# Patient Record
Sex: FEMALE | Race: WHITE | Employment: UNEMPLOYED | URBAN - METROPOLITAN AREA
[De-identification: names, ages, dates, MRNs, and addresses within clinical notes are randomized per-mention and may not be internally consistent; named-entity substitution may affect disease eponyms.]

---

## 2017-01-10 ENCOUNTER — TELEPHONE (OUTPATIENT)
Dept: NEUROLOGY | Facility: CLINIC | Age: 57
End: 2017-01-10

## 2017-01-10 DIAGNOSIS — M54.16 LUMBAR RADICULOPATHY: ICD-10-CM

## 2017-01-10 DIAGNOSIS — M48.061 SPINAL STENOSIS, LUMBAR REGION, WITHOUT NEUROGENIC CLAUDICATION: ICD-10-CM

## 2017-01-10 DIAGNOSIS — M51.9 LUMBAR DISC DISEASE: ICD-10-CM

## 2017-01-10 DIAGNOSIS — M43.10 ACQUIRED SPONDYLOLISTHESIS: Primary | ICD-10-CM

## 2017-01-10 RX ORDER — HYDROCODONE BITARTRATE AND ACETAMINOPHEN 5; 325 MG/1; MG/1
1 TABLET ORAL EVERY 4 HOURS PRN
Qty: 180 TABLET | Refills: 0 | Status: SHIPPED | OUTPATIENT
Start: 2017-01-10 | End: 2017-03-07

## 2017-01-10 NOTE — TELEPHONE ENCOUNTER
Drug Norco 5-325mg #180-30 refill 0    Last refill 11/16/2016    Last office visit 09/22/16    Next appointment 03/23/17    [place]

## 2017-01-10 NOTE — TELEPHONE ENCOUNTER
Patient has been scheduled for a bilateral L5 TFESI  on 01/27/17 at the [facility]. Medications and allergies reviewed. Patient informed to hold aspirins, nsaids, blood thinners, vitamins and fish oils 5-7 days prior to procedure.  Patient informed we will need v

## 2017-01-10 NOTE — TELEPHONE ENCOUNTER
Drug Norco 5-325mg    Last refill 11/16/26    Last office visit    Next appointment    [place]

## 2017-01-16 ENCOUNTER — TELEPHONE (OUTPATIENT)
Dept: NEUROLOGY | Facility: CLINIC | Age: 57
End: 2017-01-16

## 2017-01-16 NOTE — TELEPHONE ENCOUNTER
Called [facility] [phone] for Authorization of Approval for Bilateral L-5 CPT codes 18448-50 / 69227-23 if needed, t/t Moises Rosenthal, stated No Auth needed with   Ref #[id number] Patient is scheduled for her injection procedure 01/27/2017

## 2017-01-19 ENCOUNTER — TELEPHONE (OUTPATIENT)
Dept: NEUROLOGY | Facility: CLINIC | Age: 57
End: 2017-01-19

## 2017-01-24 ENCOUNTER — TELEPHONE (OUTPATIENT)
Dept: NEUROLOGY | Facility: CLINIC | Age: 57
End: 2017-01-24

## 2017-01-24 RX ORDER — GABAPENTIN 400 MG/1
400 CAPSULE ORAL 3 TIMES DAILY
Qty: 270 CAPSULE | Refills: 0 | Status: SHIPPED | OUTPATIENT
Start: 2017-01-24 | End: 2017-04-10

## 2017-01-24 NOTE — TELEPHONE ENCOUNTER
Medication request: Gabapentin 400mg tid (requesting 3 month supply)    LOV 9/22/16  NOV 3/23/17 (pt is scheduled for bilateral L5 TFESIs on 1/27/17)    Last refill: 10/4/16 #180  Patient increased Gabapentin 400mg bid to tid in November 2016.     Per Dr. Foreman Och

## 2017-01-27 ENCOUNTER — OFFICE VISIT (OUTPATIENT)
Dept: SURGERY | Facility: CLINIC | Age: 57
End: 2017-01-27

## 2017-01-27 DIAGNOSIS — M51.9 LUMBAR DISC DISEASE: ICD-10-CM

## 2017-01-27 DIAGNOSIS — M54.16 LUMBAR RADICULOPATHY: Primary | ICD-10-CM

## 2017-01-27 DIAGNOSIS — M96.1 POSTLAMINECTOMY SYNDROME, LUMBAR REGION: ICD-10-CM

## 2017-01-27 PROCEDURE — 64483 NJX AA&/STRD TFRM EPI L/S 1: CPT | Performed by: PHYSICAL MEDICINE & REHABILITATION

## 2017-01-27 NOTE — PROCEDURES
Francisco Jamil [address].    BILATERAL LUMBAR TRANSFORAMINAL   NAME:  Liseth Castaneda    MR #:    [id number] :  1960     PHYSICIAN:  Minor Saunders        Operative Report    DATE OF PROCEDURE: 2017   PREOPERATIVE DIAGNOSES: 1. right L Then, the patient was injected with a 3 cc solution of 1.5 cc of 40 mg/cc of Kenalog and 1.5 cc of 1% PF lidocaine without epinephrine. After this, the needle was removed.   Then  fluoroscopy was right anterior obliqued opening up the left L5-S1 interverte

## 2017-03-07 RX ORDER — HYDROCODONE BITARTRATE AND ACETAMINOPHEN 5; 325 MG/1; MG/1
1 TABLET ORAL EVERY 4 HOURS PRN
Qty: 180 TABLET | Refills: 0 | Status: SHIPPED | OUTPATIENT
Start: 2017-03-07 | End: 2017-05-02

## 2017-03-07 NOTE — TELEPHONE ENCOUNTER
Medication request: Norco 5-325mg q4h prn pain    LOV 9/22/16 (pt had bilateral L5 TFESI 1/27/17)  Krystal Brow 3/23/17    [place] 1/13/17 #180 (pt refills every 2 months)

## 2017-03-23 ENCOUNTER — OFFICE VISIT (OUTPATIENT)
Dept: NEUROLOGY | Facility: CLINIC | Age: 57
End: 2017-03-23

## 2017-03-23 VITALS
WEIGHT: 225 LBS | RESPIRATION RATE: 16 BRPM | DIASTOLIC BLOOD PRESSURE: 84 MMHG | HEIGHT: 67 IN | HEART RATE: 65 BPM | OXYGEN SATURATION: 95 % | BODY MASS INDEX: 35.31 KG/M2 | SYSTOLIC BLOOD PRESSURE: 138 MMHG

## 2017-03-23 DIAGNOSIS — M76.61 TENDONITIS, ACHILLES, RIGHT: ICD-10-CM

## 2017-03-23 DIAGNOSIS — M54.16 LUMBAR RADICULOPATHY: ICD-10-CM

## 2017-03-23 DIAGNOSIS — M48.061 SPINAL STENOSIS, LUMBAR REGION, WITHOUT NEUROGENIC CLAUDICATION: ICD-10-CM

## 2017-03-23 DIAGNOSIS — M96.1 POSTLAMINECTOMY SYNDROME, LUMBAR REGION: ICD-10-CM

## 2017-03-23 DIAGNOSIS — M51.9 LUMBAR DISC DISEASE: ICD-10-CM

## 2017-03-23 DIAGNOSIS — M43.10 ACQUIRED SPONDYLOLISTHESIS: Primary | ICD-10-CM

## 2017-03-23 PROCEDURE — 99214 OFFICE O/P EST MOD 30 MIN: CPT | Performed by: PHYSICAL MEDICINE & REHABILITATION

## 2017-03-23 NOTE — PATIENT INSTRUCTIONS
Plan  The patient does not need any injections at this time. The patient will continue with their current pain medications.     The patient will continue with her home exercise program.    She will follow up in 1 year unless there are any changes in he

## 2017-03-23 NOTE — PROGRESS NOTES
Low Back Pain H & P    Chief Complaint: Patient presents with: Follow - Up: LOV 9/22/16. Lumbar injection 1/2017. Pt stated that she got 75% relief of symptoms. Hep being done,. Pt taking Norco and neurontin. Pt continues to have right lower back pain. • Cancer Father    • Diabetes Mother    • Other[other] [OTHER] Mother      Parkinson       Social History     Social History   Marital Status:   Spouse Name: N/A    Years of Education: N/A  Number of Children: N/A     Occupational History  None o posterior pulse-RIGHT 2+   Tibialis posterior pulse-LEFT 2+     Neurological Lower Extremity:    Light Touch Sensation: Intact in bilateral Lower Extremities   LE Muscle Strength:  All LE strength measurements 5/5  Hamstring RIGHT:   4/5   RIGHT plantar ref

## 2017-04-10 ENCOUNTER — TELEPHONE (OUTPATIENT)
Dept: NEUROLOGY | Facility: CLINIC | Age: 57
End: 2017-04-10

## 2017-04-10 NOTE — TELEPHONE ENCOUNTER
Medication request: Gabapentin 400mg tid    LOV 3/23/17 (f/u in one year)  No follow up scheduled    Last refill: 1/24/17 #561

## 2017-04-11 RX ORDER — GABAPENTIN 400 MG/1
400 CAPSULE ORAL 3 TIMES DAILY
Qty: 270 CAPSULE | Refills: 1 | Status: SHIPPED | OUTPATIENT
Start: 2017-04-24 | End: 2017-04-12

## 2017-04-12 RX ORDER — GABAPENTIN 400 MG/1
400 CAPSULE ORAL 3 TIMES DAILY
Qty: 270 CAPSULE | Refills: 1 | Status: SHIPPED | OUTPATIENT
Start: 2017-04-24 | End: 2018-05-01

## 2017-04-12 NOTE — TELEPHONE ENCOUNTER
Nick Gearing at [facility] in [facility] and cancelled Gabapentin 400mg tid #270 r-1. Prescription Gabapentin 400mg tid #270 r-1 e-scribed to preferred pharmacy ([facility] in [facility]) as requested.

## 2017-04-12 NOTE — TELEPHONE ENCOUNTER
Doc Daija would like her gabapentin resent to a new pharmacy just added to her chart so she can get a 90 day supply.   Please send to [facility] in [place], [place]

## 2017-05-02 ENCOUNTER — TELEPHONE (OUTPATIENT)
Dept: NEUROLOGY | Facility: CLINIC | Age: 57
End: 2017-05-02

## 2017-05-02 RX ORDER — HYDROCODONE BITARTRATE AND ACETAMINOPHEN 5; 325 MG/1; MG/1
1 TABLET ORAL EVERY 4 HOURS PRN
Qty: 180 TABLET | Refills: 0 | Status: SHIPPED | OUTPATIENT
Start: 2017-05-02 | End: 2017-06-28

## 2017-05-02 RX ORDER — METHYLPREDNISOLONE 4 MG/1
TABLET ORAL
Qty: 1 PACKAGE | Refills: 0 | Status: SHIPPED | OUTPATIENT
Start: 2017-05-02 | End: 2018-02-26

## 2017-05-02 RX ORDER — METHYLPREDNISOLONE 4 MG/1
TABLET ORAL
Qty: 1 PACKAGE | Refills: 0 | Status: SHIPPED | OUTPATIENT
Start: 2017-05-02 | End: 2018-01-11

## 2017-05-02 NOTE — TELEPHONE ENCOUNTER
Pt called Rx sent to [facility] per pt request.Drug Norco 5-325mg q 4hr prn #180-30. 0 refill    [place] Roldan Morales rx #[id number] filled 1/10/17 no record of fill [facility] [place].     Last office visit 03/23/17    Next appointment none    Pt h

## 2017-05-02 NOTE — TELEPHONE ENCOUNTER
Recalled pt. Pt c/o of low back pain across both buttock going into thigh bilateral no further down leg. Pt stated that in pass Dr Judy Velasquez gave medrol pack and pt had good relief of symptoms with steroid. Last medrol 4mg domingo given in Dec 2016.  Pt also asking

## 2017-05-17 ENCOUNTER — TELEPHONE (OUTPATIENT)
Dept: NEUROLOGY | Facility: CLINIC | Age: 57
End: 2017-05-17

## 2017-05-17 DIAGNOSIS — M48.061 SPINAL STENOSIS, LUMBAR REGION, WITHOUT NEUROGENIC CLAUDICATION: ICD-10-CM

## 2017-05-17 DIAGNOSIS — M96.1 POSTLAMINECTOMY SYNDROME, LUMBAR REGION: ICD-10-CM

## 2017-05-17 DIAGNOSIS — M43.10 ACQUIRED SPONDYLOLISTHESIS: ICD-10-CM

## 2017-05-17 DIAGNOSIS — M54.16 LUMBAR RADICULOPATHY: Primary | ICD-10-CM

## 2017-05-17 NOTE — TELEPHONE ENCOUNTER
Patient states she is experiencing lower back pain bilaterally radiating in both buttocks. Patient has been off work 2 days due to pain. Patient will occasionally have some superficial numbness in the left thigh.  Pain score of 6-8/10 at worst. Taking Norco

## 2017-05-18 NOTE — TELEPHONE ENCOUNTER
Patient has been scheduled for a bilateral L5 TFESIs  on 6/2/17 at the [facility]. Medications and allergies reviewed. Patient informed to hold aspirins, nsaids, blood thinners, vitamins and fish oils 5-7 days prior to procedure.  Patient informed we will need ve

## 2017-05-18 NOTE — TELEPHONE ENCOUNTER
Called [facility] BS for authorization of approval of bilateral L5 TFESIs cpt codes 21626-61,19635. Talked to [address].  who states no authorization is required. Reference # [id number]. Will inform Nursing.

## 2017-06-02 ENCOUNTER — OFFICE VISIT (OUTPATIENT)
Dept: SURGERY | Facility: CLINIC | Age: 57
End: 2017-06-02

## 2017-06-02 DIAGNOSIS — M96.1 POSTLAMINECTOMY SYNDROME, LUMBAR REGION: ICD-10-CM

## 2017-06-02 DIAGNOSIS — M51.9 LUMBAR DISC DISEASE: ICD-10-CM

## 2017-06-02 DIAGNOSIS — M54.16 LUMBAR RADICULOPATHY: Primary | ICD-10-CM

## 2017-06-02 PROCEDURE — 64483 NJX AA&/STRD TFRM EPI L/S 1: CPT | Performed by: PHYSICAL MEDICINE & REHABILITATION

## 2017-06-02 NOTE — PROCEDURES
Francisco CHANEY 7.    BILATERAL LUMBAR TRANSFORAMINAL   NAME:  Celi Guzman    MR #:    [id number] :  1960     PHYSICIAN:  Leslie Saunders        Operative Report    DATE OF PROCEDURE: 2017   PREOPERATIVE DIAGNOSES: 1. right L5 No blood, fluid, or air was aspirated. Then, the patient was injected with a 3 cc solution of 1.5 cc of 40 mg/cc of Kenalog and 1.5 cc of 1% PF lidocaine without epinephrine. After this, the needle was removed.   Then  fluoroscopy was right anterior obliq

## 2017-06-28 NOTE — TELEPHONE ENCOUNTER
TANIKA refill policy is 12-81 business hours for controlled medications    Medication request: Norco 5-325mg q4h prn pain    LOV 3/23/17 -had bilateral L5 TFESIs on 6/2/17  NOV 9/14/17    [place] 5/5/17 #180

## 2017-06-29 RX ORDER — HYDROCODONE BITARTRATE AND ACETAMINOPHEN 5; 325 MG/1; MG/1
1 TABLET ORAL EVERY 4 HOURS PRN
Qty: 180 TABLET | Refills: 0 | Status: SHIPPED | OUTPATIENT
Start: 2017-06-29 | End: 2017-08-28

## 2017-08-28 ENCOUNTER — TELEPHONE (OUTPATIENT)
Dept: NEUROLOGY | Facility: CLINIC | Age: 57
End: 2017-08-28

## 2017-08-28 RX ORDER — HYDROCODONE BITARTRATE AND ACETAMINOPHEN 5; 325 MG/1; MG/1
1 TABLET ORAL EVERY 4 HOURS PRN
Qty: 180 TABLET | Refills: 0 | Status: SHIPPED | OUTPATIENT
Start: 2017-08-28 | End: 2017-10-20

## 2017-08-28 NOTE — TELEPHONE ENCOUNTER
Patient requesting refill on Norco 5-325 mg  Checked [facility] [place] last filled was 630/2017  LOV 6/2/2017  NOV 9/14/2017  Rx printed and signed and left at desk for   Last office note was to continue same current medications

## 2017-08-29 ENCOUNTER — TELEPHONE (OUTPATIENT)
Dept: NEUROLOGY | Facility: CLINIC | Age: 57
End: 2017-08-29

## 2017-09-08 NOTE — TELEPHONE ENCOUNTER
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 Aneta Hale.

## 2017-09-13 ENCOUNTER — CHARTING TRANS (OUTPATIENT)
Dept: OTHER | Age: 57
End: 2017-09-13

## 2017-09-14 ENCOUNTER — OFFICE VISIT (OUTPATIENT)
Dept: NEUROLOGY | Facility: CLINIC | Age: 57
End: 2017-09-14

## 2017-09-14 ENCOUNTER — TELEPHONE (OUTPATIENT)
Dept: NEUROLOGY | Facility: CLINIC | Age: 57
End: 2017-09-14

## 2017-09-14 VITALS
HEIGHT: 67 IN | SYSTOLIC BLOOD PRESSURE: 134 MMHG | HEART RATE: 78 BPM | WEIGHT: 225 LBS | BODY MASS INDEX: 35.31 KG/M2 | DIASTOLIC BLOOD PRESSURE: 82 MMHG

## 2017-09-14 DIAGNOSIS — M54.16 LUMBAR RADICULOPATHY: Primary | ICD-10-CM

## 2017-09-14 DIAGNOSIS — M43.10 ACQUIRED SPONDYLOLISTHESIS: ICD-10-CM

## 2017-09-14 DIAGNOSIS — M48.061 SPINAL STENOSIS, LUMBAR REGION, WITHOUT NEUROGENIC CLAUDICATION: ICD-10-CM

## 2017-09-14 DIAGNOSIS — M51.9 LUMBAR DISC DISEASE: ICD-10-CM

## 2017-09-14 DIAGNOSIS — M96.1 POSTLAMINECTOMY SYNDROME, LUMBAR REGION: ICD-10-CM

## 2017-09-14 PROCEDURE — 99214 OFFICE O/P EST MOD 30 MIN: CPT | Performed by: PHYSICAL MEDICINE & REHABILITATION

## 2017-09-14 RX ORDER — VALSARTAN 160 MG/1
160 TABLET ORAL DAILY
COMMUNITY
End: 2018-05-01

## 2017-09-14 NOTE — TELEPHONE ENCOUNTER
Left detailed message informing patient of approval of bilateral L5 TFESIs and to call back when ready to schedule.  (HIPAA verified)

## 2017-09-14 NOTE — TELEPHONE ENCOUNTER
Called [facility] BS for authorization of approval of bilateral L5 TFESIs cpt codes I4273813, [id number]. Talked to [facility].      who states no authorization is required. Reference #      [id number] inform Nursing.

## 2017-09-14 NOTE — PATIENT INSTRUCTIONS
Refill policies:    • Allow 2 business days for refills; controlled substances may take longer.   • Contact your pharmacy at least 5 days prior to running out of medication and have them send an electronic request or submit request through the “request re your physician has recommended that you have a procedure or additional testing performed. [facility] will contact your insurance carrier to obtain pre-certification or prior authorization.     Unfortunately, TANIKA has seen an increas

## 2017-09-14 NOTE — TELEPHONE ENCOUNTER
Called [facility] BS for authorization of approval of bilateral L5 TFESIs cpt codes U6285692, Z2155632. Talked to [facility]. Who states no authorization is required. Reference # [id number]. Will inform Nursing.

## 2017-09-14 NOTE — PROGRESS NOTES
Low Back Pain H & P    Chief Complaint: Patient presents with:  Low Back Pain: LOV 03/23/17. Pt had bilateral L5 TFESI 06/02/17 with 80% relief lasting appro 3 months symptoms are now slowly reoccuring.   Pain low back bilaterally worse on left travels down tobacco: Never Used    Alcohol use Yes  0.0 oz/week     Comment: 1 drink every other month on average.     Drug use: No    Sexual activity: Not on file     Other Topics Concern    Caffeine Concern Yes    Comment: 2 cups coffee daily    Exercise Yes    Comme 2. s/p left L4-5 laminectomy    3. L4-5 bilateral mild, L3-4 right mild foraminal stenosis    4. L4-5 grade 1-2 unstable spondylolisthesis    5.  L4-5 right mild/left mod diffuse, L5-S1 right mild, L3-4 right mild-mod/left mild far lateral, L2-3 right mil

## 2017-09-22 ENCOUNTER — MED REC SCAN ONLY (OUTPATIENT)
Dept: NEUROLOGY | Facility: CLINIC | Age: 57
End: 2017-09-22

## 2017-09-22 NOTE — TELEPHONE ENCOUNTER
Forms completed and emailed to patient at her request at [email or web address]    Copy sent to scanning

## 2017-09-29 ENCOUNTER — TELEPHONE (OUTPATIENT)
Dept: NEUROLOGY | Facility: CLINIC | Age: 57
End: 2017-09-29

## 2017-09-29 NOTE — TELEPHONE ENCOUNTER
Patient has been scheduled for a bilateral L5 TFESI  on 10/20/17 at the [place]. Medications and allergies reviewed. Patient informed to hold aspirins, nsaids, blood thinners, vitamins and fish oils 5-7 days prior to procedure.  Patient informed we will need v

## 2017-10-20 ENCOUNTER — APPOINTMENT (OUTPATIENT)
Dept: SURGERY | Facility: CLINIC | Age: 57
End: 2017-10-20

## 2017-10-20 DIAGNOSIS — M51.9 LUMBAR DISC DISEASE: ICD-10-CM

## 2017-10-20 DIAGNOSIS — M96.1 POSTLAMINECTOMY SYNDROME, LUMBAR REGION: ICD-10-CM

## 2017-10-20 DIAGNOSIS — M54.16 LUMBAR RADICULOPATHY: Primary | ICD-10-CM

## 2017-10-20 PROCEDURE — 64483 NJX AA&/STRD TFRM EPI L/S 1: CPT | Performed by: PHYSICAL MEDICINE & REHABILITATION

## 2017-10-20 NOTE — PROCEDURES
Francisco CHANEY 7.    BILATERAL LUMBAR TRANSFORAMINAL   NAME:  Amado Bragg    MR #:    [id number] :  1960     PHYSICIAN:  Renan Saunders        Operative Report    DATE OF PROCEDURE: 10/20/2017   PREOPERATIVE DIAGNOSES: 1. Bilate injected with a 3 cc solution of 1.5 cc of 40 mg/cc of Kenalog and 1.5 cc of 1% PF lidocaine without epinephrine. After this, the needle was removed. Then  fluoroscopy was right anterior obliqued opening up the left L5-S1 intervertebral foramen.   At this

## 2017-10-20 NOTE — TELEPHONE ENCOUNTER
Drug Norco 5-325mg q 4hr prn pain #180-30 0 refill    Last refill 08/28/17    Last office visit 09/14/17    Next appointment    [place]

## 2017-10-22 RX ORDER — HYDROCODONE BITARTRATE AND ACETAMINOPHEN 5; 325 MG/1; MG/1
1 TABLET ORAL EVERY 4 HOURS PRN
Qty: 180 TABLET | Refills: 0 | Status: SHIPPED | OUTPATIENT
Start: 2017-10-22 | End: 2017-12-27

## 2017-10-24 ENCOUNTER — DIAGNOSTIC TRANS (OUTPATIENT)
Dept: OTHER | Age: 57
End: 2017-10-24

## 2017-10-24 ENCOUNTER — HOSPITAL (OUTPATIENT)
Dept: OTHER | Age: 57
End: 2017-10-24
Attending: SURGERY

## 2017-10-30 ENCOUNTER — MED REC SCAN ONLY (OUTPATIENT)
Dept: NEUROLOGY | Facility: CLINIC | Age: 57
End: 2017-10-30

## 2017-10-30 ENCOUNTER — TELEPHONE (OUTPATIENT)
Dept: NEUROLOGY | Facility: CLINIC | Age: 57
End: 2017-10-30

## 2017-10-30 NOTE — TELEPHONE ENCOUNTER
Left message information added to [facility] paperwork signed by Dr Teodora Rapp 9/20/17. Copy to scanning and copy mailed to patient home address. Unable to e-mail that patient requested.

## 2017-11-14 ENCOUNTER — IMAGING SERVICES (OUTPATIENT)
Dept: OTHER | Age: 57
End: 2017-11-14

## 2017-11-14 ENCOUNTER — CHARTING TRANS (OUTPATIENT)
Dept: OTHER | Age: 57
End: 2017-11-14

## 2017-11-14 ASSESSMENT — PAIN SCALES - GENERAL: PAINLEVEL_OUTOF10: 0

## 2017-11-20 ENCOUNTER — HOSPITAL (OUTPATIENT)
Dept: OTHER | Age: 57
End: 2017-11-20
Attending: SURGERY

## 2017-11-20 ENCOUNTER — CHARTING TRANS (OUTPATIENT)
Dept: OTHER | Age: 57
End: 2017-11-20

## 2017-11-20 LAB
ALBUMIN SERPL-MCNC: 3 GM/DL (ref 3.6–5.1)
ALBUMIN/GLOB SERPL: 0.9 {RATIO} (ref 1–2.4)
ALP SERPL-CCNC: 58 UNIT/L (ref 45–117)
ALT SERPL-CCNC: 71 UNIT/L
ANALYZER ANC (IANC): ABNORMAL
ANION GAP SERPL CALC-SCNC: 10 MMOL/L (ref 10–20)
APTT PPP: 22 SECONDS (ref 22–30)
APTT PPP: NORMAL S
AST SERPL-CCNC: 66 UNIT/L
BASE DEFICIT BLDA-SCNC: ABNORMAL MMOL/L
BASE EXCESS BLDA CALC-SCNC: 2 MMOL/L (ref 0–3)
BASOPHILS # BLD: 0 THOUSAND/MCL (ref 0–0.3)
BASOPHILS NFR BLD: 0 %
BDY SITE: ABNORMAL
BILIRUB SERPL-MCNC: 0.8 MG/DL (ref 0.2–1)
BODY TEMPERATURE: 36.2 DEGREES
BUN SERPL-MCNC: 13 MG/DL (ref 6–20)
BUN/CREAT SERPL: 17 (ref 7–25)
CA-I BLDA-SCNC: 16 % (ref 15–23)
CALCIUM SERPL-MCNC: 8.8 MG/DL (ref 8.4–10.2)
CHLORIDE: 106 MMOL/L (ref 98–107)
CO2 SERPL-SCNC: 30 MMOL/L (ref 21–32)
COHGB MFR BLD: 2.5 %
CONDITION: ABNORMAL
CONDITION: ABNORMAL
CREAT SERPL-MCNC: 0.78 MG/DL (ref 0.51–0.95)
DIFFERENTIAL METHOD BLD: ABNORMAL
EOSINOPHIL # BLD: 0 THOUSAND/MCL (ref 0.1–0.5)
EOSINOPHIL NFR BLD: 0 %
ERYTHROCYTE [DISTWIDTH] IN BLOOD: 14.3 % (ref 11–15)
GLOBULIN SER-MCNC: 3.3 GM/DL (ref 2–4)
GLUCOSE BLDC GLUCOMTR-MCNC: 153 MG/DL (ref 65–99)
GLUCOSE BLDC GLUCOMTR-MCNC: 84 MG/DL (ref 65–99)
GLUCOSE SERPL-MCNC: 150 MG/DL (ref 65–99)
HCO3 BLDA-SCNC: 28 MMOL/L (ref 22–28)
HCT VFR BLD CALC: 41 % (ref 36–46.5)
HEMATOCRIT: 39.9 % (ref 36–46.5)
HGB BLD-MCNC: 13.6 GM/DL (ref 12–15.5)
HGB BLD-MCNC: 13.8 GM/DL (ref 12–15.5)
HOROWITZ INDEX BLD+IHG-RTO: ABNORMAL MM[HG]
INR PPP: 1
LYMPHOCYTES # BLD: 1.1 THOUSAND/MCL (ref 1–4)
LYMPHOCYTES NFR BLD: 5 %
MAGNESIUM SERPL-MCNC: 2 MG/DL (ref 1.7–2.4)
MCH RBC QN AUTO: 31.5 PG (ref 26–34)
MCHC RBC AUTO-ENTMCNC: 34.6 GM/DL (ref 32–36.5)
MCV RBC AUTO: 91.1 FL (ref 78–100)
METHGB MFR BLD: 1.5 %
MONOCYTES # BLD: 1 THOUSAND/MCL (ref 0.3–0.9)
MONOCYTES NFR BLD: 5 %
NEUTROPHILS # BLD: 17.9 THOUSAND/MCL (ref 1.8–7.7)
NEUTROPHILS NFR BLD: 90 %
NEUTS SEG NFR BLD: ABNORMAL %
OXYHGB MFR BLD: 86.2 % (ref 94–98)
PCO2 BLDA: 45 MM HG (ref 32–45)
PERCENT NRBC: ABNORMAL
PH BLDA: 7.39 UNIT (ref 7.35–7.45)
PHOSPHATE SERPL-MCNC: 4.8 MG/DL (ref 2.4–4.7)
PLATELET # BLD: 299 THOUSAND/MCL (ref 140–450)
PO2 BLDA: 47 MM HG (ref 83–108)
POTASSIUM BLD-SCNC: 2.9 MMOL/L (ref 3.4–5.1)
POTASSIUM SERPL-SCNC: 3.3 MMOL/L (ref 3.4–5.1)
PROT SERPL-MCNC: 6.3 GM/DL (ref 6.4–8.2)
PROTHROMBIN TIME: 11 SECONDS (ref 9.7–11.8)
PROTHROMBIN TIME: NORMAL
RBC # BLD: 4.38 MILLION/MCL (ref 4–5.2)
SAO2 % BLDA: 90 % (ref 95–99)
SODIUM BLD-SCNC: 136 MMOL/L (ref 135–145)
SODIUM SERPL-SCNC: 143 MMOL/L (ref 135–145)
WBC # BLD: 19.9 THOUSAND/MCL (ref 4.2–11)

## 2017-11-21 LAB
ALBUMIN SERPL-MCNC: 2.6 GM/DL (ref 3.6–5.1)
ALP SERPL-CCNC: 55 UNIT/L (ref 45–117)
ALT SERPL-CCNC: 71 UNIT/L
ANALYZER ANC (IANC): ABNORMAL
ANION GAP SERPL CALC-SCNC: 9 MMOL/L (ref 10–20)
AST SERPL-CCNC: 58 UNIT/L
BILIRUB CONJ SERPL-MCNC: 0.2 MG/DL (ref 0–0.2)
BILIRUB SERPL-MCNC: 0.8 MG/DL (ref 0.2–1)
BUN SERPL-MCNC: 11 MG/DL (ref 6–20)
BUN/CREAT SERPL: 16 (ref 7–25)
CALCIUM SERPL-MCNC: 8.5 MG/DL (ref 8.4–10.2)
CHLORIDE: 107 MMOL/L (ref 98–107)
CO2 SERPL-SCNC: 30 MMOL/L (ref 21–32)
CREAT SERPL-MCNC: 0.68 MG/DL (ref 0.51–0.95)
ERYTHROCYTE [DISTWIDTH] IN BLOOD: 14.8 % (ref 11–15)
GLUCOSE BLDC GLUCOMTR-MCNC: 108 MG/DL (ref 65–99)
GLUCOSE BLDC GLUCOMTR-MCNC: 126 MG/DL (ref 65–99)
GLUCOSE BLDC GLUCOMTR-MCNC: 85 MG/DL (ref 65–99)
GLUCOSE BLDC GLUCOMTR-MCNC: 95 MG/DL (ref 65–99)
GLUCOSE SERPL-MCNC: 124 MG/DL (ref 65–99)
HEMATOCRIT: 38.1 % (ref 36–46.5)
HGB BLD-MCNC: 13 GM/DL (ref 12–15.5)
MAGNESIUM SERPL-MCNC: 1.8 MG/DL (ref 1.7–2.4)
MCH RBC QN AUTO: 31.8 PG (ref 26–34)
MCHC RBC AUTO-ENTMCNC: 34.1 GM/DL (ref 32–36.5)
MCV RBC AUTO: 93.2 FL (ref 78–100)
PHOSPHATE SERPL-MCNC: 2.7 MG/DL (ref 2.4–4.7)
PLATELET # BLD: 328 THOUSAND/MCL (ref 140–450)
POTASSIUM SERPL-SCNC: 4.3 MMOL/L (ref 3.4–5.1)
PROT SERPL-MCNC: 6.1 GM/DL (ref 6.4–8.2)
RBC # BLD: 4.09 MILLION/MCL (ref 4–5.2)
SODIUM SERPL-SCNC: 142 MMOL/L (ref 135–145)
WBC # BLD: 16.3 THOUSAND/MCL (ref 4.2–11)

## 2017-11-22 LAB
ALBUMIN SERPL-MCNC: 2.7 GM/DL (ref 3.6–5.1)
ALBUMIN/GLOB SERPL: 0.7 {RATIO} (ref 1–2.4)
ALP SERPL-CCNC: 61 UNIT/L (ref 45–117)
ALT SERPL-CCNC: 66 UNIT/L
ANALYZER ANC (IANC): ABNORMAL
ANION GAP SERPL CALC-SCNC: 10 MMOL/L (ref 10–20)
AST SERPL-CCNC: 42 UNIT/L
BASOPHILS # BLD: 0 THOUSAND/MCL (ref 0–0.3)
BASOPHILS NFR BLD: 0 %
BILIRUB SERPL-MCNC: 1.1 MG/DL (ref 0.2–1)
BUN SERPL-MCNC: 7 MG/DL (ref 6–20)
BUN/CREAT SERPL: 12 (ref 7–25)
CALCIUM SERPL-MCNC: 8.6 MG/DL (ref 8.4–10.2)
CHLORIDE: 104 MMOL/L (ref 98–107)
CO2 SERPL-SCNC: 31 MMOL/L (ref 21–32)
CREAT SERPL-MCNC: 0.59 MG/DL (ref 0.51–0.95)
DIFFERENTIAL METHOD BLD: ABNORMAL
EOSINOPHIL # BLD: 0.1 THOUSAND/MCL (ref 0.1–0.5)
EOSINOPHIL NFR BLD: 1 %
ERYTHROCYTE [DISTWIDTH] IN BLOOD: 14.4 % (ref 11–15)
GLOBULIN SER-MCNC: 3.8 GM/DL (ref 2–4)
GLUCOSE BLDC GLUCOMTR-MCNC: 102 MG/DL (ref 65–99)
GLUCOSE BLDC GLUCOMTR-MCNC: 93 MG/DL (ref 65–99)
GLUCOSE SERPL-MCNC: 95 MG/DL (ref 65–99)
HEMATOCRIT: 39.1 % (ref 36–46.5)
HGB BLD-MCNC: 12.9 GM/DL (ref 12–15.5)
LYMPHOCYTES # BLD: 1.6 THOUSAND/MCL (ref 1–4)
LYMPHOCYTES NFR BLD: 14 %
MAGNESIUM SERPL-MCNC: 2 MG/DL (ref 1.7–2.4)
MCH RBC QN AUTO: 30.9 PG (ref 26–34)
MCHC RBC AUTO-ENTMCNC: 33 GM/DL (ref 32–36.5)
MCV RBC AUTO: 93.5 FL (ref 78–100)
MONOCYTES # BLD: 1 THOUSAND/MCL (ref 0.3–0.9)
MONOCYTES NFR BLD: 9 %
NEUTROPHILS # BLD: 8.2 THOUSAND/MCL (ref 1.8–7.7)
NEUTROPHILS NFR BLD: 76 %
NEUTS SEG NFR BLD: ABNORMAL %
PERCENT NRBC: ABNORMAL
PHOSPHATE SERPL-MCNC: 2.6 MG/DL (ref 2.4–4.7)
PLATELET # BLD: 297 THOUSAND/MCL (ref 140–450)
POTASSIUM SERPL-SCNC: 3.7 MMOL/L (ref 3.4–5.1)
PROT SERPL-MCNC: 6.5 GM/DL (ref 6.4–8.2)
RBC # BLD: 4.18 MILLION/MCL (ref 4–5.2)
SODIUM SERPL-SCNC: 141 MMOL/L (ref 135–145)
WBC # BLD: 10.9 THOUSAND/MCL (ref 4.2–11)

## 2017-12-27 ENCOUNTER — TELEPHONE (OUTPATIENT)
Dept: NEUROLOGY | Facility: CLINIC | Age: 57
End: 2017-12-27

## 2017-12-27 NOTE — TELEPHONE ENCOUNTER
Drug Norco 5-325mg q6hr prn pain #180-0 0-refill    Last refill 11/15/17    Last office visit 09/14/17    Next appointment 02/26/18    [place]  Pt had  Gastric bypass surgery by Dr Vinod Stewart in November. Brandon Pena was order by Dr Vinod Stewart.

## 2017-12-29 RX ORDER — HYDROCODONE BITARTRATE AND ACETAMINOPHEN 5; 325 MG/1; MG/1
1 TABLET ORAL EVERY 4 HOURS PRN
Qty: 180 TABLET | Refills: 0 | Status: SHIPPED | OUTPATIENT
Start: 2017-12-29 | End: 2018-02-26

## 2018-01-11 ENCOUNTER — TELEPHONE (OUTPATIENT)
Dept: NEUROLOGY | Facility: CLINIC | Age: 58
End: 2018-01-11

## 2018-01-11 RX ORDER — METHYLPREDNISOLONE 4 MG/1
TABLET ORAL
Qty: 1 PACKAGE | Refills: 0 | Status: SHIPPED | OUTPATIENT
Start: 2018-01-11 | End: 2018-02-26

## 2018-01-11 NOTE — TELEPHONE ENCOUNTER
Pt recalled stated that she was helping dtg move bookcase and pulled something in left lower back, pt feels it may be muscle. Pt is asking for medirol dose domingo for what she feels is muscle related. Norco is not relieving pain. Appro 2 tabs per day.  Per

## 2018-01-19 ENCOUNTER — TELEPHONE (OUTPATIENT)
Dept: NEUROLOGY | Facility: CLINIC | Age: 58
End: 2018-01-19

## 2018-01-23 ENCOUNTER — MED REC SCAN ONLY (OUTPATIENT)
Dept: NEUROLOGY | Facility: CLINIC | Age: 58
End: 2018-01-23

## 2018-02-26 ENCOUNTER — OFFICE VISIT (OUTPATIENT)
Dept: NEUROLOGY | Facility: CLINIC | Age: 58
End: 2018-02-26

## 2018-02-26 ENCOUNTER — TELEPHONE (OUTPATIENT)
Dept: NEUROLOGY | Facility: CLINIC | Age: 58
End: 2018-02-26

## 2018-02-26 VITALS
RESPIRATION RATE: 18 BRPM | SYSTOLIC BLOOD PRESSURE: 118 MMHG | DIASTOLIC BLOOD PRESSURE: 70 MMHG | WEIGHT: 210 LBS | HEIGHT: 67 IN | HEART RATE: 82 BPM | BODY MASS INDEX: 32.96 KG/M2

## 2018-02-26 DIAGNOSIS — M43.10 ACQUIRED SPONDYLOLISTHESIS: ICD-10-CM

## 2018-02-26 DIAGNOSIS — M51.9 LUMBAR DISC DISEASE: Primary | ICD-10-CM

## 2018-02-26 DIAGNOSIS — M48.061 SPINAL STENOSIS, LUMBAR REGION, WITHOUT NEUROGENIC CLAUDICATION: ICD-10-CM

## 2018-02-26 DIAGNOSIS — M96.1 POSTLAMINECTOMY SYNDROME, LUMBAR REGION: ICD-10-CM

## 2018-02-26 DIAGNOSIS — M76.61 TENDONITIS, ACHILLES, RIGHT: ICD-10-CM

## 2018-02-26 DIAGNOSIS — M54.16 LUMBAR RADICULOPATHY: ICD-10-CM

## 2018-02-26 PROCEDURE — 99214 OFFICE O/P EST MOD 30 MIN: CPT | Performed by: PHYSICAL MEDICINE & REHABILITATION

## 2018-02-26 RX ORDER — HYDROCODONE BITARTRATE AND ACETAMINOPHEN 5; 325 MG/1; MG/1
1 TABLET ORAL EVERY 4 HOURS PRN
Qty: 180 TABLET | Refills: 0 | Status: SHIPPED | OUTPATIENT
Start: 2018-02-26 | End: 2018-04-23

## 2018-02-26 RX ORDER — LORAZEPAM 1 MG/1
1 TABLET ORAL AS NEEDED
Qty: 2 TABLET | Refills: 0 | Status: SHIPPED | OUTPATIENT
Start: 2018-02-26 | End: 2018-08-09 | Stop reason: ALTCHOICE

## 2018-02-26 NOTE — TELEPHONE ENCOUNTER
Left detailed message informing patient of the denial for right achilles tendon platelet rich plasma injection. Patient notified to contact office if she would like to pay out of pocket $933.00 + proceed with scheduling PRP.-HIPAA verified.     Patient was

## 2018-02-26 NOTE — TELEPHONE ENCOUNTER
Called [facility] BS for authorization of approval of Right Achilles tendon platelet rich plasma injection cpt code 0232T. Talked to [address] who states it is NOT a covered benefit. Reference # [id number]. Will  inform Nursing.

## 2018-02-26 NOTE — PROGRESS NOTES
Low Back Pain H & P    Chief Complaint: Patient presents with:  Low Back Pain: Patient is here for f/u on lower back pain and right heels pain. sharp pain in lower back. Right heel is warm to touch constantly painful and difficult to bear weight.       Fanny Drug use: No    Sexual activity: Not on file     Other Topics Concern    Caffeine Concern Yes    Comment: 2 cups coffee daily    Exercise Yes    Comment: walk 1-3 miles per day. Social History Narrative    The patient does not use an assistive device. Roderick Baltazar the calcaneus    3. Bilateral L5 radiculopathies     4. L4-5 grade 1-2 unstable spondylolisthesis    5.  L4-5 bilateral mild, L3-4 right mild foraminal stenosis    6. s/p left L4-5 laminectomy      Plan  I will do a right Achilles tendon platelet rich plasm

## 2018-02-26 NOTE — PATIENT INSTRUCTIONS
As of October 6th 2014, the Drug Enforcement Agency [facility]) is reclassifying all hydrocodone combination medications from Schedule III to Schedule II. This includes medications such as Norco, Vicodin, Lortab, Zohydro, and Vicoprofen.     What this means for y will do a right Achilles tendon platelet rich plasma injection in the future. The patient will continue with her home exercise program.    She will trail Voltaren gel for the Achilles tendon pain. She does not need any lumbar ORLIN's at this time.     Bhavin Merino

## 2018-02-27 ENCOUNTER — MED REC SCAN ONLY (OUTPATIENT)
Dept: NEUROLOGY | Facility: CLINIC | Age: 58
End: 2018-02-27

## 2018-03-24 ENCOUNTER — CHARTING TRANS (OUTPATIENT)
Dept: OTHER | Age: 58
End: 2018-03-24

## 2018-03-24 ENCOUNTER — MYAURORA ACCOUNT LINK (OUTPATIENT)
Dept: OTHER | Age: 58
End: 2018-03-24

## 2018-03-24 ASSESSMENT — PAIN SCALES - GENERAL: PAINLEVEL_OUTOF10: 0

## 2018-03-30 ENCOUNTER — TELEPHONE (OUTPATIENT)
Dept: NEUROLOGY | Facility: CLINIC | Age: 58
End: 2018-03-30

## 2018-03-30 NOTE — TELEPHONE ENCOUNTER
Spoke to patient and informed her as soon as Dr. Margie Colon provides office his availability we will contact her to schedule appt for Right Achilles tendon platelet rich plasma injection under ultrasound guidance. Patient verbalized understanding.

## 2018-04-09 ENCOUNTER — TELEPHONE (OUTPATIENT)
Dept: NEUROLOGY | Facility: CLINIC | Age: 58
End: 2018-04-09

## 2018-04-12 ENCOUNTER — TELEPHONE (OUTPATIENT)
Dept: NEUROLOGY | Facility: CLINIC | Age: 58
End: 2018-04-12

## 2018-04-12 DIAGNOSIS — M51.9 LUMBAR DISC DISEASE: ICD-10-CM

## 2018-04-12 DIAGNOSIS — M96.1 POSTLAMINECTOMY SYNDROME, LUMBAR REGION: ICD-10-CM

## 2018-04-12 DIAGNOSIS — M54.16 LUMBAR RADICULOPATHY: Primary | ICD-10-CM

## 2018-04-12 NOTE — TELEPHONE ENCOUNTER
Spoke to patient, states she would like to have back injection prior to PRP for achilles tendon. Advise would forward to Dr Judy Velasquez and recontact her after order and authorization obtained. Expressed understanding.

## 2018-04-13 NOTE — TELEPHONE ENCOUNTER
Patient has been scheduled for a bilateral L5 TFESIs  on 4/24/18 at the [facility]. Medications and allergies reviewed. Patient informed to hold aspirins, nsaids, blood thinners, vitamins and fish oils 3-7 days prior to procedure.  Patient informed we will need v

## 2018-04-13 NOTE — TELEPHONE ENCOUNTER
Called [facility] BS for authorization of approval of bilateral L5 TFESIs cpt codes [id number], [id number]. Talked to Jeni Greer. who states no authorization is required. Reference # [id number] Will inform Nursing.

## 2018-04-23 RX ORDER — HYDROCODONE BITARTRATE AND ACETAMINOPHEN 5; 325 MG/1; MG/1
1 TABLET ORAL EVERY 4 HOURS PRN
Qty: 180 TABLET | Refills: 0 | Status: SHIPPED | OUTPATIENT
Start: 2018-04-23 | End: 2018-06-18

## 2018-04-23 NOTE — TELEPHONE ENCOUNTER
Refill request for norco 5/325 mg, take 1 tab every 4 hrs as needed, #180, no refills    LOV: 2/26/18  NOV: 5/1/18  Last [place] 3[place]1/18 [place]

## 2018-04-24 ENCOUNTER — APPOINTMENT (OUTPATIENT)
Dept: SURGERY | Facility: CLINIC | Age: 58
End: 2018-04-24

## 2018-04-24 DIAGNOSIS — M96.1 POSTLAMINECTOMY SYNDROME, LUMBAR REGION: ICD-10-CM

## 2018-04-24 DIAGNOSIS — M51.9 LUMBAR DISC DISEASE: ICD-10-CM

## 2018-04-24 DIAGNOSIS — M54.16 LUMBAR RADICULOPATHY: Primary | ICD-10-CM

## 2018-04-24 PROCEDURE — 64483 NJX AA&/STRD TFRM EPI L/S 1: CPT | Performed by: PHYSICAL MEDICINE & REHABILITATION

## 2018-04-24 NOTE — PROGRESS NOTES
Francisco CHANEY 7.    BILATERAL LUMBAR TRANSFORAMINAL   NAME:  Donnell Guerrero    MR #:    [id number] :  1960     PHYSICIAN:  Tyshawn Saunders        Operative Report    DATE OF PROCEDURE: 2018   PREOPERATIVE DIAGNOSES: 1. Bilater injected with a 3 cc solution of 1.5 cc of 40 mg/cc of Kenalog and 1.5 cc of 1% PF lidocaine without epinephrine. After this, the needle was removed. Then  fluoroscopy was right anterior obliqued opening up the left L5-S1 intervertebral foramen.   At this

## 2018-05-01 ENCOUNTER — OFFICE VISIT (OUTPATIENT)
Dept: NEUROLOGY | Facility: CLINIC | Age: 58
End: 2018-05-01

## 2018-05-01 VITALS
RESPIRATION RATE: 20 BRPM | SYSTOLIC BLOOD PRESSURE: 120 MMHG | WEIGHT: 190 LBS | DIASTOLIC BLOOD PRESSURE: 80 MMHG | HEART RATE: 66 BPM | HEIGHT: 67 IN | BODY MASS INDEX: 29.82 KG/M2

## 2018-05-01 DIAGNOSIS — M76.61 TENDONITIS, ACHILLES, RIGHT: ICD-10-CM

## 2018-05-01 DIAGNOSIS — M43.10 ACQUIRED SPONDYLOLISTHESIS: Primary | ICD-10-CM

## 2018-05-01 DIAGNOSIS — M48.061 SPINAL STENOSIS, LUMBAR REGION, WITHOUT NEUROGENIC CLAUDICATION: ICD-10-CM

## 2018-05-01 DIAGNOSIS — M54.16 LUMBAR RADICULOPATHY: ICD-10-CM

## 2018-05-01 DIAGNOSIS — M51.9 LUMBAR DISC DISEASE: ICD-10-CM

## 2018-05-01 DIAGNOSIS — M96.1 POSTLAMINECTOMY SYNDROME, LUMBAR REGION: ICD-10-CM

## 2018-05-01 PROCEDURE — 99214 OFFICE O/P EST MOD 30 MIN: CPT | Performed by: PHYSICAL MEDICINE & REHABILITATION

## 2018-05-01 RX ORDER — GABAPENTIN 400 MG/1
400 CAPSULE ORAL 3 TIMES DAILY
Qty: 90 CAPSULE | Refills: 11 | Status: SHIPPED | OUTPATIENT
Start: 2018-05-01 | End: 2019-03-13

## 2018-05-01 NOTE — PATIENT INSTRUCTIONS
As of October 6th 2014, the Drug Enforcement Agency [facility]) is reclassifying all hydrocodone combination medications from Schedule III to Schedule II. This includes medications such as Norco, Vicodin, Lortab, Zohydro, and Vicoprofen.     What this means for y

## 2018-05-01 NOTE — PROGRESS NOTES
Low Back Pain H & P    Chief Complaint: Patient presents with:  Low Back Pain: Patient presents for follow up on low back pain, had bilateral lumbar TSFEI 4/24/18. States her pain is better since the injection, has 50% relief from the injection.  Rated pain device. . Using boot right foot     The patient does live in a home with stairs. Review of Systems      PE: The patient does appear in her stated age in no distress. The patient is well groomed.     Psychiatric:  The patient is alert and oriented x 3 this time. She will do a few more sessions of the PT. She will continue with the Voltaren gel as needed. The patient will continue with their current pain medications. She will follow up in 3 months.     The patient understands and agrees with t

## 2018-05-03 RX ORDER — GABAPENTIN 400 MG/1
400 CAPSULE ORAL 3 TIMES DAILY
Qty: 270 CAPSULE | Refills: 1 | Status: SHIPPED | OUTPATIENT
Start: 2018-05-03 | End: 2018-08-01

## 2018-05-11 ENCOUNTER — MED REC SCAN ONLY (OUTPATIENT)
Dept: NEUROLOGY | Facility: CLINIC | Age: 58
End: 2018-05-11

## 2018-05-29 ENCOUNTER — TELEPHONE (OUTPATIENT)
Dept: NEUROLOGY | Facility: CLINIC | Age: 58
End: 2018-05-29

## 2018-05-29 NOTE — TELEPHONE ENCOUNTER
Healthcare Strategy paperwork for possible drug-drug interaction placed on Dr Saunders`s desk for review and completion.

## 2018-05-30 NOTE — TELEPHONE ENCOUNTER
Healthcare Strategy form for pharmacy data physician feedback  completed by Dr Sean Partida and faxed to .

## 2018-06-18 RX ORDER — HYDROCODONE BITARTRATE AND ACETAMINOPHEN 5; 325 MG/1; MG/1
1 TABLET ORAL EVERY 4 HOURS PRN
Qty: 180 TABLET | Refills: 0 | Status: SHIPPED | OUTPATIENT
Start: 2018-06-18 | End: 2018-07-18

## 2018-06-18 NOTE — TELEPHONE ENCOUNTER
Medication request: Norco 5-325mg q4h prn pain    LOV 5/1/18  NOV 8/9/18    [place] 4/29/18 #180

## 2018-07-18 ENCOUNTER — MED REC SCAN ONLY (OUTPATIENT)
Dept: NEUROLOGY | Facility: CLINIC | Age: 58
End: 2018-07-18

## 2018-07-30 ENCOUNTER — TELEPHONE (OUTPATIENT)
Dept: NEUROLOGY | Facility: CLINIC | Age: 58
End: 2018-07-30

## 2018-07-30 DIAGNOSIS — M43.10 ACQUIRED SPONDYLOLISTHESIS: Primary | ICD-10-CM

## 2018-07-30 NOTE — TELEPHONE ENCOUNTER
Patient has been waking up since Friday 8/27/18 with bilateral hip pain - unusual that the pain is waking her up. Pain is radiating into buttocks and neuropathy in feet is getting worse. Patient rates pain 8/10. Patient is requesting Medrol Dose pack.     L

## 2018-07-31 RX ORDER — METHYLPREDNISOLONE 4 MG/1
TABLET ORAL
Qty: 1 PACKAGE | Refills: 0 | Status: SHIPPED | OUTPATIENT
Start: 2018-07-31 | End: 2018-08-09 | Stop reason: ALTCHOICE

## 2018-08-09 ENCOUNTER — OFFICE VISIT (OUTPATIENT)
Dept: NEUROLOGY | Facility: CLINIC | Age: 58
End: 2018-08-09
Payer: COMMERCIAL

## 2018-08-09 VITALS
BODY MASS INDEX: 29.03 KG/M2 | HEART RATE: 78 BPM | HEIGHT: 67 IN | SYSTOLIC BLOOD PRESSURE: 116 MMHG | WEIGHT: 185 LBS | DIASTOLIC BLOOD PRESSURE: 82 MMHG

## 2018-08-09 DIAGNOSIS — M76.61 TENDONITIS, ACHILLES, RIGHT: ICD-10-CM

## 2018-08-09 DIAGNOSIS — M51.9 LUMBAR DISC DISEASE: ICD-10-CM

## 2018-08-09 DIAGNOSIS — M96.1 POSTLAMINECTOMY SYNDROME, LUMBAR REGION: ICD-10-CM

## 2018-08-09 DIAGNOSIS — M54.16 LUMBAR RADICULOPATHY: Primary | ICD-10-CM

## 2018-08-09 DIAGNOSIS — M48.061 SPINAL STENOSIS, LUMBAR REGION, WITHOUT NEUROGENIC CLAUDICATION: ICD-10-CM

## 2018-08-09 DIAGNOSIS — M43.10 ACQUIRED SPONDYLOLISTHESIS: ICD-10-CM

## 2018-08-09 PROCEDURE — 99214 OFFICE O/P EST MOD 30 MIN: CPT | Performed by: PHYSICAL MEDICINE & REHABILITATION

## 2018-08-09 RX ORDER — HYDROCODONE BITARTRATE AND ACETAMINOPHEN 10; 325 MG/1; MG/1
1 TABLET ORAL EVERY 8 HOURS PRN
Qty: 90 TABLET | Refills: 0 | Status: SHIPPED | OUTPATIENT
Start: 2018-08-09 | End: 2018-08-09

## 2018-08-09 RX ORDER — HYDROCODONE BITARTRATE AND ACETAMINOPHEN 10; 325 MG/1; MG/1
1 TABLET ORAL EVERY 8 HOURS PRN
Qty: 90 TABLET | Refills: 0 | Status: SHIPPED | OUTPATIENT
Start: 2018-09-08 | End: 2018-10-01

## 2018-08-09 RX ORDER — HYDROCODONE BITARTRATE AND ACETAMINOPHEN 5; 325 MG/1; MG/1
1 TABLET ORAL EVERY 6 HOURS PRN
Refills: 0 | COMMUNITY
Start: 2018-06-21 | End: 2018-08-09

## 2018-08-09 NOTE — PATIENT INSTRUCTIONS
Plan  I will perform bilateral L5 TFESI(s). The patient will continue with her home exercise program.    I have increased her Norco to 10/325 TID.     The patient will follow up in 3 months, but the patient will call me 2 weeks after having the injecti

## 2018-08-09 NOTE — PROGRESS NOTES
Low Back Pain H & P    Chief Complaint: Patient presents with:  Low Back Pain: LOV 5-1-18 pt is here to f/u on lower back pain 8 out 10. Per pt lower back pain is constant worse since last office visit, takes Norco and Gabapentin for pain.  completed physic History  None on file     Social History Main Topics   Smoking status: Former Smoker     Quit date: 11/20/2000    Smokeless tobacco: Never Used    Alcohol use No    Drug use: No    Sexual activity: Not on file     Other Topics Concern    Caffeine Concern Y bilateral lower extremities     Assessment  1. Bilateral L5 radiculopathies     2. L4-5 grade 1-2 unstable spondylolisthesis    3. L4-5 bilateral mild, L3-4 right mild foraminal stenosis    4. s/p left L4-5 laminectomy    5.  L4-5 right mild/left mod diffus

## 2018-08-10 ENCOUNTER — TELEPHONE (OUTPATIENT)
Dept: NEUROLOGY | Facility: CLINIC | Age: 58
End: 2018-08-10

## 2018-08-10 NOTE — TELEPHONE ENCOUNTER
Shiva Guadalupe called [phone] for Authorization of Approval t/t May B Authorization was given with Ref #[id number], another # for this patients Plan [id number]

## 2018-08-10 NOTE — TELEPHONE ENCOUNTER
Patient has been scheduled for a bilateral L5 TFESIs  on 9/7/18 at the [facility]. Medications and allergies reviewed. Patient informed to hold aspirins, nsaids, blood thinners, vitamins and fish oils 3-7 days prior to procedure.  Patient informed we will need ve

## 2018-08-10 NOTE — TELEPHONE ENCOUNTER
Called [facility] [phone] for Authorization of Approval for Bilateral L5 TFESI with CPT codes 77440-59 / 67655, t/t Alberto Claros, stated that patients insurance termed on 05/01/2018,   then called [facility] [phone] Hawk Maynard is stating that patients insurance is no

## 2018-09-06 ENCOUNTER — TELEPHONE (OUTPATIENT)
Dept: NEUROLOGY | Facility: CLINIC | Age: 58
End: 2018-09-06

## 2018-09-07 ENCOUNTER — OFFICE VISIT (OUTPATIENT)
Dept: SURGERY | Facility: CLINIC | Age: 58
End: 2018-09-07
Payer: COMMERCIAL

## 2018-09-07 DIAGNOSIS — M54.16 LUMBAR RADICULOPATHY: Primary | ICD-10-CM

## 2018-09-07 DIAGNOSIS — M96.1 POSTLAMINECTOMY SYNDROME, LUMBAR REGION: ICD-10-CM

## 2018-09-07 DIAGNOSIS — M51.9 LUMBAR DISC DISEASE: ICD-10-CM

## 2018-09-07 PROCEDURE — 64483 NJX AA&/STRD TFRM EPI L/S 1: CPT | Performed by: PHYSICAL MEDICINE & REHABILITATION

## 2018-09-07 NOTE — PROCEDURES
Francisco Jamil [address] 7.    BILATERAL LUMBAR TRANSFORAMINAL   NAME:  Suzzanna Severin    MR #:    [id number] :  1960     PHYSICIAN:  Angela Saunders        Operative Report    DATE OF PROCEDURE: 2018   PREOPERATIVE DIAGNOSES: 1. Bilatera injected with a 3 cc solution of 1.5 cc of 40 mg/cc of Kenalog and 1.5 cc of 1% PF lidocaine without epinephrine. After this, the needle was removed. Then  fluoroscopy was right anterior obliqued opening up the left L5-S1 intervertebral foramen.   At this

## 2018-10-01 NOTE — TELEPHONE ENCOUNTER
Patient called with update s/p Bilateral L5 TFESI on 9/7/2018. Patient states she has obtained 50% relief from injection. She states she has a slight low back pain but mostly experiencing radicular pain to bilateral calves/feet.  Describes pain as burni

## 2018-10-02 RX ORDER — HYDROCODONE BITARTRATE AND ACETAMINOPHEN 10; 325 MG/1; MG/1
1 TABLET ORAL EVERY 8 HOURS PRN
Qty: 90 TABLET | Refills: 0 | Status: SHIPPED | OUTPATIENT
Start: 2018-10-05 | End: 2018-10-30

## 2018-10-02 NOTE — TELEPHONE ENCOUNTER
Called patient to notify her that Sujeya Client stated it was ok to increase Gabapentin to 4 times daily. Also, left detailed message(hipaa verified) that she should call the office to schedule NOV.

## 2018-10-06 ENCOUNTER — HOSPITAL (OUTPATIENT)
Dept: OTHER | Age: 58
End: 2018-10-06
Attending: INTERNAL MEDICINE

## 2018-10-06 ENCOUNTER — IMAGING SERVICES (OUTPATIENT)
Dept: OTHER | Age: 58
End: 2018-10-06

## 2018-10-11 ENCOUNTER — CHARTING TRANS (OUTPATIENT)
Dept: OTHER | Age: 58
End: 2018-10-11

## 2018-10-30 RX ORDER — HYDROCODONE BITARTRATE AND ACETAMINOPHEN 10; 325 MG/1; MG/1
1 TABLET ORAL EVERY 8 HOURS PRN
Qty: 90 TABLET | Refills: 0 | Status: SHIPPED | OUTPATIENT
Start: 2018-11-03 | End: 2018-11-26

## 2018-10-30 NOTE — TELEPHONE ENCOUNTER
Medication request: hydrocodone-acetaminophen 10/325mg. Take 1 tablet every 8 hours prn pain. #90. No refills.     LOV-8/9/2018 NOV-1/8/2019    [place]10/4/2018

## 2018-11-01 VITALS
SYSTOLIC BLOOD PRESSURE: 98 MMHG | HEIGHT: 68 IN | HEART RATE: 62 BPM | TEMPERATURE: 98.6 F | RESPIRATION RATE: 14 BRPM | BODY MASS INDEX: 31.98 KG/M2 | WEIGHT: 211 LBS | DIASTOLIC BLOOD PRESSURE: 64 MMHG

## 2018-11-02 VITALS
HEART RATE: 90 BPM | DIASTOLIC BLOOD PRESSURE: 68 MMHG | WEIGHT: 256.04 LBS | BODY MASS INDEX: 38.81 KG/M2 | RESPIRATION RATE: 16 BRPM | SYSTOLIC BLOOD PRESSURE: 124 MMHG | HEIGHT: 68 IN | TEMPERATURE: 98 F

## 2018-11-03 VITALS
SYSTOLIC BLOOD PRESSURE: 160 MMHG | WEIGHT: 263.89 LBS | RESPIRATION RATE: 18 BRPM | HEART RATE: 76 BPM | TEMPERATURE: 97.9 F | DIASTOLIC BLOOD PRESSURE: 100 MMHG | BODY MASS INDEX: 39.99 KG/M2 | OXYGEN SATURATION: 96 % | HEIGHT: 68 IN

## 2018-11-26 ENCOUNTER — TELEPHONE (OUTPATIENT)
Dept: NEUROLOGY | Facility: CLINIC | Age: 58
End: 2018-11-26

## 2018-11-26 NOTE — TELEPHONE ENCOUNTER
Medication request: HYDROcodone-acetaminophen  MG Oral Tab, #90, no refill  Take 1 tablet by mouth every 8 (eight) hours as needed for pain.      [place]: 10/31/18    LOV: 8/9/18  NOV: 1/8/19    Medication pended - routed to Dr. Michael Hollingsworth

## 2018-11-27 RX ORDER — HYDROCODONE BITARTRATE AND ACETAMINOPHEN 10; 325 MG/1; MG/1
1 TABLET ORAL EVERY 8 HOURS PRN
Qty: 90 TABLET | Refills: 0 | Status: SHIPPED | OUTPATIENT
Start: 2018-11-30 | End: 2018-12-27

## 2018-12-27 NOTE — TELEPHONE ENCOUNTER
Medication request: HYDROcodone-acetaminophen  MG Oral Tab, #90, no refill  Take 1 tablet by mouth every 8 (eight) hours as needed for pain.      [place]: 11/28/18    LOV: 8/9/18  NOV: 1/8/19    Medication refill pended - routed to Dr. Latasha Conteh

## 2018-12-28 RX ORDER — HYDROCODONE BITARTRATE AND ACETAMINOPHEN 10; 325 MG/1; MG/1
1 TABLET ORAL EVERY 8 HOURS PRN
Qty: 90 TABLET | Refills: 0 | Status: SHIPPED | OUTPATIENT
Start: 2018-12-28 | End: 2019-01-21

## 2019-01-01 ENCOUNTER — EXTERNAL RECORD (OUTPATIENT)
Dept: HEALTH INFORMATION MANAGEMENT | Facility: OTHER | Age: 59
End: 2019-01-01

## 2019-01-21 NOTE — TELEPHONE ENCOUNTER
Medication request: HYDROcodone-acetaminophen  MG Oral Tab, #90, no refill  Take 1 tablet by mouth every 8 (eight) hours as needed for pain.     [place]: 12/28/19    LOV: 8/9/18  NOV: Not yet scheduled - routed to  to schedule appt

## 2019-01-22 RX ORDER — HYDROCODONE BITARTRATE AND ACETAMINOPHEN 10; 325 MG/1; MG/1
1 TABLET ORAL EVERY 8 HOURS PRN
Qty: 90 TABLET | Refills: 0 | Status: SHIPPED | OUTPATIENT
Start: 2019-01-27 | End: 2019-02-25

## 2019-01-23 DIAGNOSIS — K63.5 BENIGN COLONIC POLYP: ICD-10-CM

## 2019-01-23 DIAGNOSIS — R19.4 ENCOUNTER FOR DIAGNOSTIC COLONOSCOPY DUE TO CHANGE IN BOWEL HABITS: Primary | ICD-10-CM

## 2019-01-24 NOTE — TELEPHONE ENCOUNTER
Pt picked up Rx, Id verified. Pt states she is out of insurance and will schedule when she get insurance again.

## 2019-02-11 RX ORDER — DULOXETIN HYDROCHLORIDE 60 MG/1
CAPSULE, DELAYED RELEASE ORAL
Qty: 90 CAPSULE | Refills: 0 | Status: SHIPPED | OUTPATIENT
Start: 2019-02-11 | End: 2019-05-07 | Stop reason: SDUPTHER

## 2019-02-25 NOTE — TELEPHONE ENCOUNTER
Medication request: Norco 10-325mg 1 tab q 8hr prn pain #90-30 0 refill    LOV 08/09/18  NOV 03/13/19    [place]01/27/19

## 2019-02-26 RX ORDER — HYDROCODONE BITARTRATE AND ACETAMINOPHEN 10; 325 MG/1; MG/1
1 TABLET ORAL EVERY 8 HOURS PRN
Qty: 90 TABLET | Refills: 0 | Status: SHIPPED | OUTPATIENT
Start: 2019-02-26 | End: 2019-03-13

## 2019-03-13 ENCOUNTER — OFFICE VISIT (OUTPATIENT)
Dept: NEUROLOGY | Facility: CLINIC | Age: 59
End: 2019-03-13

## 2019-03-13 VITALS
HEIGHT: 68 IN | HEART RATE: 80 BPM | BODY MASS INDEX: 27.28 KG/M2 | DIASTOLIC BLOOD PRESSURE: 90 MMHG | RESPIRATION RATE: 16 BRPM | SYSTOLIC BLOOD PRESSURE: 140 MMHG | WEIGHT: 180 LBS

## 2019-03-13 DIAGNOSIS — M54.16 LUMBAR RADICULOPATHY: Primary | ICD-10-CM

## 2019-03-13 DIAGNOSIS — M48.061 SPINAL STENOSIS, LUMBAR REGION, WITHOUT NEUROGENIC CLAUDICATION: ICD-10-CM

## 2019-03-13 DIAGNOSIS — M76.61 TENDONITIS, ACHILLES, RIGHT: ICD-10-CM

## 2019-03-13 DIAGNOSIS — M51.9 LUMBAR DISC DISEASE: ICD-10-CM

## 2019-03-13 DIAGNOSIS — M43.10 ACQUIRED SPONDYLOLISTHESIS: ICD-10-CM

## 2019-03-13 DIAGNOSIS — M96.1 POSTLAMINECTOMY SYNDROME, LUMBAR REGION: ICD-10-CM

## 2019-03-13 PROCEDURE — 99214 OFFICE O/P EST MOD 30 MIN: CPT | Performed by: PHYSICAL MEDICINE & REHABILITATION

## 2019-03-13 RX ORDER — ASPIRIN 325 MG
325 TABLET ORAL DAILY
COMMUNITY
End: 2020-02-24

## 2019-03-13 RX ORDER — HYDROCODONE BITARTRATE AND ACETAMINOPHEN 10; 325 MG/1; MG/1
1 TABLET ORAL EVERY 6 HOURS PRN
Qty: 120 TABLET | Refills: 0 | Status: SHIPPED | OUTPATIENT
Start: 2019-03-21 | End: 2019-04-20

## 2019-03-13 RX ORDER — GABAPENTIN 600 MG/1
600 TABLET ORAL 4 TIMES DAILY
Qty: 120 TABLET | Refills: 5 | Status: SHIPPED | OUTPATIENT
Start: 2019-03-13 | End: 2019-08-26

## 2019-03-13 NOTE — PATIENT INSTRUCTIONS
Plan  She does not have insurance and therefore I will put the bilateral L5 TFESI's on hold for now. I will increase the Neurontin to 600 mg 4 times a day. She will increase the Norco to 4 times a day as well.     The patient will continue with her

## 2019-03-13 NOTE — PROGRESS NOTES
Low Back Pain H & P    Chief Complaint: Patient presents with:  Low Back Pain: Patient presents for follow up on low back pain, LOV: 8/9/18.  Patient states her pain has worsened, had Bilateral L5 transforaminal epidural steroid injections, had relief for 5 History      Marital status:       Spouse name: Not on file      Number of children: Not on file      Years of education: Not on file      Highest education level: Not on file    Occupational History      Not on file    Social Needs      Financial The patient does not use an assistive device. The patient does live in a home with stairs. Review of Systems      PE: The patient does appear in her stated age in no distress. The patient is well groomed.     Psychiatric:  The patient is alert the calcaneus         Plan  She does not have insurance and therefore I will put the bilateral L5 TFESI's on hold for now. I will increase the Neurontin to 600 mg 4 times a day. She will increase the Norco to 4 times a day as well.     The patient rachel

## 2019-04-16 ENCOUNTER — TELEPHONE (OUTPATIENT)
Dept: NEUROLOGY | Facility: CLINIC | Age: 59
End: 2019-04-16

## 2019-04-16 NOTE — TELEPHONE ENCOUNTER
Norco 10/325mg. Take 1 tablet every 6 hours prn pain. #120. No refills    [place]19[place]    LOV-3/13/2019  NOV-none scheduled    Left detailed message(hipaa verified) to have patient call to schedule NOV.

## 2019-04-18 RX ORDER — HYDROCODONE BITARTRATE AND ACETAMINOPHEN 10; 325 MG/1; MG/1
1 TABLET ORAL EVERY 8 HOURS PRN
Qty: 120 TABLET | Refills: 0 | Status: SHIPPED | OUTPATIENT
Start: 2019-04-18 | End: 2019-05-07

## 2019-04-22 NOTE — TELEPHONE ENCOUNTER
Pt states that the dosage changed from her last visit to 4 tabs a day but she was given a script last week for 3 times a day instead, please advise

## 2019-04-22 NOTE — TELEPHONE ENCOUNTER
S/w pt who states quantity of last Norco prescription was correct, #120, but directions stated pt is to take 3 tabs per day, when per LOV note 3/13/19, pt is to increase to 4 tabs per day.  Pharmacy was only able to dispense #90 on 4/19/19 based on directio

## 2019-05-07 RX ORDER — DULOXETIN HYDROCHLORIDE 60 MG/1
CAPSULE, DELAYED RELEASE ORAL
Qty: 90 CAPSULE | Refills: 0 | Status: SHIPPED | OUTPATIENT
Start: 2019-05-07 | End: 2019-08-02 | Stop reason: SDUPTHER

## 2019-05-07 NOTE — TELEPHONE ENCOUNTER
Medication request: Norco 10-325mg q8h prn pain    LOV 3/13/19  NOV 9/9/19    [place]: 4/19/19 #90    Per Dr. Josephine Davis at [address] [place] [address] will increase the Norco to 4 times a day    For April refill patient was given 90 tabs of Norco to take q8h, May pr

## 2019-05-08 ENCOUNTER — TELEPHONE (OUTPATIENT)
Dept: SCHEDULING | Age: 59
End: 2019-05-08

## 2019-05-08 RX ORDER — DULOXETIN HYDROCHLORIDE 60 MG/1
CAPSULE, DELAYED RELEASE ORAL
Qty: 90 CAPSULE | Refills: 0 | OUTPATIENT
Start: 2019-05-08

## 2019-05-09 RX ORDER — HYDROCODONE BITARTRATE AND ACETAMINOPHEN 10; 325 MG/1; MG/1
1 TABLET ORAL EVERY 8 HOURS PRN
Qty: 90 TABLET | Refills: 0 | Status: SHIPPED | OUTPATIENT
Start: 2019-05-19 | End: 2019-05-10

## 2019-05-10 RX ORDER — HYDROCODONE BITARTRATE AND ACETAMINOPHEN 10; 325 MG/1; MG/1
1 TABLET ORAL EVERY 6 HOURS PRN
Qty: 120 TABLET | Refills: 0 | Status: SHIPPED | OUTPATIENT
Start: 2019-05-10 | End: 2019-06-04

## 2019-05-10 NOTE — TELEPHONE ENCOUNTER
Patient returned prescription for Norco to start 5/19/19 for 90 tabs, rx shredded appropriately    Patient picked up new rx for Norco 10-325mg #120

## 2019-05-10 NOTE — TELEPHONE ENCOUNTER
Spoke to patient who states she was suppose to get 120 tabs of Norco last month but only received 90 tabs. Because of this patient was out sooner.  Patient is currently out of medication and would like new rx to start today for 120 tabs    Patient informed

## 2019-05-13 ENCOUNTER — APPOINTMENT (OUTPATIENT)
Dept: INTERNAL MEDICINE | Age: 59
End: 2019-05-13

## 2019-05-15 ENCOUNTER — OFFICE VISIT (OUTPATIENT)
Dept: INTERNAL MEDICINE | Age: 59
End: 2019-05-15

## 2019-05-15 DIAGNOSIS — I10 ESSENTIAL HYPERTENSION: Primary | ICD-10-CM

## 2019-05-15 PROCEDURE — 99213 OFFICE O/P EST LOW 20 MIN: CPT | Performed by: INTERNAL MEDICINE

## 2019-05-15 PROCEDURE — 3079F DIAST BP 80-89 MM HG: CPT | Performed by: INTERNAL MEDICINE

## 2019-05-15 PROCEDURE — 3075F SYST BP GE 130 - 139MM HG: CPT | Performed by: INTERNAL MEDICINE

## 2019-05-15 RX ORDER — HYDROCODONE BITARTRATE AND ACETAMINOPHEN 10; 325 MG/1; MG/1
1 TABLET ORAL EVERY 6 HOURS PRN
COMMUNITY

## 2019-05-15 RX ORDER — TRIAMTERENE AND HYDROCHLOROTHIAZIDE 37.5; 25 MG/1; MG/1
1 TABLET ORAL DAILY
Qty: 90 TABLET | Refills: 3 | Status: SHIPPED | OUTPATIENT
Start: 2019-05-15

## 2019-05-15 RX ORDER — ASPIRIN 325 MG
325 TABLET ORAL DAILY
COMMUNITY

## 2019-05-15 RX ORDER — GABAPENTIN 600 MG/1
600 TABLET ORAL 4 TIMES DAILY
COMMUNITY

## 2019-05-15 SDOH — HEALTH STABILITY: MENTAL HEALTH
STRESS IS WHEN SOMEONE FEELS TENSE, NERVOUS, ANXIOUS, OR CAN'T SLEEP AT NIGHT BECAUSE THEIR MIND IS TROUBLED. HOW STRESSED ARE YOU?: RATHER MUCH

## 2019-05-15 SDOH — HEALTH STABILITY: PHYSICAL HEALTH: ON AVERAGE, HOW MANY DAYS PER WEEK DO YOU ENGAGE IN MODERATE TO STRENUOUS EXERCISE (LIKE A BRISK WALK)?: 5 DAYS

## 2019-05-15 SDOH — HEALTH STABILITY: MENTAL HEALTH: HOW OFTEN DO YOU HAVE A DRINK CONTAINING ALCOHOL?: NEVER

## 2019-05-15 SDOH — HEALTH STABILITY: PHYSICAL HEALTH: ON AVERAGE, HOW MANY MINUTES DO YOU ENGAGE IN EXERCISE AT THIS LEVEL?: 30 MIN

## 2019-05-15 ASSESSMENT — PAIN SCALES - GENERAL: PAINLEVEL: 0

## 2019-05-16 ASSESSMENT — ENCOUNTER SYMPTOMS: ROS SKIN COMMENTS: PER HPI.

## 2019-06-04 RX ORDER — HYDROCODONE BITARTRATE AND ACETAMINOPHEN 10; 325 MG/1; MG/1
1 TABLET ORAL EVERY 6 HOURS PRN
Qty: 120 TABLET | Refills: 0 | Status: SHIPPED | OUTPATIENT
Start: 2019-06-09 | End: 2019-07-01

## 2019-06-04 NOTE — TELEPHONE ENCOUNTER
Medication request: Norco  mg q 6hr prn pain #120 30-0 refill    LOV 03/13/19  NOV 09/09/19    [place]: 05/10/19

## 2019-07-01 RX ORDER — HYDROCODONE BITARTRATE AND ACETAMINOPHEN 10; 325 MG/1; MG/1
1 TABLET ORAL EVERY 6 HOURS PRN
Qty: 120 TABLET | Refills: 0 | Status: SHIPPED | OUTPATIENT
Start: 2019-07-09 | End: 2019-08-05

## 2019-08-02 RX ORDER — DULOXETIN HYDROCHLORIDE 60 MG/1
CAPSULE, DELAYED RELEASE ORAL
Qty: 90 CAPSULE | Refills: 0 | Status: SHIPPED | OUTPATIENT
Start: 2019-08-02

## 2019-08-05 NOTE — TELEPHONE ENCOUNTER
Called pt regarding request for medrol dosepak. Pt states it has been a year since her last injection, back pain is a 7-8/10, but currently she is experiencing more radicular symptoms in feet, burning/tingling in feet has increased.  Pt notes medrol dosepak

## 2019-08-06 ENCOUNTER — TELEPHONE (OUTPATIENT)
Dept: SCHEDULING | Age: 59
End: 2019-08-06

## 2019-08-06 RX ORDER — HYDROCODONE BITARTRATE AND ACETAMINOPHEN 10; 325 MG/1; MG/1
1 TABLET ORAL EVERY 6 HOURS PRN
Qty: 120 TABLET | Refills: 0 | Status: SHIPPED | OUTPATIENT
Start: 2019-08-08 | End: 2019-08-26

## 2019-08-06 RX ORDER — METHYLPREDNISOLONE 4 MG/1
TABLET ORAL
Qty: 1 PACKAGE | Refills: 0 | Status: SHIPPED | OUTPATIENT
Start: 2019-08-06 | End: 2019-08-26 | Stop reason: ALTCHOICE

## 2019-08-09 ENCOUNTER — OFFICE VISIT (OUTPATIENT)
Dept: INTERNAL MEDICINE | Age: 59
End: 2019-08-09

## 2019-08-09 VITALS
WEIGHT: 187.2 LBS | RESPIRATION RATE: 16 BRPM | DIASTOLIC BLOOD PRESSURE: 70 MMHG | BODY MASS INDEX: 29.32 KG/M2 | OXYGEN SATURATION: 99 % | HEART RATE: 77 BPM | TEMPERATURE: 98.3 F | SYSTOLIC BLOOD PRESSURE: 108 MMHG

## 2019-08-09 DIAGNOSIS — Z02.1 PRE-EMPLOYMENT EXAMINATION: Primary | ICD-10-CM

## 2019-08-09 PROCEDURE — 99396 PREV VISIT EST AGE 40-64: CPT | Performed by: INTERNAL MEDICINE

## 2019-08-09 SDOH — HEALTH STABILITY: MENTAL HEALTH: HOW OFTEN DO YOU HAVE A DRINK CONTAINING ALCOHOL?: NEVER

## 2019-08-09 ASSESSMENT — PATIENT HEALTH QUESTIONNAIRE - PHQ9
2. FEELING DOWN, DEPRESSED OR HOPELESS: NOT AT ALL
1. LITTLE INTEREST OR PLEASURE IN DOING THINGS: NOT AT ALL
SUM OF ALL RESPONSES TO PHQ9 QUESTIONS 1 AND 2: 0
SUM OF ALL RESPONSES TO PHQ9 QUESTIONS 1 AND 2: 0

## 2019-08-20 ENCOUNTER — TELEPHONE (OUTPATIENT)
Dept: NEUROLOGY | Facility: CLINIC | Age: 59
End: 2019-08-20

## 2019-08-20 NOTE — TELEPHONE ENCOUNTER
Spoke to patient who is asking for a 2 month supply of Norco because she is moving to [place] next week. Patient notified per [facility] policy our office do not give 3 month supply of narcotics unless otherwise specified by the physician.    Patient also infor

## 2019-08-26 ENCOUNTER — OFFICE VISIT (OUTPATIENT)
Dept: NEUROLOGY | Facility: CLINIC | Age: 59
End: 2019-08-26

## 2019-08-26 VITALS
WEIGHT: 180 LBS | HEART RATE: 80 BPM | SYSTOLIC BLOOD PRESSURE: 131 MMHG | HEIGHT: 68 IN | RESPIRATION RATE: 17 BRPM | DIASTOLIC BLOOD PRESSURE: 79 MMHG | BODY MASS INDEX: 27.28 KG/M2

## 2019-08-26 DIAGNOSIS — M54.16 LUMBAR RADICULOPATHY: Primary | ICD-10-CM

## 2019-08-26 DIAGNOSIS — M96.1 POSTLAMINECTOMY SYNDROME, LUMBAR REGION: ICD-10-CM

## 2019-08-26 DIAGNOSIS — M48.061 SPINAL STENOSIS, LUMBAR REGION, WITHOUT NEUROGENIC CLAUDICATION: ICD-10-CM

## 2019-08-26 DIAGNOSIS — M51.9 LUMBAR DISC DISEASE: ICD-10-CM

## 2019-08-26 DIAGNOSIS — M43.10 ACQUIRED SPONDYLOLISTHESIS: ICD-10-CM

## 2019-08-26 DIAGNOSIS — M76.61 TENDONITIS, ACHILLES, RIGHT: ICD-10-CM

## 2019-08-26 PROCEDURE — 99214 OFFICE O/P EST MOD 30 MIN: CPT | Performed by: PHYSICAL MEDICINE & REHABILITATION

## 2019-08-26 RX ORDER — HYDROCODONE BITARTRATE AND ACETAMINOPHEN 10; 325 MG/1; MG/1
1 TABLET ORAL EVERY 6 HOURS PRN
Qty: 120 TABLET | Refills: 0 | Status: SHIPPED | OUTPATIENT
Start: 2019-10-07 | End: 2019-11-06

## 2019-08-26 RX ORDER — HYDROCODONE BITARTRATE AND ACETAMINOPHEN 10; 325 MG/1; MG/1
1 TABLET ORAL EVERY 6 HOURS PRN
Qty: 120 TABLET | Refills: 0 | Status: SHIPPED | OUTPATIENT
Start: 2019-09-07 | End: 2019-10-07

## 2019-08-26 RX ORDER — NALOXONE HYDROCHLORIDE 4 MG/.1ML
4 SPRAY, METERED NASAL AS NEEDED
Qty: 1 KIT | Refills: 0 | Status: SHIPPED | OUTPATIENT
Start: 2019-08-26

## 2019-08-26 RX ORDER — GABAPENTIN 600 MG/1
TABLET ORAL
Qty: 120 TABLET | Refills: 4 | OUTPATIENT
Start: 2019-08-26

## 2019-08-26 RX ORDER — HYDROCODONE BITARTRATE AND ACETAMINOPHEN 10; 325 MG/1; MG/1
1 TABLET ORAL EVERY 6 HOURS PRN
Qty: 120 TABLET | Refills: 0 | Status: SHIPPED | OUTPATIENT
Start: 2019-09-07 | End: 2019-08-26

## 2019-08-26 RX ORDER — HYDROCODONE BITARTRATE AND ACETAMINOPHEN 10; 325 MG/1; MG/1
1 TABLET ORAL EVERY 6 HOURS PRN
Qty: 120 TABLET | Refills: 0 | Status: SHIPPED | OUTPATIENT
Start: 2019-11-06 | End: 2019-12-06

## 2019-08-26 RX ORDER — TRIAMTERENE AND HYDROCHLOROTHIAZIDE 37.5; 25 MG/1; MG/1
1 TABLET ORAL AS NEEDED
COMMUNITY
Start: 2019-05-15

## 2019-08-26 RX ORDER — CELECOXIB 200 MG/1
200 CAPSULE ORAL DAILY
Qty: 30 CAPSULE | Refills: 2 | Status: SHIPPED | OUTPATIENT
Start: 2019-08-26 | End: 2020-02-24

## 2019-08-26 RX ORDER — GABAPENTIN 600 MG/1
600 TABLET ORAL 4 TIMES DAILY
Qty: 120 TABLET | Refills: 5 | Status: SHIPPED | OUTPATIENT
Start: 2019-08-26 | End: 2020-01-07

## 2019-08-26 RX ORDER — HYDROCODONE BITARTRATE AND ACETAMINOPHEN 10; 325 MG/1; MG/1
1 TABLET ORAL EVERY 6 HOURS PRN
Qty: 120 TABLET | Refills: 0 | Status: SHIPPED | OUTPATIENT
Start: 2019-10-07 | End: 2019-08-26

## 2019-08-26 RX ORDER — HYDROCODONE BITARTRATE AND ACETAMINOPHEN 10; 325 MG/1; MG/1
1 TABLET ORAL EVERY 6 HOURS PRN
Qty: 120 TABLET | Refills: 0 | Status: SHIPPED | OUTPATIENT
Start: 2019-11-06 | End: 2019-08-26

## 2019-08-26 NOTE — PROGRESS NOTES
Low Back Pain H & P    Chief Complaint: Patient presents with:  Low Back Pain: LOV: 3/13/19 - Pt presents for LBP, symptoms are consistent w/ LOV, moving to [place] on Friday. Pt states she would like to discuss medication refills once she moves.  Would l status:       Spouse name: Not on file      Number of children: Not on file      Years of education: Not on file      Highest education level: Not on file    Occupational History      Not on file    Social Needs      Financial resource strain: Not History Narrative      The patient does not use an assistive device. The patient does live in a home with stairs. Review of Systems      PE: The patient does appear in her stated age in no distress. The patient is well groomed.     Psychiatric: tendonitis at the insertion on the calcaneus         Plan  I will hold on doing any repeat bilateral L5 TFESI's for now. She will need a new MRI of the lumbar spine before I do any further injections since it has been 9 years since her last MRI scan.

## 2019-08-26 NOTE — PATIENT INSTRUCTIONS
Plan  I will hold on doing any repeat bilateral L5 TFESI's for now. She will need a new MRI of the lumbar spine before I do any further injections since it has been 9 years since her last MRI scan. She will continue with the [place] for the pain.

## 2019-09-04 ENCOUNTER — MED REC SCAN ONLY (OUTPATIENT)
Dept: NEUROLOGY | Facility: CLINIC | Age: 59
End: 2019-09-04

## 2019-10-11 ENCOUNTER — TELEPHONE (OUTPATIENT)
Dept: NEUROLOGY | Facility: CLINIC | Age: 59
End: 2019-10-11

## 2019-10-11 DIAGNOSIS — M96.1 POSTLAMINECTOMY SYNDROME, LUMBAR REGION: ICD-10-CM

## 2019-10-11 DIAGNOSIS — M51.9 LUMBAR DISC DISEASE: ICD-10-CM

## 2019-10-11 DIAGNOSIS — M48.061 SPINAL STENOSIS, LUMBAR REGION, WITHOUT NEUROGENIC CLAUDICATION: ICD-10-CM

## 2019-10-11 DIAGNOSIS — M54.16 LUMBAR RADICULOPATHY: ICD-10-CM

## 2019-10-11 DIAGNOSIS — M43.10 ACQUIRED SPONDYLOLISTHESIS: Primary | ICD-10-CM

## 2019-10-11 NOTE — TELEPHONE ENCOUNTER
Spoke to patient who is requesting order for Lumbar Spine MRI be placed as discussed with Dr. Lora Quarles at [address] [place] [address] and mailed to her. - (L-Spine MRI w+wo contrast placed-completed and mailed at the direction of Dr. Lora Quarles    Patient states she resides in [facility]

## 2019-10-11 NOTE — TELEPHONE ENCOUNTER
Called Laurie[employer]Ashlie for authorization of approval of MRI L-spine w/wo cpt code 41677. Talked to Joie Mcginnis who states  authorization is required. Pt. has not (zero)met individual nor family deductible; would be responsible for cost . Reference # [id number].

## 2019-11-12 ENCOUNTER — TELEPHONE (OUTPATIENT)
Dept: NEUROLOGY | Facility: CLINIC | Age: 59
End: 2019-11-12

## 2019-11-14 ENCOUNTER — TELEPHONE (OUTPATIENT)
Dept: NEUROLOGY | Facility: CLINIC | Age: 59
End: 2019-11-14

## 2019-11-14 DIAGNOSIS — M54.16 LUMBAR RADICULOPATHY: Primary | ICD-10-CM

## 2019-11-14 DIAGNOSIS — M51.9 LUMBAR DISC DISEASE: ICD-10-CM

## 2019-11-14 DIAGNOSIS — M43.10 ACQUIRED SPONDYLOLISTHESIS: ICD-10-CM

## 2019-11-14 DIAGNOSIS — M48.061 SPINAL STENOSIS, LUMBAR REGION, WITHOUT NEUROGENIC CLAUDICATION: ICD-10-CM

## 2019-11-14 RX ORDER — HYDROCODONE BITARTRATE AND ACETAMINOPHEN 10; 325 MG/1; MG/1
1 TABLET ORAL EVERY 6 HOURS PRN
Qty: 120 TABLET | Refills: 0 | Status: SHIPPED | OUTPATIENT
Start: 2020-02-04 | End: 2020-02-24

## 2019-11-14 RX ORDER — HYDROCODONE BITARTRATE AND ACETAMINOPHEN 10; 325 MG/1; MG/1
1 TABLET ORAL EVERY 6 HOURS PRN
Qty: 120 TABLET | Refills: 0 | Status: SHIPPED | OUTPATIENT
Start: 2020-01-05 | End: 2020-02-24

## 2019-11-14 RX ORDER — HYDROCODONE BITARTRATE AND ACETAMINOPHEN 10; 325 MG/1; MG/1
1 TABLET ORAL EVERY 6 HOURS PRN
Qty: 120 TABLET | Refills: 0 | Status: SHIPPED | OUTPATIENT
Start: 2019-12-06 | End: 2020-02-24

## 2019-11-14 NOTE — TELEPHONE ENCOUNTER
[employer] for authorization of approval of Bilateral L5 TFESIs cpt codes D9266845, A3070030. Talked to Antwon Foster. who states  authorization is required. Reference #   [id number]  Will initiate request,. Called [facility].  T/t Xena NEWSOME  who states

## 2019-11-14 NOTE — TELEPHONE ENCOUNTER
Called [facility] to request authorization of Bilateral L5 TFESIs cpt codes D5915530, [id number]. T/t Yamileth SERRATO States no authorization is required. Advised I received call from [facility] stating authorization is required after conference call.  States

## 2019-11-14 NOTE — TELEPHONE ENCOUNTER
Spoke to patient who had new MRI done and will bring disc prior to injection on 11/15/19. Patient would like to proceed with repeat bilateral L5 TFESIs. Patient also requesting 3 mos supply of Norco 10-325mg.   Patient states pain is unchanged in the lowe

## 2019-11-14 NOTE — TELEPHONE ENCOUNTER
Spoke to [place] from [place]. States she spoke to [address] [place] [address] from [place] and was told injection does require authorization. Patient  is scheduled for 11/15/19.

## 2019-11-15 ENCOUNTER — TELEPHONE (OUTPATIENT)
Dept: NEUROLOGY | Facility: CLINIC | Age: 59
End: 2019-11-15

## 2019-11-15 ENCOUNTER — OFFICE VISIT (OUTPATIENT)
Dept: SURGERY | Facility: CLINIC | Age: 59
End: 2019-11-15

## 2019-11-15 DIAGNOSIS — M51.9 LUMBAR DISC DISEASE: ICD-10-CM

## 2019-11-15 DIAGNOSIS — M96.1 POSTLAMINECTOMY SYNDROME, LUMBAR REGION: ICD-10-CM

## 2019-11-15 DIAGNOSIS — M54.16 LUMBAR RADICULOPATHY: Primary | ICD-10-CM

## 2019-11-15 PROCEDURE — 64483 NJX AA&/STRD TFRM EPI L/S 1: CPT | Performed by: PHYSICAL MEDICINE & REHABILITATION

## 2019-11-15 NOTE — TELEPHONE ENCOUNTER
Received disc of Lumbar Spine MRI done 11/6/19 at [facility] -with report  Patients daughter also brought in disc of Lumbar Spine MRI done 10/30/2008 under Tevin Just report-loaded to [address] PACs  Discs loaded to [address] PACs and given back to

## 2019-11-15 NOTE — PROCEDURES
Francisco Jamil UDanae [address].    BILATERAL LUMBAR TRANSFORAMINAL   NAME:  Suzzanna Severin    MR #:    [id number] :  1960     PHYSICIAN:  Angela Saunders        Operative Report    DATE OF PROCEDURE: 11/15/2019   PREOPERATIVE DIAGNOSES: 1. Bilate injected with a 3 cc solution of 1.5 cc of 40 mg/cc of Kenalog and 1.5 cc of 1% PF lidocaine without epinephrine. After this, the needle was removed. Then  fluoroscopy was right anterior obliqued opening up the left L5-S1 intervertebral foramen.   At this

## 2019-11-19 ENCOUNTER — MED REC SCAN ONLY (OUTPATIENT)
Dept: NEUROLOGY | Facility: CLINIC | Age: 59
End: 2019-11-19

## 2019-11-22 PROBLEM — M51.26 LUMBAR HERNIATED DISC: Status: ACTIVE | Noted: 2019-11-22

## 2019-11-22 PROBLEM — M48.061 SPINAL STENOSIS OF LUMBAR REGION WITHOUT NEUROGENIC CLAUDICATION: Status: ACTIVE | Noted: 2019-11-22

## 2019-12-03 ENCOUNTER — MED REC SCAN ONLY (OUTPATIENT)
Dept: NEUROLOGY | Facility: CLINIC | Age: 59
End: 2019-12-03

## 2020-01-01 ENCOUNTER — EXTERNAL RECORD (OUTPATIENT)
Dept: HEALTH INFORMATION MANAGEMENT | Facility: OTHER | Age: 60
End: 2020-01-01

## 2020-01-07 NOTE — TELEPHONE ENCOUNTER
Medication request: gabapentin 600MG Oral Tab, #120, 5 refills  Take 1 tablet by mouth QID    [place]: 10/22/19    LOV: 8/26/19  NOV: 2/24/20

## 2020-01-08 ENCOUNTER — DOCUMENTATION ONLY (OUTPATIENT)
Dept: CARE COORDINATION | Facility: CLINIC | Age: 60
End: 2020-01-08

## 2020-01-09 RX ORDER — GABAPENTIN 600 MG/1
TABLET ORAL
Qty: 120 TABLET | Refills: 5 | Status: SHIPPED | OUTPATIENT
Start: 2020-01-09 | End: 2020-07-29

## 2020-02-24 ENCOUNTER — OFFICE VISIT (OUTPATIENT)
Dept: NEUROLOGY | Facility: CLINIC | Age: 60
End: 2020-02-24
Payer: COMMERCIAL

## 2020-02-24 VITALS — RESPIRATION RATE: 16 BRPM | SYSTOLIC BLOOD PRESSURE: 138 MMHG | DIASTOLIC BLOOD PRESSURE: 72 MMHG | HEART RATE: 78 BPM

## 2020-02-24 DIAGNOSIS — M48.061 SPINAL STENOSIS, LUMBAR REGION, WITHOUT NEUROGENIC CLAUDICATION: ICD-10-CM

## 2020-02-24 DIAGNOSIS — M54.16 LUMBAR RADICULOPATHY: Primary | ICD-10-CM

## 2020-02-24 DIAGNOSIS — M43.10 ACQUIRED SPONDYLOLISTHESIS: ICD-10-CM

## 2020-02-24 DIAGNOSIS — M51.9 LUMBAR DISC DISEASE: ICD-10-CM

## 2020-02-24 DIAGNOSIS — M48.061 SPINAL STENOSIS OF LUMBAR REGION WITHOUT NEUROGENIC CLAUDICATION: ICD-10-CM

## 2020-02-24 DIAGNOSIS — M96.1 POSTLAMINECTOMY SYNDROME, LUMBAR REGION: ICD-10-CM

## 2020-02-24 DIAGNOSIS — M51.26 LUMBAR HERNIATED DISC: ICD-10-CM

## 2020-02-24 PROCEDURE — 99214 OFFICE O/P EST MOD 30 MIN: CPT | Performed by: PHYSICAL MEDICINE & REHABILITATION

## 2020-02-24 RX ORDER — HYDROCODONE BITARTRATE AND ACETAMINOPHEN 10; 325 MG/1; MG/1
1 TABLET ORAL EVERY 6 HOURS PRN
Qty: 120 TABLET | Refills: 0 | Status: SHIPPED | OUTPATIENT
Start: 2020-05-04 | End: 2020-04-28

## 2020-02-24 RX ORDER — HYDROCODONE BITARTRATE AND ACETAMINOPHEN 10; 325 MG/1; MG/1
1 TABLET ORAL EVERY 6 HOURS PRN
Qty: 120 TABLET | Refills: 0 | Status: SHIPPED | OUTPATIENT
Start: 2020-03-05 | End: 2020-04-04

## 2020-02-24 RX ORDER — MULTIVITAMIN WITH IRON
500 TABLET ORAL AS NEEDED
COMMUNITY
Start: 2019-10-21

## 2020-02-24 RX ORDER — ACETAMINOPHEN 500 MG
500 TABLET ORAL AS NEEDED
COMMUNITY

## 2020-02-24 RX ORDER — HYDROCODONE BITARTRATE AND ACETAMINOPHEN 10; 325 MG/1; MG/1
1 TABLET ORAL EVERY 6 HOURS PRN
Qty: 120 TABLET | Refills: 0 | Status: CANCELLED | OUTPATIENT
Start: 2020-02-24 | End: 2020-03-25

## 2020-02-24 RX ORDER — ELETRIPTAN HYDROBROMIDE 40 MG/1
40 TABLET, FILM COATED ORAL AS NEEDED
COMMUNITY
Start: 2020-02-12

## 2020-02-24 RX ORDER — HYDROCODONE BITARTRATE AND ACETAMINOPHEN 10; 325 MG/1; MG/1
1 TABLET ORAL EVERY 6 HOURS PRN
Qty: 120 TABLET | Refills: 0 | Status: SHIPPED | OUTPATIENT
Start: 2020-04-04 | End: 2020-05-04

## 2020-02-24 NOTE — PROGRESS NOTES
Low Back Pain H & P    Chief Complaint: Patient presents with:  Low Back Pain: pt s/p Bilateral L5 transforaminal epidural steroid injections 11/15/19 with 80-85% relief until one month ago.  pt c/o lower back pain radiating in the lower extremitites with n Mother         Parkinson       Social History   Social History    Socioeconomic History      Marital status:       Spouse name: Not on file      Number of children: Not on file      Years of education: Not on file      Highest education level: Not day.        Bike Helmet: Not Asked        Seat Belt: Not Asked        Self-Exams: Not Asked    Social History Narrative      The patient does not use an assistive device. The patient does live in a home with stairs. Review of Systems      PE:   Elias Henao stenosis    5. L5-S1 left mod foraminal, L4-5 mod diffuse, L3-4 mod diffuse, L2-3 left mod foraminal & mild-mod diffuse bulging discs    6.  L5-S1 mild central herniated disc    7. s/p left L4-5 laminectomy       Plan  I will perform bilateral L5 TFESI(s) i

## 2020-02-24 NOTE — PATIENT INSTRUCTIONS
As of October 6th 2014, the Drug Enforcement Agency [facility]) is reclassifying all hydrocodone combination medications from Schedule III to Schedule II. This includes medications such as Norco, Vicodin, Lortab, Zohydro, and Vicoprofen.     What this means for y will perform bilateral L5 TFESI(s) in May. The patient will continue with her home exercise program.    The patient will continue with their current pain medications.   She will let me know which medication her podiatrist will place her on after her surg

## 2020-02-27 ENCOUNTER — TELEPHONE (OUTPATIENT)
Dept: NEUROLOGY | Facility: CLINIC | Age: 60
End: 2020-02-27

## 2020-02-27 NOTE — TELEPHONE ENCOUNTER
Bilateral L5 (L5-S1) LUMBAR TRANSFORAMINAL EPIDURAL INJECTION- APPROVED    Called GUERA Dailey for authorization of approval for Bilateral L5 (L5-S1) LUMBAR TRANSFORAMINAL EPIDURAL INJECTION   CPT [id number], [id number]  Spoke to Tom RAMIREZ who states au

## 2020-03-03 ENCOUNTER — PRE VISIT (OUTPATIENT)
Dept: INTERNAL MEDICINE | Facility: CLINIC | Age: 60
End: 2020-03-03

## 2020-03-03 NOTE — TELEPHONE ENCOUNTER
Patient establishing care, no records of labs, no labs ordered,  need to sign care everywhere  Alma Gutierrez CMA at 10:00 AM on 3/3/2020.

## 2020-03-12 NOTE — TELEPHONE ENCOUNTER
Patient has been scheduled for a bilateral L5 TFESIs  on 5/15/2020 at the [facility]. Medications and allergies reviewed. Patient informed to hold aspirins, nsaids, blood thinners, multivitamins, vitamin E and fish oils 3-7 days prior to procedure.  Patient infor

## 2020-03-13 NOTE — TELEPHONE ENCOUNTER
Patient left voicemail calling to update Dr. Nisha Candelaria that the Podiatrist will prescribe Oxycodone post surgery

## 2020-04-21 ENCOUNTER — TELEPHONE (OUTPATIENT)
Dept: NEUROLOGY | Facility: CLINIC | Age: 60
End: 2020-04-21

## 2020-04-21 ENCOUNTER — TELEMEDICINE (OUTPATIENT)
Dept: NEUROLOGY | Facility: CLINIC | Age: 60
End: 2020-04-21
Payer: COMMERCIAL

## 2020-04-21 DIAGNOSIS — M96.1 POSTLAMINECTOMY SYNDROME, LUMBAR REGION: ICD-10-CM

## 2020-04-21 DIAGNOSIS — M51.26 LUMBAR HERNIATED DISC: ICD-10-CM

## 2020-04-21 DIAGNOSIS — M54.16 LUMBAR RADICULOPATHY: Primary | ICD-10-CM

## 2020-04-21 DIAGNOSIS — M48.061 SPINAL STENOSIS OF LUMBAR REGION WITHOUT NEUROGENIC CLAUDICATION: ICD-10-CM

## 2020-04-21 DIAGNOSIS — M48.061 SPINAL STENOSIS, LUMBAR REGION, WITHOUT NEUROGENIC CLAUDICATION: ICD-10-CM

## 2020-04-21 DIAGNOSIS — M43.10 ACQUIRED SPONDYLOLISTHESIS: ICD-10-CM

## 2020-04-21 DIAGNOSIS — M51.9 LUMBAR DISC DISEASE: ICD-10-CM

## 2020-04-21 PROCEDURE — 99214 OFFICE O/P EST MOD 30 MIN: CPT | Performed by: PHYSICAL MEDICINE & REHABILITATION

## 2020-04-21 NOTE — TELEPHONE ENCOUNTER
Called  [facility] for authorization of approval of Bilateral L5 TFESIs cpt codes G2020100, [id number]. Talked to [address] [place] [address] [place] who states authorization is not required. Reference #   [id number].  Procedure may be scheduled after 06/15/20(secondary to

## 2020-04-21 NOTE — PATIENT INSTRUCTIONS
PLAN:   I will do bilateral L5 TFESI's once we are doing elective procedures again.     The patient will continue with her home exercise program as best as she is able.     The patient will continue with their current pain medications.   She does not want a

## 2020-04-21 NOTE — PROGRESS NOTES
[facility] [address] [facility]    Telemedicine Visit - New Evaluation    Celi Guzamn verbally consents to a Telemedicine Visit on 04/21/20. This visit is conducted using Telemedicine with live, interactive audio and video. weakness in both legs but she is not sure if this is due to wearing the boot or her neuropathy. She has issues with her balance.       PAST MEDICAL HISTORY:     Past Medical History:   Diagnosis Date   • Depression    • PE (pulmonary embolism)          PAS Coughing  Ace Inhibitors          Coughing, OTHER (SEE COMMENTS)      FAMILY HISTORY:     Family History   Problem Relation Age of Onset   • Cancer Father    • Diabetes Mother    • Other (Other) Mother         Parkinson          SOCIAL HISTORY:   Social Hi unstable spondylolisthesis    4. L5-S1 left mild-mod, L4-5 bilateral mild, L3-4 left mild, L2-3 left mod foraminal stenosis    5. L5-S1 left mod foraminal, L4-5 mod diffuse, L3-4 mod diffuse, L2-3 left mod foraminal & mild-mod diffuse bulging discs    6.  L over half of the time spent discussing his care and treatment as outlined above. Leslie Bartlett M.D.   Physical Medicine and Rehabilitation   4/21/2020

## 2020-04-28 RX ORDER — HYDROCODONE BITARTRATE AND ACETAMINOPHEN 10; 325 MG/1; MG/1
1 TABLET ORAL EVERY 6 HOURS PRN
Qty: 120 TABLET | Refills: 0 | Status: SHIPPED | OUTPATIENT
Start: 2020-05-04 | End: 2020-05-26

## 2020-04-28 NOTE — TELEPHONE ENCOUNTER
Pt called stating she is unable to locate her May norco script. As we are able to e-scribe, can we e-scribe this medication with a note to pharmacy stating \"if paper prescription is turned in with pt's name/start date of 5/4/20, it is to be shredded\"?  Pl

## 2020-05-27 RX ORDER — HYDROCODONE BITARTRATE AND ACETAMINOPHEN 10; 325 MG/1; MG/1
1 TABLET ORAL EVERY 6 HOURS PRN
Qty: 120 TABLET | Refills: 0 | Status: SHIPPED | OUTPATIENT
Start: 2020-06-03 | End: 2020-06-28

## 2020-05-27 NOTE — TELEPHONE ENCOUNTER
Refill request for norco 10/325 mg, take 1 tab every 6 hrs as needed, #120, no refills    LOV: 4/21/20  NOV: none  Last [place]

## 2020-06-03 ENCOUNTER — TELEPHONE (OUTPATIENT)
Dept: NEUROLOGY | Facility: CLINIC | Age: 60
End: 2020-06-03

## 2020-06-03 NOTE — TELEPHONE ENCOUNTER
Patient has been scheduled for a Bilateral L5 TFESI under local on 6/12/20 at the [facility]. Medications and allergies reviewed. Patient informed to hold aspirins, nsaids, blood thinners, vitamins and fish oils 3-7 days prior to procedure.  Patient informed he/s

## 2020-06-10 NOTE — TELEPHONE ENCOUNTER
Called  [facility] for authorization of approval of Bilateral L5 TFESIs cpt codes [id number], [id number]. Talked to Alejandrina who states authorization is not required. Authorization would be required if medication for procedure would cost more than $2000.

## 2020-06-11 ENCOUNTER — LAB REQUISITION (OUTPATIENT)
Dept: LAB | Facility: HOSPITAL | Age: 60
End: 2020-06-11
Payer: COMMERCIAL

## 2020-06-11 DIAGNOSIS — Z20.828 CONTACT WITH AND (SUSPECTED) EXPOSURE TO OTHER VIRAL COMMUNICABLE DISEASES: ICD-10-CM

## 2020-06-12 ENCOUNTER — OFFICE VISIT (OUTPATIENT)
Dept: SURGERY | Facility: CLINIC | Age: 60
End: 2020-06-12

## 2020-06-12 DIAGNOSIS — M51.9 LUMBAR DISC DISEASE: ICD-10-CM

## 2020-06-12 DIAGNOSIS — M96.1 POSTLAMINECTOMY SYNDROME, LUMBAR REGION: ICD-10-CM

## 2020-06-12 DIAGNOSIS — M51.26 LUMBAR HERNIATED DISC: ICD-10-CM

## 2020-06-12 DIAGNOSIS — M54.16 LUMBAR RADICULOPATHY: Primary | ICD-10-CM

## 2020-06-12 PROCEDURE — 64483 NJX AA&/STRD TFRM EPI L/S 1: CPT | Performed by: PHYSICAL MEDICINE & REHABILITATION

## 2020-06-12 NOTE — PROCEDURES
Francisco CHANEY 7.    BILATERAL LUMBAR TRANSFORAMINAL   NAME:  Amado Bragg    MR #:    [id number] :  1960     PHYSICIAN:  Renan Saunders        Operative Report    DATE OF PROCEDURE: 2020   PREOPERATIVE DIAGNOSES: 1. Bilater air was aspirated. Then, the patient was injected with a 3 cc solution of 1.5 cc of 40 mg/cc of Kenalog and 1.5 cc of 1% PF lidocaine without epinephrine. After this, the needle was removed.   Then  fluoroscopy was right anterior obliqued opening up the l

## 2020-06-29 NOTE — TELEPHONE ENCOUNTER
norco 10/325 mg, QID PRN, #120, no refills    LOV: 4/21/20  NOV: None  Last refilled on 6/3/20 per pharmacy

## 2020-06-30 RX ORDER — HYDROCODONE BITARTRATE AND ACETAMINOPHEN 10; 325 MG/1; MG/1
1 TABLET ORAL EVERY 6 HOURS PRN
Qty: 120 TABLET | Refills: 0 | Status: SHIPPED | OUTPATIENT
Start: 2020-07-03 | End: 2020-07-28

## 2020-07-02 ENCOUNTER — VIRTUAL VISIT (OUTPATIENT)
Dept: INTERNAL MEDICINE | Facility: CLINIC | Age: 60
End: 2020-07-02
Payer: COMMERCIAL

## 2020-07-02 ENCOUNTER — PATIENT MESSAGE (OUTPATIENT)
Dept: NEUROLOGY | Facility: CLINIC | Age: 60
End: 2020-07-02

## 2020-07-02 DIAGNOSIS — Z98.84 HISTORY OF WEIGHT LOSS SURGERY: ICD-10-CM

## 2020-07-02 DIAGNOSIS — M54.50 CHRONIC LOW BACK PAIN, UNSPECIFIED BACK PAIN LATERALITY, UNSPECIFIED WHETHER SCIATICA PRESENT: ICD-10-CM

## 2020-07-02 DIAGNOSIS — I10 BENIGN ESSENTIAL HYPERTENSION: ICD-10-CM

## 2020-07-02 DIAGNOSIS — Z86.718 PERSONAL HISTORY OF DVT (DEEP VEIN THROMBOSIS): ICD-10-CM

## 2020-07-02 DIAGNOSIS — G89.29 CHRONIC LOW BACK PAIN, UNSPECIFIED BACK PAIN LATERALITY, UNSPECIFIED WHETHER SCIATICA PRESENT: ICD-10-CM

## 2020-07-02 DIAGNOSIS — Z79.01 LONG TERM CURRENT USE OF ANTICOAGULANT THERAPY: ICD-10-CM

## 2020-07-02 DIAGNOSIS — Z76.89 ENCOUNTER TO ESTABLISH CARE: Primary | ICD-10-CM

## 2020-07-02 DIAGNOSIS — F32.A DEPRESSION, UNSPECIFIED DEPRESSION TYPE: ICD-10-CM

## 2020-07-02 RX ORDER — UBIDECARENONE 75 MG
500 CAPSULE ORAL EVERY EVENING
COMMUNITY
Start: 2019-10-21 | End: 2022-06-01

## 2020-07-02 RX ORDER — HYDROCODONE BITARTRATE AND ACETAMINOPHEN 5; 325 MG/1; MG/1
1 TABLET ORAL EVERY 6 HOURS PRN
Status: ON HOLD | COMMUNITY
End: 2022-04-12

## 2020-07-02 RX ORDER — METHOCARBAMOL 500 MG/1
500 TABLET, FILM COATED ORAL 2 TIMES DAILY PRN
Status: ON HOLD | COMMUNITY
Start: 2020-04-20 | End: 2022-04-10

## 2020-07-02 RX ORDER — GABAPENTIN 600 MG/1
600 TABLET ORAL 4 TIMES DAILY
Status: ON HOLD | COMMUNITY
End: 2022-04-10

## 2020-07-02 RX ORDER — ACETAMINOPHEN 500 MG
325-650 TABLET ORAL EVERY 6 HOURS PRN
Status: ON HOLD | COMMUNITY
End: 2022-04-12

## 2020-07-02 RX ORDER — HYDROCHLOROTHIAZIDE 25 MG/1
25 TABLET ORAL DAILY PRN
Status: ON HOLD | COMMUNITY
Start: 2019-10-21 | End: 2022-04-10

## 2020-07-02 RX ORDER — MULTIVITAMIN
1 TABLET ORAL
Status: ON HOLD | COMMUNITY
End: 2022-04-10

## 2020-07-02 RX ORDER — DULOXETIN HYDROCHLORIDE 60 MG/1
60 CAPSULE, DELAYED RELEASE ORAL
COMMUNITY
Start: 2019-08-02 | End: 2020-07-27

## 2020-07-02 RX ORDER — ELETRIPTAN HYDROBROMIDE 40 MG/1
TABLET, FILM COATED ORAL
COMMUNITY
Start: 2020-02-12 | End: 2020-07-21
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
## 2020-07-02 NOTE — TELEPHONE ENCOUNTER
From: Rohit Fuentes  To: Denys Partida MD  Sent: 7/2/2020 8:16 AM CDT  Subject: Non-Urgent Medical Question    Good Morning. .. I hope all is well with you and your family. Wanted to give you a follow up note after my injection last month.  Unfortunately,

## 2020-07-02 NOTE — NURSING NOTE
Chief Complaint   Patient presents with     Establish Care     pt would like to establish care       Abner Colvin CMA, EMT at 9:35 AM on 7/2/2020.

## 2020-07-03 ENCOUNTER — TELEPHONE (OUTPATIENT)
Dept: INTERNAL MEDICINE | Facility: CLINIC | Age: 60
End: 2020-07-03

## 2020-07-03 ENCOUNTER — PATIENT MESSAGE (OUTPATIENT)
Dept: NEUROLOGY | Facility: CLINIC | Age: 60
End: 2020-07-03

## 2020-07-03 DIAGNOSIS — M54.16 LUMBAR RADICULOPATHY: ICD-10-CM

## 2020-07-03 DIAGNOSIS — M51.9 LUMBAR DISC DISEASE: ICD-10-CM

## 2020-07-03 DIAGNOSIS — M96.1 POSTLAMINECTOMY SYNDROME, LUMBAR REGION: ICD-10-CM

## 2020-07-03 DIAGNOSIS — M48.061 SPINAL STENOSIS, LUMBAR REGION, WITHOUT NEUROGENIC CLAUDICATION: ICD-10-CM

## 2020-07-03 DIAGNOSIS — M51.26 LUMBAR HERNIATED DISC: ICD-10-CM

## 2020-07-03 DIAGNOSIS — M43.10 ACQUIRED SPONDYLOLISTHESIS: Primary | ICD-10-CM

## 2020-07-03 DIAGNOSIS — M48.061 SPINAL STENOSIS OF LUMBAR REGION WITHOUT NEUROGENIC CLAUDICATION: ICD-10-CM

## 2020-07-06 NOTE — TELEPHONE ENCOUNTER
From: Awilda Merida  To: Jigna Black MD  Sent: 7/3/2020 11:11 AM CDT  Subject: Non-Urgent Medical Question    Would both be an option?     Lucas Alert        RE: Non-Urgent Medical Question  I could repeat the injections, but it might be worth doing a few sess

## 2020-07-13 ENCOUNTER — TELEPHONE (OUTPATIENT)
Dept: NEUROLOGY | Facility: CLINIC | Age: 60
End: 2020-07-13

## 2020-07-13 NOTE — TELEPHONE ENCOUNTER
[employer] [facility]   for authorization of approval of Bilateral L5 TFESI cpt codes [id number], [id number]. Talked to [facility] who states authorization is not required. Reference #  [id number]             Will inform Nursing.

## 2020-07-17 ENCOUNTER — PATIENT MESSAGE (OUTPATIENT)
Dept: NEUROLOGY | Facility: CLINIC | Age: 60
End: 2020-07-17

## 2020-07-20 ENCOUNTER — MYC MEDICAL ADVICE (OUTPATIENT)
Dept: INTERNAL MEDICINE | Facility: CLINIC | Age: 60
End: 2020-07-20

## 2020-07-20 DIAGNOSIS — G43.909 MIGRAINE: ICD-10-CM

## 2020-07-20 DIAGNOSIS — I82.409 DVT (DEEP VENOUS THROMBOSIS) (H): Primary | ICD-10-CM

## 2020-07-20 NOTE — TELEPHONE ENCOUNTER
From: Suzzanna Severin  To: Angela Saunders MD  Sent: 7/17/2020 1:03 PM CDT  Subject: Other    Last week William Bazan left a message for me on my VM. I did return her call, but uncertain if she received it.  Wanted to let het know that I hoped that Aug 14th is

## 2020-07-20 NOTE — TELEPHONE ENCOUNTER
PT order faxed to [facility] at [phone]. Injection scheduling continued on 7/13/20 encounter.

## 2020-07-21 RX ORDER — ELETRIPTAN HYDROBROMIDE 40 MG/1
TABLET, FILM COATED ORAL
Qty: 20 TABLET | Refills: 1 | Status: SHIPPED | OUTPATIENT
Start: 2020-07-21 | End: 2021-01-05

## 2020-07-21 NOTE — TELEPHONE ENCOUNTER
Patient has been scheduled for a Bilateral L5 TFESI under local on 8/14/20 at the [facility]. Medications and allergies reviewed. Patient informed to hold aspirins, nsaids, blood thinners, vitamins and fish oils 3-7 days prior to procedure.  Patient informed he/s

## 2020-07-25 ENCOUNTER — MYC MEDICAL ADVICE (OUTPATIENT)
Dept: INTERNAL MEDICINE | Facility: CLINIC | Age: 60
End: 2020-07-25

## 2020-07-25 ENCOUNTER — MYC REFILL (OUTPATIENT)
Dept: INTERNAL MEDICINE | Facility: CLINIC | Age: 60
End: 2020-07-25

## 2020-07-25 DIAGNOSIS — G43.909 MIGRAINE: ICD-10-CM

## 2020-07-25 DIAGNOSIS — F32.A DEPRESSION, UNSPECIFIED DEPRESSION TYPE: Primary | ICD-10-CM

## 2020-07-25 DIAGNOSIS — I82.409 DVT (DEEP VENOUS THROMBOSIS) (H): ICD-10-CM

## 2020-07-25 RX ORDER — ELETRIPTAN HYDROBROMIDE 40 MG/1
TABLET, FILM COATED ORAL
Qty: 20 TABLET | Refills: 1 | Status: CANCELLED | OUTPATIENT
Start: 2020-07-25

## 2020-07-27 NOTE — TELEPHONE ENCOUNTER
"    eletriptan (RELPAX) 40 MG tablet    Last Written Prescription Date:  7/21/20  Last Fill Quantity: 20,   # refills: 1         apixaban ANTICOAGULANT (ELIQUIS) 5 MG tablet      Last Written Prescription Date:  7/21/20  Last Fill Quantity: 180,   # refills: 1    Requested to:\"The Rx Should go to [facility] on [address] in [place].The telephone number is [phone]. \"  Prescriptions sent on 7/21/20 to:  [facility] [place] [facility] - [address] PRICE  [phone]     Last Office Visit : 7/2/20  Future Office visit:  None     Message sent to patient in this regard per Otis                 "

## 2020-07-28 RX ORDER — HYDROCODONE BITARTRATE AND ACETAMINOPHEN 10; 325 MG/1; MG/1
1 TABLET ORAL EVERY 6 HOURS PRN
Qty: 120 TABLET | Refills: 0 | Status: SHIPPED | OUTPATIENT
Start: 2020-08-02 | End: 2020-08-27

## 2020-07-28 RX ORDER — DULOXETIN HYDROCHLORIDE 60 MG/1
60 CAPSULE, DELAYED RELEASE ORAL DAILY
Qty: 90 CAPSULE | Refills: 3 | Status: SHIPPED | OUTPATIENT
Start: 2020-07-28 | End: 2021-06-28

## 2020-07-28 NOTE — TELEPHONE ENCOUNTER
Refill request for norco 10/325 mg, take 1 tab every 6 hrs as needed, #120, no refills    LOV: 4/21/20  NOV: none, injection at [facility] on 8/14  Last refilled on 7/3 per pharmacy

## 2020-07-29 NOTE — TELEPHONE ENCOUNTER
Refill request for gabapentin 600 mg, QID, #120, 5 refills    LOV: 4/21/20  NOV: none  Last refilled on 1/9/20 with 5 refills

## 2020-07-30 ENCOUNTER — PATIENT MESSAGE (OUTPATIENT)
Dept: NEUROLOGY | Facility: CLINIC | Age: 60
End: 2020-07-30

## 2020-07-30 RX ORDER — GABAPENTIN 600 MG/1
TABLET ORAL
Qty: 120 TABLET | Refills: 5 | Status: SHIPPED | OUTPATIENT
Start: 2020-07-30 | End: 2020-11-03

## 2020-07-30 NOTE — TELEPHONE ENCOUNTER
From: Alicia Franco  To: Katie Rapp MD  Sent: 7/30/2020 3:34 PM CDT  Subject: Other    I received a call from [facility] today. She left a message on my VM and said that my injection date needed to be changed to August 14th.  It was already scheduled on

## 2020-07-30 NOTE — TELEPHONE ENCOUNTER
Dr Reno Burns unavailable 8/14/20 for injection EOSC. Left message on voice mail to call office to reschedule.

## 2020-08-17 NOTE — TELEPHONE ENCOUNTER
Patient asked for a supervisor to call back regarding multiple injection cancellations .  LM informing patient I had received her message and asked her to call back to discuss

## 2020-08-17 NOTE — TELEPHONE ENCOUNTER
Spoke to patient regarding rescheduling injection from 8/21 to 8/19. She states that this is the 3rd time she has been cancelled. 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  She would like to know why she is being rescheduled

## 2020-08-18 NOTE — TELEPHONE ENCOUNTER
Spoke to Dr Esther Prajapati and Dr Deepak Carrion. OK for Dr Esther Prajapati to do injection on 8/21/20. Spoke to patient and informed her Dr Esther Prajapati will do injection. Patient will contact internist to hold eliquis for 48 hours. Will bring documentation with her to injection.    Instr

## 2020-08-18 NOTE — TELEPHONE ENCOUNTER
Spoke to patient, apologized for needing to change date of injection. Patient aware Dr Hudson Bhardwaj needs to change his schedule for 8/21/20. Patient offered injection date 8/19/20, is unable to have injection on this date. Is APN in clinic in [place].  Plan

## 2020-08-21 ENCOUNTER — OFFICE VISIT (OUTPATIENT)
Dept: SURGERY | Facility: CLINIC | Age: 60
End: 2020-08-21

## 2020-08-21 DIAGNOSIS — M54.16 LUMBAR RADICULOPATHY: Primary | ICD-10-CM

## 2020-08-21 PROCEDURE — 64483 NJX AA&/STRD TFRM EPI L/S 1: CPT | Performed by: PHYSICAL MEDICINE & REHABILITATION

## 2020-08-21 NOTE — PROCEDURES
Preoperative Diagnosis:  (M54.16) Bilateral L5 radiculopathies   (primary encounter diagnosis)       Postoperative Diagnosis:  (M54.16) Bilateral L5 radiculopathies   (primary encounter diagnosis)       Procedures: Bilateral L5 Transforaminal epidural ster

## 2020-08-28 RX ORDER — HYDROCODONE BITARTRATE AND ACETAMINOPHEN 10; 325 MG/1; MG/1
1 TABLET ORAL EVERY 6 HOURS PRN
Qty: 120 TABLET | Refills: 0 | Status: SHIPPED | OUTPATIENT
Start: 2020-09-01 | End: 2020-09-30

## 2020-08-28 NOTE — TELEPHONE ENCOUNTER
Medication request: Norco 10-325mg 1 TAB Q6H PRN PAIN    LOV: 08/21/20 bilateral L5 TFESI, OV 04/21/20  NOV: none    [place] 08/02/20 #120 r-0

## 2020-09-26 DIAGNOSIS — M76.61 TENDONITIS, ACHILLES, RIGHT: ICD-10-CM

## 2020-09-28 NOTE — TELEPHONE ENCOUNTER
Medication request: Norco 10-325mg q6h prn pain and Voltaren gel 1% apply 4g to prn affected area    LOV 4/21/20  No follow up    [place]: 9/1/20 #120  Voltaren Gel !% last filled 3/13/2019 #1 r-3    Left detailed message informing patient follow

## 2020-09-30 ENCOUNTER — TELEMEDICINE (OUTPATIENT)
Dept: NEUROLOGY | Facility: CLINIC | Age: 60
End: 2020-09-30
Payer: COMMERCIAL

## 2020-09-30 DIAGNOSIS — M96.1 POSTLAMINECTOMY SYNDROME, LUMBAR REGION: ICD-10-CM

## 2020-09-30 DIAGNOSIS — M43.10 ACQUIRED SPONDYLOLISTHESIS: ICD-10-CM

## 2020-09-30 DIAGNOSIS — M51.26 LUMBAR HERNIATED DISC: ICD-10-CM

## 2020-09-30 DIAGNOSIS — M48.061 SPINAL STENOSIS, LUMBAR REGION, WITHOUT NEUROGENIC CLAUDICATION: ICD-10-CM

## 2020-09-30 DIAGNOSIS — M51.9 LUMBAR DISC DISEASE: ICD-10-CM

## 2020-09-30 DIAGNOSIS — M47.816 LUMBAR FACET ARTHROPATHY: ICD-10-CM

## 2020-09-30 DIAGNOSIS — M54.16 LUMBAR RADICULOPATHY: Primary | ICD-10-CM

## 2020-09-30 DIAGNOSIS — M48.061 SPINAL STENOSIS OF LUMBAR REGION WITHOUT NEUROGENIC CLAUDICATION: ICD-10-CM

## 2020-09-30 DIAGNOSIS — G60.9 IDIOPATHIC PERIPHERAL NEUROPATHY: ICD-10-CM

## 2020-09-30 PROCEDURE — 99214 OFFICE O/P EST MOD 30 MIN: CPT | Performed by: PHYSICAL MEDICINE & REHABILITATION

## 2020-09-30 RX ORDER — HYDROCODONE BITARTRATE AND ACETAMINOPHEN 10; 325 MG/1; MG/1
1 TABLET ORAL EVERY 6 HOURS PRN
Qty: 120 TABLET | Refills: 0 | Status: SHIPPED | OUTPATIENT
Start: 2020-10-01 | End: 2020-10-31

## 2020-09-30 NOTE — PROGRESS NOTES
[facility]  [address]    Telemedicine Visit - Evaluation    Angel Rodriguez verbally consents to a Telemedicine Visit on 09/30/20. This visit is conducted using Telemedicine with live, interactive audio and video.     Pa mouth every 6 (six) hours as needed for Pain. 120 tablet 0   • GABAPENTIN 600 MG Oral Tab TAKE ONE TABLET BY MOUTH 4 TIMES DAILY  120 tablet 5   • apixaban 5 MG Oral Tab Take 5 mg by mouth 2 (two) times daily.      • acetaminophen 500 MG Oral Tab Take 500 m deformity  Cardiovascular: No cyanosis, clubbing or edema  Respiratory: Non-labored respirations  Skin: No lesions noted   Neurological: alert & oriented x 3, attentive, able to follow commands, comprehention intact, spontaneous speech intact  Psychiatric: neuropathy to see if it is coming from any source other than her spine. The patient has stated that she does not want to proceed with an EMG nerve conduction study of her bilateral lower extremities to assess for her neuropathy at this time.     The parag consent forms and documents. which are located on the [facility] website. The patient verbally agreed to telehealth consent form, related consents and the risks discussed. Lastly, the patient confirmed [place].    Included in this visit, florecita

## 2020-10-01 ENCOUNTER — TELEPHONE (OUTPATIENT)
Dept: NEUROLOGY | Facility: CLINIC | Age: 60
End: 2020-10-01

## 2020-10-01 DIAGNOSIS — M47.816 LUMBAR FACET ARTHROPATHY: Primary | ICD-10-CM

## 2020-10-01 RX ORDER — HYDROCODONE BITARTRATE AND ACETAMINOPHEN 10; 325 MG/1; MG/1
1 TABLET ORAL EVERY 6 HOURS PRN
Qty: 120 TABLET | Refills: 0 | Status: SHIPPED | OUTPATIENT
Start: 2020-10-01 | End: 2020-10-28

## 2020-10-01 NOTE — TELEPHONE ENCOUNTER
Pt concerned that order was not written for lumbar  RFA or TFESI that will be preformed on Friday after MBB. Pt was told that Dr Noam Ghosh could not present order til results of MBB. We are unable to schedule until order written and no order written.  Told her c

## 2020-10-01 NOTE — TELEPHONE ENCOUNTER
Patient has been scheduled for a literal L3-L4 and L5 MBB on 12/15/20 at the [address] [place] [address]. Medications and allergies reviewed. Patient informed to hold aspirins, nsaids, blood thinners, vitamins and fish oils 5-7 days prior to procedure.  Patient informed we will n

## 2020-10-01 NOTE — TELEPHONE ENCOUNTER
Called Laurie/Ashlie for authorization of approval of Bilateral L3 and L4 and L5 medial branch blocks cpt codes 15736-36, 89748-FG, 63514-SV, 64353-BT, 81802-OY. Talked to [address] [place] [address]. who states authorization is not required. Reference #   [id number].    Tala Mcfarlane

## 2020-10-02 ENCOUNTER — TELEPHONE (OUTPATIENT)
Dept: NEUROLOGY | Facility: CLINIC | Age: 60
End: 2020-10-02

## 2020-10-02 NOTE — TELEPHONE ENCOUNTER
Order placed for bilateral L3, L4, and L5 MBRFN's if she has a positive response to the MBB's earlier in the week.

## 2020-10-02 NOTE — TELEPHONE ENCOUNTER
Bilateral L3, L4, and L5 medial branch radiofrequency neurotomies   CPT Code: Z4679918, [id number] RT, 12657 LT -[address] [place] [address] for authorization of approval for above. Spoke to [address] [place] [address]  who states no authorization is required.    Reference call# [email or web address]

## 2020-10-25 ENCOUNTER — MYC REFILL (OUTPATIENT)
Dept: INTERNAL MEDICINE | Facility: CLINIC | Age: 60
End: 2020-10-25

## 2020-10-25 DIAGNOSIS — G43.909 MIGRAINE: ICD-10-CM

## 2020-10-25 DIAGNOSIS — I82.409 DVT (DEEP VENOUS THROMBOSIS) (H): ICD-10-CM

## 2020-10-25 RX ORDER — ELETRIPTAN HYDROBROMIDE 40 MG/1
TABLET, FILM COATED ORAL
Qty: 20 TABLET | Refills: 1 | Status: CANCELLED | OUTPATIENT
Start: 2020-10-25

## 2020-10-28 NOTE — TELEPHONE ENCOUNTER
Norco 10/325mg Take 1 tablet every 6 hours prn pain. #120.      [place]    LOV-9/30/20 televisit  NOV-none    12/15/20 Lateral L3-4 and 5 MBB scheduled    Patient also stut messaged today asking to increase dose of norco and gabapentin by 1 tabl pe

## 2020-10-29 RX ORDER — HYDROCODONE BITARTRATE AND ACETAMINOPHEN 10; 325 MG/1; MG/1
1 TABLET ORAL EVERY 6 HOURS PRN
Qty: 120 TABLET | Refills: 0 | Status: SHIPPED | OUTPATIENT
Start: 2020-10-31 | End: 2020-11-24

## 2020-10-30 ENCOUNTER — PATIENT MESSAGE (OUTPATIENT)
Dept: NEUROLOGY | Facility: CLINIC | Age: 60
End: 2020-10-30

## 2020-10-30 NOTE — TELEPHONE ENCOUNTER
From: Farideh Perez  To: Priya Anderson MD  Sent: 10/30/2020 9:30 AM CDT  Subject: Prescription Question    Would it be possible to increase my dose of Norco by one dose, and my Neurontin by one dose per day?  Thanks, Fay Jon

## 2020-10-31 NOTE — TELEPHONE ENCOUNTER
[address] Consuelo Persaud for Neurontin to increase by one more tablet per day. She should do this for one week and then let me know how she is doing before increasing the Norco by another pill per day.   If she ends up needing 5 of each per day, then she should get a surgical o

## 2020-11-03 NOTE — TELEPHONE ENCOUNTER
Medication request: Gabapentin 600mg 5 times daily    LOV 9/30/20  No follow up-scheduled 12/15/20 for MBBs.     Last refill: 7/30/20 #120 r-5    Dose was increased to 5 times per day per

## 2020-11-06 RX ORDER — GABAPENTIN 600 MG/1
600 TABLET ORAL
Qty: 150 TABLET | Refills: 3 | Status: SHIPPED | OUTPATIENT
Start: 2020-11-06 | End: 2021-01-25

## 2020-11-24 RX ORDER — HYDROCODONE BITARTRATE AND ACETAMINOPHEN 10; 325 MG/1; MG/1
1 TABLET ORAL EVERY 6 HOURS PRN
Qty: 120 TABLET | Refills: 0 | Status: SHIPPED | OUTPATIENT
Start: 2020-11-30 | End: 2020-12-22

## 2020-11-24 NOTE — TELEPHONE ENCOUNTER
Medication request: Norco 10-325mg q6h prn pain    LOV tele visit 9/30/20  No follow up scheduled-scheduled for lumbar MBBs 12/15/20    [place]: 10/31/20 #120    Patient is requesting refill to be e-scribed across state lines to [facility] [place]

## 2020-12-10 ENCOUNTER — TELEPHONE (OUTPATIENT)
Dept: NEUROLOGY | Facility: CLINIC | Age: 60
End: 2020-12-10

## 2020-12-10 NOTE — TELEPHONE ENCOUNTER
Patient calling to cancel TFESI on 12/15/20. Scheduling form faxed to [facility] and spoke with Yahaira    Also, informed patient that she will need to have a follow up with Ольга Haider as instructed at [address] [place] [address].     Appointment scheduled

## 2020-12-24 RX ORDER — HYDROCODONE BITARTRATE AND ACETAMINOPHEN 10; 325 MG/1; MG/1
1 TABLET ORAL EVERY 6 HOURS PRN
Qty: 120 TABLET | Refills: 0 | Status: SHIPPED | OUTPATIENT
Start: 2020-12-30 | End: 2021-01-25

## 2020-12-24 NOTE — TELEPHONE ENCOUNTER
Medication request: Hydrocodone-acetaminophen 10-325mg. Take 1 tab PO q6H PRN for pain.      LOV: 09/30/2020   NOV: 02/01/2021    [place]: 11/30/2020 # 120 Refill 0

## 2020-12-29 DIAGNOSIS — G43.909 MIGRAINE: ICD-10-CM

## 2021-01-01 ENCOUNTER — EXTERNAL RECORD (OUTPATIENT)
Dept: HEALTH INFORMATION MANAGEMENT | Facility: OTHER | Age: 61
End: 2021-01-01

## 2021-01-04 ENCOUNTER — HEALTH MAINTENANCE LETTER (OUTPATIENT)
Age: 61
End: 2021-01-04

## 2021-01-05 RX ORDER — ELETRIPTAN HYDROBROMIDE 40 MG/1
40 TABLET, FILM COATED ORAL
Qty: 20 TABLET | Refills: 2 | Status: SHIPPED | OUTPATIENT
Start: 2021-01-05 | End: 2021-08-25

## 2021-01-05 NOTE — TELEPHONE ENCOUNTER
ELETRIPTAN HBR 40 MG TABLET   Last Written Prescription Date:  7/21/2020  Last Fill Quantity: 20,   # refills: 1  Last Office Visit : 7/2/2020  Future Office visit:  None  20 Tabs, 2 Refills sent to pharm 1/5/2021    Yoon Gaona RN  Central Triage Red Flags/Med Refills

## 2021-01-25 RX ORDER — GABAPENTIN 600 MG/1
600 TABLET ORAL
Qty: 150 TABLET | Refills: 0 | Status: SHIPPED | OUTPATIENT
Start: 2021-01-25 | End: 2021-04-21

## 2021-01-25 NOTE — TELEPHONE ENCOUNTER
Gabapentin 600mg Take 1 tablet 5 times daily.  #150     Last filled-11/6/20 for 3 months    LOV-9/30/20  NOV-2/1/21

## 2021-01-26 NOTE — TELEPHONE ENCOUNTER
Medication request: Norco 10-325mg Take 1 tablet by mouth every 6 (six) hours as needed for Pain.     LOV: 09/30/20  NOV: 02/01/21    [place] ref[place]: 12/31/20 #120 r-0

## 2021-01-27 RX ORDER — HYDROCODONE BITARTRATE AND ACETAMINOPHEN 10; 325 MG/1; MG/1
1 TABLET ORAL EVERY 6 HOURS PRN
Qty: 120 TABLET | Refills: 0 | Status: SHIPPED | OUTPATIENT
Start: 2021-01-30 | End: 2021-02-24

## 2021-02-01 ENCOUNTER — OFFICE VISIT (OUTPATIENT)
Dept: NEUROLOGY | Facility: CLINIC | Age: 61
End: 2021-02-01

## 2021-02-01 ENCOUNTER — TELEPHONE (OUTPATIENT)
Dept: NEUROLOGY | Facility: CLINIC | Age: 61
End: 2021-02-01

## 2021-02-01 DIAGNOSIS — M47.816 LUMBAR FACET ARTHROPATHY: ICD-10-CM

## 2021-02-01 DIAGNOSIS — M96.1 POSTLAMINECTOMY SYNDROME, LUMBAR REGION: ICD-10-CM

## 2021-02-01 DIAGNOSIS — M48.061 SPINAL STENOSIS, LUMBAR REGION, WITHOUT NEUROGENIC CLAUDICATION: ICD-10-CM

## 2021-02-01 DIAGNOSIS — M48.061 SPINAL STENOSIS OF LUMBAR REGION WITHOUT NEUROGENIC CLAUDICATION: ICD-10-CM

## 2021-02-01 DIAGNOSIS — M51.26 LUMBAR HERNIATED DISC: ICD-10-CM

## 2021-02-01 DIAGNOSIS — M76.61 TENDONITIS, ACHILLES, RIGHT: ICD-10-CM

## 2021-02-01 DIAGNOSIS — M54.16 LUMBAR RADICULOPATHY: Primary | ICD-10-CM

## 2021-02-01 DIAGNOSIS — M51.9 LUMBAR DISC DISEASE: ICD-10-CM

## 2021-02-01 DIAGNOSIS — G60.9 IDIOPATHIC PERIPHERAL NEUROPATHY: ICD-10-CM

## 2021-02-01 DIAGNOSIS — M43.10 ACQUIRED SPONDYLOLISTHESIS: ICD-10-CM

## 2021-02-01 PROCEDURE — 99214 OFFICE O/P EST MOD 30 MIN: CPT | Performed by: PHYSICAL MEDICINE & REHABILITATION

## 2021-02-01 NOTE — PROGRESS NOTES
[facility]  [facility]    Telemedicine Visit - Evaluation    Hanny Vega verbally consents to a Telemedicine Visit on 02/01/21. This visit is conducted using Telemedicine with live, interactive audio and video.     Pa Refill   • HYDROcodone-acetaminophen  MG Oral Tab Take 1 tablet by mouth every 6 (six) hours as needed for Pain. 120 tablet 0   • gabapentin 600 MG Oral Tab Take 1 tablet (600 mg total) by mouth 5 (five) times daily.  150 tablet 0   • Diclofenac Sodiu intact  Ears/Nose/Throat:  External appearance identifies normal appearance without obvious deformity  Cardiovascular: No cyanosis, clubbing or edema  Respiratory: Non-labored respirations  Skin: No lesions noted   Neurological: alert & oriented x 3, atten however, this limits the ability to perform a thorough physical examination which may affect objective findings related to a specific condition and can affect treatment. The patient verbalized understanding with this plan and was in agreement.   There time spent for the visit.

## 2021-02-02 NOTE — TELEPHONE ENCOUNTER
Left detailed message on confidential voicemail of Aydenain to initiate authorization for Bilateral L5 TFESIs CPT 11152-34 dx:M54.16 to be done at [facility].     Status: pending follow up

## 2021-02-02 NOTE — TELEPHONE ENCOUNTER
Called patient, no answer and no voicemail to find out who prescribes Eliquis. Will try calling again later.

## 2021-02-02 NOTE — TELEPHONE ENCOUNTER
Azael VILLASEÑOR at [facility] to initiate authorization for Bilateral L5 TFESIs. (no reference # only name of rep and today's date)    Status:Approved-pre-certification is not required per health plan    Routing to clinical staff to schedule

## 2021-02-24 NOTE — TELEPHONE ENCOUNTER
Medication request: .  Hydrocodone-acetaminophen  mg     LOV 2/1/2021  NOV 5/3/2021     [place] 12/31/2020 #120 R-0

## 2021-02-26 RX ORDER — HYDROCODONE BITARTRATE AND ACETAMINOPHEN 10; 325 MG/1; MG/1
1 TABLET ORAL EVERY 6 HOURS PRN
Qty: 120 TABLET | Refills: 0 | Status: SHIPPED | OUTPATIENT
Start: 2021-02-26 | End: 2021-03-25

## 2021-03-07 ENCOUNTER — PATIENT MESSAGE (OUTPATIENT)
Dept: NEUROLOGY | Facility: CLINIC | Age: 61
End: 2021-03-07

## 2021-03-08 NOTE — TELEPHONE ENCOUNTER
From: Donnell Guerrero  To: Tyshawn Goetz MD  Sent: 3/7/2021 8:48 AM CST  Subject: Non-Urgent Medical Question    Would Friday April 9th work for you to do an injection?

## 2021-03-09 ENCOUNTER — TELEPHONE (OUTPATIENT)
Dept: NEUROLOGY | Facility: CLINIC | Age: 61
End: 2021-03-09

## 2021-03-09 ENCOUNTER — PATIENT MESSAGE (OUTPATIENT)
Dept: NEUROLOGY | Facility: CLINIC | Age: 61
End: 2021-03-09

## 2021-03-09 NOTE — TELEPHONE ENCOUNTER
Staff called and faxed over form to: 03/09/2021    Lasha Greer MD      Specialty: [facility] [address]  [address], [address] [place] [address]: [phone]  Fax: [phone]

## 2021-03-09 NOTE — TELEPHONE ENCOUNTER
S/W patient and she stated she would mychart message with information on who prescribes the Eliquis.

## 2021-03-09 NOTE — TELEPHONE ENCOUNTER
From: Branden Ritter  To: Benja Payne MD  Sent: 3/9/2021 2:40 PM CST  Subject: Non-Urgent Medical Question    Contact info for provider authorizing Annabellequis:     Virgen Gomez MD     Specialty: [facility]     Practice Information   [place]

## 2021-03-17 NOTE — TELEPHONE ENCOUNTER
Received approval from Dr. Dede Lay for patient to hold Eliquis for 2 days prior to TFESI. Placed in scan bin.

## 2021-03-17 NOTE — TELEPHONE ENCOUNTER
Patient has been scheduled for a Bilateral L5 transforaminal epidural steroid injections on 04/16/21 at the [facility]. Medications and allergies reviewed.  Patient informed we will need verbal or written authorization from patients Primary Care Physician/Cardiol

## 2021-03-25 NOTE — TELEPHONE ENCOUNTER
Medication request: HYDROcodone-acetaminophen  MG Take 1 tablet by mouth every 6 (six) hours as needed for Pain    LOV: 2/1/21  NOV: 4/16/21-injection, 5/3/21-OV     [place]: 2/27/21 #120

## 2021-03-26 RX ORDER — HYDROCODONE BITARTRATE AND ACETAMINOPHEN 10; 325 MG/1; MG/1
1 TABLET ORAL EVERY 6 HOURS PRN
Qty: 120 TABLET | Refills: 0 | Status: SHIPPED | OUTPATIENT
Start: 2021-03-28 | End: 2021-04-20

## 2021-04-03 DIAGNOSIS — I82.409 DVT (DEEP VENOUS THROMBOSIS) (H): ICD-10-CM

## 2021-04-16 ENCOUNTER — OFFICE VISIT (OUTPATIENT)
Dept: SURGERY | Facility: CLINIC | Age: 61
End: 2021-04-16

## 2021-04-16 DIAGNOSIS — M54.16 LUMBAR RADICULOPATHY: Primary | ICD-10-CM

## 2021-04-16 DIAGNOSIS — M51.9 LUMBAR DISC DISEASE: ICD-10-CM

## 2021-04-16 DIAGNOSIS — M96.1 POSTLAMINECTOMY SYNDROME, LUMBAR REGION: ICD-10-CM

## 2021-04-16 PROCEDURE — 64483 NJX AA&/STRD TFRM EPI L/S 1: CPT | Performed by: PHYSICAL MEDICINE & REHABILITATION

## 2021-04-16 NOTE — PROCEDURES
[facility] [address]    BILATERAL LUMBAR TRANSFORAMINAL   NAME:  Alicia Franco    MR #:    [id number] :  1960     PHYSICIAN:  Katie Saunders        Operative Report    DATE OF PROCEDURE: 2021   PREOPERATIVE DIAGNOSES: 1. Bilater of 40 mg/cc of Kenalog and 2 cc of 1% PF lidocaine without epinephrine. After this, the needle was removed. Then  fluoroscopy was right anterior obliqued opening up the left L5-S1 intervertebral foramen.   At this point in time, the patient's skin was ane

## 2021-04-20 NOTE — TELEPHONE ENCOUNTER
Medication request: HYDROcodone-acetaminophen  MG Oral Tab. Take 1 tablet by mouth every 6 (six) hours as needed for Pain.     LOV 02/01/2021  NOV 04/2/2021    [place] ref[place]: 03/29/2021 #120 Refill-0

## 2021-04-21 NOTE — TELEPHONE ENCOUNTER
Medication request: gabapentin 600 MG  Take 1 tablet (600 mg total) by mouth 4 times daily.     LOV: 2/1/21-televisit, 4/16/21-injection  NOV: 4/28/21     [place] ref[place]: 3/26/21 #120

## 2021-04-22 RX ORDER — GABAPENTIN 600 MG/1
TABLET ORAL
Qty: 120 TABLET | Refills: 4 | Status: SHIPPED | OUTPATIENT
Start: 2021-04-22 | End: 2021-09-20

## 2021-04-23 RX ORDER — HYDROCODONE BITARTRATE AND ACETAMINOPHEN 10; 325 MG/1; MG/1
1 TABLET ORAL EVERY 6 HOURS PRN
Qty: 120 TABLET | Refills: 0 | Status: SHIPPED | OUTPATIENT
Start: 2021-04-28 | End: 2021-05-24

## 2021-04-28 ENCOUNTER — TELEPHONE (OUTPATIENT)
Dept: NEUROLOGY | Facility: CLINIC | Age: 61
End: 2021-04-28

## 2021-04-28 ENCOUNTER — TELEMEDICINE (OUTPATIENT)
Dept: NEUROLOGY | Facility: CLINIC | Age: 61
End: 2021-04-28

## 2021-04-28 VITALS — WEIGHT: 175 LBS | BODY MASS INDEX: 27.47 KG/M2 | HEIGHT: 67 IN

## 2021-04-28 DIAGNOSIS — M48.061 SPINAL STENOSIS, LUMBAR REGION, WITHOUT NEUROGENIC CLAUDICATION: ICD-10-CM

## 2021-04-28 DIAGNOSIS — M43.10 ACQUIRED SPONDYLOLISTHESIS: ICD-10-CM

## 2021-04-28 DIAGNOSIS — M54.16 LUMBAR RADICULOPATHY: Primary | ICD-10-CM

## 2021-04-28 DIAGNOSIS — M96.1 POSTLAMINECTOMY SYNDROME, LUMBAR REGION: ICD-10-CM

## 2021-04-28 DIAGNOSIS — M51.9 LUMBAR DISC DISEASE: ICD-10-CM

## 2021-04-28 DIAGNOSIS — M48.061 SPINAL STENOSIS OF LUMBAR REGION WITHOUT NEUROGENIC CLAUDICATION: ICD-10-CM

## 2021-04-28 DIAGNOSIS — M51.26 LUMBAR HERNIATED DISC: ICD-10-CM

## 2021-04-28 PROCEDURE — 3008F BODY MASS INDEX DOCD: CPT | Performed by: PHYSICAL MEDICINE & REHABILITATION

## 2021-04-28 PROCEDURE — 99442 PHONE E/M BY PHYS 11-20 MIN: CPT | Performed by: PHYSICAL MEDICINE & REHABILITATION

## 2021-04-28 NOTE — PROGRESS NOTES
[facility]    Telemedicine Visit - Evaluation    Zheng Michel verbally consents to a Telemedicine Visit on 04/28/21. This visit is conducted using Telemedicine with live, interactive audio.     Patient unde tablet by mouth every 6 (six) hours as needed for Pain. 120 tablet 0   • GABAPENTIN 600 MG Oral Tab TAKE 1 TABLET BY MOUTH FOUR TIMES DAILY 120 tablet 4   • Diclofenac Sodium 1 % Transdermal Gel Apply 4 g topically 4 (four) times daily.  1 Tube 3   • apixab sound depressed or anxious      ASSESSMENT:     1. Bilateral L5 radiculopathies     2. L4-5 grade 1-2 unstable spondylolisthesis    3.  L5-S1 left mild-mod, L4-5 bilateral mild, L3-4 left mild, L2-3 left mod foraminal stenosis    4. s/p left L4-5 laminectom

## 2021-05-20 ENCOUNTER — OFFICE VISIT (OUTPATIENT)
Dept: NEUROLOGY | Facility: CLINIC | Age: 61
End: 2021-05-20
Payer: COMMERCIAL

## 2021-05-20 VITALS — BODY MASS INDEX: 29.03 KG/M2 | WEIGHT: 185 LBS | HEIGHT: 67 IN

## 2021-05-20 DIAGNOSIS — M96.1 POSTLAMINECTOMY SYNDROME, LUMBAR REGION: ICD-10-CM

## 2021-05-20 DIAGNOSIS — M48.061 SPINAL STENOSIS, LUMBAR REGION, WITHOUT NEUROGENIC CLAUDICATION: ICD-10-CM

## 2021-05-20 DIAGNOSIS — M54.16 LUMBAR RADICULOPATHY: ICD-10-CM

## 2021-05-20 DIAGNOSIS — S93.492A SPRAIN OF ANTERIOR TALOFIBULAR LIGAMENT OF LEFT ANKLE, INITIAL ENCOUNTER: Primary | ICD-10-CM

## 2021-05-20 DIAGNOSIS — M51.9 LUMBAR DISC DISEASE: ICD-10-CM

## 2021-05-20 DIAGNOSIS — G60.9 IDIOPATHIC PERIPHERAL NEUROPATHY: ICD-10-CM

## 2021-05-20 DIAGNOSIS — M43.10 ACQUIRED SPONDYLOLISTHESIS: ICD-10-CM

## 2021-05-20 DIAGNOSIS — M51.26 LUMBAR HERNIATED DISC: ICD-10-CM

## 2021-05-20 DIAGNOSIS — M76.61 TENDONITIS, ACHILLES, RIGHT: ICD-10-CM

## 2021-05-20 DIAGNOSIS — M48.061 SPINAL STENOSIS OF LUMBAR REGION WITHOUT NEUROGENIC CLAUDICATION: ICD-10-CM

## 2021-05-20 PROCEDURE — 99214 OFFICE O/P EST MOD 30 MIN: CPT | Performed by: PHYSICAL MEDICINE & REHABILITATION

## 2021-05-20 PROCEDURE — 3008F BODY MASS INDEX DOCD: CPT | Performed by: PHYSICAL MEDICINE & REHABILITATION

## 2021-05-20 NOTE — PROGRESS NOTES
Low Back Pain H & P    Chief Complaint: Patient presents with:  Back Pain: 04/16/2021 Bilateral L5 transforaminal epidural. LOV: 02/01/2021. Patient had 60% improvment from injections.  Patient states she is doing home exercises and would like to do more PT use: No      Sexual activity: Not on file    Other Topics      Concerns:         Service: Not Asked        Blood Transfusions: Not Asked        Caffeine Concern: Yes          2 cups coffee daily        Occupational Exposure: Not Asked        Clance Opitz admits   Neurological  Loss of Strength Since last Visit: denies  Tingling/Numbness: denies          PE: The patient does appear in her stated age in no distress. The patient is well groomed. Psychiatric:  The patient is alert and oriented x 3.   The p left mild, L2-3 left mod foraminal stenosis    7. s/p left L4-5 laminectomy    8. L5-S1 left mod foraminal, L4-5 mod diffuse, L3-4 mod diffuse, L2-3 left mod foraminal & mild-mod diffuse bulging discs    9. L3-4 mod central stenosis    10.  L5-S1 mild centr

## 2021-05-20 NOTE — PATIENT INSTRUCTIONS
Plan  I will perform bilateral L5 TFESI(s) once the pain returns in the future with 160 mg of Kenalog. She will continue with the walking boot on the right side and she may trial this on the left side.     The patient will continue with her home exerci

## 2021-05-24 ENCOUNTER — PATIENT MESSAGE (OUTPATIENT)
Dept: NEUROLOGY | Facility: CLINIC | Age: 61
End: 2021-05-24

## 2021-05-25 VITALS
HEART RATE: 64 BPM | WEIGHT: 193 LBS | SYSTOLIC BLOOD PRESSURE: 136 MMHG | DIASTOLIC BLOOD PRESSURE: 83 MMHG | RESPIRATION RATE: 16 BRPM | HEIGHT: 67 IN | BODY MASS INDEX: 30.29 KG/M2 | TEMPERATURE: 98.2 F

## 2021-05-25 NOTE — TELEPHONE ENCOUNTER
From: Jaylin Lemon  To: Anabell Saunders MD  Sent: 5/24/2021 6:23 PM CDT  Subject: Other    My new pharmacy is [facility], [address] [place], [address] [place] [address] . Phone number is : [phone]; fax: [phone].

## 2021-05-25 NOTE — TELEPHONE ENCOUNTER
Medication request: HYDROcodone-acetaminophen  MG Oral Tab. Take 1 tablet by mouth every 6 (six) hours as needed for Pain.     LOV: 05/20/2021  NOV: None     [place] ref[place]: 04/23/2021 #120  Refill-0

## 2021-05-26 ENCOUNTER — PATIENT MESSAGE (OUTPATIENT)
Dept: NEUROLOGY | Facility: CLINIC | Age: 61
End: 2021-05-26

## 2021-05-28 RX ORDER — HYDROCODONE BITARTRATE AND ACETAMINOPHEN 10; 325 MG/1; MG/1
1 TABLET ORAL EVERY 6 HOURS PRN
Qty: 120 TABLET | Refills: 0 | Status: CANCELLED | OUTPATIENT
Start: 2021-05-28 | End: 2021-06-27

## 2021-05-28 RX ORDER — HYDROCODONE BITARTRATE AND ACETAMINOPHEN 10; 325 MG/1; MG/1
1 TABLET ORAL EVERY 6 HOURS PRN
Qty: 120 TABLET | Refills: 0 | Status: SHIPPED | OUTPATIENT
Start: 2021-05-28 | End: 2021-06-22

## 2021-05-31 NOTE — TELEPHONE ENCOUNTER
From: Celi Guzman  To: Leslie Bartlett MD  Sent: 5/26/2021 6:48 PM CDT  Subject: Other    Dr Corbin Bartlett. ..wanted to drop a quick note to let you know that after a week of (fairly much) staying off my feet and wrapping them both, I feel so much better.  They (

## 2021-06-23 NOTE — TELEPHONE ENCOUNTER
Narcotic Refill Request    Medication request: HYDROcodone-acetaminophen  MG  Take 1 tablet by mouth every 6 (six) hours as needed for Pain.     LOV: 5/20/21  NOV: none scheduled (Per LOV f/u in 3-6 months)     [place] refill: 5/28/21 #120    Natanael Mccray

## 2021-06-24 RX ORDER — HYDROCODONE BITARTRATE AND ACETAMINOPHEN 10; 325 MG/1; MG/1
1 TABLET ORAL EVERY 6 HOURS PRN
Qty: 120 TABLET | Refills: 0 | Status: SHIPPED | OUTPATIENT
Start: 2021-06-27 | End: 2021-07-24

## 2021-06-25 DIAGNOSIS — F32.A DEPRESSION, UNSPECIFIED DEPRESSION TYPE: ICD-10-CM

## 2021-06-28 RX ORDER — DULOXETIN HYDROCHLORIDE 60 MG/1
60 CAPSULE, DELAYED RELEASE ORAL DAILY
Qty: 90 CAPSULE | Refills: 0 | Status: SHIPPED | OUTPATIENT
Start: 2021-06-28 | End: 2021-09-20

## 2021-06-28 NOTE — TELEPHONE ENCOUNTER
Last Clinic Visit: 7/2/2020 [facility] _ Established care  Future Visit: 8/9/2021    Test(s)- PHQ-9 past due.  Renetta refill of duloxitine was sent for a 90 day supply and FYI to [facility] clinic.   *future visit 8/9/2021

## 2021-07-22 DIAGNOSIS — Z11.59 ENCOUNTER FOR SCREENING FOR OTHER VIRAL DISEASES: ICD-10-CM

## 2021-07-26 ENCOUNTER — TELEPHONE (OUTPATIENT)
Dept: NEUROLOGY | Facility: CLINIC | Age: 61
End: 2021-07-26

## 2021-07-26 DIAGNOSIS — M54.16 LUMBAR RADICULOPATHY: Primary | ICD-10-CM

## 2021-07-26 NOTE — TELEPHONE ENCOUNTER
S/W patient and she would like to have lumbar injections done on Oct 15th.  Spot held and orders placed

## 2021-07-26 NOTE — TELEPHONE ENCOUNTER
Called Laurie/Ashlie for authorization of approval of Bilateral L5 TFESI cpt codes [id number], [id number]. Talked to [employer]. Insurance termed on 05/01/21. Reference # [id number]. Pt. has new insurance. Updated in [address]      Called [facility] for authorization

## 2021-07-26 NOTE — TELEPHONE ENCOUNTER
Narcotic Refill Request    Medication request: HYDROcodone-acetaminophen  MG Oral Tab  Take 1 tablet by mouth every 6 (six) hours as needed for Pain. LOV: 5/20/21  NOV: none scheduled (Per LOV: She will follow up in 3-6 months or sooner if needed.

## 2021-07-26 NOTE — TELEPHONE ENCOUNTER
Patient has been scheduled for a  Bilateral L5 TFESI   on 10/15/2021 at the [facility]. Medications and allergies reviewed. Patient informed she will need a .  Patient informed not to eat or drink anything after midnight the night prior to the procedure if

## 2021-07-28 RX ORDER — HYDROCODONE BITARTRATE AND ACETAMINOPHEN 10; 325 MG/1; MG/1
1 TABLET ORAL EVERY 6 HOURS PRN
Qty: 120 TABLET | Refills: 0 | Status: SHIPPED | OUTPATIENT
Start: 2021-07-28 | End: 2021-08-20

## 2021-08-03 ENCOUNTER — TELEPHONE (OUTPATIENT)
Dept: NEUROLOGY | Facility: CLINIC | Age: 61
End: 2021-08-03

## 2021-08-20 RX ORDER — HYDROCODONE BITARTRATE AND ACETAMINOPHEN 10; 325 MG/1; MG/1
1 TABLET ORAL EVERY 6 HOURS PRN
Qty: 120 TABLET | Refills: 0 | Status: SHIPPED | OUTPATIENT
Start: 2021-08-28 | End: 2021-09-23

## 2021-08-20 NOTE — TELEPHONE ENCOUNTER
Medication request: Norco 10-325mg Take 1 tablet by mouth every 6 (six) hours as needed for Pain.     LOV: 05/20/21  NOV: 10/28/21    [place]: 07/29/21 #120 r-0

## 2021-08-21 DIAGNOSIS — I82.409 DVT (DEEP VENOUS THROMBOSIS) (H): ICD-10-CM

## 2021-08-24 ENCOUNTER — LAB (OUTPATIENT)
Dept: LAB | Facility: CLINIC | Age: 61
End: 2021-08-24
Attending: SURGERY
Payer: COMMERCIAL

## 2021-08-24 DIAGNOSIS — Z11.59 ENCOUNTER FOR SCREENING FOR OTHER VIRAL DISEASES: ICD-10-CM

## 2021-08-24 PROCEDURE — U0003 INFECTIOUS AGENT DETECTION BY NUCLEIC ACID (DNA OR RNA); SEVERE ACUTE RESPIRATORY SYNDROME CORONAVIRUS 2 (SARS-COV-2) (CORONAVIRUS DISEASE [COVID-19]), AMPLIFIED PROBE TECHNIQUE, MAKING USE OF HIGH THROUGHPUT TECHNOLOGIES AS DESCRIBED BY CMS-2020-01-R: HCPCS

## 2021-08-24 PROCEDURE — U0005 INFEC AGEN DETEC AMPLI PROBE: HCPCS

## 2021-08-25 LAB — SARS-COV-2 RNA RESP QL NAA+PROBE: NEGATIVE

## 2021-08-25 NOTE — TELEPHONE ENCOUNTER
ELIQUIS 5 MG TABLET  Last Written Prescription Date:  4/7/2021  Last Fill Quantity: 180,   # refills: 0  Last Office Visit : 7/2/2020  Future Office visit:  9/20/2021  30 days sent to pharm to cover Pt until office visit on 9/20/2021      Yoon Gaona RN  Central Triage Red Flags/Med Refills      Warnings Override History for apixaban ANTICOAGULANT (ELIQUIS ANTICOAGULANT) 5 MG tablet [[id number]]    Overridden by Eloisa Talbot RN on Apr 7, 2021 7:01 AM   Drug-Drug   1. NSAIDS / DIRECT FACTOR XA INHIBITORS [Level: Major]   Other Orders: diclofenac (VOLTAREN) 1 % topical gel

## 2021-08-27 ENCOUNTER — ANESTHESIA (OUTPATIENT)
Dept: SURGERY | Facility: CLINIC | Age: 61
End: 2021-08-27
Payer: COMMERCIAL

## 2021-08-27 ENCOUNTER — HOSPITAL ENCOUNTER (OUTPATIENT)
Facility: CLINIC | Age: 61
Discharge: HOME | End: 2021-08-27
Attending: SURGERY | Admitting: SURGERY
Payer: COMMERCIAL

## 2021-08-27 ENCOUNTER — ANESTHESIA EVENT (OUTPATIENT)
Dept: SURGERY | Facility: CLINIC | Age: 61
End: 2021-08-27
Payer: COMMERCIAL

## 2021-08-27 VITALS
HEIGHT: 67 IN | OXYGEN SATURATION: 99 % | WEIGHT: 181.2 LBS | HEART RATE: 66 BPM | TEMPERATURE: 98 F | BODY MASS INDEX: 28.44 KG/M2 | RESPIRATION RATE: 16 BRPM | SYSTOLIC BLOOD PRESSURE: 123 MMHG | DIASTOLIC BLOOD PRESSURE: 65 MMHG

## 2021-08-27 PROCEDURE — 250N000009 HC RX 250: Performed by: SURGERY

## 2021-08-27 PROCEDURE — 258N000003 HC RX IP 258 OP 636: Performed by: ANESTHESIOLOGY

## 2021-08-27 PROCEDURE — 710N000012 HC RECOVERY PHASE 2, PER MINUTE: Performed by: SURGERY

## 2021-08-27 PROCEDURE — 360N000075 HC SURGERY LEVEL 2, PER MIN: Performed by: SURGERY

## 2021-08-27 PROCEDURE — 88304 TISSUE EXAM BY PATHOLOGIST: CPT | Mod: TC | Performed by: SURGERY

## 2021-08-27 PROCEDURE — 272N000001 HC OR GENERAL SUPPLY STERILE: Performed by: SURGERY

## 2021-08-27 PROCEDURE — 250N000011 HC RX IP 250 OP 636: Performed by: NURSE ANESTHETIST, CERTIFIED REGISTERED

## 2021-08-27 PROCEDURE — 250N000009 HC RX 250: Performed by: NURSE ANESTHETIST, CERTIFIED REGISTERED

## 2021-08-27 PROCEDURE — 370N000017 HC ANESTHESIA TECHNICAL FEE, PER MIN: Performed by: SURGERY

## 2021-08-27 PROCEDURE — 999N000141 HC STATISTIC PRE-PROCEDURE NURSING ASSESSMENT: Performed by: SURGERY

## 2021-08-27 PROCEDURE — 250N000011 HC RX IP 250 OP 636: Performed by: SURGERY

## 2021-08-27 RX ORDER — FENTANYL CITRATE 50 UG/ML
INJECTION, SOLUTION INTRAMUSCULAR; INTRAVENOUS PRN
Status: DISCONTINUED | OUTPATIENT
Start: 2021-08-27 | End: 2021-08-27

## 2021-08-27 RX ORDER — ONDANSETRON 4 MG/1
4 TABLET, ORALLY DISINTEGRATING ORAL EVERY 30 MIN PRN
Status: DISCONTINUED | OUTPATIENT
Start: 2021-08-27 | End: 2021-08-27 | Stop reason: HOSPADM

## 2021-08-27 RX ORDER — MELATONIN 10 MG
1 CAPSULE ORAL
COMMUNITY
End: 2023-11-13

## 2021-08-27 RX ORDER — PROPOFOL 10 MG/ML
INJECTION, EMULSION INTRAVENOUS CONTINUOUS PRN
Status: DISCONTINUED | OUTPATIENT
Start: 2021-08-27 | End: 2021-08-27

## 2021-08-27 RX ORDER — OXYCODONE HYDROCHLORIDE 5 MG/1
5 TABLET ORAL EVERY 4 HOURS PRN
Status: DISCONTINUED | OUTPATIENT
Start: 2021-08-27 | End: 2021-08-27 | Stop reason: HOSPADM

## 2021-08-27 RX ORDER — CEFAZOLIN SODIUM 2 G/100ML
2 INJECTION, SOLUTION INTRAVENOUS SEE ADMIN INSTRUCTIONS
Status: DISCONTINUED | OUTPATIENT
Start: 2021-08-27 | End: 2021-08-27 | Stop reason: HOSPADM

## 2021-08-27 RX ORDER — MEPERIDINE HYDROCHLORIDE 25 MG/ML
12.5 INJECTION INTRAMUSCULAR; INTRAVENOUS; SUBCUTANEOUS
Status: DISCONTINUED | OUTPATIENT
Start: 2021-08-27 | End: 2021-08-27 | Stop reason: HOSPADM

## 2021-08-27 RX ORDER — LIDOCAINE 40 MG/G
CREAM TOPICAL
Status: DISCONTINUED | OUTPATIENT
Start: 2021-08-27 | End: 2021-08-27 | Stop reason: HOSPADM

## 2021-08-27 RX ORDER — CEFAZOLIN SODIUM 2 G/100ML
2 INJECTION, SOLUTION INTRAVENOUS
Status: COMPLETED | OUTPATIENT
Start: 2021-08-27 | End: 2021-08-27

## 2021-08-27 RX ORDER — SODIUM CHLORIDE, SODIUM LACTATE, POTASSIUM CHLORIDE, CALCIUM CHLORIDE 600; 310; 30; 20 MG/100ML; MG/100ML; MG/100ML; MG/100ML
INJECTION, SOLUTION INTRAVENOUS CONTINUOUS
Status: DISCONTINUED | OUTPATIENT
Start: 2021-08-27 | End: 2021-08-27 | Stop reason: HOSPADM

## 2021-08-27 RX ORDER — ONDANSETRON 2 MG/ML
INJECTION INTRAMUSCULAR; INTRAVENOUS PRN
Status: DISCONTINUED | OUTPATIENT
Start: 2021-08-27 | End: 2021-08-27

## 2021-08-27 RX ORDER — DEXAMETHASONE SODIUM PHOSPHATE 4 MG/ML
INJECTION, SOLUTION INTRA-ARTICULAR; INTRALESIONAL; INTRAMUSCULAR; INTRAVENOUS; SOFT TISSUE PRN
Status: DISCONTINUED | OUTPATIENT
Start: 2021-08-27 | End: 2021-08-27

## 2021-08-27 RX ORDER — ONDANSETRON 2 MG/ML
4 INJECTION INTRAMUSCULAR; INTRAVENOUS EVERY 30 MIN PRN
Status: DISCONTINUED | OUTPATIENT
Start: 2021-08-27 | End: 2021-08-27 | Stop reason: HOSPADM

## 2021-08-27 RX ORDER — PROPOFOL 10 MG/ML
INJECTION, EMULSION INTRAVENOUS PRN
Status: DISCONTINUED | OUTPATIENT
Start: 2021-08-27 | End: 2021-08-27

## 2021-08-27 RX ORDER — FENTANYL CITRATE 50 UG/ML
25 INJECTION, SOLUTION INTRAMUSCULAR; INTRAVENOUS EVERY 5 MIN PRN
Status: DISCONTINUED | OUTPATIENT
Start: 2021-08-27 | End: 2021-08-27 | Stop reason: HOSPADM

## 2021-08-27 RX ORDER — BUPIVACAINE HYDROCHLORIDE AND EPINEPHRINE 5; 5 MG/ML; UG/ML
INJECTION, SOLUTION EPIDURAL; INTRACAUDAL; PERINEURAL PRN
Status: DISCONTINUED | OUTPATIENT
Start: 2021-08-27 | End: 2021-08-27 | Stop reason: HOSPADM

## 2021-08-27 RX ADMIN — FENTANYL CITRATE 50 MCG: 50 INJECTION, SOLUTION INTRAMUSCULAR; INTRAVENOUS at 10:00

## 2021-08-27 RX ADMIN — FENTANYL CITRATE 50 MCG: 50 INJECTION, SOLUTION INTRAMUSCULAR; INTRAVENOUS at 10:02

## 2021-08-27 RX ADMIN — MIDAZOLAM HYDROCHLORIDE 2 MG: 1 INJECTION, SOLUTION INTRAMUSCULAR; INTRAVENOUS at 09:58

## 2021-08-27 RX ADMIN — CEFAZOLIN SODIUM 2 G: 2 INJECTION, SOLUTION INTRAVENOUS at 10:05

## 2021-08-27 RX ADMIN — PROPOFOL 50 MG: 10 INJECTION, EMULSION INTRAVENOUS at 10:02

## 2021-08-27 RX ADMIN — SODIUM CHLORIDE, POTASSIUM CHLORIDE, SODIUM LACTATE AND CALCIUM CHLORIDE: 600; 310; 30; 20 INJECTION, SOLUTION INTRAVENOUS at 08:35

## 2021-08-27 RX ADMIN — ONDANSETRON 4 MG: 2 INJECTION INTRAMUSCULAR; INTRAVENOUS at 10:05

## 2021-08-27 RX ADMIN — DEXAMETHASONE SODIUM PHOSPHATE 4 MG: 4 INJECTION, SOLUTION INTRA-ARTICULAR; INTRALESIONAL; INTRAMUSCULAR; INTRAVENOUS; SOFT TISSUE at 10:05

## 2021-08-27 RX ADMIN — PROPOFOL 150 MCG/KG/MIN: 10 INJECTION, EMULSION INTRAVENOUS at 10:02

## 2021-08-27 ASSESSMENT — MIFFLIN-ST. JEOR: SCORE: 1419.55

## 2021-08-27 NOTE — OR NURSING
LM on vm for patient to call Dr Velasco's office to see when/if he wants to see her for follow up.

## 2021-08-27 NOTE — ANESTHESIA CARE TRANSFER NOTE
Patient: Angeli Galindo    Procedure(s):  EXCISE CYST ABDOMINAL WALL    Diagnosis: Abscess [L02.91]  Diagnosis Additional Information: No value filed.    Anesthesia Type:   MAC     Note:    Oropharynx: oropharynx clear of all foreign objects  Level of Consciousness: awake  Oxygen Supplementation: face mask  Level of Supplemental Oxygen (L/min / FiO2): 6  Independent Airway: airway patency satisfactory and stable  Dentition: dentition unchanged  Vital Signs Stable: post-procedure vital signs reviewed and stable  Report to RN Given: handoff report given  Patient transferred to: Phase II    Handoff Report: Identifed the Patient, Identified the Reponsible Provider, Reviewed the pertinent medical history, Discussed the surgical course, Reviewed Intra-OP anesthesia mangement and issues during anesthesia, Set expectations for post-procedure period and Allowed opportunity for questions and acknowledgement of understanding      Vitals:  Vitals Value Taken Time   BP     Temp     Pulse     Resp     SpO2         Electronically Signed By: YAHIR Burch CRNA  August 27, 2021  10:28 AM

## 2021-08-27 NOTE — ANESTHESIA POSTPROCEDURE EVALUATION
Patient: Angeli Galindo    Procedure(s):  EXCISE CYST ABDOMINAL WALL    Diagnosis:Abscess [L02.91]  Diagnosis Additional Information: No value filed.    Anesthesia Type:  MAC    Note:  Disposition: Outpatient   Postop Pain Control: Uneventful            Sign Out: Well controlled pain   PONV: No   Neuro/Psych: Uneventful            Sign Out: Acceptable/Baseline neuro status   Airway/Respiratory: Uneventful            Sign Out: Acceptable/Baseline resp. status   CV/Hemodynamics: Uneventful            Sign Out: Acceptable CV status; No obvious hypovolemia; No obvious fluid overload   Other NRE: NONE   DID A NON-ROUTINE EVENT OCCUR? No           Last vitals:  Vitals Value Taken Time   /65 08/27/21 1100   Temp 36.7  C (98  F) 08/27/21 1026   Pulse 66 08/27/21 1026   Resp     SpO2 99 % 08/27/21 1100       Electronically Signed By: Chaim Vargas MD  August 27, 2021  12:41 PM

## 2021-08-27 NOTE — ANESTHESIA PREPROCEDURE EVALUATION
Anesthesia Pre-Procedure Evaluation    Patient: Angeli Galindo   MRN: [id number] : 1960        Preoperative Diagnosis: Abscess [L02.91]   Procedure : Procedure(s):  EXCISE CYST ABDOMINAL WALL     History reviewed. No pertinent past medical history.   Past Surgical History:   Procedure Laterality Date      SECTION       GASTRIC BYPASS  2016      Allergies   Allergen Reactions     Shellfish-Derived Products Angioedema     Lisinopril Cough      Social History     Tobacco Use     Smoking status: Former Smoker     Years: 5.00     Quit date: 2000     Years since quittin.6     Smokeless tobacco: Never Used   Substance Use Topics     Alcohol use: Yes     Comment: Rare      Wt Readings from Last 1 Encounters:   21 82.2 kg (181 lb 3.2 oz)        Anesthesia Evaluation   Pt has had prior anesthetic.     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 ), pt is anticoagulated,     Musculoskeletal:       GI/Hepatic:       Renal/Genitourinary:       Endo:     (+) Obesity,     Psychiatric/Substance Use:       Infectious Disease:       Malignancy:       Other:            Physical Exam    Airway        Mallampati: II   TM distance: > 3 FB   Neck ROM: full   Mouth opening: > 3 cm    Respiratory Devices and Support         Dental       (+) caps      Cardiovascular          Rhythm and rate: regular and normal     Pulmonary           breath sounds clear to auscultation           OUTSIDE LABS:  CBC: No results found for: WBC, HGB, HCT, PLT  BMP: No results found for: NA, POTASSIUM, CHLORIDE, CO2, BUN, CR, GLC  COAGS: No results found for: PTT, INR, FIBR  POC: No results found for: BGM, HCG, HCGS  HEPATIC: No results found  for: ALBUMIN, PROTTOTAL, ALT, AST, GGT, ALKPHOS, BILITOTAL, BILIDIRECT, KWAME  OTHER: No results found for: PH, LACT, A1C, ALYSSIA, PHOS, MAG, LIPASE, AMYLASE, TSH, T4, T3, CRP, SED    Anesthesia Plan    ASA Status:  2      Anesthesia Type: MAC.     - Reason for MAC: straight local not clinically adequate              Consents    Anesthesia Plan(s) and associated risks, benefits, and realistic alternatives discussed. Questions answered and patient/representative(s) expressed understanding.     - Discussed with:  Patient      - Patient is DNR/DNI Status: No         Postoperative Care    Pain management: Multi-modal analgesia.   PONV prophylaxis: Ondansetron (or other 5HT-3), Dexamethasone or Solumedrol     Comments:    GA LMA as needed             Chaim Vargas MD

## 2021-08-27 NOTE — PHARMACY-ADMISSION MEDICATION HISTORY
Pharmacy Note - Admission Medication History    Pertinent Provider Information: none.    ______________________________________________________________________    Prior To Admission (PTA) med list completed and updated in EMR.       PTA Med List   Medication Sig Note Last Dose     acetaminophen (TYLENOL) 500 MG tablet Take 1,000 mg by mouth   8/27/2021 at 0500     apixaban ANTICOAGULANT (ELIQUIS ANTICOAGULANT) 5 MG tablet Take 1 tablet (5 mg) by mouth 2 times daily (Please have updated labs on 9/20/2021 office visit for further refills) Thank you 8/25/2021: LD 8/22 8/22/2021 at 2000     Calcium Carbonate-Vitamin D (CALCIUM-VITAMIN D) 600-125 MG-UNIT TABS Take 1 tablet by mouth  8/22/2021 at 0800     cyanocobalamin (VITAMIN B-12) 100 MCG tablet Take 500 mcg by mouth daily   8/22/2021 at 0800     DULoxetine (CYMBALTA) 60 MG capsule Take 1 capsule (60 mg) by mouth daily  8/26/2021 at 2000     gabapentin (NEURONTIN) 600 MG tablet Take 600 mg by mouth 4 times daily Take 2 tabs  8/26/2021 at Unknown time     hydrochlorothiazide (HYDRODIURIL) 25 MG tablet Take 25 mg by mouth daily as needed   Past Month at Unknown time     HYDROcodone-acetaminophen (NORCO) 5-325 MG tablet Take 1 tablet by mouth every 6 hours as needed   8/20/2021 at Unknown time     Melatonin 10 MG CAPS Take 1 capsule by mouth At Bedtime  8/26/2021 at 2200     methocarbamol (ROBAXIN) 500 MG tablet Take 500 mg by mouth 2 times daily as needed   8/26/2021 at 2000     multivitamin (ONE-DAILY) tablet Take 1 tablet by mouth  8/22/2021 at 0800       Information source(s): Patient and CareEverywhere/SureScripts    Method of interview communication: in-person    Patient was asked about OTC/herbal products specifically.  PTA med list reflects this.    Based on the pharmacist's assessment, the PTA med list information appears reliable    Allergies were reviewed, assessed, and updated with the patient.      Patient does not use any multi-dose medications prior to  admission.     Thank you for the opportunity to participate in the care of this patient.      Venus Carlson [facility]     8/27/2021     9:04 AM

## 2021-08-27 NOTE — OP NOTE
OPERATIVE REPORT    Angeli Galindo   [facility]  Medical Record #:  [id number]  YOB: 1960  Age:  61 year old    PROCEDURE DATE:  8/27/2021    PREOPERATIVE DIAGNOSIS: Large sebaceous cyst upper abdomen    POSTOPERATIVE DIAGNOSIS: Same    PROCEDURE: Excision of large sebaceous cyst incision 5 cm  2.5 cm by primary layer closure skin subcutaneous tissue separately    OPERATING SURGEON:  Junior Gonzalez MD    ASSISTANT: Technician    ANESTHESIA: MAC    DESCRIPTION OF PROCEDURE: With the patient in supine position under intravenous sedative anesthesia abdomen is prepped draped usual sterile fashion.  1/2% Marcaine with epinephrine used for field block in the elliptical incision above no dimensions are made and this is carried through subcutaneous tissue to the fascial level.  Lesion is removed in its entirety skin flaps elevated and closed with interrupted 2-0 Vicryl running 3-0 Vicryl subcu tissues and Dermabond.  Estimated blood loss was minimal there were no complications and the patient tolerated procedure well.  Sponge and counts are correct x2.    EBL:  [unfilled]    SPECIMENS:    ID Type Source Tests Collected by Time Destination   1 : abdominal cyst wall Tissue Abdomen SURGICAL PATHOLOGY EXAM Junior Gonzalez MD 8/27/2021 10:09 AM        Junior gonzalez md  [facility]

## 2021-08-30 LAB
PATH REPORT.COMMENTS IMP SPEC: NORMAL
PATH REPORT.COMMENTS IMP SPEC: NORMAL
PATH REPORT.FINAL DX SPEC: NORMAL
PATH REPORT.GROSS SPEC: NORMAL
PATH REPORT.MICROSCOPIC SPEC OTHER STN: NORMAL
PATH REPORT.RELEVANT HX SPEC: NORMAL
PHOTO IMAGE: NORMAL

## 2021-08-30 PROCEDURE — 88304 TISSUE EXAM BY PATHOLOGIST: CPT | Mod: 26 | Performed by: PATHOLOGY

## 2021-09-01 ENCOUNTER — TRANSFERRED RECORDS (OUTPATIENT)
Dept: MULTI SPECIALTY CLINIC | Facility: CLINIC | Age: 61
End: 2021-09-01

## 2021-09-17 DIAGNOSIS — F32.A DEPRESSION, UNSPECIFIED DEPRESSION TYPE: ICD-10-CM

## 2021-09-17 DIAGNOSIS — I82.409 DVT (DEEP VENOUS THROMBOSIS) (H): Primary | ICD-10-CM

## 2021-09-20 ENCOUNTER — OFFICE VISIT (OUTPATIENT)
Dept: INTERNAL MEDICINE | Facility: CLINIC | Age: 61
End: 2021-09-20
Payer: COMMERCIAL

## 2021-09-20 VITALS
WEIGHT: 181.5 LBS | BODY MASS INDEX: 28.49 KG/M2 | HEART RATE: 68 BPM | HEIGHT: 67 IN | DIASTOLIC BLOOD PRESSURE: 84 MMHG | OXYGEN SATURATION: 98 % | SYSTOLIC BLOOD PRESSURE: 128 MMHG

## 2021-09-20 DIAGNOSIS — Z90.81 H/O SPLENECTOMY: ICD-10-CM

## 2021-09-20 DIAGNOSIS — Z23 NEED FOR INFLUENZA VACCINATION: ICD-10-CM

## 2021-09-20 DIAGNOSIS — Z12.11 SPECIAL SCREENING FOR MALIGNANT NEOPLASMS, COLON: ICD-10-CM

## 2021-09-20 DIAGNOSIS — T07.XXXA MULTIPLE FRACTURES: Primary | ICD-10-CM

## 2021-09-20 DIAGNOSIS — Z12.31 ENCOUNTER FOR SCREENING MAMMOGRAM FOR BREAST CANCER: ICD-10-CM

## 2021-09-20 PROCEDURE — 90471 IMMUNIZATION ADMIN: CPT | Performed by: INTERNAL MEDICINE

## 2021-09-20 PROCEDURE — 99215 OFFICE O/P EST HI 40 MIN: CPT | Mod: 25 | Performed by: INTERNAL MEDICINE

## 2021-09-20 PROCEDURE — 90686 IIV4 VACC NO PRSV 0.5 ML IM: CPT | Performed by: INTERNAL MEDICINE

## 2021-09-20 RX ORDER — DULOXETIN HYDROCHLORIDE 60 MG/1
60 CAPSULE, DELAYED RELEASE ORAL DAILY
Qty: 90 CAPSULE | Refills: 0 | Status: SHIPPED | OUTPATIENT
Start: 2021-09-20 | End: 2022-02-09

## 2021-09-20 RX ORDER — GABAPENTIN 600 MG/1
TABLET ORAL
Qty: 120 TABLET | Refills: 3 | Status: SHIPPED | OUTPATIENT
Start: 2021-09-20

## 2021-09-20 ASSESSMENT — MIFFLIN-ST. JEOR: SCORE: 1420.91

## 2021-09-20 ASSESSMENT — PAIN SCALES - GENERAL: PAINLEVEL: MODERATE PAIN (4)

## 2021-09-20 NOTE — PATIENT INSTRUCTIONS
Schedule:  Mammogram  Colonoscopy--stop eliquis 3 days prior    Get your pneumococcal vaccine and meningitis vaccine in the future for post-splenectomy status.    Get labs done for bones.

## 2021-09-20 NOTE — NURSING NOTE
Angeli Galindo is a 61 year old female patient that presents today in clinic for the following:    Chief Complaint   Patient presents with     Physical     Patient wants to talk about bone issues and COVID boosters.      The patient's allergies and medications were reviewed as noted. A set of vitals were recorded as noted without incident. The patient does not have any other questions for the provider.    Wilmer Patel, EMT at 4:40 PM on 9/20/2021

## 2021-09-20 NOTE — NURSING NOTE
Angeli Galindo is a 61 year old female patient that presents today in clinic for the following:    Chief Complaint   Patient presents with     RECHECK     Patient wants to talk about bone issues and COVID boosters.      The patient's allergies and medications were reviewed as noted. A set of vitals were recorded as noted without incident. The patient does not have any other questions for the provider.    Wilmer Patel, EMT at 4:49 PM on 9/20/2021

## 2021-09-20 NOTE — TELEPHONE ENCOUNTER
Medication request: GABAPENTIN 600 MG Oral Tab   TAKE 1 TABLET BY MOUTH FOUR TIMES DAILY    LOV: 5/20/21  NOV: 10/28/21     [place] 4/22/21 #120 R-4

## 2021-09-20 NOTE — TELEPHONE ENCOUNTER
Last Clinic Visit: [facility] 7/2/20  Pt has appt 9/20/21  Most recent labs from [facility] on 10/19/20, Cr and Plts within normal limits
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
## 2021-09-21 NOTE — TELEPHONE ENCOUNTER
Narcotic Refill Request    Medication request: HYDROcodone-acetaminophen  MG Oral Tab Take 1 tablet by mouth every 6 (six) hours as needed for Pain.     LOV: 5/20/21  NOV: 10/28/21     [place]: 8/28/21 #120

## 2021-09-23 RX ORDER — HYDROCODONE BITARTRATE AND ACETAMINOPHEN 10; 325 MG/1; MG/1
1 TABLET ORAL EVERY 6 HOURS PRN
Qty: 120 TABLET | Refills: 0 | Status: SHIPPED | OUTPATIENT
Start: 2021-09-27 | End: 2021-10-27

## 2021-09-23 RX ORDER — HYDROCODONE BITARTRATE AND ACETAMINOPHEN 10; 325 MG/1; MG/1
1 TABLET ORAL EVERY 6 HOURS PRN
Qty: 120 TABLET | Refills: 0 | Status: SHIPPED | OUTPATIENT
Start: 2021-09-26 | End: 2021-10-18

## 2021-09-23 NOTE — Clinical Note
[facility] SURGERY SCHEDULING FORM   [address] [place] [address] [place] [address]   [phone] (scheduling phone) [phone] (scheduling fax)     PATIENT INFORMATION   Patient Name:    Shiva Claudio   :    1960   [facility] will follow its Pre-Admission Assessment and Screening Policy for pre-admission testing. Physicians that require   additional testing must order this directly and have results faxed to [address] [place] [address] at [phone].

## 2021-09-24 ENCOUNTER — MYC MEDICAL ADVICE (OUTPATIENT)
Dept: INTERNAL MEDICINE | Facility: CLINIC | Age: 61
End: 2021-09-24

## 2021-10-19 NOTE — TELEPHONE ENCOUNTER
Medication request: HYDROcodone-acetaminophen  MG Oral Tab  Sig:   Take 1 tablet by mouth every 6 (six) hours as needed for Pain. LOV: 5/20/21  NOV: 10/28/21    [place]: 9/25/21 qty#120 r-0        Too early for rx.

## 2021-10-20 RX ORDER — HYDROCODONE BITARTRATE AND ACETAMINOPHEN 10; 325 MG/1; MG/1
1 TABLET ORAL EVERY 6 HOURS PRN
Qty: 120 TABLET | Refills: 0 | Status: SHIPPED | OUTPATIENT
Start: 2021-10-25 | End: 2021-11-16

## 2021-10-28 ENCOUNTER — TELEMEDICINE (OUTPATIENT)
Dept: PHYSICAL MEDICINE AND REHAB | Facility: CLINIC | Age: 61
End: 2021-10-28

## 2021-10-28 ENCOUNTER — EXTERNAL RECORD (OUTPATIENT)
Dept: HEALTH INFORMATION MANAGEMENT | Facility: OTHER | Age: 61
End: 2021-10-28

## 2021-10-28 DIAGNOSIS — M48.061 SPINAL STENOSIS OF LUMBAR REGION WITHOUT NEUROGENIC CLAUDICATION: ICD-10-CM

## 2021-10-28 DIAGNOSIS — M54.16 LUMBAR RADICULOPATHY: Primary | ICD-10-CM

## 2021-10-28 DIAGNOSIS — M51.9 LUMBAR DISC DISEASE: ICD-10-CM

## 2021-10-28 DIAGNOSIS — M51.26 LUMBAR HERNIATED DISC: ICD-10-CM

## 2021-10-28 DIAGNOSIS — M43.10 ACQUIRED SPONDYLOLISTHESIS: ICD-10-CM

## 2021-10-28 DIAGNOSIS — M96.1 POSTLAMINECTOMY SYNDROME, LUMBAR REGION: ICD-10-CM

## 2021-10-28 DIAGNOSIS — M48.061 SPINAL STENOSIS, LUMBAR REGION, WITHOUT NEUROGENIC CLAUDICATION: ICD-10-CM

## 2021-10-28 PROCEDURE — 99214 OFFICE O/P EST MOD 30 MIN: CPT | Performed by: PHYSICAL MEDICINE & REHABILITATION

## 2021-10-28 NOTE — PROGRESS NOTES
[facility] [address]    Telemedicine Visit - Evaluation    Carlos Alberto Zhao verbally consents to a Telemedicine Visit on 10/28/21. This visit is conducted using Telemedicine with live, interactive audio and video.     Pa hours as needed for Pain. 120 tablet 0   • GABAPENTIN 600 MG Oral Tab TAKE 1 TABLET BY MOUTH FOUR TIMES DAILY 120 tablet 3   • Diclofenac Sodium 1 % Transdermal Gel Apply 4 g topically 4 (four) times daily.  1 Tube 3   • apixaban 5 MG Oral Tab Take 5 mg by No cyanosis, clubbing or edema  Respiratory: Non-labored respirations  Skin: No lesions noted   Neurological: alert & oriented x 3, attentive, able to follow commands, comprehention intact, spontaneous speech intact  Psychiatric: Mood and affect appropriat

## 2021-10-29 ENCOUNTER — VIRTUAL VISIT (OUTPATIENT)
Dept: INTERNAL MEDICINE | Facility: CLINIC | Age: 61
End: 2021-10-29
Payer: COMMERCIAL

## 2021-10-29 ENCOUNTER — TELEPHONE (OUTPATIENT)
Dept: PHYSICAL MEDICINE AND REHAB | Facility: CLINIC | Age: 61
End: 2021-10-29

## 2021-10-29 DIAGNOSIS — Z87.81 PERSONAL HISTORY OF TRAUMATIC FRACTURE: ICD-10-CM

## 2021-10-29 DIAGNOSIS — T07.XXXA MULTIPLE FRACTURES: ICD-10-CM

## 2021-10-29 DIAGNOSIS — Z79.52 LONG TERM CURRENT USE OF SYSTEMIC STEROIDS: ICD-10-CM

## 2021-10-29 DIAGNOSIS — M85.851 OSTEOPENIA OF BOTH HIPS: Primary | ICD-10-CM

## 2021-10-29 DIAGNOSIS — M85.852 OSTEOPENIA OF BOTH HIPS: Primary | ICD-10-CM

## 2021-10-29 PROCEDURE — 99213 OFFICE O/P EST LOW 20 MIN: CPT | Mod: 95 | Performed by: INTERNAL MEDICINE

## 2021-10-29 RX ORDER — RISEDRONATE SODIUM 150 MG/1
150 TABLET, FILM COATED ORAL
Qty: 4 TABLET | Refills: 3 | Status: SHIPPED | OUTPATIENT
Start: 2021-10-29 | End: 2022-11-09

## 2021-10-29 NOTE — TELEPHONE ENCOUNTER
Initiated authorization for Bilateral L5 TFESIs CPT [id number] dx:M54.16 to be done at [facility] with [employer] online  Coverage is active Transaction ID: [id number]  Certification/Reference Number  [id number]  Status: Approved  No Prior Authorization is require
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
## 2021-11-16 RX ORDER — GABAPENTIN 600 MG/1
600 TABLET ORAL 4 TIMES DAILY
Qty: 120 TABLET | Refills: 3 | Status: CANCELLED | OUTPATIENT
Start: 2021-11-16

## 2021-11-17 NOTE — TELEPHONE ENCOUNTER
Drug hydrocodone 10-25 #120 pend to Dr Nereida Lloyd    Last refill 10/25/21    Last office visit 10/28/21    Next appointment 11/19/21    [place]    Gabapentin refill request denied, too soon for refill.  Left message on patient`s voice mail has refills

## 2021-11-18 RX ORDER — HYDROCODONE BITARTRATE AND ACETAMINOPHEN 10; 325 MG/1; MG/1
1 TABLET ORAL EVERY 6 HOURS PRN
Qty: 120 TABLET | Refills: 0 | Status: SHIPPED | OUTPATIENT
Start: 2021-11-18 | End: 2021-12-16

## 2021-11-19 ENCOUNTER — OFFICE VISIT (OUTPATIENT)
Dept: SURGERY | Facility: CLINIC | Age: 61
End: 2021-11-19

## 2021-11-19 ENCOUNTER — EXTERNAL RECORD (OUTPATIENT)
Dept: HEALTH INFORMATION MANAGEMENT | Facility: OTHER | Age: 61
End: 2021-11-19

## 2021-11-19 DIAGNOSIS — M51.9 LUMBAR DISC DISEASE: ICD-10-CM

## 2021-11-19 DIAGNOSIS — M48.061 SPINAL STENOSIS OF LUMBAR REGION WITHOUT NEUROGENIC CLAUDICATION: ICD-10-CM

## 2021-11-19 DIAGNOSIS — M54.16 LUMBAR RADICULOPATHY: Primary | ICD-10-CM

## 2021-11-19 DIAGNOSIS — M96.1 POSTLAMINECTOMY SYNDROME, LUMBAR REGION: ICD-10-CM

## 2021-11-19 PROCEDURE — 64483 NJX AA&/STRD TFRM EPI L/S 1: CPT | Performed by: PHYSICAL MEDICINE & REHABILITATION

## 2021-11-19 NOTE — PROCEDURES
[address]    BILATERAL LUMBAR TRANSFORAMINAL   NAME:  Alicia Franco    MR #:    [id number] :  1960     PHYSICIAN:  Katie Rapp MD        Operative Report    DATE OF PROCEDURE: 2021   PREOPERATIVE DIAGNOSES: 1.  Bi aspirated. Then, the patient was injected with a 4 cc solution of 2 cc of 40 mg/cc of Kenalog and 2 cc of 1% PF lidocaine without epinephrine. After this, the needle was removed.   Then  fluoroscopy was right anterior obliqued opening up the left L5-S1 in

## 2021-12-16 DIAGNOSIS — I82.409 DVT (DEEP VENOUS THROMBOSIS) (H): ICD-10-CM

## 2021-12-16 NOTE — TELEPHONE ENCOUNTER
Narcotic Refill Request    Medication request: HYDROcodone-acetaminophen  MG Oral Tab Take 1 tablet by mouth every 6 (six) hours as needed for Pain.     LOV: 11/19/21-inj, 10/28/21-televisit  NOV: none scheduled (Per LOV follow up due in 6 months.)

## 2021-12-17 RX ORDER — HYDROCODONE BITARTRATE AND ACETAMINOPHEN 10; 325 MG/1; MG/1
1 TABLET ORAL EVERY 6 HOURS PRN
Qty: 120 TABLET | Refills: 0 | Status: SHIPPED | OUTPATIENT
Start: 2021-12-18 | End: 2022-01-14

## 2021-12-20 RX ORDER — APIXABAN 5 MG/1
TABLET, FILM COATED ORAL
Qty: 180 TABLET | Refills: 0 | Status: SHIPPED | OUTPATIENT
Start: 2021-12-20 | End: 2022-03-16

## 2021-12-20 NOTE — TELEPHONE ENCOUNTER
apixaban ANTICOAGULANT (ELIQUIS ANTICOAGULANT) 5 MG tablet  Last Written Prescription Date:  9/20/21  Last Fill Quantity: 180,   # refills: 0  Last Office Visit :10/29/21  Future Office visit:  none    Routing refill request to provider for review/approval because: platelets, creat past due.

## 2021-12-21 NOTE — TELEPHONE ENCOUNTER
Otis patient to call [facility] to schedule lab only appointment for medications refill. Due for  platelets, creat . Order is placed by PCP.

## 2022-01-14 ENCOUNTER — TELEPHONE (OUTPATIENT)
Dept: PHYSICAL MEDICINE AND REHAB | Facility: CLINIC | Age: 62
End: 2022-01-14

## 2022-01-17 NOTE — TELEPHONE ENCOUNTER
Medication request:  HYDROcodone-acetaminophen  MG Oral Tab  Sig:   Take 1 tablet by mouth every 6 (six) hours as needed for Pain.     LOV 10/28/21  NOV none     [place]: 12/18/2021

## 2022-01-20 ENCOUNTER — MED REC SCAN ONLY (OUTPATIENT)
Dept: PHYSICAL MEDICINE AND REHAB | Facility: CLINIC | Age: 62
End: 2022-01-20

## 2022-01-20 RX ORDER — HYDROCODONE BITARTRATE AND ACETAMINOPHEN 10; 325 MG/1; MG/1
1 TABLET ORAL EVERY 6 HOURS PRN
Qty: 120 TABLET | Refills: 0 | Status: SHIPPED | OUTPATIENT
Start: 2022-01-20 | End: 2022-02-19

## 2022-01-30 ENCOUNTER — HEALTH MAINTENANCE LETTER (OUTPATIENT)
Age: 62
End: 2022-01-30

## 2022-02-07 DIAGNOSIS — F32.A DEPRESSION, UNSPECIFIED DEPRESSION TYPE: ICD-10-CM

## 2022-02-09 RX ORDER — DULOXETIN HYDROCHLORIDE 60 MG/1
CAPSULE, DELAYED RELEASE ORAL
Qty: 90 CAPSULE | Refills: 0 | Status: SHIPPED | OUTPATIENT
Start: 2022-02-09 | End: 2022-05-18

## 2022-02-09 NOTE — TELEPHONE ENCOUNTER
DULOXETINE HCL DR 60 MG CAP      Last Written Prescription Date:  9-20-21  Last Fill Quantity: 90,   # refills: 0  Last Office Visit : 10-29-21  Future Office visit:  none    Routing refill request to provider for review/approval because:  Overdue PHQ9, FYI to clinic   2 previous 90 day Renetta refill

## 2022-02-16 ENCOUNTER — TELEPHONE (OUTPATIENT)
Dept: PHYSICAL MEDICINE AND REHAB | Facility: CLINIC | Age: 62
End: 2022-02-16
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
## 2022-02-16 NOTE — TELEPHONE ENCOUNTER
Refill Request    Medication request: HYDROcodone-acetaminophen  MG Oral Tab Take 1 tablet by mouth every 6 (six) hours as needed for Pain    LOV:10/28/21-televisit  Due back to clinic per last office note:  \"Follow-up:  6 months\"  NOV: Visit date not found      [place] 22 #120    Urine drug screen (if applicable): none  Pain contract:  on 2020    LOV plan (if weaning or changing medications): no change      Message sent to patient via [employer] that a follow up appointment needs to be scheduled.

## 2022-02-17 RX ORDER — HYDROCODONE BITARTRATE AND ACETAMINOPHEN 10; 325 MG/1; MG/1
1 TABLET ORAL EVERY 6 HOURS PRN
Qty: 120 TABLET | Refills: 0 | Status: SHIPPED | OUTPATIENT
Start: 2022-02-20 | End: 2022-03-15

## 2022-02-18 NOTE — TELEPHONE ENCOUNTER
Order pend to Dr Waqar Espinoza for injection. Left message on patientt`s voice mail office will call to schedule injection once it is approved.

## 2022-02-21 ENCOUNTER — TELEPHONE (OUTPATIENT)
Dept: NEUROLOGY | Facility: CLINIC | Age: 62
End: 2022-02-21

## 2022-02-21 RX ORDER — GABAPENTIN 600 MG/1
TABLET ORAL
Qty: 120 TABLET | Refills: 3 | Status: SHIPPED | OUTPATIENT
Start: 2022-02-21

## 2022-02-21 NOTE — TELEPHONE ENCOUNTER
[employer] for authorization of approval for Bilateral L5 TFESI cpt codes [id number], [id number]. Authorization is not required. Transaction ID: [id number]. Will inform Nursing.

## 2022-02-21 NOTE — TELEPHONE ENCOUNTER
Refill Request    Medication request: GABAPENTIN 600 MG Oral Tab TAKE 1 TABLET BY MOUTH FOUR TIMES DAILY    LOV: 10/28/21- televisit  Due back to clinic per last office note:     Follow-up:  6 months  NOV: 3/3/2022 Karina Hartley MD televisit     [place]: 9/20/21 #120 R-3    Urine drug screen (if applicable): none  Pain contract:9/4/2020    LOV plan (if weaning or changing medications): no change

## 2022-02-21 NOTE — TELEPHONE ENCOUNTER
Patient is going to verify the name of the doctor that rxs her eliquis. She will let us know their name and number. Will need to send an anticoag clearance form. Patient has been scheduled for Bilateral L5 TFESI  on 03/25/22 at the [address] [place], [place] [address]  with . -Anesthesia type: LOCAL. -If receiving MAC or IVC sedation patient will need to get COVID tested 3 days prior even if already vaccinated (order placed by [address]  -If scheduling [facility] and [facility] covid testing required for all procedures whether patient is vaccinated or not. -Patient informed not to eat or drink anything after midnight the night prior to the procedure, if being sedated. -Patient was advised that if he/she does receive the covid vaccine it needs to be at least 2 weeks before or after the injection. -Medications and allergies reviewed. -Patient reminded to hold NSAIDs (Ibuprofen, ASA, Aleve, Naproxen, Mobic etc.) for 3 days prior to [facility]  if BMI is greater than 35. For Cervical injections only hold multivitamins, Vitamin E, Fish Oil, Phentermine (Lomaira) for 7 days prior to injection and NSAIDS. -If patient is receiving MAC/IVCS Phentermine Dionisio Jesu) will need to be held for 7 days prior to injection.  -If on blood thinner clearance has been received to hold this medication by provider. *Patient is on Eliquis.  -Patient informed he/she will need a  to and from procedure. -[facility] is located in the [facility] 1st floor. Patient may park in the yellow parking. Patient verbalized understanding and agrees with plan.  -----> Scheduled in Epic: Yes  -----> Scheduled in [employer]:  Yes

## 2022-03-03 ENCOUNTER — TELEMEDICINE (OUTPATIENT)
Dept: PHYSICAL MEDICINE AND REHAB | Facility: CLINIC | Age: 62
End: 2022-03-03

## 2022-03-03 ENCOUNTER — EXTERNAL RECORD (OUTPATIENT)
Dept: HEALTH INFORMATION MANAGEMENT | Facility: OTHER | Age: 62
End: 2022-03-03

## 2022-03-03 DIAGNOSIS — G60.9 IDIOPATHIC PERIPHERAL NEUROPATHY: ICD-10-CM

## 2022-03-03 DIAGNOSIS — M48.061 SPINAL STENOSIS, LUMBAR REGION, WITHOUT NEUROGENIC CLAUDICATION: ICD-10-CM

## 2022-03-03 DIAGNOSIS — M43.10 ACQUIRED SPONDYLOLISTHESIS: ICD-10-CM

## 2022-03-03 DIAGNOSIS — M76.61 TENDONITIS, ACHILLES, RIGHT: ICD-10-CM

## 2022-03-03 DIAGNOSIS — M48.061 SPINAL STENOSIS OF LUMBAR REGION WITHOUT NEUROGENIC CLAUDICATION: ICD-10-CM

## 2022-03-03 DIAGNOSIS — M51.26 LUMBAR HERNIATED DISC: ICD-10-CM

## 2022-03-03 DIAGNOSIS — M54.16 LUMBAR RADICULOPATHY: Primary | ICD-10-CM

## 2022-03-03 DIAGNOSIS — M96.1 POSTLAMINECTOMY SYNDROME, LUMBAR REGION: ICD-10-CM

## 2022-03-03 DIAGNOSIS — M51.9 LUMBAR DISC DISEASE: ICD-10-CM

## 2022-03-03 PROCEDURE — 99214 OFFICE O/P EST MOD 30 MIN: CPT | Performed by: PHYSICAL MEDICINE & REHABILITATION

## 2022-03-04 ENCOUNTER — TELEPHONE (OUTPATIENT)
Dept: NEUROLOGY | Facility: CLINIC | Age: 62
End: 2022-03-04

## 2022-03-04 NOTE — TELEPHONE ENCOUNTER
Bilateral L5( L5-S1) TFESI CPT CODE 46905-46,58439    Status: APPROVED. Pre authorization is not required    Availty online for authorization of approval for above.  Notification or Prior Authorization is not required for the requested services  Transaction ID: [id number] ID: [id number] Date: 2022-03-04     Reference#[id number]        Notified Approved Referrals for scheduling

## 2022-03-07 ENCOUNTER — MEDICAL CORRESPONDENCE (OUTPATIENT)
Dept: HEALTH INFORMATION MANAGEMENT | Facility: CLINIC | Age: 62
End: 2022-03-07
Payer: COMMERCIAL

## 2022-03-07 ENCOUNTER — TELEPHONE (OUTPATIENT)
Dept: INTERNAL MEDICINE | Facility: CLINIC | Age: 62
End: 2022-03-07
Payer: COMMERCIAL

## 2022-03-07 NOTE — TELEPHONE ENCOUNTER
[facility] Call Center    Phone Message    May a detailed message be left on voicemail: yes     Reason for Call: Medication Question or concern regarding medication   Prescription Clarification  Name of Medication: ELIQUIS   Prescribing Provider: Gina   Pharmacy:  [facility]       What on the order needs clarification?  Pain clinic would like an order to hold eliquis for 2 days prior to her getting an injection. Please call and discuss.          Action Taken: Message routed to:  [facility] PCC    Travel Screening: Not Applicable

## 2022-03-07 NOTE — TELEPHONE ENCOUNTER
Spoke with patient and she states Dr. Aj Gray at the [facility] rxs her Eliquis. Ph.[phone]  Fax.[phone]. Anticoag form faxed.

## 2022-03-08 NOTE — TELEPHONE ENCOUNTER
Called [facility] and gave verbal orders per Dr. Fuentes to hold Eliquis 2 days prior to lumbar injection.      Peng Ortiz CMA ([facility]) at 9:10 AM on 3/8/2022

## 2022-03-08 NOTE — CONFIDENTIAL NOTE
Okay to hold eliquis 2 days prior to procedure. No bridging necessary.  Resume after procedure per specialist.  Thanks,  Eloisa Fuentes MD  Internal Medicine

## 2022-03-08 NOTE — TELEPHONE ENCOUNTER
[facility] message from [place], [place] stating, \"MA from Dr. Suni Fong' office is returning your call. Please call back at [phone]\"  vd verbal clearance from Dr. Addie Zaragoza' office for patient to hold Eliquis for 2 days prior to [facility]. Spoke with Abraham Jiménez. LVMTCB on patient's vm.

## 2022-03-13 ENCOUNTER — TELEPHONE (OUTPATIENT)
Dept: INTERNAL MEDICINE | Facility: CLINIC | Age: 62
End: 2022-03-13
Payer: COMMERCIAL

## 2022-03-13 DIAGNOSIS — I82.409 DVT (DEEP VENOUS THROMBOSIS) (H): ICD-10-CM

## 2022-03-16 NOTE — TELEPHONE ENCOUNTER
Refill Request    Medication request: HYDROcodone-acetaminophen  MG Oral Tab Take 1 tablet by mouth every 6 (six) hours as needed for Pain.     LOV: 3/3/22- televisit  Due back to clinic per last office note:  \"Follow-up:  3 months\"  NOV: 3/25/22-inj     [place] 22 #120    Urine drug screen (if applicable): none  Pain contract:  on 2020    LOV plan (if weaning or changing medications): no change

## 2022-03-16 NOTE — TELEPHONE ENCOUNTER
ELIQUIS 5 MG TABLET      Last Written Prescription Date:  12-20-21  Last Fill Quantity: 180,   # refills: 0  Last Office Visit : 10-29-21  Future Office visit:  none    Routing refill request to provider for review/approval because:  Overdue lab: Cr, Plt    -- FYI to clinic  Previous 90 day enrico    Outside lab orders 12-21-21 in epic - no appt

## 2022-03-16 NOTE — TELEPHONE ENCOUNTER
Talked w/ pt about scheduleing labs per pcp request - pt stated she would do them at a clinic closer to her home in the next week or so.

## 2022-03-18 RX ORDER — HYDROCODONE BITARTRATE AND ACETAMINOPHEN 10; 325 MG/1; MG/1
1 TABLET ORAL EVERY 6 HOURS PRN
Qty: 120 TABLET | Refills: 0 | Status: SHIPPED | OUTPATIENT
Start: 2022-03-22 | End: 2022-04-21

## 2022-03-25 ENCOUNTER — OFFICE VISIT (OUTPATIENT)
Dept: SURGERY | Facility: CLINIC | Age: 62
End: 2022-03-25

## 2022-03-25 ENCOUNTER — EXTERNAL RECORD (OUTPATIENT)
Dept: HEALTH INFORMATION MANAGEMENT | Facility: OTHER | Age: 62
End: 2022-03-25

## 2022-03-25 DIAGNOSIS — M51.9 LUMBAR DISC DISEASE: ICD-10-CM

## 2022-03-25 DIAGNOSIS — M96.1 POSTLAMINECTOMY SYNDROME, LUMBAR REGION: ICD-10-CM

## 2022-03-25 DIAGNOSIS — M51.26 LUMBAR HERNIATED DISC: ICD-10-CM

## 2022-03-25 DIAGNOSIS — M54.16 LUMBAR RADICULOPATHY: Primary | ICD-10-CM

## 2022-03-25 PROCEDURE — 64483 NJX AA&/STRD TFRM EPI L/S 1: CPT | Performed by: PHYSICAL MEDICINE & REHABILITATION

## 2022-03-25 NOTE — PROCEDURES
[facility]    BILATERAL LUMBAR TRANSFORAMINAL   NAME:  Sahra Zabala    MR #:    [id number] :  1960     PHYSICIAN:  Javier Jauregui MD        Operative Report    DATE OF PROCEDURE: 3/25/2022   PREOPERATIVE DIAGNOSES: 1. Bilateral L5 radiculopathies     2. s/p left L4-5 laminectomy    3. L5-S1 left mod foraminal, L4-5 mod diffuse, L3-4 mod diffuse, L2-3 left mod foraminal & mild-mod diffuse bulging discs    4. L5-S1 mild central herniated disc        POSTOPERATIVE DIAGNOSES:   1. Bilateral L5 radiculopathies     2. s/p left L4-5 laminectomy    3. L5-S1 left mod foraminal, L4-5 mod diffuse, L3-4 mod diffuse, L2-3 left mod foraminal & mild-mod diffuse bulging discs    4. L5-S1 mild central herniated disc        PROCEDURES: Bilateral L5 transforaminal epidural steroid injections done under fluoroscopic guidance with contrast enhancement. SURGEON: Javier Saunders MD   ANESTHESIA: Local   INDICATIONS:      OPERATIVE PROCEDURE:  Written consent was obtained from the patient. The patient was brought into the operating room and placed in the prone position on the fluoroscopy table with pillow underneath her abdomen. The patient's skin was cleaned and draped in a normal sterile fashion. Using AP fluoroscopy, all five lumbar vertebrae were identified. When the fifth vertebra was identified, fluoroscopy was left anterior obliqued opening up the right L5-S1 intervertebral foramen. At this point in time, the patient's skin was anesthetized with 2% lidocaine without epinephrine. Then, a 5 inch, 22-gauge spinal needle was inserted and directed towards the right L5-S1 intervertebral foramen. When it felt to be in good position, AP fluoroscopy was used to advance the needle to the 6 o'clock position on the right L5 pedicle. At this point in time, Omnipaque-240 contrast was used to obtain a good epidurogram indicating correct needle placement. Then, aspiration was performed.   No blood, fluid, or air was aspirated. Then, the patient was injected with a 4 cc solution of 2 cc of 40 mg/cc of Kenalog and 2 cc of 1% PF lidocaine without epinephrine. After this, the needle was removed. Then  fluoroscopy was right anterior obliqued opening up the left L5-S1 intervertebral foramen. At this point in time, the patient's skin was anesthetized with 1 to 2 cc of 2% lidocaine without epinephrine. Then, a 5 inch, 22-gauge spinal needle was inserted and directed towards the left L5-S1 intervertebral foramen. When it felt to be in good position, AP fluoroscopy was used to advance the needle to the 6 o'clock position on the left L5 pedicle. At this point in time, Omnipaque-240 contrast was used to obtain a good epidurogram indicating correct needle placement. Then, aspiration was performed. No blood, fluid, or air was aspirated. Then, the patient was injected with a 4 cc solution of 2 cc of 40 mg/cc of Kenalog and 2 cc of 1% PF lidocaine without epinephrine. After this, the needle was removed. The patient's skin was cleaned. Band-Aids were applied. The patient was transferred to the cart and into [facility]. The patient was given discharge instructions and will follow up in the clinic as scheduled. Throughout the whole procedure, the patient's pulse oximetry and vital signs were monitored and they remained completely stable. Also, throughout the whole procedure, prior to injection of any medication, aspiration was performed. No blood, fluid, or air was aspirated at anytime.

## 2022-03-27 ENCOUNTER — HEALTH MAINTENANCE LETTER (OUTPATIENT)
Age: 62
End: 2022-03-27

## 2022-04-06 DIAGNOSIS — I82.409 DVT (DEEP VENOUS THROMBOSIS) (H): ICD-10-CM

## 2022-04-08 ENCOUNTER — APPOINTMENT (OUTPATIENT)
Dept: CT IMAGING | Facility: CLINIC | Age: 62
End: 2022-04-08
Attending: EMERGENCY MEDICINE
Payer: COMMERCIAL

## 2022-04-08 ENCOUNTER — LAB (OUTPATIENT)
Dept: LAB | Facility: CLINIC | Age: 62
End: 2022-04-08
Payer: COMMERCIAL

## 2022-04-08 ENCOUNTER — APPOINTMENT (OUTPATIENT)
Dept: CT IMAGING | Facility: CLINIC | Age: 62
End: 2022-04-08
Attending: PHYSICIAN ASSISTANT
Payer: COMMERCIAL

## 2022-04-08 ENCOUNTER — ANCILLARY PROCEDURE (OUTPATIENT)
Dept: CT IMAGING | Facility: CLINIC | Age: 62
End: 2022-04-08
Attending: INTERNAL MEDICINE
Payer: COMMERCIAL

## 2022-04-08 ENCOUNTER — HOSPITAL ENCOUNTER (INPATIENT)
Facility: CLINIC | Age: 62
LOS: 4 days | Discharge: HOME HEALTH | End: 2022-04-12
Attending: EMERGENCY MEDICINE | Admitting: SURGERY
Payer: COMMERCIAL

## 2022-04-08 ENCOUNTER — APPOINTMENT (OUTPATIENT)
Dept: GENERAL RADIOLOGY | Facility: CLINIC | Age: 62
End: 2022-04-08
Attending: EMERGENCY MEDICINE
Payer: COMMERCIAL

## 2022-04-08 ENCOUNTER — ANESTHESIA EVENT (OUTPATIENT)
Dept: SURGERY | Facility: CLINIC | Age: 62
End: 2022-04-08
Payer: COMMERCIAL

## 2022-04-08 ENCOUNTER — DOCUMENTATION ONLY (OUTPATIENT)
Dept: INTERNAL MEDICINE | Facility: CLINIC | Age: 62
End: 2022-04-08

## 2022-04-08 ENCOUNTER — OFFICE VISIT (OUTPATIENT)
Dept: INTERNAL MEDICINE | Facility: CLINIC | Age: 62
End: 2022-04-08
Payer: COMMERCIAL

## 2022-04-08 VITALS
WEIGHT: 181 LBS | SYSTOLIC BLOOD PRESSURE: 139 MMHG | DIASTOLIC BLOOD PRESSURE: 83 MMHG | HEART RATE: 86 BPM | HEIGHT: 67 IN | OXYGEN SATURATION: 99 % | RESPIRATION RATE: 16 BRPM | TEMPERATURE: 98 F | BODY MASS INDEX: 28.41 KG/M2
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
## 2022-04-08 DIAGNOSIS — R40.0 SOMNOLENCE: ICD-10-CM

## 2022-04-08 DIAGNOSIS — S06.5XAA SUBDURAL HEMATOMA (H): ICD-10-CM

## 2022-04-08 DIAGNOSIS — S62.347A CLOSED NONDISPLACED FRACTURE OF BASE OF FIFTH METACARPAL BONE OF LEFT HAND, INITIAL ENCOUNTER: ICD-10-CM

## 2022-04-08 DIAGNOSIS — Z13.220 SCREENING FOR HYPERLIPIDEMIA: ICD-10-CM

## 2022-04-08 DIAGNOSIS — G89.29 OTHER CHRONIC PAIN: ICD-10-CM

## 2022-04-08 DIAGNOSIS — S02.32XA CLOSED BLOW-OUT FRACTURE OF LEFT ORBITAL FLOOR (H): ICD-10-CM

## 2022-04-08 DIAGNOSIS — Z79.01 LONG TERM CURRENT USE OF ANTICOAGULANT THERAPY: ICD-10-CM

## 2022-04-08 DIAGNOSIS — D68.59 PROTEIN S DEFICIENCY (H): Primary | ICD-10-CM

## 2022-04-08 DIAGNOSIS — R29.6 FALLS FREQUENTLY: ICD-10-CM

## 2022-04-08 DIAGNOSIS — R40.0 SOMNOLENCE: Primary | ICD-10-CM

## 2022-04-08 DIAGNOSIS — T07.XXXA MULTIPLE FRACTURES: ICD-10-CM

## 2022-04-08 DIAGNOSIS — I82.409 DVT (DEEP VENOUS THROMBOSIS) (H): ICD-10-CM

## 2022-04-08 DIAGNOSIS — M62.81 MUSCLE WEAKNESS (GENERALIZED): ICD-10-CM

## 2022-04-08 DIAGNOSIS — S72.011A CLOSED SUBCAPITAL FRACTURE OF RIGHT FEMUR, INITIAL ENCOUNTER (H): ICD-10-CM

## 2022-04-08 DIAGNOSIS — Z11.52 ENCOUNTER FOR SCREENING LABORATORY TESTING FOR SEVERE ACUTE RESPIRATORY SYNDROME CORONAVIRUS 2 (SARS-COV-2): ICD-10-CM

## 2022-04-08 LAB
ABO/RH(D): NORMAL
ALBUMIN SERPL-MCNC: 3.4 G/DL (ref 3.4–5)
ALBUMIN SERPL-MCNC: 3.4 G/DL (ref 3.4–5)
ALBUMIN UR-MCNC: NEGATIVE MG/DL
ALP SERPL-CCNC: 121 U/L (ref 40–150)
ALP SERPL-CCNC: 129 U/L (ref 40–150)
ALT SERPL W P-5'-P-CCNC: 42 U/L (ref 0–50)
ALT SERPL W P-5'-P-CCNC: 46 U/L (ref 0–50)
ANION GAP SERPL CALCULATED.3IONS-SCNC: 3 MMOL/L (ref 3–14)
ANION GAP SERPL CALCULATED.3IONS-SCNC: 5 MMOL/L (ref 3–14)
ANTIBODY SCREEN: NEGATIVE
APPEARANCE UR: CLEAR
APTT PPP: 25 SECONDS (ref 22–38)
AST SERPL W P-5'-P-CCNC: 49 U/L (ref 0–45)
AST SERPL W P-5'-P-CCNC: 52 U/L (ref 0–45)
BASOPHILS # BLD AUTO: 0 10E3/UL (ref 0–0.2)
BASOPHILS # BLD AUTO: 0.1 10E3/UL (ref 0–0.2)
BASOPHILS NFR BLD AUTO: 0 %
BASOPHILS NFR BLD AUTO: 0 %
BILIRUB SERPL-MCNC: 0.8 MG/DL (ref 0.2–1.3)
BILIRUB SERPL-MCNC: 0.8 MG/DL (ref 0.2–1.3)
BILIRUB UR QL STRIP: NEGATIVE
BUN SERPL-MCNC: 9 MG/DL (ref 7–30)
BUN SERPL-MCNC: 9 MG/DL (ref 7–30)
CALCIUM SERPL-MCNC: 8.1 MG/DL (ref 8.5–10.1)
CALCIUM SERPL-MCNC: 8.4 MG/DL (ref 8.5–10.1)
CHLORIDE BLD-SCNC: 108 MMOL/L (ref 94–109)
CHLORIDE BLD-SCNC: 108 MMOL/L (ref 94–109)
CHOLEST SERPL-MCNC: 150 MG/DL
CO2 SERPL-SCNC: 27 MMOL/L (ref 20–32)
CO2 SERPL-SCNC: 29 MMOL/L (ref 20–32)
COLOR UR AUTO: ABNORMAL
CREAT BLD-MCNC: 0.4 MG/DL (ref 0.5–1)
CREAT SERPL-MCNC: 0.5 MG/DL (ref 0.52–1.04)
CREAT SERPL-MCNC: 0.52 MG/DL (ref 0.52–1.04)
DEPRECATED CALCIDIOL+CALCIFEROL SERPL-MC: 12 UG/L (ref 20–75)
EOSINOPHIL # BLD AUTO: 0 10E3/UL (ref 0–0.7)
EOSINOPHIL # BLD AUTO: 0 10E3/UL (ref 0–0.7)
EOSINOPHIL NFR BLD AUTO: 0 %
EOSINOPHIL NFR BLD AUTO: 0 %
ERYTHROCYTE [DISTWIDTH] IN BLOOD BY AUTOMATED COUNT: 12.3 % (ref 10–15)
ERYTHROCYTE [DISTWIDTH] IN BLOOD BY AUTOMATED COUNT: 12.4 % (ref 10–15)
ETHANOL SERPL-MCNC: <0.01 G/DL
FASTING STATUS PATIENT QL REPORTED: NORMAL
FERRITIN SERPL-MCNC: 56 NG/ML (ref 8–252)
GFR SERPL CREATININE-BSD FRML MDRD: >60 ML/MIN/1.73M2
GFR SERPL CREATININE-BSD FRML MDRD: >90 ML/MIN/1.73M2
GFR SERPL CREATININE-BSD FRML MDRD: >90 ML/MIN/1.73M2
GLUCOSE BLD-MCNC: 100 MG/DL (ref 70–99)
GLUCOSE BLD-MCNC: 94 MG/DL (ref 70–99)
GLUCOSE UR STRIP-MCNC: NEGATIVE MG/DL
HCT VFR BLD AUTO: 39.8 % (ref 35–47)
HCT VFR BLD AUTO: 39.9 % (ref 35–47)
HDLC SERPL-MCNC: 55 MG/DL
HGB BLD-MCNC: 13.5 G/DL (ref 11.7–15.7)
HGB BLD-MCNC: 13.6 G/DL (ref 11.7–15.7)
HGB UR QL STRIP: NEGATIVE
HOLD SPECIMEN: NORMAL
IMM GRANULOCYTES # BLD: 0 10E3/UL
IMM GRANULOCYTES # BLD: 0.1 10E3/UL
IMM GRANULOCYTES NFR BLD: 0 %
IMM GRANULOCYTES NFR BLD: 0 %
INR BLD: 0.9 (ref 2–3)
INR PPP: 1 (ref 0.85–1.15)
IRON SATN MFR SERPL: 29 % (ref 15–46)
IRON SERPL-MCNC: 105 UG/DL (ref 35–180)
KETONES UR STRIP-MCNC: NEGATIVE MG/DL
LDLC SERPL CALC-MCNC: 82 MG/DL
LEUKOCYTE ESTERASE UR QL STRIP: NEGATIVE
LYMPHOCYTES # BLD AUTO: 0.9 10E3/UL (ref 0.8–5.3)
LYMPHOCYTES # BLD AUTO: 1 10E3/UL (ref 0.8–5.3)
LYMPHOCYTES NFR BLD AUTO: 8 %
LYMPHOCYTES NFR BLD AUTO: 9 %
MCH RBC QN AUTO: 31.6 PG (ref 26.5–33)
MCH RBC QN AUTO: 31.9 PG (ref 26.5–33)
MCHC RBC AUTO-ENTMCNC: 33.8 G/DL (ref 31.5–36.5)
MCHC RBC AUTO-ENTMCNC: 34.2 G/DL (ref 31.5–36.5)
MCV RBC AUTO: 93 FL (ref 78–100)
MCV RBC AUTO: 94 FL (ref 78–100)
MONOCYTES # BLD AUTO: 1 10E3/UL (ref 0–1.3)
MONOCYTES # BLD AUTO: 1.1 10E3/UL (ref 0–1.3)
MONOCYTES NFR BLD AUTO: 9 %
MONOCYTES NFR BLD AUTO: 9 %
NEUTROPHILS # BLD AUTO: 9.3 10E3/UL (ref 1.6–8.3)
NEUTROPHILS # BLD AUTO: 9.8 10E3/UL (ref 1.6–8.3)
NEUTROPHILS NFR BLD AUTO: 82 %
NEUTROPHILS NFR BLD AUTO: 83 %
NITRATE UR QL: NEGATIVE
NONHDLC SERPL-MCNC: 95 MG/DL
NRBC # BLD AUTO: 0 10E3/UL
NRBC # BLD AUTO: 0 10E3/UL
NRBC BLD AUTO-RTO: 0 /100
NRBC BLD AUTO-RTO: 0 /100
PH UR STRIP: 7.5 [PH] (ref 5–7)
PLATELET # BLD AUTO: 301 10E3/UL (ref 150–450)
PLATELET # BLD AUTO: 303 10E3/UL (ref 150–450)
POTASSIUM BLD-SCNC: 3.7 MMOL/L (ref 3.4–5.3)
POTASSIUM BLD-SCNC: 4 MMOL/L (ref 3.4–5.3)
PROT SERPL-MCNC: 7.1 G/DL (ref 6.8–8.8)
PROT SERPL-MCNC: 7.9 G/DL (ref 6.8–8.8)
PTH-INTACT SERPL-MCNC: 218 PG/ML (ref 15–65)
RADIOLOGIST FLAGS: ABNORMAL
RBC # BLD AUTO: 4.23 10E6/UL (ref 3.8–5.2)
RBC # BLD AUTO: 4.3 10E6/UL (ref 3.8–5.2)
RBC URINE: 1 /HPF
SARS-COV-2 RNA RESP QL NAA+PROBE: NEGATIVE
SODIUM SERPL-SCNC: 140 MMOL/L (ref 133–144)
SODIUM SERPL-SCNC: 140 MMOL/L (ref 133–144)
SP GR UR STRIP: 1 (ref 1–1.03)
SPECIMEN EXPIRATION DATE: NORMAL
SQUAMOUS EPITHELIAL: <1 /HPF
TIBC SERPL-MCNC: 365 UG/DL (ref 240–430)
TRIGL SERPL-MCNC: 65 MG/DL
TROPONIN I SERPL HS-MCNC: 6 NG/L
UROBILINOGEN UR STRIP-MCNC: NORMAL MG/DL
VIT B12 SERPL-MCNC: 626 PG/ML (ref 193–986)
WBC # BLD AUTO: 11.4 10E3/UL (ref 4–11)
WBC # BLD AUTO: 11.9 10E3/UL (ref 4–11)
WBC URINE: 1 /HPF

## 2022-04-08 PROCEDURE — 80053 COMPREHEN METABOLIC PANEL: CPT | Performed by: PATHOLOGY

## 2022-04-08 PROCEDURE — 83550 IRON BINDING TEST: CPT

## 2022-04-08 PROCEDURE — U0003 INFECTIOUS AGENT DETECTION BY NUCLEIC ACID (DNA OR RNA); SEVERE ACUTE RESPIRATORY SYNDROME CORONAVIRUS 2 (SARS-COV-2) (CORONAVIRUS DISEASE [COVID-19]), AMPLIFIED PROBE TECHNIQUE, MAKING USE OF HIGH THROUGHPUT TECHNOLOGIES AS DESCRIBED BY CMS-2020-01-R: HCPCS | Performed by: EMERGENCY MEDICINE

## 2022-04-08 PROCEDURE — 85025 COMPLETE CBC W/AUTO DIFF WBC: CPT | Performed by: PATHOLOGY

## 2022-04-08 PROCEDURE — 250N000009 HC RX 250: Performed by: STUDENT IN AN ORGANIZED HEALTH CARE EDUCATION/TRAINING PROGRAM

## 2022-04-08 PROCEDURE — 73552 X-RAY EXAM OF FEMUR 2/>: CPT | Mod: 26 | Performed by: RADIOLOGY

## 2022-04-08 PROCEDURE — 70450 CT HEAD/BRAIN W/O DYE: CPT

## 2022-04-08 PROCEDURE — 72131 CT LUMBAR SPINE W/O DYE: CPT | Mod: 26 | Performed by: RADIOLOGY

## 2022-04-08 PROCEDURE — G0390 TRAUMA RESPONS W/HOSP CRITI: HCPCS

## 2022-04-08 PROCEDURE — 72132 CT LUMBAR SPINE W/DYE: CPT

## 2022-04-08 PROCEDURE — 82306 VITAMIN D 25 HYDROXY: CPT | Mod: 90 | Performed by: PATHOLOGY

## 2022-04-08 PROCEDURE — 85041 AUTOMATED RBC COUNT: CPT | Performed by: EMERGENCY MEDICINE

## 2022-04-08 PROCEDURE — 86901 BLOOD TYPING SEROLOGIC RH(D): CPT | Performed by: EMERGENCY MEDICINE

## 2022-04-08 PROCEDURE — 250N000015 HC RX FACTOR IP MED 636 OP 636: Performed by: EMERGENCY MEDICINE

## 2022-04-08 PROCEDURE — 99291 CRITICAL CARE FIRST HOUR: CPT | Mod: 25 | Performed by: EMERGENCY MEDICINE

## 2022-04-08 PROCEDURE — 99222 1ST HOSP IP/OBS MODERATE 55: CPT | Performed by: PHYSICIAN ASSISTANT

## 2022-04-08 PROCEDURE — 81003 URINALYSIS AUTO W/O SCOPE: CPT | Performed by: EMERGENCY MEDICINE

## 2022-04-08 PROCEDURE — 99000 SPECIMEN HANDLING OFFICE-LAB: CPT | Performed by: PATHOLOGY

## 2022-04-08 PROCEDURE — 36415 COLL VENOUS BLD VENIPUNCTURE: CPT | Performed by: EMERGENCY MEDICINE

## 2022-04-08 PROCEDURE — 80061 LIPID PANEL: CPT | Performed by: PATHOLOGY

## 2022-04-08 PROCEDURE — 70486 CT MAXILLOFACIAL W/O DYE: CPT | Mod: 26 | Performed by: RADIOLOGY

## 2022-04-08 PROCEDURE — 82565 ASSAY OF CREATININE: CPT

## 2022-04-08 PROCEDURE — 72170 X-RAY EXAM OF PELVIS: CPT

## 2022-04-08 PROCEDURE — 84484 ASSAY OF TROPONIN QUANT: CPT | Performed by: EMERGENCY MEDICINE

## 2022-04-08 PROCEDURE — 70450 CT HEAD/BRAIN W/O DYE: CPT | Mod: 26 | Performed by: RADIOLOGY

## 2022-04-08 PROCEDURE — C9803 HOPD COVID-19 SPEC COLLECT: HCPCS | Performed by: EMERGENCY MEDICINE

## 2022-04-08 PROCEDURE — 36415 COLL VENOUS BLD VENIPUNCTURE: CPT | Performed by: PATHOLOGY

## 2022-04-08 PROCEDURE — 74177 CT ABD & PELVIS W/CONTRAST: CPT | Mod: 26 | Performed by: RADIOLOGY

## 2022-04-08 PROCEDURE — 93010 ELECTROCARDIOGRAM REPORT: CPT | Performed by: EMERGENCY MEDICINE

## 2022-04-08 PROCEDURE — 84155 ASSAY OF PROTEIN SERUM: CPT | Performed by: EMERGENCY MEDICINE

## 2022-04-08 PROCEDURE — 73552 X-RAY EXAM OF FEMUR 2/>: CPT | Mod: RT

## 2022-04-08 PROCEDURE — 93005 ELECTROCARDIOGRAM TRACING: CPT | Performed by: EMERGENCY MEDICINE

## 2022-04-08 PROCEDURE — 250N000013 HC RX MED GY IP 250 OP 250 PS 637: Performed by: PHYSICIAN ASSISTANT

## 2022-04-08 PROCEDURE — 85610 PROTHROMBIN TIME: CPT | Performed by: EMERGENCY MEDICINE

## 2022-04-08 PROCEDURE — 72170 X-RAY EXAM OF PELVIS: CPT | Mod: 26 | Performed by: RADIOLOGY

## 2022-04-08 PROCEDURE — 250N000011 HC RX IP 250 OP 636: Performed by: STUDENT IN AN ORGANIZED HEALTH CARE EDUCATION/TRAINING PROGRAM

## 2022-04-08 PROCEDURE — 99223 1ST HOSP IP/OBS HIGH 75: CPT | Performed by: PHYSICIAN ASSISTANT

## 2022-04-08 PROCEDURE — 73130 X-RAY EXAM OF HAND: CPT | Mod: LT

## 2022-04-08 PROCEDURE — 72125 CT NECK SPINE W/O DYE: CPT | Mod: 26 | Performed by: RADIOLOGY

## 2022-04-08 PROCEDURE — 71260 CT THORAX DX C+: CPT | Mod: 26 | Performed by: RADIOLOGY

## 2022-04-08 PROCEDURE — 82077 ASSAY SPEC XCP UR&BREATH IA: CPT | Performed by: EMERGENCY MEDICINE

## 2022-04-08 PROCEDURE — 72128 CT CHEST SPINE W/O DYE: CPT | Mod: 26 | Performed by: RADIOLOGY

## 2022-04-08 PROCEDURE — 250N000013 HC RX MED GY IP 250 OP 250 PS 637: Performed by: EMERGENCY MEDICINE

## 2022-04-08 PROCEDURE — 83970 ASSAY OF PARATHORMONE: CPT | Performed by: PATHOLOGY

## 2022-04-08 PROCEDURE — 96374 THER/PROPH/DIAG INJ IV PUSH: CPT | Mod: 59 | Performed by: EMERGENCY MEDICINE

## 2022-04-08 PROCEDURE — 73130 X-RAY EXAM OF HAND: CPT | Mod: 26 | Performed by: RADIOLOGY

## 2022-04-08 PROCEDURE — 99255 IP/OBS CONSLTJ NEW/EST HI 80: CPT | Mod: GC | Performed by: INTERNAL MEDICINE

## 2022-04-08 PROCEDURE — 85018 HEMOGLOBIN: CPT | Performed by: EMERGENCY MEDICINE

## 2022-04-08 PROCEDURE — 85730 THROMBOPLASTIN TIME PARTIAL: CPT | Performed by: EMERGENCY MEDICINE

## 2022-04-08 PROCEDURE — 72129 CT CHEST SPINE W/DYE: CPT

## 2022-04-08 PROCEDURE — 99253 IP/OBS CNSLTJ NEW/EST LOW 45: CPT | Performed by: PHYSICIAN ASSISTANT

## 2022-04-08 PROCEDURE — 70486 CT MAXILLOFACIAL W/O DYE: CPT

## 2022-04-08 PROCEDURE — 74177 CT ABD & PELVIS W/CONTRAST: CPT

## 2022-04-08 PROCEDURE — 120N000001 HC R&B MED SURG/OB

## 2022-04-08 PROCEDURE — 82728 ASSAY OF FERRITIN: CPT

## 2022-04-08 PROCEDURE — 99215 OFFICE O/P EST HI 40 MIN: CPT | Performed by: INTERNAL MEDICINE

## 2022-04-08 PROCEDURE — 70450 CT HEAD/BRAIN W/O DYE: CPT | Mod: GC | Performed by: RADIOLOGY

## 2022-04-08 PROCEDURE — 85610 PROTHROMBIN TIME: CPT

## 2022-04-08 PROCEDURE — 82607 VITAMIN B-12: CPT

## 2022-04-08 PROCEDURE — 72125 CT NECK SPINE W/O DYE: CPT

## 2022-04-08 RX ORDER — IOPAMIDOL 755 MG/ML
114 INJECTION, SOLUTION INTRAVASCULAR ONCE
Status: COMPLETED | OUTPATIENT
Start: 2022-04-08 | End: 2022-04-08

## 2022-04-08 RX ORDER — UREA 10 %
500 LOTION (ML) TOPICAL DAILY
Status: DISCONTINUED | OUTPATIENT
Start: 2022-04-09 | End: 2022-04-12 | Stop reason: HOSPADM

## 2022-04-08 RX ORDER — GABAPENTIN 600 MG/1
1200 TABLET ORAL 4 TIMES DAILY
Status: DISCONTINUED | OUTPATIENT
Start: 2022-04-08 | End: 2022-04-08

## 2022-04-08 RX ORDER — LEVETIRACETAM 750 MG/1
750 TABLET ORAL 2 TIMES DAILY
Status: DISCONTINUED | OUTPATIENT
Start: 2022-04-08 | End: 2022-04-12 | Stop reason: HOSPADM

## 2022-04-08 RX ORDER — ACETAMINOPHEN 325 MG/1
650 TABLET ORAL EVERY 4 HOURS PRN
Status: DISCONTINUED | OUTPATIENT
Start: 2022-04-08 | End: 2022-04-08

## 2022-04-08 RX ORDER — ACETAMINOPHEN 325 MG/1
975 TABLET ORAL 3 TIMES DAILY
Status: DISCONTINUED | OUTPATIENT
Start: 2022-04-08 | End: 2022-04-12 | Stop reason: HOSPADM

## 2022-04-08 RX ORDER — GABAPENTIN 600 MG/1
600 TABLET ORAL 2 TIMES DAILY
Status: DISCONTINUED | OUTPATIENT
Start: 2022-04-08 | End: 2022-04-10

## 2022-04-08 RX ORDER — ONDANSETRON 2 MG/ML
4 INJECTION INTRAMUSCULAR; INTRAVENOUS EVERY 6 HOURS PRN
Status: DISCONTINUED | OUTPATIENT
Start: 2022-04-08 | End: 2022-04-12 | Stop reason: HOSPADM

## 2022-04-08 RX ORDER — DULOXETIN HYDROCHLORIDE 60 MG/1
60 CAPSULE, DELAYED RELEASE ORAL DAILY
Status: DISCONTINUED | OUTPATIENT
Start: 2022-04-08 | End: 2022-04-12 | Stop reason: HOSPADM

## 2022-04-08 RX ORDER — LIDOCAINE 40 MG/G
CREAM TOPICAL
Status: DISCONTINUED | OUTPATIENT
Start: 2022-04-08 | End: 2022-04-12 | Stop reason: HOSPADM

## 2022-04-08 RX ORDER — OXYCODONE HYDROCHLORIDE 5 MG/1
5 TABLET ORAL
Status: DISCONTINUED | OUTPATIENT
Start: 2022-04-08 | End: 2022-04-12 | Stop reason: HOSPADM

## 2022-04-08 RX ORDER — HYDROCHLOROTHIAZIDE 25 MG/1
25 TABLET ORAL DAILY PRN
Status: DISCONTINUED | OUTPATIENT
Start: 2022-04-08 | End: 2022-04-12 | Stop reason: HOSPADM

## 2022-04-08 RX ORDER — MULTIPLE VITAMINS W/ MINERALS TAB 9MG-400MCG
1 TAB ORAL DAILY
Status: DISCONTINUED | OUTPATIENT
Start: 2022-04-08 | End: 2022-04-12 | Stop reason: HOSPADM

## 2022-04-08 RX ORDER — HYDROMORPHONE HCL IN WATER/PF 6 MG/30 ML
0.2 PATIENT CONTROLLED ANALGESIA SYRINGE INTRAVENOUS
Status: DISCONTINUED | OUTPATIENT
Start: 2022-04-08 | End: 2022-04-12 | Stop reason: HOSPADM

## 2022-04-08 RX ORDER — AMOXICILLIN 250 MG
1-2 CAPSULE ORAL 2 TIMES DAILY
Status: DISCONTINUED | OUTPATIENT
Start: 2022-04-08 | End: 2022-04-12 | Stop reason: HOSPADM

## 2022-04-08 RX ORDER — METHOCARBAMOL 500 MG/1
500 TABLET, FILM COATED ORAL 2 TIMES DAILY PRN
Status: DISCONTINUED | OUTPATIENT
Start: 2022-04-08 | End: 2022-04-12 | Stop reason: HOSPADM

## 2022-04-08 RX ORDER — ONDANSETRON 4 MG/1
4 TABLET, ORALLY DISINTEGRATING ORAL EVERY 6 HOURS PRN
Status: DISCONTINUED | OUTPATIENT
Start: 2022-04-08 | End: 2022-04-12 | Stop reason: HOSPADM

## 2022-04-08 RX ORDER — POLYETHYLENE GLYCOL 3350 17 G/17G
17 POWDER, FOR SOLUTION ORAL DAILY PRN
Status: DISCONTINUED | OUTPATIENT
Start: 2022-04-08 | End: 2022-04-12 | Stop reason: HOSPADM

## 2022-04-08 RX ORDER — GABAPENTIN 600 MG/1
1200 TABLET ORAL AT BEDTIME
Status: DISCONTINUED | OUTPATIENT
Start: 2022-04-08 | End: 2022-04-10

## 2022-04-08 RX ORDER — PANTOPRAZOLE SODIUM 40 MG/1
40 TABLET, DELAYED RELEASE ORAL
Status: DISCONTINUED | OUTPATIENT
Start: 2022-04-09 | End: 2022-04-12 | Stop reason: HOSPADM

## 2022-04-08 RX ADMIN — LEVETIRACETAM 750 MG: 750 TABLET, FILM COATED ORAL at 13:27

## 2022-04-08 RX ADMIN — IOPAMIDOL 114 ML: 755 INJECTION, SOLUTION INTRAVENOUS at 12:36

## 2022-04-08 RX ADMIN — ACETAMINOPHEN 975 MG: 325 TABLET ORAL at 23:57

## 2022-04-08 RX ADMIN — SODIUM CHLORIDE, PRESERVATIVE FREE 78 ML: 5 INJECTION INTRAVENOUS at 12:36

## 2022-04-08 RX ADMIN — GABAPENTIN 1200 MG: 600 TABLET, FILM COATED ORAL at 23:59

## 2022-04-08 RX ADMIN — PROTHROMBIN, COAGULATION FACTOR VII HUMAN, COAGULATION FACTOR IX HUMAN, COAGULATION FACTOR X HUMAN, PROTEIN C, PROTEIN S HUMAN, AND WATER 4124 UNITS: KIT at 13:16

## 2022-04-08 ASSESSMENT — ACTIVITIES OF DAILY LIVING (ADL)
ADLS_ACUITY_SCORE: 8
ADLS_ACUITY_SCORE: 6
ADLS_ACUITY_SCORE: 8
ADLS_ACUITY_SCORE: 6
ADLS_ACUITY_SCORE: 8

## 2022-04-08 ASSESSMENT — PATIENT HEALTH QUESTIONNAIRE - PHQ9: SUM OF ALL RESPONSES TO PHQ QUESTIONS 1-9: 0

## 2022-04-08 ASSESSMENT — PAIN SCALES - GENERAL: PAINLEVEL: NO PAIN (0)

## 2022-04-08 NOTE — CONSULTS
Otolaryngology Consult Note  2022      CC: orbital blowout fracture    HPI: Angeli Galindo is a 62 year old female with a past medical history of protein S deficiency with hx of DVT/PE in  on Eliquis. She recently fell on 3/9/2022 after falling on ice and presented to the ED on 3/12/2022 after being unable to walk on her own. She has continued to have multiple falls since that time. She presents to the ED today after falling when getting up to go to the bathroom and tripped on her pants. She had a head CT which showed a subdural hematoma and left orbital blowout fracture.     She reports bleeding from her nose after hitting her head, mainly from the right side. She denies LOC upon fall yesterday, 2022. She has noted double vision over the last few weeks and feels that it was exacerbated today. She endorses a headache. She does report clear drainage coming from her nose after the incident but reports it has resolved. She denies a salty or metallic taste. Denies SOB, facial or leg pain, no numbness or tingling in her face. No history of facial fracture or surgery.    Past Medical History:   Diagnosis Date     Bilateral low back pain without sciatica      Chronic deep vein thrombosis (DVT) of lower extremity (H)      H/O splenectomy      Obesity due to excess calories     s/p gastric bypass, 320 lb to 170 lbs     Protein S deficiency (H)      Spherocytosis (H)        Past Surgical History:   Procedure Laterality Date     BACK SURGERY      L4/5 microdiskectomy      SECTION       EXCISE LESION TRUNK N/A 2021    Procedure: EXCISE CYST ABDOMINAL WALL;  Surgeon: Junior Velasco MD;  Location: [facility] Main OR     GASTRIC BYPASS  2016    neto en y     IR REMOVAL IVC FILTER         Current Outpatient Medications   Medication Sig Dispense Refill     acetaminophen (TYLENOL) 500 MG tablet Take 1,000 mg by mouth        apixaban ANTICOAGULANT (ELIQUIS ANTICOAGULANT) 5 MG tablet Take 1  tablet (5 mg) by mouth 2 times daily Labs due for refills 60 tablet 0     Calcium Carbonate-Vitamin D (CALCIUM-VITAMIN D) 600-125 MG-UNIT TABS Take 1 tablet by mouth       cyanocobalamin (VITAMIN B-12) 100 MCG tablet Take 500 mcg by mouth daily        DULoxetine (CYMBALTA) 60 MG capsule TAKE 1 CAPSULE BY MOUTH EVERY DAY 90 capsule 0     gabapentin (NEURONTIN) 600 MG tablet Take 600 mg by mouth 4 times daily Take 2 tabs       hydrochlorothiazide (HYDRODIURIL) 25 MG tablet Take 25 mg by mouth daily as needed        HYDROcodone-acetaminophen (NORCO) 5-325 MG tablet Take 1 tablet by mouth every 6 hours as needed        Melatonin 10 MG CAPS Take 1 capsule by mouth At Bedtime       methocarbamol (ROBAXIN) 500 MG tablet Take 500 mg by mouth 2 times daily as needed        multivitamin (ONE-DAILY) tablet Take 1 tablet by mouth       naloxone (NARCAN) 4 MG/0.1ML nasal spray Spray 4 mg in nostril       RISEdronate (ACTONEL) 150 MG tablet Take 1 tablet (150 mg) by mouth every 30 days 4 tablet 3          Allergies   Allergen Reactions     Shellfish-Derived Products Angioedema     Lisinopril Cough       Social History     Socioeconomic History     Marital status:      Spouse name: Not on file     Number of children: Not on file     Years of education: Not on file     Highest education level: Not on file   Occupational History     Not on file   Tobacco Use     Smoking status: Former Smoker     Years: 5.00     Quit date: 2000     Years since quittin.2     Smokeless tobacco: Never Used   Vaping Use     Vaping Use: Never used   Substance and Sexual Activity     Alcohol use: Yes     Comment: Rare     Drug use: Never     Sexual activity: Not on file   Other Topics Concern     Parent/sibling w/ CABG, MI or angioplasty before 65F 55M? Not Asked   Social History Narrative    2 children, daughter is 32 and granddaughter     Daughter is 31 yo lives in [place]    Adopted 3 special needs adults grown    [place]      Social Determinants of Health     Financial Resource Strain: Not on file   Food Insecurity: Not on file   Transportation Needs: Not on file   Physical Activity: Not on file   Stress: Not on file   Social Connections: Not on file   Intimate Partner Violence: Not on file   Housing Stability: Not on file       Family History   Problem Relation Age of Onset     Parkinsonism Mother      Diabetes Type 2  Father        ROS: 10 point review of systems is negative unless noted in HPI.    PHYSICAL EXAM:  General: laying in bed, no acute distress  /77   Pulse 70   Resp 12   Wt 84.4 kg (186 lb)   SpO2 99%   BMI 29.13 kg/m    HEAD: normocephalic, atraumatic  Face: symmetrical, CN VII intact bilaterally (HB 1), no erythema. Sensation V1-V3 intact and equal bilaterally. Purple ecchymosis on left lateral mandible with minor swelling. NTTP over mandible, maxilla, and globes BL.  Eyes: EOMI without spontaneous or gaze evoked nystagmus, PERRL, clear sclera. Left eye 2 mm lower than right eye.   Ears: no tragal tenderness, external ear canal open and clear bilaterally  Nose: no anterior drainage, intact and midline septum without perforation or hematoma   Mouth: moist, no ulcers, no jaw or tooth tenderness, tongue midline and symmetric, normal occlusion of teeth  Oropharynx: tonsils within normal limits, uvula midline, no oropharyngeal erythema, no blood or drainage noted in oropharynx  Neck: no LAD, trachea midline  Neuro: cranial nerves 2-12 grossly intact  Respiratory: breathing non-labored on RA, no stridor  Skin: no rashes or skin lesions of the face/neck  Psych: pleasant affect  Cardio: extremities warm and well perfused       ROUTINE IP LABS (Last four results)  BMP  Recent Labs   Lab 04/08/22  1150 04/08/22  1149 04/08/22  1029     --  140   POTASSIUM 3.7  --  4.0   CHLORIDE 108  --  108   ALYSSIA 8.1*  --  8.4*   CO2 29  --  27   BUN 9  --  9   CR 0.52 0.4* 0.50*   GLC 94  --  100*     CBC  Recent Labs   Lab  04/08/22  1150 04/08/22  1029   WBC 11.4* 11.9*   RBC 4.23 4.30   HGB 13.5 13.6   HCT 39.9 39.8   MCV 94 93   MCH 31.9 31.6   MCHC 33.8 34.2   RDW 12.3 12.4    303     INR  Recent Labs   Lab 04/08/22  1150 04/08/22  1148   INR 1.00 0.9*       Imaging:  Impression:   1.  Thin extra-axial hyperdensities along the anterior middle cranial  fossa bilaterally and anterior falx, suspicious for subdural  hemorrhages.  2.  Left orbital blowout fracture with inferior orbital wall  displacement associated with significant downward herniation of  extraconal fat, no definite orbital muscle entrapment.  3.  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    Assessment and Plan  Angeli Galindo is a 62 year old female with a past medical history of protein S deficiency with hx of DVT/PE in 2010/2013 on Eliquis. She has a recent history of of falls. ENT was consulted to evaluated left orbital blowout fracture. I independently reviewed the head CT which revealed left orbital blowout fracture with no evidence of muscle entrapment. Subdural hemorrhages also noted on CT. Emergency surgery not indicated, entrapment not observed on imaging or exam. Outpatient follow-up for surgical planning.    - Sinus precautions   - Nasal rinse with ocean spray every 4-6 hours   - Do not blow your nose   - Open your mouth when you sneeze and cough  - Follow-up with ENT outpatient in one week for surgical options for orbital floor repair  - Agree with ophthalmology consult  - Rest of cares per primary team  - Contact ENT with any questions    -- Patient and plan discussed with Dr. Dre De Los Santos, PA-C  Otolaryngology-Head & Neck Surgery  Please page ENT with questions by dialing [phone] and entering job code [phone] when prompted.

## 2022-04-08 NOTE — ED PROVIDER NOTES
"    [place] EMERGENCY DEPARTMENT ([facility])  4/08/22  History     Chief Complaint   Patient presents with     Fall     4/7/22     Head Injury     The history is provided by the patient and medical records.     Angeli Galindo is a 62 year old female with a past medical history significant for protein S deficiency (anticoagulated on Eliquis), spherocytosis, chronic DVT, gastric bypass, hypertension, idiopathic peripheral neuropathy, osteopenia, and PE who presents to the Emergency Department for evaluation of injury sustained from mutiple falls.    Patient reports that she had a fall on 03/09/2022.  She states that the cause for the fall at this time was because she slipped on ice and after the fall she was able to walk on her own afterwards.  She adds that on 03/12/2022 she was not able to walk on her own and went to an [facility]  for further evaluation.  She says that at the time of this visit there was no fractures found and she was placed on crutches (due to concern for possible occult fracture not seen on xray).  She endorses that since the time of this fall she has had multiple falls - approximately 3 falls daily.  She notes that yesterday she had about 4 episodes of falling.  She adds that one of the falls resulted in her falling flat on her face and she did lose consciousness with this.  She says that she went to clinic today and had a CT scan that showed subdural hematoma and left orbital blow out fracture. She adds that she has been experiencing double vision since last week.  She describes her double vision as seeing 2 things on top of each other.  She adds that when she closes her left eye she can see normally.  She states that this morning she woke up with \"horrible\" right hip pain.  She says that this right hip pain has been existent since her fall on 03/09.  She adds that prior to today she felt like her right hip pain was improving as she was using crutches.  She notes that she has been " feeling weakness in both lower extremities since her first fall.  She adds that she does have history of neuropathy at both of her feet and feels like this is at baseline today in the ED.  She says that she also has a mild headache while in the ED today.  She reports that she has been taking Eliquis 5 mg BID and notes that she has not missed a dose of this medication. She last took this Eliquis this morning. She has that she is currently on Eliquis for Protein S deficiency.  Patient denies having a fall today.  Patient denies numbness, tingling, arm pain, chest pain, back pain, neck pain, fever, cough, nausea, vomiting, abdominal pain, and dizziness. She does also report left hand pain and bruising. Patient denies use of illicit drugs.  Patient denies use of tobacco and notes that she stopped smoking tobacco in 2000.  Patient reports that she does drink alcohol occasionally and reports no recent alcohol use.    Social history: Patient reports that she was recently fired from her job due to recent falls.    Per chart review, patient was seen at [facility] today (04/08/2022) for evaluation of fall.  Patient appears somnolent today and somewhat confused.  Patient's gait was also impaired along with weakness of the bilateral lower extremities.  Patient was advised to consider reducing her gabapentin given her current symptoms.  CT scan of head was performed today (results are described below). EMS transported patient here for evaluation.       CT HEAD W/O CONTRAST 4/8/2022 9:50 AM   Impression:   1.  Thin extra-axial hyperdensities along the anterior middle cranial  fossa bilaterally and anterior falx, suspicious for subdural  hemorrhages.  2.  Left orbital blowout fracture with inferior orbital wall  displacement associated with significant downward herniation of  extraconal fat, no definite orbital muscle entrapment.  3.  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      Past Medical History:   Diagnosis Date      Bilateral low back pain without sciatica      Chronic deep vein thrombosis (DVT) of lower extremity (H)      H/O splenectomy      Obesity due to excess calories     s/p gastric bypass, 320 lb to 170 lbs     Protein S deficiency (H)      Spherocytosis (H)        Past Surgical History:   Procedure Laterality Date     BACK SURGERY      L4/5 microdiskectomy      SECTION       EXCISE LESION TRUNK N/A 2021    Procedure: EXCISE CYST ABDOMINAL WALL;  Surgeon: Junior Velasco MD;  Location: [facility] Main OR     GASTRIC BYPASS  2016    neto en y     IR REMOVAL IVC FILTER         Family History   Problem Relation Age of Onset     Parkinsonism Mother      Diabetes Type 2  Father        Social History     Tobacco Use     Smoking status: Former Smoker     Years: 5.00     Quit date: 2000     Years since quittin.2     Smokeless tobacco: Never Used   Substance Use Topics     Alcohol use: Yes     Comment: Rare       Current Facility-Administered Medications   Medication     levETIRAcetam (KEPPRA) tablet 750 mg     prothrombin 4 factor complex concentrate (KCENTRA) infusion 4,220 Units     Current Outpatient Medications   Medication     acetaminophen (TYLENOL) 500 MG tablet     apixaban ANTICOAGULANT (ELIQUIS ANTICOAGULANT) 5 MG tablet     Calcium Carbonate-Vitamin D (CALCIUM-VITAMIN D) 600-125 MG-UNIT TABS     cyanocobalamin (VITAMIN B-12) 100 MCG tablet     DULoxetine (CYMBALTA) 60 MG capsule     gabapentin (NEURONTIN) 600 MG tablet     hydrochlorothiazide (HYDRODIURIL) 25 MG tablet     HYDROcodone-acetaminophen (NORCO) 5-325 MG tablet     Melatonin 10 MG CAPS     methocarbamol (ROBAXIN) 500 MG tablet     multivitamin (ONE-DAILY) tablet     naloxone (NARCAN) 4 MG/0.1ML nasal spray     RISEdronate (ACTONEL) 150 MG tablet        Allergies   Allergen Reactions     Shellfish-Derived Products Angioedema     Lisinopril Cough         I have reviewed the Medications, Allergies, Past Medical and Surgical  History, and Social History in the Epic system.    Review of Systems  A complete review of systems was performed with pertinent positives and negatives noted in the HPI, and all other systems negative.    Physical Exam   BP: 135/78  Pulse: 76  Resp: 12  Weight: 84.4 kg (186 lb)  SpO2: 99 %      Physical Exam  Vitals reviewed.   Constitutional:       General: She is not in acute distress.     Appearance: She is well-developed.   HENT:      Head: Normocephalic.      Comments: No scalp hematoma. No septal hematoma. No hemotympanum bilaterally. Left eye periorbital swelling as below with mild tenderness. No intraoral trauma.      Mouth/Throat:      Mouth: Mucous membranes are moist.   Eyes:      General: No visual field deficit.     Extraocular Movements: Extraocular movements intact.      Conjunctiva/sclera: Conjunctivae normal.      Pupils: Pupils are equal, round, and reactive to light.      Comments: Mild left periorbital edema and ecchymosis. No proptosis. Full EOM.    Neck:      Comments: No midline cervical spine tenderness.   Cardiovascular:      Rate and Rhythm: Normal rate and regular rhythm.      Pulses: Normal pulses.      Heart sounds: Normal heart sounds. No murmur heard.  Pulmonary:      Effort: Pulmonary effort is normal. No respiratory distress.      Breath sounds: Normal breath sounds. No wheezing or rales.   Chest:      Chest wall: No tenderness.   Abdominal:      General: Bowel sounds are normal. There is no distension.      Palpations: Abdomen is soft. There is no mass.      Tenderness: There is no abdominal tenderness. There is no right CVA tenderness, left CVA tenderness, guarding or rebound.      Comments: Ecchymosis on LLQ   Musculoskeletal:         General: Normal range of motion.      Cervical back: Normal range of motion and neck supple. No rigidity.      Comments: Right hip tenderness and pain in right hip with movement RLE and log roll. Pelvis stable and non tender to compression. No  midline thoracic or lumbar spine tenderness. Tenderness over dorsum of left hand with area of ecchymosis. Can make a okay sign and thumbs up. Compartments are soft and compressible. 2+ radial and pedal pulses. No other bony tenderness of the remainder of the extremities.    Skin:     General: Skin is warm and dry.      Capillary Refill: Capillary refill takes less than 2 seconds.      Comments: Scattered ecchymosis   Neurological:      Mental Status: She is alert.      GCS: GCS eye subscore is 4. GCS verbal subscore is 4. GCS motor subscore is 6.      Cranial Nerves: Cranial nerves are intact. No cranial nerve deficit, dysarthria or facial asymmetry.      Sensory: Sensation is intact. No sensory deficit.      Motor: Motor function is intact. No abnormal muscle tone or pronator drift.      Coordination: Finger-Nose-Finger Test normal.      Comments: Disoriented to month, otherwise oriented. Alert. 5/5 strength in all 4 extremities bilaterally. Sensation intact to light touch in all 4 extremities and the face bilaterally.    Psychiatric:         Mood and Affect: Mood normal.         ED Course     At 11:45 AM the patient was seen and examined by Rea Tejada MD in Room ED02.        Procedures              EKG Interpretation:      Interpreted by Rea Tejada MD  Time reviewed: 12 pm  Symptoms at time of EKG: falls, LOC   Rhythm: normal sinus   Rate: normal  Axis: normal  Ectopy: none  Conduction: normal  ST Segments/ T Waves: No ST-T wave changes  Q Waves: none  Comparison to prior: No old EKG available    Clinical Impression: no evidence of acute ischemia.           Critical Care Addendum    My initial assessment, based on my review of prehospital provider report, review of nursing observations, review of vital signs, focused history and physical exam, established that Angeli Galindo has severe traumatic injuries, which requires immediate intervention, and therefore she is critically ill.     After the  initial assessment, the care team initiated multiple lab tests, initiated medication therapy with KCENTRA for eliquis reversal and consulted with Trauma, neurosurgery to provide stabilization care. Due to the critical nature of this patient, I reassessed nursing observations, vital signs, physical exam and neurologic status multiple times prior to her disposition.     Time also spent performing documentation, reviewing test results, discussion with consultants and coordination of care.     Critical care time (excluding teaching time and procedures): 30 minutes.   The medical record was reviewed and interpreted.  Current labs reviewed and interpreted.  Current images reviewed and interpreted: as below.  Previous images reviewed and interpreted: as above.  EKG reviewed and interpreted: as above.     Results for orders placed or performed during the hospital encounter of 04/08/22 (from the past 24 hour(s))   iStat INR, POCT   Result Value Ref Range    INR POCT 0.9 (L) 2.0 - 3.0   Creatinine POCT   Result Value Ref Range    Creatinine POCT 0.4 (L) 0.5 - 1.0 mg/dL    GFR, ESTIMATED POCT >60 >60 mL/min/1.73m2   [place] Draw    Narrative    The following orders were created for panel order [place] Draw.  Procedure                               Abnormality         Status                     ---------                               -----------         ------                     Extra Blue Top Tube[[id number]]                              Final result               Extra Red Top Tube[[id number]]                               Final result               Extra Green Top (Lithium...[[id number]]                      Final result               Extra Purple Top Tube[[id number]]                            Final result                 Please view results for these tests on the individual orders.   Extra Blue Top Tube   Result Value Ref Range    Hold Specimen JIC    Extra Red Top Tube   Result Value Ref Range    Hold Specimen JIC    Extra  Green Top (Lithium Heparin) Tube   Result Value Ref Range    Hold Specimen JIC    Extra Purple Top Tube   Result Value Ref Range    Hold Specimen JIC    CBC with platelets differential    Narrative    The following orders were created for panel order CBC with platelets differential.  Procedure                               Abnormality         Status                     ---------                               -----------         ------                     CBC with platelets and d...[[id number]]  Abnormal            Final result                 Please view results for these tests on the individual orders.   Comprehensive metabolic panel   Result Value Ref Range    Sodium 140 133 - 144 mmol/L    Potassium 3.7 3.4 - 5.3 mmol/L    Chloride 108 94 - 109 mmol/L    Carbon Dioxide (CO2) 29 20 - 32 mmol/L    Anion Gap 3 3 - 14 mmol/L    Urea Nitrogen 9 7 - 30 mg/dL    Creatinine 0.52 0.52 - 1.04 mg/dL    Calcium 8.1 (L) 8.5 - 10.1 mg/dL    Glucose 94 70 - 99 mg/dL    Alkaline Phosphatase 121 40 - 150 U/L    AST 52 (H) 0 - 45 U/L    ALT 42 0 - 50 U/L    Protein Total 7.9 6.8 - 8.8 g/dL    Albumin 3.4 3.4 - 5.0 g/dL    Bilirubin Total 0.8 0.2 - 1.3 mg/dL    GFR Estimate >90 >60 mL/min/1.73m2   INR   Result Value Ref Range    INR 1.00 0.85 - 1.15   Partial thromboplastin time   Result Value Ref Range    aPTT 25 22 - 38 Seconds   ABO/Rh type and screen    Narrative    The following orders were created for panel order ABO/Rh type and screen.  Procedure                               Abnormality         Status                     ---------                               -----------         ------                     Adult Type and Screen[[id number]]                            Final result                 Please view results for these tests on the individual orders.   Troponin I   Result Value Ref Range    Troponin I High Sensitivity 6 <54 ng/L   Ethyl Alcohol Level   Result Value Ref Range    Alcohol ethyl <0.01 <=0.01 g/dL   CBC with  platelets and differential   Result Value Ref Range    WBC Count 11.4 (H) 4.0 - 11.0 10e3/uL    RBC Count 4.23 3.80 - 5.20 10e6/uL    Hemoglobin 13.5 11.7 - 15.7 g/dL    Hematocrit 39.9 35.0 - 47.0 %    MCV 94 78 - 100 fL    MCH 31.9 26.5 - 33.0 pg    MCHC 33.8 31.5 - 36.5 g/dL    RDW 12.3 10.0 - 15.0 %    Platelet Count 301 150 - 450 10e3/uL    % Neutrophils 82 %    % Lymphocytes 9 %    % Monocytes 9 %    % Eosinophils 0 %    % Basophils 0 %    % Immature Granulocytes 0 %    NRBCs per 100 WBC 0 <1 /100    Absolute Neutrophils 9.3 (H) 1.6 - 8.3 10e3/uL    Absolute Lymphocytes 1.0 0.8 - 5.3 10e3/uL    Absolute Monocytes 1.0 0.0 - 1.3 10e3/uL    Absolute Eosinophils 0.0 0.0 - 0.7 10e3/uL    Absolute Basophils 0.0 0.0 - 0.2 10e3/uL    Absolute Immature Granulocytes 0.1 <=0.4 10e3/uL    Absolute NRBCs 0.0 10e3/uL   Adult Type and Screen   Result Value Ref Range    ABO/RH(D) A NEG     Antibody Screen Negative Negative    SPECIMEN EXPIRATION DATE [id number]    Iron and iron binding capacity   Result Value Ref Range    Iron 105 35 - 180 ug/dL    Iron Binding Capacity 365 240 - 430 ug/dL    Iron Sat Index 29 15 - 46 %   Ferritin   Result Value Ref Range    Ferritin 56 8 - 252 ng/mL   Vitamin B12   Result Value Ref Range    Vitamin B12 626 193 - 986 pg/mL   EKG 12 lead   Result Value Ref Range    Systolic Blood Pressure  mmHg    Diastolic Blood Pressure  mmHg    Ventricular Rate 76 BPM    Atrial Rate 76 BPM    WI Interval 148 ms    QRS Duration 90 ms     ms    QTc 427 ms    P Axis 48 degrees    R AXIS -7 degrees    T Axis 3 degrees    Interpretation ECG Sinus rhythm  Normal ECG      Asymptomatic COVID-19 Virus (Coronavirus) by PCR Nasopharyngeal    Specimen: Nasopharyngeal; Swab   Result Value Ref Range    SARS CoV2 PCR Negative Negative, Testing sent to reference lab. Results will be returned via unsolicited result    Narrative    Testing was performed using the [employer] Xpress SARS-CoV-2 Assay on the  [employer]  Gene-Xpert Instrument Systems. Additional information about  this Emergency Use Authorization (EUA) assay can be found via the Lab  Guide. This test should be ordered for the detection of SARS-CoV-2 in  individuals who meet SARS-CoV-2 clinical and/or epidemiological  criteria. Test performance is unknown in asymptomatic patients. This  test is for in vitro diagnostic use under the FDA EUA for  laboratories certified under CLIA to perform high complexity testing.  This test has not been FDA cleared or approved. A negative result  does not rule out the presence of PCR inhibitors in the specimen or  target RNA in concentration below the limit of detection for the  assay. The possibility of a false negative should be considered if  the patient's recent exposure or clinical presentation suggests  COVID-19. This test was validated by the [facility]. This laboratory is certified under  the Clinical Laboratory Improvement Amendments of 1988 (CLIA-88) as  qualified to perform high complexity laboratory testing.     Cervical spine CT w/o contrast    Narrative    CT CERVICAL SPINE W/O CONTRAST 4/8/2022 12:56 PM    Provided History: falls, eval for injury    Comparison: None    Technique: Using multidetector thin collimation helical acquisition  technique, axial, coronal and sagittal CT images through the cervical  spine were obtained without intravenous contrast.     Findings:  The cervical vertebrae are normally aligned. Normal cervical lordosis.  No acute fracture or subluxation. No prevertebral edema. There is mild  discogenic narrowing at C3-C7 and mild to moderate narrowing at C5-6.  The findings on a level by level basis are as follows:    C2-3: No spinal canal or neural foraminal stenosis.    C3-4:  Uncinate hypertrophy with mild bilateral neural canal  narrowing. No spinal canal narrowing.    C4-5:  Uncinate and facet hypertrophy with mild bilateral neural  foraminal  narrowing. No spinal canal narrowing.    C5-6:  Disc osteophyte complex with right central disc extrusion. Mild  uncinate and facet hypertrophy. Mild-to-moderate spinal canal and  severe right neural foraminal narrowing. Mild left neural foraminal  narrowing.    C6-7:  No spinal canal or neural foraminal  stenosis.    C7-T1:  No spinal canal or neural foraminal stenosis.     No abnormality of the paraspinous soft tissues.      Impression    Impression:   1.  No acute fracture or subluxation.  2.  Multilevel cervical spondylosis, greatest at C5-6 where there is  mild-to-moderate spinal canal and severe right neural foraminal  narrowing.    I have personally reviewed the examination and initial interpretation  and I agree with the findings.    VIKTORIYA CAMACHO MD         SYSTEM ID:  [id number]   CT Thoracic Spine w Contrast    Narrative    Thoracic and Lumbar spine CT without contrast    History: Fall.    Comparison: None available    Technique: Axial, coronal, and sagittal multiplanar reconstructions  obtained from CT of the chest, abdomen and pelvis.    Findings:  Thoracic spine: The posterior elements of T1 partially columnated from  thoracic spine CT study. There is no acute fracture or subluxation.  There is no prevertebral edema. No significant spinal canal stenosis.  Severe left T7-8 neural foraminal narrowing. Multilevel degenerative  osteophytic spurring. No soft tissue abnormality in the visualized  paraspinous tissues anteriorly.    Lumbar Spine: There is no acute fracture or subluxation. Counting down  from the cervical spine there are 5 lumbar-type vertebrae. 9 mm  anterolisthesis of L4 on L5. Trace retrolisthesis of L5 on S1. Mild  spinal canal narrowing at L2-3, L3-4, L4-5 and L5-S1 secondary to  facet hypertrophy. Multilevel neural foraminal narrowing secondary to  facet hypertrophy. Disc space narrowing greatest at L5-S1.    The visualized paraspinous tissues anteriorly are unremarkable.  Partial  visualization of parapelvic cysts bilaterally. 1.9 cm mass  involving the inferior pole of the left kidney. Partial visualization  intrahepatic biliary dilatation. Please see chest, abdomen and pelvis  CT for further details.      Impression    Impression:   1. Thoracic spine:  No acute fracture or subluxation of the thoracic  spine.   2. Lumbar Spine: No acute fracture or subluxation of the lumbar spine.  3. Multilevel degenerative changes of the thoracic and lumbar spine as  detailed above.  4. 1.9 cm mass arising from the inferior pole of the left kidney. This  is renal cell carcinoma until proven otherwise. Please see same day  chest, abdomen and pelvis CT report for further details.    I have personally reviewed the examination and initial interpretation  and I agree with the findings.    VIKTORIYA CAMACHO MD         SYSTEM ID:  [id number]   CT Lumbar Spine w Contrast    Narrative    Thoracic and Lumbar spine CT without contrast    History: Fall.    Comparison: None available    Technique: Axial, coronal, and sagittal multiplanar reconstructions  obtained from CT of the chest, abdomen and pelvis.    Findings:  Thoracic spine: The posterior elements of T1 partially columnated from  thoracic spine CT study. There is no acute fracture or subluxation.  There is no prevertebral edema. No significant spinal canal stenosis.  Severe left T7-8 neural foraminal narrowing. Multilevel degenerative  osteophytic spurring. No soft tissue abnormality in the visualized  paraspinous tissues anteriorly.    Lumbar Spine: There is no acute fracture or subluxation. Counting down  from the cervical spine there are 5 lumbar-type vertebrae. 9 mm  anterolisthesis of L4 on L5. Trace retrolisthesis of L5 on S1. Mild  spinal canal narrowing at L2-3, L3-4, L4-5 and L5-S1 secondary to  facet hypertrophy. Multilevel neural foraminal narrowing secondary to  facet hypertrophy. Disc space narrowing greatest at L5-S1.    The visualized paraspinous  "tissues anteriorly are unremarkable.  Partial visualization of parapelvic cysts bilaterally. 1.9 cm mass  involving the inferior pole of the left kidney. Partial visualization  intrahepatic biliary dilatation. Please see chest, abdomen and pelvis  CT for further details.      Impression    Impression:   1. Thoracic spine:  No acute fracture or subluxation of the thoracic  spine.   2. Lumbar Spine: No acute fracture or subluxation of the lumbar spine.  3. Multilevel degenerative changes of the thoracic and lumbar spine as  detailed above.  4. 1.9 cm mass arising from the inferior pole of the left kidney. This  is renal cell carcinoma until proven otherwise. Please see same day  chest, abdomen and pelvis CT report for further details.    I have personally reviewed the examination and initial interpretation  and I agree with the findings.    VIKTORIYA CAMACHO MD         SYSTEM ID:  [id number]   CT Chest/Abdomen/Pelvis w Contrast   Result Value Ref Range    Radiologist flags Right femoral neck fracture (Urgent)     Addendum: 4/8/2022    Please add to the body of the report the following in the section  titled \"Abdomen/Pelvis\":  The spleen has been resected. Lobulated enhancing nodule in the  anterior left upper quadrant adjacent to the colon consistent with  splenosis/hypertrophied splenule.    JOSEFA REESE MD         SYSTEM ID:  [id number]      Narrative    EXAM: CT CHEST/ABDOMEN/PELVIS W CONTRAST, 4/8/2022    TECHNIQUE:  Helical CT images from the thoracic inlet through the  symphysis pubis were obtained after the administration of intravenous  contrast. Coronal and sagittal reformatted images were generated at a  workstation for further assessment.     HISTORY: fall, on anticoagulation, scattered bruising, eval for  injury.    COMPARISON: None.    FINDINGS:     Chest: Basilar atelectasis. No acute airspace disease, suspicious  pulmonary lesion, pleural effusion, or pneumothorax. Heart is normal  size without " pericardial effusion. No central pulmonary embolism.  Nonaneurysmal thoracic aorta. Major branches. No thoracic  lymphadenopathy by size criteria.    Abdomen/Pelvis: Cholecystectomy with presumed reservoir effect in the  common bile duct. Symmetric intrahepatic biliary dilation. Common duct  measures up to 1.6 cm but appears to taper to the ampulla. No  radiopaque gallstones or inflammatory stranding surrounding the  extrahepatic bile ducts. No discrete lesions. Atrophic pancreas  without focal lesion or ductal dilatation. The spleen is unremarkable.    Gastric bypass with [place]-en-Y reconstruction. No abnormally dilated or  thickened loops of bowel, free air or free fluid in the  abdomen/pelvis. Colonic diverticulosis without evidence for acute  diverticulitis. Mild bilateral hydronephrosis with numerous parapelvic  cysts. No renal calculi. Exophytic enhancing solid lesion in the lower  pole of the left kidney measures 2.2 x 2.5 cm, series 505 image 332.  Well-distended urinary bladder. Nonaneurysmal abdominal aorta and  major branches.     Bones / Soft Tissues: Nondisplaced fracture of the right femoral neck.  Healing fractures of right ribs 4-8, likely subacute.       Impression    IMPRESSION:   1. Nondisplaced fracture of the right femoral neck and multiple  healing rib fractures, likely subacute.  2. No additional acute pathology of the lungs or abdomen/pelvis.  3. Exophytic lesion in the lower pole of the left kidney suspicious  for renal cell carcinoma. No clear evidence of metastatic disease.  4. Colonic diverticulosis without evidence of acute diverticulitis.    [Urgent Result: Right femoral neck fracture]    Finding was identified on 4/8/2022 1:04 PM.     Dr. Tejada was contacted by Dr. Robles at 4/8/2022 1:39 PM and  verbalized understanding of the urgent finding.     Exophytic solid lesion lower pole of the left kidney concerning for  renal cell carcinoma with no evidence of metastatic disease  discussed  with Dr. Tejada at 2:55 PM 4/8/2022 by Dr. Sanchez from radiology.    I have personally reviewed the examination and initial interpretation  and I agree with the findings.    JOSEFA SANCHEZ MD         SYSTEM ID:  [id number]   XR Femur Right 2 Views    Narrative    EXAM: XR PELVIS 1/2 VW, XR FEMUR RIGHT 2 VIEW  4/8/2022 1:20 PM      HISTORY: right hip pain after fall    COMPARISON: CT chest abdomen pelvis same-day.    FINDINGS: AP pelvis. AP and lateral use of the right femur.    Minimally impacted subcapital fracture of the proximal right  femur/femoral neck. Remainder of femur is intact.    Mild to moderate degenerative changes of the hips. Degenerative  changes of the right knee. Bones appear osteopenic lumbar spondylosis.  Soft tissues unremarkable. Excreting contrast in the right ureter.      Impression    IMPRESSION: Minimally impacted subcapital fracture of the right  femoral neck.    AGUSTIN HARTMAN MD (Joe)         SYSTEM ID:  [id number]   XR Pelvis 1/2 Views    Narrative    EXAM: XR PELVIS 1/2 VW, XR FEMUR RIGHT 2 VIEW  4/8/2022 1:20 PM      HISTORY: right hip pain after fall    COMPARISON: CT chest abdomen pelvis same-day.    FINDINGS: AP pelvis. AP and lateral use of the right femur.    Minimally impacted subcapital fracture of the proximal right  femur/femoral neck. Remainder of femur is intact.    Mild to moderate degenerative changes of the hips. Degenerative  changes of the right knee. Bones appear osteopenic lumbar spondylosis.  Soft tissues unremarkable. Excreting contrast in the right ureter.      Impression    IMPRESSION: Minimally impacted subcapital fracture of the right  femoral neck.    AGUSTIN HARTMAN MD (Joe)         SYSTEM ID:  [id number]   XR Hand Left G/E 3 Views    Narrative    3 views left hand radiographs 4/8/2022 1:27 PM    History: bruising fall, eval for fracture     Comparison: None    Findings:    PA, oblique and lateral view(s) of the left hand were obtained.      Possible nondisplaced fracture through the ulnar base of the fifth  metacarpal on the AP view. No erosion.    Moderate degenerative changes of the first carpometacarpal and  triscaphe joints.  Additional scattered degenerative change in the  interphalangeal joints, particularly distally.    Dorsal soft tissue swelling.      Impression    Impression:  Possible nondisplaced fracture through the ulnar base of the fifth  metacarpal on the AP view.    AGUSTIN HARTMAN MD (Joe)         SYSTEM ID:  [id number]   UA with Microscopic reflex to Culture    Specimen: Urine, Clean Catch   Result Value Ref Range    Color Urine Straw Colorless, Straw, Light Yellow, Yellow    Appearance Urine Clear Clear    Glucose Urine Negative Negative mg/dL    Bilirubin Urine Negative Negative    Ketones Urine Negative Negative mg/dL    Specific Gravity Urine 1.005 1.003 - 1.035    Blood Urine Negative Negative    pH Urine 7.5 (H) 5.0 - 7.0    Protein Albumin Urine Negative Negative mg/dL    Urobilinogen Urine Normal Normal, 2.0 mg/dL    Nitrite Urine Negative Negative    Leukocyte Esterase Urine Negative Negative    RBC Urine 1 <=2 /HPF    WBC Urine 1 <=5 /HPF    Squamous Epithelials Urine <1 <=1 /HPF    Narrative    Urine Culture not indicated   CT Facial Bones without Contrast    Narrative    CT FACIAL BONES WITHOUT CONTRAST 4/8/2022 3:33 PM    History:  Trauma - Facial Injury    Comparison: Same day CT head    Technique: Using thin collimation multidetector helical acquisition  technique, axial and coronal thin section CT images were reconstructed  through the facial bones. Images were reviewed in bone and soft tissue  windows.    Findings: Comminuted left orbital floor and medial wall fracture with  herniation of orbital fat into the left maxillary sinus. Downward  displacement of fracture fragments in the left maxillary sinus.  Inferior displacement of the left medial and inferior rectus muscles  without entrapment. Globe is intact.  There is preseptal air. The  ocular globes are intact. The cribriform plate appears intact.  Alignment of the remaining facial bones appears normal. No other  fractures identified.    There are blood products within the left maxillary sinus. Mucosal  thickening in the left anterior ethmoidal air cells.      Impression    Impression:   Comminuted left orbital floor and medial wall fracture with herniation  of orbital fat and fracture fragments into the maxillary sinus.  Opacification of the left maxillary sinus. Inferior displacement of  the inferior and medial rectus muscles.      I have personally reviewed the examination and initial interpretation  and I agree with the findings.    JJ NAILS MD         SYSTEM ID:  [id number]   CT Head w/o Contrast    Narrative    CT HEAD W/O CONTRAST 4/8/2022 3:33 PM    History: Head trauma, skull fracture or hematoma (Age 19-64y)     Comparison: 4/6/2012 dated CT    Technique: Using multidetector thin collimation helical acquisition  technique, axial, coronal and sagittal CT images from the skull base  to the vertex were obtained without intravenous contrast.   (topogram) image(s) also obtained and reviewed.    Findings:   No significant change in subdural hemorrhages in bilateral middle  cranial fossa and along the falx and tentorium.    There is no intracranial hemorrhage, mass effect, or midline shift.  Gray/white matter differentiation in both cerebral hemispheres is  preserved. Ventricles are proportionate to the cerebral sulci. The  basal cisterns are clear. Patchy periventricular hypodensity,  consistent with chronic small ischemic disease.    The bony calvaria and the bones of the skull base are normal. Left  orbital blowout fracture. Complete opacification of the left maxillary  sinus. The remaining paranasal sinuses and mastoid air cells are  clear.       Impression    Impression:  1. No significant change in subdural hemorrhages in bilateral middle  cranial  fossa and along the falx and possibly tentorium.  2. Chronic small vessel ischemic disease.   3. Left orbital b,ow-out fracture. Refer to the same day face CT for  details.     JJ NAILS MD         SYSTEM ID:  [id number]     Medications   levETIRAcetam (KEPPRA) tablet 750 mg (750 mg Oral Given 4/8/22 1327)   lidocaine 1 % 0.1-1 mL (has no administration in time range)   lidocaine (LMX4) cream (has no administration in time range)   sodium chloride (PF) 0.9% PF flush 3 mL (3 mLs Intracatheter Given 4/8/22 1435)   sodium chloride (PF) 0.9% PF flush 3 mL (has no administration in time range)   HYDROmorphone (DILAUDID) injection 0.2 mg (has no administration in time range)   oxyCODONE (ROXICODONE) tablet 5 mg (has no administration in time range)   ondansetron (ZOFRAN-ODT) ODT tab 4 mg (has no administration in time range)     Or   ondansetron (ZOFRAN) injection 4 mg (has no administration in time range)   senna-docusate (SENOKOT-S/PERICOLACE) 8.6-50 MG per tablet 1-2 tablet (has no administration in time range)   polyethylene glycol (MIRALAX) Packet 17 g (has no administration in time range)   multivitamin w/minerals (THERA-VIT-M) tablet 1 tablet (has no administration in time range)   calcium carbonate 600 mg-vitamin D 400 units (CALTRATE) per tablet 1 tablet (has no administration in time range)   cyanocobalamin (VITAMIN B-12) tablet 500 mcg (has no administration in time range)   DULoxetine (CYMBALTA) DR capsule 60 mg (has no administration in time range)   methocarbamol (ROBAXIN) tablet 500 mg (has no administration in time range)   hydrochlorothiazide (HYDRODIURIL) tablet 25 mg (has no administration in time range)   acetaminophen (TYLENOL) tablet 975 mg (has no administration in time range)   gabapentin (NEURONTIN) tablet 600 mg (has no administration in time range)   gabapentin (NEURONTIN) tablet 1,200 mg (has no administration in time range)   pantoprazole (PROTONIX) EC tablet 40 mg (has no administration  in time range)   prothrombin 4 factor complex concentrate (KCENTRA) infusion 4,124 Units (4,124 Units Intravenous Given 4/8/22 1316)   CT saline (78 mLs Intravenous Given 4/8/22 1236)   iopamidol (ISOVUE-370) solution 114 mL (114 mLs Intravenous Given 4/8/22 1236)             Assessments & Plan (with Medical Decision Making)   Patient presents with multiple daily falls after initially slipping on the ice on March 9 and injuring her right hip. She is anticoagulated on eliquis and went to her PCP clinic who then performed CT scan which then showed 1.  Thin extra-axial hyperdensities along the anterior middle cranial  fossa bilaterally and anterior falx, suspicious for subdural  hemorrhages.  2.  Left orbital blowout fracture with inferior orbital wall  displacement associated with significant downward herniation of  extraconal fat, no definite orbital muscle entrapment. And patient was sent here for evaluation via EMS as a result. On arrival, she is mildly confused with a GCS of 14 but awake and alert. She was uncertain which month it is. She is reporting double vision but does not have any obvious visual field defect and has intact extraocular movements. It was reported that she had some coordination issues but I do not note this on my current exam. No other obvious deficits. She continues to have tenderness in the right hip area and pain with movement of the right hip. She is neurovascularly intact in the right lower extremity. She also has ecchymosis across the dorsum of her left hand with diffuse tenderness in the area but does have full range of motion of the fingers, wrist, and has no anatomical snuff box tenderness and is neurovascularly intact. With her anticoagulation and multiple falls did add on additional imaging of CT of cervical spine with C collar in place, CT of the chest, abdomen, pelvis with IV contrast, CT thoracic and lumbar spine, pelvis xray, right femur xray, left hand xray. Labs obtained as  well. She was given KCENTRA due to life threatening bleeding and neurosurgery immediately consulted. They came to evaluate and recommended keppra orally and SBP less than 140 which is occuring without intervention. Trauma consulted and evaluating as well. Labs largely unremarkable as above. Hemoglobin is 13.5. She is vitally normal and stable. EKG is normal sinus rhythm and is without any evidence of acute ischemia. Troponin within normal limits. Trauma agrees to admit to their service.     Xray of the pelvis/right femur reveals Minimally impacted subcapital fracture of the right femoral neck. CT cervical spine without acute pathology. CT lumbar and thoracic spine shows 1. Thoracic spine:  No acute fracture or subluxation of the thoracic  spine.   2. Lumbar Spine: No acute fracture or subluxation of the lumbar spine.  3. Multilevel degenerative changes of the thoracic and lumbar spine as  detailed above.  4. 1.9 cm mass arising from the inferior pole of the left kidney. This  is renal cell carcinoma until proven otherwise. Please see same day  chest, abdomen and pelvis CT report for further details.    CT chest, abdomen, pelvis shows 1. Nondisplaced fracture of the right femoral neck and multiple  healing rib fractures, likely subacute.  2. No additional acute pathology of the lungs or abdomen/pelvis.  3. Exophytic lesion in the lower pole of the left kidney suspicious  for renal cell carcinoma. No clear evidence of metastatic disease.  4. Colonic diverticulosis without evidence of acute diverticulitis.    I discussed the mass of the left kidney with trauma who states that they will discuss this with the patient as she is admitted to their service.     Xray of the left hand shows possible nondisplaced fracture through the ulnar base of the fifth metacarpal on the AP view.    Discussed orthopedic injuries with the trauma service as patient was already admitted them, they had already contacted orthopedics who will  come evaluate both the right femur fracture and the possible 5th metacarpal fracture and will perform splinting.     Patient will have repeat CT of the head and trauma consulted ortho, ENT, ophtho as well and CT max face recommended during repeat 6 hour CT. These are pending at this time. Patient was admitted to trauma in stable condition.      I have reviewed the nursing notes.    I have reviewed the findings, diagnosis, plan and need for follow up with the patient.    New Prescriptions    No medications on file       Final diagnoses:   Subdural hematoma (H)   Closed blow-out fracture of left orbital floor (H)       I, Tonie Patterson, am serving as a trained medical scribe to document services personally performed by Rea Tejada MD, based on the provider's statements to me.      I, Rea Tejada MD, was physically present and have reviewed and verified the accuracy of this note documented by Tonie Patterson.       Rea Tejada MD  4/8/2022   [facility] EMERGENCY DEPARTMENT     Rea Tejada MD  04/09/22 0057

## 2022-04-08 NOTE — NURSING NOTE
Chief Complaint   Patient presents with     Fall     Patient comes in to discuss a fall last month.         Cornell Jesus MA on 4/8/2022 at 8:38 AM

## 2022-04-08 NOTE — ED NOTES
"Bed: ED02  Expected date:   Expected time:   Means of arrival:   Comments:  Angeli Galindo, [place] [facility] [place] hematoma and left blow out orbital fracture, on eliquis, fell on face yesterday, on pain meds chronically for back pain, per clinic - patient is altered and off balance without \"focal deficit\", drove herself to clinic today     Rea Tejada MD  04/08/22 9068    "

## 2022-04-08 NOTE — PROGRESS NOTES
[facility], [place]   04/09/2022  Neurosurgery Progress Note:    Assessment:  Angeli Galindo is a 62 year old female, on eliquis for protein S deficiency associated hx of DVT, presented after multiple falls with bilateral temporal SDH, orbital blowout fracture and right femoral neck fracture. Repeat head CT was stable.      Plan:    Neuro:  #Bilateral temporal tip SDH  #Orbital blowout fracture  - Serial neuro exam  - tylenlol, oxycodone prn  - Keppra 7 days  - holding eliquis    CV:  - SBP<140  - labetalol/hydralazine prn    Pulm:  - RA  - Incentive spirometry    GI:  - prn antiemetics  - bowel regimen: miralax, senna    Renal/FEN:  #Hypocalcemia  - Electrolyte replacement protocol    :  - Sifuentes    MSK:  #Femoral neck fracture, right  - HOB > 30    HEME:  #Protein S deficiency  #hx of DVT  - Hgb > 8  - plts > 100k  - INR < 1.5    ENDO:  - none    ID:  - monitor for fever    PPX:  - SCDs for DVT proph    Dispo:  - per primary team        -----------------------------------  Rivera Ross MD, PhD  Neurosurgery Resident, PGY-3    Please contact neurosurgery resident on call with questions.    Dial * * *[phone], enter [phone] when prompted.   -----------------------------------    Interval History: NAEO    Objective:   Temp:  [97.6  F (36.4  C)-97.9  F (36.6  C)] 97.6  F (36.4  C)  Pulse:  [56-85] 62  Resp:  [10-19] 16  BP: (106-136)/(60-84) 124/77  SpO2:  [98 %-100 %] 99 %  I/O last 3 completed shifts:  In: -   Out: 2000 [Urine:2000]    Gen: Appears comfortable, NAD  Neurologic:  - Alert & Oriented x2  - Follows commands but appears sleepy  - No gaze preference  - PERRL, EOMI  - Face symmetric with sensation intact to light touch  - Trapezii muscles 5/5 bilaterally  - No pronator drift  - Dysmetria b/l  - Strength 5/5 x4    LABS:  Recent Labs   Lab 04/09/22  0902 04/09/22  0645 04/08/22  1150 04/08/22  1149 04/08/22  1029   NA  --  142 140  --  140   POTASSIUM  --  3.9 3.7  --  4.0    CHLORIDE  --  111* 108  --  108   CO2  --  26 29  --  27   ANIONGAP  --  5 3  --  5   GLC 73 94 94  --  100*   BUN  --  8 9  --  9   CR  --  0.37* 0.52 0.4* 0.50*   ALYSSIA  --  8.0* 8.1*  --  8.4*       Recent Labs   Lab 04/09/22  0645   WBC 6.6   RBC 3.99   HGB 12.5   HCT 37.8   MCV 95   MCH 31.3   MCHC 33.1   RDW 12.5          IMAGING:  Recent Results (from the past 24 hour(s))   Cervical spine CT w/o contrast    Narrative    CT CERVICAL SPINE W/O CONTRAST 4/8/2022 12:56 PM    Provided History: falls, eval for injury    Comparison: None    Technique: Using multidetector thin collimation helical acquisition  technique, axial, coronal and sagittal CT images through the cervical  spine were obtained without intravenous contrast.     Findings:  The cervical vertebrae are normally aligned. Normal cervical lordosis.  No acute fracture or subluxation. No prevertebral edema. There is mild  discogenic narrowing at C3-C7 and mild to moderate narrowing at C5-6.  The findings on a level by level basis are as follows:    C2-3: No spinal canal or neural foraminal stenosis.    C3-4:  Uncinate hypertrophy with mild bilateral neural canal  narrowing. No spinal canal narrowing.    C4-5:  Uncinate and facet hypertrophy with mild bilateral neural  foraminal narrowing. No spinal canal narrowing.    C5-6:  Disc osteophyte complex with right central disc extrusion. Mild  uncinate and facet hypertrophy. Mild-to-moderate spinal canal and  severe right neural foraminal narrowing. Mild left neural foraminal  narrowing.    C6-7:  No spinal canal or neural foraminal  stenosis.    C7-T1:  No spinal canal or neural foraminal stenosis.     No abnormality of the paraspinous soft tissues.      Impression    Impression:   1.  No acute fracture or subluxation.  2.  Multilevel cervical spondylosis, greatest at C5-6 where there is  mild-to-moderate spinal canal and severe right neural foraminal  narrowing.    I have personally reviewed the  examination and initial interpretation  and I agree with the findings.    VIKTORIYA CAMACHO MD         SYSTEM ID:  [id number]   CT Thoracic Spine w Contrast    Narrative    Thoracic and Lumbar spine CT without contrast    History: Fall.    Comparison: None available    Technique: Axial, coronal, and sagittal multiplanar reconstructions  obtained from CT of the chest, abdomen and pelvis.    Findings:  Thoracic spine: The posterior elements of T1 partially columnated from  thoracic spine CT study. There is no acute fracture or subluxation.  There is no prevertebral edema. No significant spinal canal stenosis.  Severe left T7-8 neural foraminal narrowing. Multilevel degenerative  osteophytic spurring. No soft tissue abnormality in the visualized  paraspinous tissues anteriorly.    Lumbar Spine: There is no acute fracture or subluxation. Counting down  from the cervical spine there are 5 lumbar-type vertebrae. 9 mm  anterolisthesis of L4 on L5. Trace retrolisthesis of L5 on S1. Mild  spinal canal narrowing at L2-3, L3-4, L4-5 and L5-S1 secondary to  facet hypertrophy. Multilevel neural foraminal narrowing secondary to  facet hypertrophy. Disc space narrowing greatest at L5-S1.    The visualized paraspinous tissues anteriorly are unremarkable.  Partial visualization of parapelvic cysts bilaterally. 1.9 cm mass  involving the inferior pole of the left kidney. Partial visualization  intrahepatic biliary dilatation. Please see chest, abdomen and pelvis  CT for further details.      Impression    Impression:   1. Thoracic spine:  No acute fracture or subluxation of the thoracic  spine.   2. Lumbar Spine: No acute fracture or subluxation of the lumbar spine.  3. Multilevel degenerative changes of the thoracic and lumbar spine as  detailed above.  4. 1.9 cm mass arising from the inferior pole of the left kidney. This  is renal cell carcinoma until proven otherwise. Please see same day  chest, abdomen and pelvis CT report for  further details.    I have personally reviewed the examination and initial interpretation  and I agree with the findings.    VIKTORIYA CAMACHO MD         SYSTEM ID:  [id number]   CT Lumbar Spine w Contrast    Narrative    Thoracic and Lumbar spine CT without contrast    History: Fall.    Comparison: None available    Technique: Axial, coronal, and sagittal multiplanar reconstructions  obtained from CT of the chest, abdomen and pelvis.    Findings:  Thoracic spine: The posterior elements of T1 partially columnated from  thoracic spine CT study. There is no acute fracture or subluxation.  There is no prevertebral edema. No significant spinal canal stenosis.  Severe left T7-8 neural foraminal narrowing. Multilevel degenerative  osteophytic spurring. No soft tissue abnormality in the visualized  paraspinous tissues anteriorly.    Lumbar Spine: There is no acute fracture or subluxation. Counting down  from the cervical spine there are 5 lumbar-type vertebrae. 9 mm  anterolisthesis of L4 on L5. Trace retrolisthesis of L5 on S1. Mild  spinal canal narrowing at L2-3, L3-4, L4-5 and L5-S1 secondary to  facet hypertrophy. Multilevel neural foraminal narrowing secondary to  facet hypertrophy. Disc space narrowing greatest at L5-S1.    The visualized paraspinous tissues anteriorly are unremarkable.  Partial visualization of parapelvic cysts bilaterally. 1.9 cm mass  involving the inferior pole of the left kidney. Partial visualization  intrahepatic biliary dilatation. Please see chest, abdomen and pelvis  CT for further details.      Impression    Impression:   1. Thoracic spine:  No acute fracture or subluxation of the thoracic  spine.   2. Lumbar Spine: No acute fracture or subluxation of the lumbar spine.  3. Multilevel degenerative changes of the thoracic and lumbar spine as  detailed above.  4. 1.9 cm mass arising from the inferior pole of the left kidney. This  is renal cell carcinoma until proven otherwise. Please see same  "day  chest, abdomen and pelvis CT report for further details.    I have personally reviewed the examination and initial interpretation  and I agree with the findings.    VIKTORIYA CAMACHO MD         SYSTEM ID:  [id number]   CT Chest/Abdomen/Pelvis w Contrast   Result Value    Radiologist flags Right femoral neck fracture (Urgent)    Addendum: 4/8/2022    Please add to the body of the report the following in the section  titled \"Abdomen/Pelvis\":  The spleen has been resected. Lobulated enhancing nodule in the  anterior left upper quadrant adjacent to the colon consistent with  splenosis/hypertrophied splenule.    JOSEFA REESE MD         SYSTEM ID:  [id number]      Narrative    EXAM: CT CHEST/ABDOMEN/PELVIS W CONTRAST, 4/8/2022    TECHNIQUE:  Helical CT images from the thoracic inlet through the  symphysis pubis were obtained after the administration of intravenous  contrast. Coronal and sagittal reformatted images were generated at a  workstation for further assessment.     HISTORY: fall, on anticoagulation, scattered bruising, eval for  injury.    COMPARISON: None.    FINDINGS:     Chest: Basilar atelectasis. No acute airspace disease, suspicious  pulmonary lesion, pleural effusion, or pneumothorax. Heart is normal  size without pericardial effusion. No central pulmonary embolism.  Nonaneurysmal thoracic aorta. Major branches. No thoracic  lymphadenopathy by size criteria.    Abdomen/Pelvis: Cholecystectomy with presumed reservoir effect in the  common bile duct. Symmetric intrahepatic biliary dilation. Common duct  measures up to 1.6 cm but appears to taper to the ampulla. No  radiopaque gallstones or inflammatory stranding surrounding the  extrahepatic bile ducts. No discrete lesions. Atrophic pancreas  without focal lesion or ductal dilatation. The spleen is unremarkable.    Gastric bypass with Rose Mary-en-Y reconstruction. No abnormally dilated or  thickened loops of bowel, free air or free fluid in " the  abdomen/pelvis. Colonic diverticulosis without evidence for acute  diverticulitis. Mild bilateral hydronephrosis with numerous parapelvic  cysts. No renal calculi. Exophytic enhancing solid lesion in the lower  pole of the left kidney measures 2.2 x 2.5 cm, series 505 image 332.  Well-distended urinary bladder. Nonaneurysmal abdominal aorta and  major branches.     Bones / Soft Tissues: Nondisplaced fracture of the right femoral neck.  Healing fractures of right ribs 4-8, likely subacute.       Impression    IMPRESSION:   1. Nondisplaced fracture of the right femoral neck and multiple  healing rib fractures, likely subacute.  2. No additional acute pathology of the lungs or abdomen/pelvis.  3. Exophytic lesion in the lower pole of the left kidney suspicious  for renal cell carcinoma. No clear evidence of metastatic disease.  4. Colonic diverticulosis without evidence of acute diverticulitis.    [Urgent Result: Right femoral neck fracture]    Finding was identified on 4/8/2022 1:04 PM.     Dr. Tejada was contacted by Dr. Robles at 4/8/2022 1:39 PM and  verbalized understanding of the urgent finding.     Exophytic solid lesion lower pole of the left kidney concerning for  renal cell carcinoma with no evidence of metastatic disease discussed  with Dr. Tejada at 2:55 PM 4/8/2022 by Dr. Sanchez from radiology.    I have personally reviewed the examination and initial interpretation  and I agree with the findings.    JOSEFA SANCHEZ MD         SYSTEM ID:  [id number]   XR Femur Right 2 Views    Narrative    EXAM: XR PELVIS 1/2 VW, XR FEMUR RIGHT 2 VIEW  4/8/2022 1:20 PM      HISTORY: right hip pain after fall    COMPARISON: CT chest abdomen pelvis same-day.    FINDINGS: AP pelvis. AP and lateral use of the right femur.    Minimally impacted subcapital fracture of the proximal right  femur/femoral neck. Remainder of femur is intact.    Mild to moderate degenerative changes of the hips. Degenerative  changes of the  right knee. Bones appear osteopenic lumbar spondylosis.  Soft tissues unremarkable. Excreting contrast in the right ureter.      Impression    IMPRESSION: Minimally impacted subcapital fracture of the right  femoral neck.    AGUSTIN HARTMAN MD (Joe)         SYSTEM ID:  [id number]   XR Pelvis 1/2 Views    Narrative    EXAM: XR PELVIS 1/2 VW, XR FEMUR RIGHT 2 VIEW  4/8/2022 1:20 PM      HISTORY: right hip pain after fall    COMPARISON: CT chest abdomen pelvis same-day.    FINDINGS: AP pelvis. AP and lateral use of the right femur.    Minimally impacted subcapital fracture of the proximal right  femur/femoral neck. Remainder of femur is intact.    Mild to moderate degenerative changes of the hips. Degenerative  changes of the right knee. Bones appear osteopenic lumbar spondylosis.  Soft tissues unremarkable. Excreting contrast in the right ureter.      Impression    IMPRESSION: Minimally impacted subcapital fracture of the right  femoral neck.    AGUSTIN HARTMAN MD (Joe)         SYSTEM ID:  [id number]   XR Hand Left G/E 3 Views    Narrative    3 views left hand radiographs 4/8/2022 1:27 PM    History: bruising fall, eval for fracture     Comparison: None    Findings:    PA, oblique and lateral view(s) of the left hand were obtained.     Possible nondisplaced fracture through the ulnar base of the fifth  metacarpal on the AP view. No erosion.    Moderate degenerative changes of the first carpometacarpal and  triscaphe joints.  Additional scattered degenerative change in the  interphalangeal joints, particularly distally.    Dorsal soft tissue swelling.      Impression    Impression:  Possible nondisplaced fracture through the ulnar base of the fifth  metacarpal on the AP view.    AGUSTIN HARTMAN MD (Joe)         SYSTEM ID:  [id number]   CT Facial Bones without Contrast    Narrative    CT FACIAL BONES WITHOUT CONTRAST 4/8/2022 3:33 PM    History:  Trauma - Facial Injury    Comparison: Same day CT  head    Technique: Using thin collimation multidetector helical acquisition  technique, axial and coronal thin section CT images were reconstructed  through the facial bones. Images were reviewed in bone and soft tissue  windows.    Findings: Comminuted left orbital floor and medial wall fracture with  herniation of orbital fat into the left maxillary sinus. Downward  displacement of fracture fragments in the left maxillary sinus.  Inferior displacement of the left medial and inferior rectus muscles  without entrapment. Globe is intact. There is preseptal air. The  ocular globes are intact. The cribriform plate appears intact.  Alignment of the remaining facial bones appears normal. No other  fractures identified.    There are blood products within the left maxillary sinus. Mucosal  thickening in the left anterior ethmoidal air cells.      Impression    Impression:   Comminuted left orbital floor and medial wall fracture with herniation  of orbital fat and fracture fragments into the maxillary sinus.  Opacification of the left maxillary sinus. Inferior displacement of  the inferior and medial rectus muscles.      I have personally reviewed the examination and initial interpretation  and I agree with the findings.    JJ NAILS MD         SYSTEM ID:  [id number]   CT Head w/o Contrast    Narrative    CT HEAD W/O CONTRAST 4/8/2022 3:33 PM    History: Head trauma, skull fracture or hematoma (Age 19-64y)     Comparison: 4/6/2012 dated CT    Technique: Using multidetector thin collimation helical acquisition  technique, axial, coronal and sagittal CT images from the skull base  to the vertex were obtained without intravenous contrast.   (topogram) image(s) also obtained and reviewed.    Findings:   No significant change in subdural hemorrhages in bilateral middle  cranial fossa and along the falx and tentorium.    There is no intracranial hemorrhage, mass effect, or midline shift.  Gray/white matter  differentiation in both cerebral hemispheres is  preserved. Ventricles are proportionate to the cerebral sulci. The  basal cisterns are clear. Patchy periventricular hypodensity,  consistent with chronic small ischemic disease.    The bony calvaria and the bones of the skull base are normal. Left  orbital blowout fracture. Complete opacification of the left maxillary  sinus. The remaining paranasal sinuses and mastoid air cells are  clear.       Impression    Impression:  1. No significant change in subdural hemorrhages in bilateral middle  cranial fossa and along the falx and possibly tentorium.  2. Chronic small vessel ischemic disease.   3. Left orbital b,ow-out fracture. Refer to the same day face CT for  details.     JJ NAILS MD         SYSTEM ID:  [id number]

## 2022-04-08 NOTE — CONSULTS
[facility] Orthopedic Consultation    Angeli Galindo MRN# [id number]   Age: 62 year old YOB: 1960   Date of Admission:  4/8/2022    Reason for consult: Right hip pain   Requesting physician: Iker Bower MD   Level of consult: One-time consult to assist in determining a diagnosis, recommend an appropriate treatment plan and place orders          Impression and Recommendation:   Impression:  Angeli Galindo is a 62 year old female with a past medical history of Protein S deficiency (on Eliquis 5 mg), DVT, PE, spherocytosis, gastric bypass, hypertension, idiopathic neuropathy, and osteopenia who presents after multiple falls with:  1. Bilateral subdural hematomas  2. Left orbital blowout fracture with inferior orbital wall displacement without orbital muscle entrapment  2. Subacute impacted right femoral neck fracture     Recomendations:  Operative Plan: Closed reduction and percutaneous pinning R hip 4/9/22 with Dr. Nguyen    - NPO at midnight   - Labs: Complete, negative COVID test today   - Hold anticoaguation morning of planned surgery   - Consent: To be done in pre op   - Case request: To be done    - Patient is medically cleared by trauma team, no further pre op clearance necessary   -NWB RLE until surgery    This patient was discussed with resident on call, Dr. Rico and staff on call, Dr. Nguyen.          Chief Complaint:   Right hip pain          History of Present Illness:   This patient is a 62 year old female with a past medical history significant for protein S deficiency (anticoagulated on Eliquis 5 mg), spherocytosis, chronic DVT, PE,  gastric bypass, hypertension, idiopathic peripheral neuropathy, and osteopenia who presented to the ED for evaluation of facial injuries after a fall last evening. She was found to have an orbital blowout fracture and b/l subdural hematomas.   Patient reports more than 10 falls in the last month, including a fall 3/9 where her dog pulled her onto  her R hip. She had immediate pain and went to urgent care where she was told she did not have any fracture or dislocation. She ambulated at work the following day, but has since been NWB to the E with the use of crutches 2/2 to pain. Had noticed significant bruising over the right hip at the time. In the ED today, CT scan showed subacute minimally impacted femoral neck fracture. Angeli endorses 9/10 R lateral hip and groin pain that is present with weight bearing. She denies any numbness, tingling, weakness, or surrounding joint pain.      History obtained from patient interview and chart review.        Past Medical History:     Past Medical History:   Diagnosis Date     Bilateral low back pain without sciatica      Chronic deep vein thrombosis (DVT) of lower extremity (H)      H/O splenectomy      Obesity due to excess calories     s/p gastric bypass, 320 lb to 170 lbs     Protein S deficiency (H)      Spherocytosis (H)              Past Surgical History:     Past Surgical History:   Procedure Laterality Date     BACK SURGERY      L4/5 microdiskectomy      SECTION       EXCISE LESION TRUNK N/A 2021    Procedure: EXCISE CYST ABDOMINAL WALL;  Surgeon: Junior Velasco MD;  Location: [facility] Main OR     GASTRIC BYPASS  2016    neto en y     IR REMOVAL IVC FILTER               Social History:   Tobacco use: former smoker, quit in   Alcohol use: Occasional  Elicit drug use: Patient denies  Occupation: NP  Living situation: Lives alone in [place]   Family contact information: BrotherCristo [phone]          Family History:   No family history of anesthesia, bleeding or clotting complications.           Allergies:     Allergies   Allergen Reactions     Shellfish-Derived Products Angioedema     Lisinopril Cough             Medications:   Medication reviewed with patient and in chart.  Anticoagulation: Eliquis  Antibiotics: None          Review of Systems:   CONSTITUTIONAL:  negative  for  fevers, chills, sweats, fatigue, malaise and weight loss  HEENT:  Positive for diplopia x 2 days, negative for tinnitus, earaches, nasal congestion, epistaxis, sore throat  RESPIRATORY:  negative for dyspnea, wheezing, chest pain and cough.  CARDIOVASCULAR:  negative for chest pain, palpitations, orthopnea, edema, syncope.  GASTROINTESTINAL:  negative for nausea, vomiting, diarrhea, constipation, abdominal pain.  GENITOURINARY:  negative for dysuria, nocturia, urinary incontinence and hematuria  INTEGUMENT/BREAST:  negative for rash and skin lesions  HEMATOLOGIC/LYMPHATIC:  negative for easy bruising, bleeding, swelling/edema  ALLERGIC/IMMUNOLOGIC:  negative for recurrent infections and drug reactions  ENDOCRINE: negative for diabetic symptoms including polyuria and polydipsia  MUSCULOSKELETAL: negative for myalgias, arthralgias, joint swelling and muscle weakness  NEUROLOGICAL: Positive for headache, negative for dizziness, gait problems, numbness/tingling          Physical Exam:     /84   Pulse 71   Temp 97.9  F (36.6  C) (Oral)   Resp 10   Wt 84.4 kg (186 lb)   SpO2 98%   BMI 29.13 kg/m    General: awake, alert, cooperative, no apparent distress, appears comfortable but drowsy  HEENT: Ecchymosis beneath left eye  Respiratory: breathing non-labored, no wheezing  Cardiovascular: peripheral pulses 2+ and symmetric, capillary refill < 2sec, skin wwp  Skin: no rashes or lesions  Neurological: A&Ox3, CN II-XII grossly intact  Musculoskeletal:  RLE: No gross deformity. Right leg is not shortened or externally rotated. Skin intact and without erythema or ecchymosis. Slight tenderness to palpation over the greater trochanter. Greater than 90 degrees of flexion at the hip joint without pain. 4/5 SLR. Fires TA/Gastroc/EHL/FHL with 5/5 strength. SILT in femoral, sural, saphenous, deep peroneal, superficial peroneal, and tibial nerve distributions. Dorsalis pedis and posterior tibial arteries 2+ and foot wwp  with BCR.          Imaging:   CT Chest/Abdomen/Pelvis 4/8: 1. Nondisplaced fracture of the right femoral neck and multiple  healing rib fractures, likely subacute          Laboratory date:   CBC:  Lab Results   Component Value Date    WBC 11.4 (H) 04/08/2022    HGB 13.5 04/08/2022     04/08/2022       BMP:  Lab Results   Component Value Date     04/08/2022    POTASSIUM 3.7 04/08/2022    CHLORIDE 108 04/08/2022    CO2 29 04/08/2022    BUN 9 04/08/2022    CR 0.52 04/08/2022    ANIONGAP 3 04/08/2022    ALYSSIA 8.1 (L) 04/08/2022    GLC 94 04/08/2022       Inflammatory Markers:  Lab Results   Component Value Date    WBC 11.4 (H) 04/08/2022       Cultures:  No results for input(s): CULT in the last 168 hours.    Time Patient Evaluated: 1440      Paulette Barker PA-C  Department of Orthopedics  [phone]

## 2022-04-08 NOTE — CONSULTS
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  She has a history of protein S deficiency with hx of DVT/PE in , was previously on coumadin but has now been on eliquis (last dose this AM).  She initially had a fall 3/9/22.  At that time she began having some pain in the groin region.  She reports she had negative imaging but there was concern of some sort of occult fracture.  She reports that since then she has been falling multiple times a day (every few hours).  She states she feels unsteady--she states it is a mix of some light headedness and weakness/gait imbalance when it happens.  Yesterday she was trying to get to the restroom quickly and fell onto her face.  She made an appointment with primary who referred her for a head CT which showed the subdurals and fracture and she presented to the ER.    No recent fevers, chills, nausea, vomiting, chest pain, shortness of breath, and denies headaches, weakness, LOC, numbness/weakness/paresthesias in extremities, changes in sensation, taste, smell, nor trouble speaking or other neurologic symptoms.    PAST MEDICAL HISTORY:   Past Medical History:   Diagnosis Date     Bilateral low back pain without sciatica      Chronic deep vein thrombosis (DVT) of lower extremity (H)      H/O splenectomy      Obesity due to excess calories     s/p gastric bypass, 320 lb to 170 lbs     Protein S deficiency (H)      Spherocytosis (H)        PAST SURGICAL HISTORY:   Past Surgical History:   Procedure Laterality Date     BACK SURGERY      L4/5 microdiskectomy      SECTION        EXCISE LESION TRUNK N/A 2021    Procedure: EXCISE CYST ABDOMINAL WALL;  Surgeon: Junior Velasco MD;  Location: [facility] Main OR     GASTRIC BYPASS  2016    neto en y     IR REMOVAL IVC FILTER         FAMILY HISTORY:   Family History   Problem Relation Age of Onset     Parkinsonism Mother      Diabetes Type 2  Father        SOCIAL HISTORY:   Social History     Tobacco Use     Smoking status: Former Smoker     Years: 5.00     Quit date: 2000     Years since quittin.2     Smokeless tobacco: Never Used   Substance Use Topics     Alcohol use: Yes     Comment: Rare       MEDICATIONS:  Current Outpatient Medications   Medication Sig Dispense Refill     acetaminophen (TYLENOL) 500 MG tablet Take 1,000 mg by mouth        apixaban ANTICOAGULANT (ELIQUIS ANTICOAGULANT) 5 MG tablet Take 1 tablet (5 mg) by mouth 2 times daily Labs due for refills 60 tablet 0     Calcium Carbonate-Vitamin D (CALCIUM-VITAMIN D) 600-125 MG-UNIT TABS Take 1 tablet by mouth       cyanocobalamin (VITAMIN B-12) 100 MCG tablet Take 500 mcg by mouth daily        DULoxetine (CYMBALTA) 60 MG capsule TAKE 1 CAPSULE BY MOUTH EVERY DAY 90 capsule 0     gabapentin (NEURONTIN) 600 MG tablet Take 600 mg by mouth 4 times daily Take 2 tabs       hydrochlorothiazide (HYDRODIURIL) 25 MG tablet Take 25 mg by mouth daily as needed        HYDROcodone-acetaminophen (NORCO) 5-325 MG tablet Take 1 tablet by mouth every 6 hours as needed        Melatonin 10 MG CAPS Take 1 capsule by mouth At Bedtime       methocarbamol (ROBAXIN) 500 MG tablet Take 500 mg by mouth 2 times daily as needed        multivitamin (ONE-DAILY) tablet Take 1 tablet by mouth       naloxone (NARCAN) 4 MG/0.1ML nasal spray Spray 4 mg in nostril       RISEdronate (ACTONEL) 150 MG tablet Take 1 tablet (150 mg) by mouth every 30 days 4 tablet 3       Allergies:  Allergies   Allergen Reactions     Shellfish-Derived Products Angioedema     Lisinopril Cough         ROS: 10 point ROS of  systems including Constitutional, Eyes, Respiratory, Cardiovascular, Gastroenterology, Genitourinary, Integumentary, Muscularskeletal, Psychiatric were all negative except for pertinent positives noted in my HPI.    EXAM:  /75   Pulse 80   Resp 18   Wt 84.4 kg (186 lb)   SpO2 98%   BMI 29.13 kg/m    CV: RRR, no m/r/g  Resp: CTAB, no wheezes or rubs  Abd: no distention noted  Neuro:   Intermittent confusion--gets dates etc wrong; drowsy, falls asleep intermittently during conversation  AAOx3, NAD.   PERRL, EOMI. Face symmetric, tongue midline.   Motor: 5/5 BUE, 5/5 BLE. Normal tone, no fasciculations or atrophy.   Sensation: intact to light touch and pinprick throughout.   Mild dysmetria bilaterally  Gait deferred.     LABS/IMAGING:        Cervical spine CT w/o contrast    (Results Pending)   CT Chest/Abdomen/Pelvis w Contrast    (Results Pending)   XR Femur Right 2 Views    (Results Pending)   XR Pelvis 1/2 Views    (Results Pending)   XR Hand Left G/E 3 Views    (Results Pending)   CT Thoracic Spine w/o Contrast    (Results Pending)   Lumbar spine CT w/o contrast    (Results Pending)       Clinically Significant Risk Factors Present on Admission          # Hypocalcemia: Ca = 8.1 mg/dL (Ref range: 8.5 - 10.1 mg/dL) and/or iCa = N/A on admission, will replace as needed     # Coagulation Defect: home medication list includes an anticoagulant medication            ASSESSMENT:  62 year old female with hx protein S deficiency on eliquis with initial fall 3/9/22 and frequent falls (multiple daily) since; now s/p fall 4/7/22 with subsequent head CT showing thin bilateral subdural hematomas along the anterior middle fossa and falx  Intermittent confusion, drowsy in the ER; slight dysmetria on exam  Orbital blowout fracture    RECOMMENDATIONS:  - Repeat CT scan 6 hours after initial (1545)  - K centra given in ED  - Hold eliquis.  - Keppra 750mg BID (likely x 7d)  - SBP <140  - Recommend Q1h neuro checks given she  is slightly altered in the ER  - Keep NPO for now until repeat CT scan  - plt > 100k  - Hgb > 8    The patient was discussed with Dr. Bernstein, neurosurgery chief resident  -----------------------------------  Caron Wang PA-C  4/8/2022 12:33 PM   Neurosurgery  [phone]    Total time spent with the patient was 70 minutes of which more than 50% was used in counseling time, including discussion of prognosis and various surgical and non-surgical treatment options and associated risks.

## 2022-04-08 NOTE — H&P
[facility]    History and Physical: Trauma Service       Date of Admission:  4/8/2022    Time of Admission/Consult Request (page/call): 1200    Time of my evaluation: 1210  Consulting services:  Neurosurgery - Emergent consult (within 30 mins): Called by ED  ENT - Referral by Me  Ophthomology - Called by me   Hematology - Referral by me  Nephrology - Referral by me    Assessment   Trauma mechanism: Multiple GLF  Time/date of injury: Last fall 4/7/22  Known Injuries:  1. SDH  2. Left Orbital blowout fracture, inferior wall displacement with significant downward herniation of extraconal fat    Other diagnoses:   1. Chronic anticoagulation, Eliquis   2. Subacute rib fractures, right 4-8    3. Right femoral neck fracture, possibly from 3/9/22 fall  4. Possible nondisplaced fracture through the ulnar base of the fifth metacarpal  5. Bilateral hydronephrosis   6. Bladder Retention     Procedures:  1. Planned OR with Ortho tomorrow     Neuro/Pain/Psych:  # Initial Fall March 9 , 3x a day since.   # Fall 4/7/22 with head strike, resulting in facial trauma  # Traumatic SDH   - Initial Head CT: Thin extra-axial hyperdensities along the anterior middle cranial  fossa bilaterally and anterior falx, suspicious for subdural hemorrhages  - Ordered Head CT: No significant change in subdural hemorrhages in bilateral middle cranial fossa and along the falx and possibly tentorium  - Keppra 750mg bid   - Neurosurgery following     # Chronic Pain Syndrome  # Acute Pain  - Scheduled: Tylenol  - Prn: Dilaudid, oxycodone    - PTA: Gabapentin, Robaxin 500mg bid prn  - Holding: Norco     # Depression  - continue PTA: Cymbalta     EENT:  # Left Orbital blowout fracture, inferior wall displacement with significant downward herniation of extraconal fat     # Double vision   - Head CT:  Left orbital blowout fracture with inferior orbital wall displacement associated with significant downward  herniation of extraconal fat, no definite orbital muscle entrapment.  - Keep upright as much as able to help with swelling  - Nasal precautions: no nose blowing, sneeze with mouth open, no heavy listing or straws  - Ophthomology consulted for double vision, recs: Follow up in general ophthalmology clinic in 1 month  - ENT consulted: Sinus precaution, Follow-up with ENT outpatient in one week for surgical options for orbital floor repair     Pulmonary:   # Hx tobacco use   # Healing 4 right rib fractures   - Supplemental oxygen to keep saturation above 92 %.  - Incentive spirometer while awake     Cardiovascular:    # Hypertension   - Monitor hemodynamic status.   - continue PTA: hydrochlorothiazide prn     GI/Nutrition:    # s/p Rose [place] en Y  - continue PTA: Vit B12  - Regular diet, npo after midnight     Renal/ Fluids/Electrolytes:  # Bilateral hydronephrosis  # Bladder Retention   # Exophytic lesion in the lower pole of the left kidney suspicious for renal cell carcinoma  - Chest/Abd/Pelvic CT: Mild bilateral hydronephrosis with numerous parapelvic Cysts. Exophytic enhancing solid lesion in the lower pole of the left kidney measures 2.2 x 2.5 cm, suspicious for renal cell carcinoma  - Creatinine: 0.52  - electrolyte replacement protocol in place.   - Urology consulted for hydronephrosis, and lesion. Per conversation the lesion is less concerning and may be tracked as OP. The size of the patient's bladder and the resulting hydronephrosis should be acted on now with a Sifuentes Cath.   - Sifuentes ordered, discussed with nurse     Endocrine:  - No management indication.     Infectious disease:   - No indications for antibiotics.      Hematology:    # Spherocytosis   # Protein S Deficiency  # Chronic DVT lower extremity   # s/p splenectomy   # hx of IVC, s/p removal  - Hgb 13.5. Monitor and trend.   - Kcentra given @ 3882    - Hematology consulted: would recommend prophylaxis for DVT w/ enoxaparin starting tomorrow 4/9/22,  provided neurosurgery okay with this; otherwise consider pneumatic compression devices. Iron panel and B12 levels (will order for you) in setting of roue-en-y bypass (iron deficiency precedes anemia) and high risk of malabsorption.     Musculoskeletal:  # Bilateral lower back pain without sciatica   # Hx Bilateral L5 radiculopathy    # s/p left L4-5 laminectomy;   # L5-S1 left mod foraminal  # L4-5 mod diffuse L3-4 mod diffuse, L2-3 left mod foraminal & mild-mod diffuse bulging discs;   # L5-S1 mild central herniated disc  # Subacute rib fractures, Right 4-8   # Possible nondisplaced fracture through the ulnar base of the fifth metacarpal,   # Femoral neck fracture, Fall on 3/9/22   - 3/12: [facility] visit: seen for hip pain. Xrays of right femur 2 views: No acute fracture. Osteopenia., and Hip: No acute fracture or dislocation.  - 3/25: had Bilateral L5 transforaminal epidural steroid injections done under fluoroscopic guidance with contrast enhancement.   - Chest/Abd/Pelvis CT: Nondisplaced fracture of the right femoral neck and multiple  healing rib fractures, likely subacute.  - Physical and occupational therapy consults.  - Ortho consulted: planned procedure tomorrow     Skin:  # Soft tissue hematoma left hand   - dilgent cares to prevent skin breakdown and wound formation.      Code status: Full Code     General Cares:  GI Prophylaxis: PPI  DVT Prophylaxis: PCD  Date of last stool/Bowel Regimen: in place  Pulmonary toilet: cough     ETOH: This patient was asked if in the last 3-6 months there has been a time when she had 4 or more drinks in a single day/outing.. Patient answer to the screening question was in the negative. No intervention needed.  Primary Care Physician   Eloisa Fuentes    Plan   1. Admit to Trauma   2. Patient optimized for surgery   3. Planned procedure with Ortho tomorrow  4. Follow-up with NSGY tomorrow anti-coagulation planning      Gemma Callejas PA-C  Primary team: Trauma Services    Job code pager [phone] (24 hours a day)  Use [employer] to text page (not text page compatible with [email or web address]).    Dial * * * [phone] then  [phone], wait for prompt and then enter call back number.   Do NOT call numbers listed to right in Treatment Team section.       Chief Complaint   Multiple falls    History is obtained from the patient, electronic health record, emergency department physician and patient's brother    History of Present Illness   Angeli Galindo is a 62 year old female who presented from her PCP today. She was seen for continued pain in her hip after her fall last month, continued falls which included yesterday (22), where she landed on her face causing a nose bleed. Patient also has had double vision which increased today. Upon work-up at the [facility] she was found to have a SDH in the setting of anticoagulation, left orbit blow out, along with confusion. A Rapid Response was called and patient transferred here.     Past Medical History    I have reviewed this patient's medical history and updated it with pertinent information if needed.   Past Medical History:   Diagnosis Date     Bilateral low back pain without sciatica      Chronic deep vein thrombosis (DVT) of lower extremity (H)      H/O splenectomy      Obesity due to excess calories     s/p gastric bypass, 320 lb to 170 lbs     Protein S deficiency (H)      Spherocytosis (H)        Past Surgical History   I have reviewed this patient's surgical history and updated it with pertinent information if needed.  Past Surgical History:   Procedure Laterality Date     BACK SURGERY      L4/5 microdiskectomy      SECTION       EXCISE LESION TRUNK N/A 2021    Procedure: EXCISE CYST ABDOMINAL WALL;  Surgeon: Junior Velasco MD;  Location: [facility] Main OR     GASTRIC BYPASS  2016    neto en y     IR REMOVAL IVC FILTER       Prior to Admission Medications   Prior to Admission Medications   Prescriptions Last Dose Informant Patient  Reported? Taking?   Calcium Carbonate-Vitamin D (CALCIUM-VITAMIN D) 600-125 MG-UNIT TABS 4/7/2022 at 10pm  Yes Yes   Sig: Take 1 tablet by mouth   DULoxetine (CYMBALTA) 60 MG capsule 4/7/2022 at 10pm  No Yes   Sig: TAKE 1 CAPSULE BY MOUTH EVERY DAY   HYDROcodone-acetaminophen (NORCO) 5-325 MG tablet 4/8/2022 at 0800  Yes Yes   Sig: Take 1 tablet by mouth every 6 hours as needed    Melatonin 10 MG CAPS Unknown at Unknown time  Yes Yes   Sig: Take 1 capsule by mouth At Bedtime   RISEdronate (ACTONEL) 150 MG tablet 4/1/2022  No No   Sig: Take 1 tablet (150 mg) by mouth every 30 days   acetaminophen (TYLENOL) 500 MG tablet 4/7/2022 at 10pm  Yes Yes   Sig: Take 1,000 mg by mouth    apixaban ANTICOAGULANT (ELIQUIS ANTICOAGULANT) 5 MG tablet 4/8/2022 at 0800  No Yes   Sig: Take 1 tablet (5 mg) by mouth 2 times daily Labs due for refills   cyanocobalamin (VITAMIN B-12) 100 MCG tablet 4/7/2022 at 10pm  Yes Yes   Sig: Take 500 mcg by mouth daily    gabapentin (NEURONTIN) 600 MG tablet 4/7/2022 at 10pm  Yes Yes   Sig: Take 600 mg by mouth 4 times daily Take 2 tabs   hydrochlorothiazide (HYDRODIURIL) 25 MG tablet Unknown at Unknown time  Yes Yes   Sig: Take 25 mg by mouth daily as needed    methocarbamol (ROBAXIN) 500 MG tablet Past Week at Unknown time  Yes Yes   Sig: Take 500 mg by mouth 2 times daily as needed    multivitamin (ONE-DAILY) tablet 4/8/2022 at 0800  Yes Yes   Sig: Take 1 tablet by mouth   naloxone (NARCAN) 4 MG/0.1ML nasal spray Unknown at Unknown time  Yes Yes   Sig: Spray 4 mg in nostril      Facility-Administered Medications: None     Allergies   Allergies   Allergen Reactions     Shellfish-Derived Products Angioedema     Lisinopril Cough       Social History   Social History     Socioeconomic History     Marital status:      Spouse name: Not on file     Number of children: Not on file     Years of education: Not on file     Highest education level: Not on file   Occupational History     Not on file    Tobacco Use     Smoking status: Former Smoker     Years: 5.00     Quit date: 2000     Years since quittin.2     Smokeless tobacco: Never Used   Vaping Use     Vaping Use: Never used   Substance and Sexual Activity     Alcohol use: Yes     Comment: Rare     Drug use: Never     Sexual activity: Not on file   Other Topics Concern     Parent/sibling w/ CABG, MI or angioplasty before 65F 55M? Not Asked   Social History Narrative    2 children, daughter is 32 and granddaughter     Daughter is 31 yo lives in [place]    Adopted 3 special needs adults grown    Hospitals in [place]     Social Determinants of Health     Financial Resource Strain: Not on file   Food Insecurity: Not on file   Transportation Needs: Not on file   Physical Activity: Not on file   Stress: Not on file   Social Connections: Not on file   Intimate Partner Violence: Not on file   Housing Stability: Not on file       Family History   Family history reviewed with patient and is noncontributory.    Review of Systems   CONSTITUTIONAL: fatigue   EYES: double vision  ENT: no decrease in hearing, no tinnitus, no vertigo, no hoarseness  RESPIRATORY: no shortness of breath, no cough, no sputum   CARDIOVASCULAR: no palpitations, no chest  pain, no exertional chest pain or pressure  GASTROINTESTINAL: no nausea or vomiting, or abd pain  GENITOURINARY: retention   MUSCULOSKELETAL: positive weakness, falls,  joint pain,   SKIN: positive ecchymoses  NEUROLOGIC: no numbness or tingling of hands, no numbness or tingling  of feet, no syncope, no tremors or weakness  PSYCHIATRIC: no sleep disturbances, no anxiety or depression    Physical Exam   Temp: 97.9  F (36.6  C) Temp src: Oral BP: 118/71 Pulse: 69   Resp: 13 SpO2: 100 % O2 Device: None (Room air)    Vital Signs with Ranges  Temp:  [97.9  F (36.6  C)-98  F (36.7  C)] 97.9  F (36.6  C)  Pulse:  [68-86] 69  Resp:  [10-18] 13  BP: (118-139)/(71-84) 118/71  SpO2:  [98 %-100 %] 100 % 186 lbs 0 oz    Primary  Survey:   Airway: patient talking  Breathing: symmetric respiratory effort bilaterally  Circulation: central pulses present and peripheral pulses present  Disability: Pupils - left 4 mm and brisk, right 4 mm and brisk     Evette Coma Scale - Total 15/15  Eye Response (E): 4  4= spontaneous,  3= to verbal/voice, 2=  to pain, 1= No response   Verbal Response (V): 5   5= Orientated, converses,  4= Confused, converses, 3= Inappropriate words,  2= Incomprehensible sounds,  1=No response   Motor Response (M): 6   6= Obeys commands, 5= Localizes to pain, 4= Withdrawal to pain, 3=Fexion to pain, 2= Extension to pain, 1= No response    Secondary Survey:  General: alert, oriented to person, place, time  Head: normocephalic,   Eyes: PERRLA, pupils 3mm, EOMI, corneas and conjunctivae clear, periorbital edema to left eye,  Nose: nares patent, no drainage, nasal septum non-tender  Mouth/Throat: no exudates or erythema,  no dental tenderness or malocclusions, no tongue lacerations  Neck: Cleared C-spine with images and exam. Trachea midline. No midline posterior tenderness, full AROM without pain or tenderness   Chest/Pulmonary: normal respiratory rate and rhythm,  bilateral clear breath sounds, no wheezes, rales or rhonchi, no chest wall tenderness or deformities,   Cardiovascular: S1, S2,  normal and regular rate and rhythm, no murmurs  Abdomen: soft, non-tender, no guarding, no rebound tenderness and no tenderness to palpation  : pelvis stable to lateral compression, no box, no urine assess  Musculoskel/Extremities: ecchymosis throughout + PP. - edema.   Hand: left hand soft tissue hematoma, tenderness to palpation, Full AROM of hand and fingers in flexion and extension.  strength equal and symmetric.   Skin:skin warm and dry.   Neuro: PERRLA, alert, oriented x 4. CN II-XII grossly intact. No focal deficits. Strength 4/5 x 4 extremities.  Sensation intact.  Psychiatric: affect/mood normal, cooperative, normal  judgement/insight and memory intact  # Pain Assessment:  Current Pain Score 4/8/2022   Patient currently in pain? sleeping, patient not able to self report   - Angeli is experiencing pain due to falls. Pain management was discussed with Angeli and her brother and the plan was created in a collaborative fashion.  Angeli's response to the current recommendations: engaged  - Please see the plan for pain management as documented above      Data   Results for orders placed or performed during the hospital encounter of 04/08/22 (from the past 24 hour(s))   iStat INR, POCT   Result Value Ref Range    INR POCT 0.9 (L) 2.0 - 3.0   Creatinine POCT   Result Value Ref Range    Creatinine POCT 0.4 (L) 0.5 - 1.0 mg/dL    GFR, ESTIMATED POCT >60 >60 mL/min/1.73m2   [place] Draw    Narrative    The following orders were created for panel order [place] Draw.  Procedure                               Abnormality         Status                     ---------                               -----------         ------                     Extra Blue Top Tube[[id number]]                              Final result               Extra Red Top Tube[[id number]]                               Final result               Extra Green Top (Lithium...[[id number]]                      Final result               Extra Purple Top Tube[[id number]]                            Final result                 Please view results for these tests on the individual orders.   Extra Blue Top Tube   Result Value Ref Range    Hold Specimen JIC    Extra Red Top Tube   Result Value Ref Range    Hold Specimen JIC    Extra Green Top (Lithium Heparin) Tube   Result Value Ref Range    Hold Specimen JIC    Extra Purple Top Tube   Result Value Ref Range    Hold Specimen JIC    CBC with platelets differential    Narrative    The following orders were created for panel order CBC with platelets differential.  Procedure                               Abnormality         Status                      ---------                               -----------         ------                     CBC with platelets and d...[[id number]]  Abnormal            Final result                 Please view results for these tests on the individual orders.   Comprehensive metabolic panel   Result Value Ref Range    Sodium 140 133 - 144 mmol/L    Potassium 3.7 3.4 - 5.3 mmol/L    Chloride 108 94 - 109 mmol/L    Carbon Dioxide (CO2) 29 20 - 32 mmol/L    Anion Gap 3 3 - 14 mmol/L    Urea Nitrogen 9 7 - 30 mg/dL    Creatinine 0.52 0.52 - 1.04 mg/dL    Calcium 8.1 (L) 8.5 - 10.1 mg/dL    Glucose 94 70 - 99 mg/dL    Alkaline Phosphatase 121 40 - 150 U/L    AST 52 (H) 0 - 45 U/L    ALT 42 0 - 50 U/L    Protein Total 7.9 6.8 - 8.8 g/dL    Albumin 3.4 3.4 - 5.0 g/dL    Bilirubin Total 0.8 0.2 - 1.3 mg/dL    GFR Estimate >90 >60 mL/min/1.73m2   INR   Result Value Ref Range    INR 1.00 0.85 - 1.15   Partial thromboplastin time   Result Value Ref Range    aPTT 25 22 - 38 Seconds   ABO/Rh type and screen    Narrative    The following orders were created for panel order ABO/Rh type and screen.  Procedure                               Abnormality         Status                     ---------                               -----------         ------                     Adult Type and Screen[[id number]]                            Final result                 Please view results for these tests on the individual orders.   Troponin I   Result Value Ref Range    Troponin I High Sensitivity 6 <54 ng/L   Ethyl Alcohol Level   Result Value Ref Range    Alcohol ethyl <0.01 <=0.01 g/dL   CBC with platelets and differential   Result Value Ref Range    WBC Count 11.4 (H) 4.0 - 11.0 10e3/uL    RBC Count 4.23 3.80 - 5.20 10e6/uL    Hemoglobin 13.5 11.7 - 15.7 g/dL    Hematocrit 39.9 35.0 - 47.0 %    MCV 94 78 - 100 fL    MCH 31.9 26.5 - 33.0 pg    MCHC 33.8 31.5 - 36.5 g/dL    RDW 12.3 10.0 - 15.0 %    Platelet Count 301 150 - 450 10e3/uL    % Neutrophils 82 %     % Lymphocytes 9 %    % Monocytes 9 %    % Eosinophils 0 %    % Basophils 0 %    % Immature Granulocytes 0 %    NRBCs per 100 WBC 0 <1 /100    Absolute Neutrophils 9.3 (H) 1.6 - 8.3 10e3/uL    Absolute Lymphocytes 1.0 0.8 - 5.3 10e3/uL    Absolute Monocytes 1.0 0.0 - 1.3 10e3/uL    Absolute Eosinophils 0.0 0.0 - 0.7 10e3/uL    Absolute Basophils 0.0 0.0 - 0.2 10e3/uL    Absolute Immature Granulocytes 0.1 <=0.4 10e3/uL    Absolute NRBCs 0.0 10e3/uL   Adult Type and Screen   Result Value Ref Range    ABO/RH(D) A NEG     Antibody Screen Negative Negative    SPECIMEN EXPIRATION DATE [id number]    Iron and iron binding capacity   Result Value Ref Range    Iron 105 35 - 180 ug/dL    Iron Binding Capacity 365 240 - 430 ug/dL    Iron Sat Index 29 15 - 46 %   Ferritin   Result Value Ref Range    Ferritin 56 8 - 252 ng/mL   Vitamin B12   Result Value Ref Range    Vitamin B12 626 193 - 986 pg/mL   EKG 12 lead   Result Value Ref Range    Systolic Blood Pressure  mmHg    Diastolic Blood Pressure  mmHg    Ventricular Rate 76 BPM    Atrial Rate 76 BPM    MI Interval 148 ms    QRS Duration 90 ms     ms    QTc 427 ms    P Axis 48 degrees    R AXIS -7 degrees    T Axis 3 degrees    Interpretation ECG Sinus rhythm  Normal ECG      Asymptomatic COVID-19 Virus (Coronavirus) by PCR Nasopharyngeal    Specimen: Nasopharyngeal; Swab   Result Value Ref Range    SARS CoV2 PCR Negative Negative, Testing sent to reference lab. Results will be returned via unsolicited result    Narrative    Testing was performed using the Xpert Xpress SARS-CoV-2 Assay on the  Cepheid Gene-Xpert Instrument Systems. Additional information about  this Emergency Use Authorization (EUA) assay can be found via the Lab  Guide. This test should be ordered for the detection of SARS-CoV-2 in  individuals who meet SARS-CoV-2 clinical and/or epidemiological  criteria. Test performance is unknown in asymptomatic patients. This  test is for in vitro diagnostic use  under the FDA EUA for  laboratories certified under CLIA to perform high complexity testing.  This test has not been FDA cleared or approved. A negative result  does not rule out the presence of PCR inhibitors in the specimen or  target RNA in concentration below the limit of detection for the  assay. The possibility of a false negative should be considered if  the patient's recent exposure or clinical presentation suggests  COVID-19. This test was validated by the [facility]. This laboratory is certified under  the Clinical Laboratory Improvement Amendments of 1988 (CLIA-88) as  qualified to perform high complexity laboratory testing.     Cervical spine CT w/o contrast    Narrative    CT CERVICAL SPINE W/O CONTRAST 4/8/2022 12:56 PM    Provided History: falls, eval for injury    Comparison: None    Technique: Using multidetector thin collimation helical acquisition  technique, axial, coronal and sagittal CT images through the cervical  spine were obtained without intravenous contrast.     Findings:  The cervical vertebrae are normally aligned. Normal cervical lordosis.  No acute fracture or subluxation. No prevertebral edema. There is mild  discogenic narrowing at C3-C7 and mild to moderate narrowing at C5-6.  The findings on a level by level basis are as follows:    C2-3: No spinal canal or neural foraminal stenosis.    C3-4:  Uncinate hypertrophy with mild bilateral neural canal  narrowing. No spinal canal narrowing.    C4-5:  Uncinate and facet hypertrophy with mild bilateral neural  foraminal narrowing. No spinal canal narrowing.    C5-6:  Disc osteophyte complex with right central disc extrusion. Mild  uncinate and facet hypertrophy. Mild-to-moderate spinal canal and  severe right neural foraminal narrowing. Mild left neural foraminal  narrowing.    C6-7:  No spinal canal or neural foraminal  stenosis.    C7-T1:  No spinal canal or neural foraminal stenosis.      No abnormality of the paraspinous soft tissues.      Impression    Impression:   1.  No acute fracture or subluxation.  2.  Multilevel cervical spondylosis, greatest at C5-6 where there is  mild-to-moderate spinal canal and severe right neural foraminal  narrowing.    I have personally reviewed the examination and initial interpretation  and I agree with the findings.    VIKTORIYA CAMACHO MD         SYSTEM ID:  [id number]   CT Thoracic Spine w Contrast    Narrative    Thoracic and Lumbar spine CT without contrast    History: Fall.    Comparison: None available    Technique: Axial, coronal, and sagittal multiplanar reconstructions  obtained from CT of the chest, abdomen and pelvis.    Findings:  Thoracic spine: The posterior elements of T1 partially columnated from  thoracic spine CT study. There is no acute fracture or subluxation.  There is no prevertebral edema. No significant spinal canal stenosis.  Severe left T7-8 neural foraminal narrowing. Multilevel degenerative  osteophytic spurring. No soft tissue abnormality in the visualized  paraspinous tissues anteriorly.    Lumbar Spine: There is no acute fracture or subluxation. Counting down  from the cervical spine there are 5 lumbar-type vertebrae. 9 mm  anterolisthesis of L4 on L5. Trace retrolisthesis of L5 on S1. Mild  spinal canal narrowing at L2-3, L3-4, L4-5 and L5-S1 secondary to  facet hypertrophy. Multilevel neural foraminal narrowing secondary to  facet hypertrophy. Disc space narrowing greatest at L5-S1.    The visualized paraspinous tissues anteriorly are unremarkable.  Partial visualization of parapelvic cysts bilaterally. 1.9 cm mass  involving the inferior pole of the left kidney. Partial visualization  intrahepatic biliary dilatation. Please see chest, abdomen and pelvis  CT for further details.      Impression    Impression:   1. Thoracic spine:  No acute fracture or subluxation of the thoracic  spine.   2. Lumbar Spine: No acute fracture or  subluxation of the lumbar spine.  3. Multilevel degenerative changes of the thoracic and lumbar spine as  detailed above.  4. 1.9 cm mass arising from the inferior pole of the left kidney. This  is renal cell carcinoma until proven otherwise. Please see same day  chest, abdomen and pelvis CT report for further details.    I have personally reviewed the examination and initial interpretation  and I agree with the findings.    VIKTORIYA CAMACHO MD         SYSTEM ID:  [id number]   CT Lumbar Spine w Contrast    Narrative    Thoracic and Lumbar spine CT without contrast    History: Fall.    Comparison: None available    Technique: Axial, coronal, and sagittal multiplanar reconstructions  obtained from CT of the chest, abdomen and pelvis.    Findings:  Thoracic spine: The posterior elements of T1 partially columnated from  thoracic spine CT study. There is no acute fracture or subluxation.  There is no prevertebral edema. No significant spinal canal stenosis.  Severe left T7-8 neural foraminal narrowing. Multilevel degenerative  osteophytic spurring. No soft tissue abnormality in the visualized  paraspinous tissues anteriorly.    Lumbar Spine: There is no acute fracture or subluxation. Counting down  from the cervical spine there are 5 lumbar-type vertebrae. 9 mm  anterolisthesis of L4 on L5. Trace retrolisthesis of L5 on S1. Mild  spinal canal narrowing at L2-3, L3-4, L4-5 and L5-S1 secondary to  facet hypertrophy. Multilevel neural foraminal narrowing secondary to  facet hypertrophy. Disc space narrowing greatest at L5-S1.    The visualized paraspinous tissues anteriorly are unremarkable.  Partial visualization of parapelvic cysts bilaterally. 1.9 cm mass  involving the inferior pole of the left kidney. Partial visualization  intrahepatic biliary dilatation. Please see chest, abdomen and pelvis  CT for further details.      Impression    Impression:   1. Thoracic spine:  No acute fracture or subluxation of the  "thoracic  spine.   2. Lumbar Spine: No acute fracture or subluxation of the lumbar spine.  3. Multilevel degenerative changes of the thoracic and lumbar spine as  detailed above.  4. 1.9 cm mass arising from the inferior pole of the left kidney. This  is renal cell carcinoma until proven otherwise. Please see same day  chest, abdomen and pelvis CT report for further details.    I have personally reviewed the examination and initial interpretation  and I agree with the findings.    VIKTORIYA CAMACHO MD         SYSTEM ID:  [id number]   CT Chest/Abdomen/Pelvis w Contrast   Result Value Ref Range    Radiologist flags Right femoral neck fracture (Urgent)     Addendum: 4/8/2022    Please add to the body of the report the following in the section  titled \"Abdomen/Pelvis\":  The spleen has been resected. Lobulated enhancing nodule in the  anterior left upper quadrant adjacent to the colon consistent with  splenosis/hypertrophied splenule.    JOSEFA REESE MD         SYSTEM ID:  [id number]      Narrative    EXAM: CT CHEST/ABDOMEN/PELVIS W CONTRAST, 4/8/2022    TECHNIQUE:  Helical CT images from the thoracic inlet through the  symphysis pubis were obtained after the administration of intravenous  contrast. Coronal and sagittal reformatted images were generated at a  workstation for further assessment.     HISTORY: fall, on anticoagulation, scattered bruising, eval for  injury.    COMPARISON: None.    FINDINGS:     Chest: Basilar atelectasis. No acute airspace disease, suspicious  pulmonary lesion, pleural effusion, or pneumothorax. Heart is normal  size without pericardial effusion. No central pulmonary embolism.  Nonaneurysmal thoracic aorta. Major branches. No thoracic  lymphadenopathy by size criteria.    Abdomen/Pelvis: Cholecystectomy with presumed reservoir effect in the  common bile duct. Symmetric intrahepatic biliary dilation. Common duct  measures up to 1.6 cm but appears to taper to the ampulla. No  radiopaque " gallstones or inflammatory stranding surrounding the  extrahepatic bile ducts. No discrete lesions. Atrophic pancreas  without focal lesion or ductal dilatation. The spleen is unremarkable.    Gastric bypass with Rose Mary-en-Y reconstruction. No abnormally dilated or  thickened loops of bowel, free air or free fluid in the  abdomen/pelvis. Colonic diverticulosis without evidence for acute  diverticulitis. Mild bilateral hydronephrosis with numerous parapelvic  cysts. No renal calculi. Exophytic enhancing solid lesion in the lower  pole of the left kidney measures 2.2 x 2.5 cm, series 505 image 332.  Well-distended urinary bladder. Nonaneurysmal abdominal aorta and  major branches.     Bones / Soft Tissues: Nondisplaced fracture of the right femoral neck.  Healing fractures of right ribs 4-8, likely subacute.       Impression    IMPRESSION:   1. Nondisplaced fracture of the right femoral neck and multiple  healing rib fractures, likely subacute.  2. No additional acute pathology of the lungs or abdomen/pelvis.  3. Exophytic lesion in the lower pole of the left kidney suspicious  for renal cell carcinoma. No clear evidence of metastatic disease.  4. Colonic diverticulosis without evidence of acute diverticulitis.    [Urgent Result: Right femoral neck fracture]    Finding was identified on 4/8/2022 1:04 PM.     Dr. Tejada was contacted by Dr. Robles at 4/8/2022 1:39 PM and  verbalized understanding of the urgent finding.     Exophytic solid lesion lower pole of the left kidney concerning for  renal cell carcinoma with no evidence of metastatic disease discussed  with Dr. Tejada at 2:55 PM 4/8/2022 by Dr. Sanchez from radiology.    I have personally reviewed the examination and initial interpretation  and I agree with the findings.    JOSEFA SANCHEZ MD         SYSTEM ID:  [id number]   XR Femur Right 2 Views    Narrative    EXAM: XR PELVIS 1/2 VW, XR FEMUR RIGHT 2 VIEW  4/8/2022 1:20 PM      HISTORY: right hip pain after  fall    COMPARISON: CT chest abdomen pelvis same-day.    FINDINGS: AP pelvis. AP and lateral use of the right femur.    Minimally impacted subcapital fracture of the proximal right  femur/femoral neck. Remainder of femur is intact.    Mild to moderate degenerative changes of the hips. Degenerative  changes of the right knee. Bones appear osteopenic lumbar spondylosis.  Soft tissues unremarkable. Excreting contrast in the right ureter.      Impression    IMPRESSION: Minimally impacted subcapital fracture of the right  femoral neck.    AGUSTIN HARTMAN MD (Joe)         SYSTEM ID:  [id number]   XR Pelvis 1/2 Views    Narrative    EXAM: XR PELVIS 1/2 VW, XR FEMUR RIGHT 2 VIEW  4/8/2022 1:20 PM      HISTORY: right hip pain after fall    COMPARISON: CT chest abdomen pelvis same-day.    FINDINGS: AP pelvis. AP and lateral use of the right femur.    Minimally impacted subcapital fracture of the proximal right  femur/femoral neck. Remainder of femur is intact.    Mild to moderate degenerative changes of the hips. Degenerative  changes of the right knee. Bones appear osteopenic lumbar spondylosis.  Soft tissues unremarkable. Excreting contrast in the right ureter.      Impression    IMPRESSION: Minimally impacted subcapital fracture of the right  femoral neck.    AGUSTIN HARTMAN MD (Joe)         SYSTEM ID:  [id number]   XR Hand Left G/E 3 Views    Narrative    3 views left hand radiographs 4/8/2022 1:27 PM    History: bruising fall, eval for fracture     Comparison: None    Findings:    PA, oblique and lateral view(s) of the left hand were obtained.     Possible nondisplaced fracture through the ulnar base of the fifth  metacarpal on the AP view. No erosion.    Moderate degenerative changes of the first carpometacarpal and  triscaphe joints.  Additional scattered degenerative change in the  interphalangeal joints, particularly distally.    Dorsal soft tissue swelling.      Impression    Impression:  Possible nondisplaced  fracture through the ulnar base of the fifth  metacarpal on the AP view.    AGUSTIN HARTMAN MD (Joe)         SYSTEM ID:  [id number]   UA with Microscopic reflex to Culture    Specimen: Urine, Clean Catch   Result Value Ref Range    Color Urine Straw Colorless, Straw, Light Yellow, Yellow    Appearance Urine Clear Clear    Glucose Urine Negative Negative mg/dL    Bilirubin Urine Negative Negative    Ketones Urine Negative Negative mg/dL    Specific Gravity Urine 1.005 1.003 - 1.035    Blood Urine Negative Negative    pH Urine 7.5 (H) 5.0 - 7.0    Protein Albumin Urine Negative Negative mg/dL    Urobilinogen Urine Normal Normal, 2.0 mg/dL    Nitrite Urine Negative Negative    Leukocyte Esterase Urine Negative Negative    RBC Urine 1 <=2 /HPF    WBC Urine 1 <=5 /HPF    Squamous Epithelials Urine <1 <=1 /HPF    Narrative    Urine Culture not indicated   CT Facial Bones without Contrast    Narrative    CT FACIAL BONES WITHOUT CONTRAST 4/8/2022 3:33 PM    History:  Trauma - Facial Injury    Comparison: Same day CT head    Technique: Using thin collimation multidetector helical acquisition  technique, axial and coronal thin section CT images were reconstructed  through the facial bones. Images were reviewed in bone and soft tissue  windows.    Findings: Comminuted left orbital floor and medial wall fracture with  herniation of orbital fat into the left maxillary sinus. Downward  displacement of fracture fragments in the left maxillary sinus.  Inferior displacement of the left medial and inferior rectus muscles  without entrapment. Globe is intact. There is preseptal air. The  ocular globes are intact. The cribriform plate appears intact.  Alignment of the remaining facial bones appears normal. No other  fractures identified.    There are blood products within the left maxillary sinus. Mucosal  thickening in the left anterior ethmoidal air cells.      Impression    Impression:   Comminuted left orbital floor and medial  wall fracture with herniation  of orbital fat and fracture fragments into the maxillary sinus.  Opacification of the left maxillary sinus. Inferior displacement of  the inferior and medial rectus muscles.      I have personally reviewed the examination and initial interpretation  and I agree with the findings.    JJ NAILS MD         SYSTEM ID:  [id number]   CT Head w/o Contrast    Narrative    CT HEAD W/O CONTRAST 4/8/2022 3:33 PM    History: Head trauma, skull fracture or hematoma (Age 19-64y)     Comparison: 4/6/2012 dated CT    Technique: Using multidetector thin collimation helical acquisition  technique, axial, coronal and sagittal CT images from the skull base  to the vertex were obtained without intravenous contrast.   (topogram) image(s) also obtained and reviewed.    Findings:   No significant change in subdural hemorrhages in bilateral middle  cranial fossa and along the falx and tentorium.    There is no intracranial hemorrhage, mass effect, or midline shift.  Gray/white matter differentiation in both cerebral hemispheres is  preserved. Ventricles are proportionate to the cerebral sulci. The  basal cisterns are clear. Patchy periventricular hypodensity,  consistent with chronic small ischemic disease.    The bony calvaria and the bones of the skull base are normal. Left  orbital blowout fracture. Complete opacification of the left maxillary  sinus. The remaining paranasal sinuses and mastoid air cells are  clear.       Impression    Impression:  1. No significant change in subdural hemorrhages in bilateral middle  cranial fossa and along the falx and possibly tentorium.  2. Chronic small vessel ischemic disease.   3. Left orbital b,ow-out fracture. Refer to the same day face CT for  details.     JJ NAILS MD         SYSTEM ID:  [id number]       Studies:  CT Head w/o Contrast   Final Result   Impression:   1. No significant change in subdural hemorrhages in bilateral middle   cranial fossa  "and along the falx and possibly tentorium.   2. Chronic small vessel ischemic disease.    3. Left orbital b,ow-out fracture. Refer to the same day face CT for   details.       JJ NAILS MD            SYSTEM ID:  [id number]      CT Facial Bones without Contrast   Final Result   Impression:    Comminuted left orbital floor and medial wall fracture with herniation   of orbital fat and fracture fragments into the maxillary sinus.   Opacification of the left maxillary sinus. Inferior displacement of   the inferior and medial rectus muscles.        I have personally reviewed the examination and initial interpretation   and I agree with the findings.      JJ NAILS MD            SYSTEM ID:  [id number]      XR Hand Left G/E 3 Views   Final Result   Impression:   Possible nondisplaced fracture through the ulnar base of the fifth   metacarpal on the AP view.      AGUSTIN HARTMAN MD (Joe)            SYSTEM ID:  [id number]      XR Pelvis 1/2 Views   Final Result   IMPRESSION: Minimally impacted subcapital fracture of the right   femoral neck.      AGUSTIN HARTMAN MD (Joe)            SYSTEM ID:  [id number]      XR Femur Right 2 Views   Final Result   IMPRESSION: Minimally impacted subcapital fracture of the right   femoral neck.      AGUSTIN HARTMAN MD (Joe)            SYSTEM ID:  [id number]      CT Chest/Abdomen/Pelvis w Contrast   Final Result   Abnormal   Addendum 1 of 1   Please add to the body of the report the following in the section   titled \"Abdomen/Pelvis\":   The spleen has been resected. Lobulated enhancing nodule in the   anterior left upper quadrant adjacent to the colon consistent with   splenosis/hypertrophied splenule.      JOSEFA REESE MD            SYSTEM ID:  [id number]      Final   IMPRESSION:    1. Nondisplaced fracture of the right femoral neck and multiple   healing rib fractures, likely subacute.   2. No additional acute pathology of the lungs or abdomen/pelvis.   3. Exophytic " lesion in the lower pole of the left kidney suspicious   for renal cell carcinoma. No clear evidence of metastatic disease.   4. Colonic diverticulosis without evidence of acute diverticulitis.      [Urgent Result: Right femoral neck fracture]      Finding was identified on 4/8/2022 1:04 PM.       Dr. Tejada was contacted by Dr. Robles at 4/8/2022 1:39 PM and   verbalized understanding of the urgent finding.       Exophytic solid lesion lower pole of the left kidney concerning for   renal cell carcinoma with no evidence of metastatic disease discussed   with Dr. Tejada at 2:55 PM 4/8/2022 by Dr. Reese from radiology.      I have personally reviewed the examination and initial interpretation   and I agree with the findings.      JOSEFA REESE MD            SYSTEM ID:  [id number]      CT Lumbar Spine w Contrast   Final Result   Impression:    1. Thoracic spine:  No acute fracture or subluxation of the thoracic   spine.    2. Lumbar Spine: No acute fracture or subluxation of the lumbar spine.   3. Multilevel degenerative changes of the thoracic and lumbar spine as   detailed above.   4. 1.9 cm mass arising from the inferior pole of the left kidney. This   is renal cell carcinoma until proven otherwise. Please see same day   chest, abdomen and pelvis CT report for further details.      I have personally reviewed the examination and initial interpretation   and I agree with the findings.      VIKTORIYA CAMACHO MD            SYSTEM ID:  [id number]      CT Thoracic Spine w Contrast   Final Result   Impression:    1. Thoracic spine:  No acute fracture or subluxation of the thoracic   spine.    2. Lumbar Spine: No acute fracture or subluxation of the lumbar spine.   3. Multilevel degenerative changes of the thoracic and lumbar spine as   detailed above.   4. 1.9 cm mass arising from the inferior pole of the left kidney. This   is renal cell carcinoma until proven otherwise. Please see same day   chest, abdomen and pelvis  CT report for further details.      I have personally reviewed the examination and initial interpretation   and I agree with the findings.      VIKTORIYA CAMACHO MD            SYSTEM ID:  [id number]      Cervical spine CT w/o contrast   Final Result   Impression:    1.  No acute fracture or subluxation.   2.  Multilevel cervical spondylosis, greatest at C5-6 where there is   mild-to-moderate spinal canal and severe right neural foraminal   narrowing.      I have personally reviewed the examination and initial interpretation   and I agree with the findings.      VIKTORIYA CAMACHO MD            SYSTEM ID:  [id number]

## 2022-04-08 NOTE — TELEPHONE ENCOUNTER
ELIQUIS 5 MG TABLET     Last Written Prescription Date:  3/16/22  Last Fill Quantity: 60,   # refills: 0  Last Office Visit : 4/8/22  Future Office visit:  Per today's not> transport to ED  CT shows areas of small subdurals, 4 mm, L orbital blow out fracture. Will transport to ER for evaluation and monitoring. Patient advised she cannot drive.       Routing refill request to provider for review/approval because:  Patient transported to ED today per Dr Fuentes appt today and   CT 4/8 showing subdural hematoma
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
## 2022-04-08 NOTE — PROGRESS NOTES
"Rapid Response Epic Documentation     Situation: Pt seen in clinic today for \"not feeling well\" after a fall yesterday. Pt had head CT ordered and it confirmed a subdural bleed with orbital Fx. 911 called, RRT notified.       Objective:    Pt with head trauma from fall yesterday.    Assessment: Pt is A&Ox4, sitting in chair. Pt appears disheveled and had selling/brusing to L eye. Pt states she feels \"weird\" and has vision issues and trouble ambulating. Pt drove herself to clinic. Pt is on a blood thinner.         BP:  139/83    Pulse: 86  Respiration: 16  SPO2: 99 %  Mental Status: Alert  CMS: Intact  Stroke Scale: Not Applicable  EKG: Not Performed      Treatment:  Pt is comfortable in chair. IV attempt failed.    IV: Size Fail gauge,  Location Fail    EMS arrived and transferred care.     Location: [facility]         Disposition:      Transfer to Emergency Room Via: 911    Protocol Used:     Other Trauma        "

## 2022-04-08 NOTE — CONSULTS
HEMATOLOGY CONSULT NOTE       ASSESSMENT AND PLAN   Angeli Galindo is a 62 y.o. female with a history Hereditary Spherocytosis s/p splenectomy (at age 5), and reported Protein S deficiency, history of unprovoked DVT and PE on anticoagulation with apixaban, history of recent recurrent falls, neuropathy, chronic lower back pain and history of  Gastric Sleeve, Gastric bypass surgery who was admitted to the hospital after recent recurrent falls with a new Subdural hematoma, right femoral head fracture and incidental kidney mass c/f RCC.     Hematology was consulted regarding appropriateness of holding anticoagulation in setting of reported protein S deficiency and ongoing risk of bleed and history of DVT.    Patient with a history of provoked DVT/PE (2008) and then an unprovoked DVT(2012)   Known hereditary spherocytosis, questionable protein S deficiency given that testing performed while on warfarin. Regardless, will require life-long anticoagulation. Given current bleeding risks, in setting of SDH and questionable stability, wise to hold apixaban and consider dvt prophylaxis w/ enoxaparin in conjunction with neurosurgery. Plan to start on 4/9/22 if NSGY agrees pending serial CT imaging findings of the Subdural bleed. In the mean time may consider pneumatic compression devices.     Recommendations      - hold Apixaban      - would recommend prophylaxis for DVT w/ enoxaparin starting tomorrow 4/9/22, provided neurosurgery okay with this; otherwise consider pneumatic compression devices.       - Iron panel and B12 levels (will order for you) in setting of roue-en-y bypass (iron deficiency precedes anemia) and high risk of malabsorption.       - trauma work-up as per other teams: ENT, NSGY, ortho; appreciate.       We will continue to follow along with you. Thanks for allowing us to participate in this patient's care.    Case discussed with and patient seen with Dr. Couch, who agrees with the findings above.        Katy Yadav MD   Hematology Service   Internal Medicine - PGY1   Pager # [phone]  Attending  The patient was seen and examined by me separate from the resident/fellow provider.The note above reflects my assessment and plan. I have personally reviewed today's labs,vital and radiology results. The points of care that were added by me are:   Interesting pt wit hx of spherocytosis,s/p splenectomy,DVT and PE with low Protein S in past,Rose Mary Nikki bariatric surgery,who fell had a blowout left orbital rx, right femur fx and sub dural while on apixaban. Agree with higgins to hold apixaban and will check with NS to see if ok to give DVT prophy with lovenox 40mgSub q.    Alex Couch M.D.  [phone]          History of Present Illness   Per chart search, patient was seen in the clinic today where she presented after malaise after falling yesterday and CT demonstrated thin supratentorial Subdural hematoma and left blow-out orbital fracture. Reportedly her first fall was on 3/9/22 and began having some groin pain but all imaging negative. She has since been falling multiple times a day, feeling unsteady, with some mix of light headedness and gait imbalance associated with these falls.  She denies any fevers, chills, nausea, vomiting , chest pain, shortness of breath, weakness, any loss of consciousness, recent easy bruisability or skin echymosis, changes in taste, smell, hearing. She does endorse headache, diplopia that resolves with closing one ye.   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    2008: developed Left DVT w/ PE after prolonged immobilization after a back injury. Started on warfarin.   2012 : developed a largely unprovoked right DVT lower extremity, work-up at that time was negative for any thrombophilic/genic conditions, except for reportedly a protein S deficiency (which we doubt) given that it was reportedly performed while patient was  on warfarin.   Hx of Gastric bypass  S/P Splenectomy  Spherocytosis    Family history: daughter has HS as well, initially diagnosed after a parvovirus infection triggered acute anemia.   Patient herself had splenectomy at age 5. Her father apparently also did.   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       General: alert and oriented x3, in no apparent cardiopulmonary distress   HEENT: normocephalic, mild  MMM, (happened to see after ophthalmology had seen her, and PER, not reactive much to light, but very dilated (greater than 6 mm. Mild left eye esophoria. EOMI otherwise. Oropharynx: mild hard palate ecchymosis posteriorly.  Neurologic: deferred gait, sensation grossly intact  Cardiac: Regular rate and rhythm, normal S1, S2, no murmurs gallops or rubs    Pulmonary: chest clear to auscultation bilaterally,  no wheezes, rales, or rhonchi   Abdomen: soft, non-distended, no irregular masses, no rebound or guarding.   Hematologic: oral ecchymosis on posterior hard palate as above.   Lymphatic: No cervical, supra/infraclavicular, lymphadenopathy.   Extremities: No lower extremity edema, palpable dorsalis pedis and radial pulses bilaterally.  Psychiatric: Linear thought processes, normal speech, mood appropriate affect.     Labs: INR 1.0, PTT 25, Hgb 13.5, WBC 11.14, Ca 8.1,  Cr 0.52, HCO3 29 other electrolytes normal. U/A bland.     Imaging reviewed 4/8/22 :   XR left hand:   Impression:  Possible nondisplaced fracture through the ulnar base of the fifth  metacarpal on the AP view.   XR Pelvis   IMPRESSION: Minimally impacted subcapital fracture of the right  femoral neck.   CT Chest Abdomen Pelvis   IMPRESSION:   1. Nondisplaced fracture of the  right femoral neck and multiple  healing rib fractures, likely subacute.  2. No additional acute pathology of the lungs or abdomen/pelvis.  3. Exophytic lesion in the lower pole of the left kidney suspicious  for renal cell carcinoma. No clear evidence of metastatic disease.  4. Colonic diverticulosis without evidence of acute diverticulitis.     [Urgent Result: Right femoral neck fracture]     Finding was identified on 4/8/2022 1:04 PM.      Dr. Tejada was contacted by Dr. Robles at 4/8/2022 1:39 PM and  verbalized understanding of the urgent finding.      Exophytic solid lesion lower pole of the left kidney concerning for  renal cell carcinoma with no evidence of metastatic disease discussed  with Dr. Tejada at 2:55 PM 4/8/2022 by Dr. Sanchez from radiology.

## 2022-04-08 NOTE — CONSULTS
OPHTHALMOLOGY CONSULT NOTE  22    Patient: Angeli Galindo      HISTORY OF PRESENTING ILLNESS:     Angeli Galindo is a 62 year old female who is admitted to trauma service for recent fall. Patient has had ~10 falls in the last month. The most recent fall was yesterday 2022. Patient also complains of diplopia since two days ago. Covering either eye resolves the diplopia. The diplopia is vertical. Patient also complains of pain in the left eye when looking down.       10+ review of systems were otherwise negative except for that which has been stated above.      OCULAR/MEDICAL/SURGICAL HISTORIES:     Past Ocular History:  LASIK, both eyes.     Family Ocular History:  Family history of diabetic retinopathy.     Social History:  Prior smoker.     Past Medical History:   Diagnosis Date     Bilateral low back pain without sciatica      Chronic deep vein thrombosis (DVT) of lower extremity (H)      H/O splenectomy      Obesity due to excess calories     s/p gastric bypass, 320 lb to 170 lbs     Protein S deficiency (H)      Spherocytosis (H)        Past Surgical History:   Procedure Laterality Date     BACK SURGERY      L4/5 microdiskectomy      SECTION       EXCISE LESION TRUNK N/A 2021    Procedure: EXCISE CYST ABDOMINAL WALL;  Surgeon: Junior Velasco MD;  Location: [facility] Main OR     GASTRIC BYPASS  2016    neto en y     IR REMOVAL IVC FILTER         EXAMINATION:     Base Eye Exam     Visual Acuity       Right Left    Dist ph sc  20/30    Near cc 20/30 20/40          Tonometry (Applanation, 2:36 PM)       Right Left    Pressure 16 13          Pupils       Pupils APD    Right PERRL     Left PERRL no apd          Visual Fields       Left Right     Full Full          Extraocular Movement       Right Left     0 0 0   0  0   0 0 0    -tr -tr -tr   --  --   -- -- --             Neuro/Psych     Oriented x3: Yes    Mood/Affect: Normal          Dilation     Both eyes: 1.0%  Mydriacyl, 2.5% Dmitriy Synephrine @ 2:37 PM            Slit Lamp and Fundus Exam     External Exam       Right Left    External Normal Normal          Slit Lamp Exam       Right Left    Lids/Lashes Normal Normal    Conjunctiva/Sclera White and quiet White and quiet    Cornea Clear Clear    Anterior Chamber Deep and quiet Deep and quiet    Iris Round and reactive Round and reactive    Lens NSC NSC          Fundus Exam       Right Left    Vitreous Normal Normal    Disc Normal, no edema, no pallor Normal, no edema, no pallor    C/D Ratio Vertical 0.2 0.2    C/D Ratio Horizontal 0.2 0.2    Macula Normal Normal    Vessels Normal Normal    Periphery Normal, attached Normal, attached                Labs/Studies/Imaging Performed  CT head 04/08/2022  Impression:   1.  Thin extra-axial hyperdensities along the anterior middle cranial  fossa bilaterally and anterior falx, suspicious for subdural  hemorrhages.  2.  Left orbital blowout fracture with inferior orbital wall  displacement associated with significant downward herniation of  extraconal fat, no definite orbital muscle entrapment.  3.  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     [Result: thin subdural hemorrhages, left orbital blowout fracture]     Finding was identified on 4/8/2022 9:55 AM.      Dr. Fuentes's clinic nurse, Shanda Ramirez RN, was contacted by Dr. Yobani Beltrán at 4/8/2022 10:16 AM and verbalized understanding of the  findings.      I have personally reviewed the examination and initial interpretation  and I agree with the findings.     VIKTORIYA CAMACHO MD      ASSESSMENT/PLAN:     #Orbital Blowout Fracture, left eye.  - Angeli Galindo is a 62 year old female who presents with recent fall yesterday 04/08/2022. Patient complains of multiple falls since 03/09/2022 when she slipped and fell on ice. Patient estimated that she had at least ten falls since the initial fall. She complains of vertical diplopia since 2 days ago. The diplopia does resolve when she  covers one eye. There is also pain when looking inferiorly on the left eye. CT facial 04/08/2022 is notable for a left orbital blowout fracture without entrapment. Vision is 20/30 in the right eye and 20/30 with pinhole on the left eye. Peripheral vision is full to finger counting. There is trace motility restriction on upgaze of the left eye. Intraocular pressures are normal. There is no afferent pupillary defect. Anterior segment exam is notable for age related cataract in both eyes. The fundus exam is normal.   - The mild motility deficit in the left eye is likely related to mild extraocular muscle edema and/or orbital floor fracture. Diplopia should most likely resolve as the extraocular muscle edema improves. There is no evidence of muscle entrapment.     Plan:   - Left orbital floor repair outpatient - per ENT.   - Follow up in general ophthalmology clinic in 1 month.   - Ophthalmology will sign off.         Please contact our eye clinic upon discharge to schedule your follow-up appointment.   [facility], 9th Floor, [address] [place] [id number]  [phone]  It is our pleasure to participate in this patient's care and treatment. Please contact us with any further questions or concerns.      Nas Rowe OD (Yen)  Ophthalmology  Adjunct   Pager: [phone]

## 2022-04-09 ENCOUNTER — ANESTHESIA (OUTPATIENT)
Dept: SURGERY | Facility: CLINIC | Age: 62
End: 2022-04-09
Payer: COMMERCIAL

## 2022-04-09 ENCOUNTER — APPOINTMENT (OUTPATIENT)
Dept: GENERAL RADIOLOGY | Facility: CLINIC | Age: 62
End: 2022-04-09
Attending: ORTHOPAEDIC SURGERY
Payer: COMMERCIAL

## 2022-04-09 ENCOUNTER — APPOINTMENT (OUTPATIENT)
Dept: CT IMAGING | Facility: CLINIC | Age: 62
End: 2022-04-09
Attending: PHYSICIAN ASSISTANT
Payer: COMMERCIAL

## 2022-04-09 LAB
ANION GAP SERPL CALCULATED.3IONS-SCNC: 5 MMOL/L (ref 3–14)
BUN SERPL-MCNC: 8 MG/DL (ref 7–30)
CALCIUM SERPL-MCNC: 8 MG/DL (ref 8.5–10.1)
CHLORIDE BLD-SCNC: 111 MMOL/L (ref 94–109)
CO2 SERPL-SCNC: 26 MMOL/L (ref 20–32)
CREAT SERPL-MCNC: 0.37 MG/DL (ref 0.52–1.04)
ERYTHROCYTE [DISTWIDTH] IN BLOOD BY AUTOMATED COUNT: 12.5 % (ref 10–15)
GFR SERPL CREATININE-BSD FRML MDRD: >90 ML/MIN/1.73M2
GLUCOSE BLD-MCNC: 94 MG/DL (ref 70–99)
GLUCOSE BLDC GLUCOMTR-MCNC: 73 MG/DL (ref 70–99)
HCT VFR BLD AUTO: 37.8 % (ref 35–47)
HGB BLD-MCNC: 12.5 G/DL (ref 11.7–15.7)
MCH RBC QN AUTO: 31.3 PG (ref 26.5–33)
MCHC RBC AUTO-ENTMCNC: 33.1 G/DL (ref 31.5–36.5)
MCV RBC AUTO: 95 FL (ref 78–100)
PLATELET # BLD AUTO: 289 10E3/UL (ref 150–450)
POTASSIUM BLD-SCNC: 3.9 MMOL/L (ref 3.4–5.3)
RBC # BLD AUTO: 3.99 10E6/UL (ref 3.8–5.2)
SODIUM SERPL-SCNC: 142 MMOL/L (ref 133–144)
WBC # BLD AUTO: 6.6 10E3/UL (ref 4–11)

## 2022-04-09 PROCEDURE — 370N000017 HC ANESTHESIA TECHNICAL FEE, PER MIN: Performed by: ORTHOPAEDIC SURGERY

## 2022-04-09 PROCEDURE — 96375 TX/PRO/DX INJ NEW DRUG ADDON: CPT | Performed by: EMERGENCY MEDICINE

## 2022-04-09 PROCEDURE — 250N000011 HC RX IP 250 OP 636: Performed by: STUDENT IN AN ORGANIZED HEALTH CARE EDUCATION/TRAINING PROGRAM

## 2022-04-09 PROCEDURE — 36415 COLL VENOUS BLD VENIPUNCTURE: CPT | Performed by: INTERNAL MEDICINE

## 2022-04-09 PROCEDURE — 120N000002 HC R&B MED SURG/OB UMMC

## 2022-04-09 PROCEDURE — 272N000002 HC OR SUPPLY OTHER OPNP: Performed by: ORTHOPAEDIC SURGERY

## 2022-04-09 PROCEDURE — 250N000011 HC RX IP 250 OP 636

## 2022-04-09 PROCEDURE — 84132 ASSAY OF SERUM POTASSIUM: CPT | Performed by: PHYSICIAN ASSISTANT

## 2022-04-09 PROCEDURE — 258N000003 HC RX IP 258 OP 636

## 2022-04-09 PROCEDURE — 27235 TREAT THIGH FRACTURE: CPT | Mod: RT | Performed by: ORTHOPAEDIC SURGERY

## 2022-04-09 PROCEDURE — 999N000141 HC STATISTIC PRE-PROCEDURE NURSING ASSESSMENT: Performed by: ORTHOPAEDIC SURGERY

## 2022-04-09 PROCEDURE — 250N000013 HC RX MED GY IP 250 OP 250 PS 637: Performed by: PHYSICIAN ASSISTANT

## 2022-04-09 PROCEDURE — 250N000025 HC SEVOFLURANE, PER MIN: Performed by: ORTHOPAEDIC SURGERY

## 2022-04-09 PROCEDURE — 250N000011 HC RX IP 250 OP 636: Performed by: ORTHOPAEDIC SURGERY

## 2022-04-09 PROCEDURE — 360N000084 HC SURGERY LEVEL 4 W/ FLUORO, PER MIN: Performed by: ORTHOPAEDIC SURGERY

## 2022-04-09 PROCEDURE — 250N000009 HC RX 250

## 2022-04-09 PROCEDURE — 0QH634Z INSERTION OF INTERNAL FIXATION DEVICE INTO RIGHT UPPER FEMUR, PERCUTANEOUS APPROACH: ICD-10-PCS | Performed by: ORTHOPAEDIC SURGERY

## 2022-04-09 PROCEDURE — 36415 COLL VENOUS BLD VENIPUNCTURE: CPT | Performed by: PHYSICIAN ASSISTANT

## 2022-04-09 PROCEDURE — 999N000179 XR SURGERY CARM FLUORO LESS THAN 5 MIN W STILLS: Mod: TC

## 2022-04-09 PROCEDURE — 85027 COMPLETE CBC AUTOMATED: CPT | Performed by: PHYSICIAN ASSISTANT

## 2022-04-09 PROCEDURE — 710N000010 HC RECOVERY PHASE 1, LEVEL 2, PER MIN: Performed by: ORTHOPAEDIC SURGERY

## 2022-04-09 PROCEDURE — 70450 CT HEAD/BRAIN W/O DYE: CPT

## 2022-04-09 PROCEDURE — 73552 X-RAY EXAM OF FEMUR 2/>: CPT | Mod: 26 | Performed by: ORTHOPAEDIC SURGERY

## 2022-04-09 PROCEDURE — 250N000011 HC RX IP 250 OP 636: Performed by: PHYSICIAN ASSISTANT

## 2022-04-09 PROCEDURE — 85306 CLOT INHIBIT PROT S FREE: CPT | Performed by: INTERNAL MEDICINE

## 2022-04-09 PROCEDURE — 272N000001 HC OR GENERAL SUPPLY STERILE: Performed by: ORTHOPAEDIC SURGERY

## 2022-04-09 PROCEDURE — 250N000013 HC RX MED GY IP 250 OP 250 PS 637: Performed by: EMERGENCY MEDICINE

## 2022-04-09 PROCEDURE — 70450 CT HEAD/BRAIN W/O DYE: CPT | Mod: 26 | Performed by: RADIOLOGY

## 2022-04-09 PROCEDURE — C1713 ANCHOR/SCREW BN/BN,TIS/BN: HCPCS | Performed by: ORTHOPAEDIC SURGERY

## 2022-04-09 PROCEDURE — 99232 SBSQ HOSP IP/OBS MODERATE 35: CPT | Performed by: PHYSICIAN ASSISTANT

## 2022-04-09 RX ORDER — LIDOCAINE HYDROCHLORIDE 20 MG/ML
INJECTION, SOLUTION INFILTRATION; PERINEURAL PRN
Status: DISCONTINUED | OUTPATIENT
Start: 2022-04-09 | End: 2022-04-09

## 2022-04-09 RX ORDER — OXYCODONE HYDROCHLORIDE 5 MG/1
5 TABLET ORAL EVERY 4 HOURS PRN
Status: DISCONTINUED | OUTPATIENT
Start: 2022-04-09 | End: 2022-04-09 | Stop reason: HOSPADM

## 2022-04-09 RX ORDER — LABETALOL HYDROCHLORIDE 5 MG/ML
INJECTION, SOLUTION INTRAVENOUS PRN
Status: DISCONTINUED | OUTPATIENT
Start: 2022-04-09 | End: 2022-04-09

## 2022-04-09 RX ORDER — DEXAMETHASONE SODIUM PHOSPHATE 4 MG/ML
INJECTION, SOLUTION INTRA-ARTICULAR; INTRALESIONAL; INTRAMUSCULAR; INTRAVENOUS; SOFT TISSUE PRN
Status: DISCONTINUED | OUTPATIENT
Start: 2022-04-09 | End: 2022-04-09

## 2022-04-09 RX ORDER — FENTANYL CITRATE 50 UG/ML
50 INJECTION, SOLUTION INTRAMUSCULAR; INTRAVENOUS EVERY 5 MIN PRN
Status: DISCONTINUED | OUTPATIENT
Start: 2022-04-09 | End: 2022-04-09 | Stop reason: HOSPADM

## 2022-04-09 RX ORDER — SODIUM CHLORIDE, SODIUM LACTATE, POTASSIUM CHLORIDE, CALCIUM CHLORIDE 600; 310; 30; 20 MG/100ML; MG/100ML; MG/100ML; MG/100ML
INJECTION, SOLUTION INTRAVENOUS CONTINUOUS PRN
Status: DISCONTINUED | OUTPATIENT
Start: 2022-04-09 | End: 2022-04-09

## 2022-04-09 RX ORDER — BUPIVACAINE HYDROCHLORIDE 2.5 MG/ML
INJECTION, SOLUTION INFILTRATION; PERINEURAL PRN
Status: DISCONTINUED | OUTPATIENT
Start: 2022-04-09 | End: 2022-04-09 | Stop reason: HOSPADM

## 2022-04-09 RX ORDER — PROPOFOL 10 MG/ML
INJECTION, EMULSION INTRAVENOUS PRN
Status: DISCONTINUED | OUTPATIENT
Start: 2022-04-09 | End: 2022-04-09

## 2022-04-09 RX ORDER — ONDANSETRON 2 MG/ML
INJECTION INTRAMUSCULAR; INTRAVENOUS PRN
Status: DISCONTINUED | OUTPATIENT
Start: 2022-04-09 | End: 2022-04-09

## 2022-04-09 RX ORDER — EPHEDRINE SULFATE 50 MG/ML
INJECTION, SOLUTION INTRAMUSCULAR; INTRAVENOUS; SUBCUTANEOUS PRN
Status: DISCONTINUED | OUTPATIENT
Start: 2022-04-09 | End: 2022-04-09

## 2022-04-09 RX ORDER — SODIUM CHLORIDE, SODIUM LACTATE, POTASSIUM CHLORIDE, CALCIUM CHLORIDE 600; 310; 30; 20 MG/100ML; MG/100ML; MG/100ML; MG/100ML
INJECTION, SOLUTION INTRAVENOUS CONTINUOUS
Status: DISCONTINUED | OUTPATIENT
Start: 2022-04-09 | End: 2022-04-09 | Stop reason: HOSPADM

## 2022-04-09 RX ORDER — FENTANYL CITRATE 50 UG/ML
INJECTION, SOLUTION INTRAMUSCULAR; INTRAVENOUS PRN
Status: DISCONTINUED | OUTPATIENT
Start: 2022-04-09 | End: 2022-04-09

## 2022-04-09 RX ORDER — CEFAZOLIN SODIUM 2 G/100ML
2 INJECTION, SOLUTION INTRAVENOUS
Status: COMPLETED | OUTPATIENT
Start: 2022-04-09 | End: 2022-04-09

## 2022-04-09 RX ORDER — KETAMINE HYDROCHLORIDE 10 MG/ML
INJECTION INTRAMUSCULAR; INTRAVENOUS PRN
Status: DISCONTINUED | OUTPATIENT
Start: 2022-04-09 | End: 2022-04-09

## 2022-04-09 RX ORDER — LIDOCAINE 40 MG/G
CREAM TOPICAL
Status: DISCONTINUED | OUTPATIENT
Start: 2022-04-09 | End: 2022-04-09 | Stop reason: HOSPADM

## 2022-04-09 RX ORDER — ONDANSETRON 2 MG/ML
4 INJECTION INTRAMUSCULAR; INTRAVENOUS EVERY 30 MIN PRN
Status: DISCONTINUED | OUTPATIENT
Start: 2022-04-09 | End: 2022-04-09 | Stop reason: HOSPADM

## 2022-04-09 RX ORDER — CEFAZOLIN SODIUM 2 G/100ML
2 INJECTION, SOLUTION INTRAVENOUS SEE ADMIN INSTRUCTIONS
Status: DISCONTINUED | OUTPATIENT
Start: 2022-04-09 | End: 2022-04-09 | Stop reason: HOSPADM

## 2022-04-09 RX ORDER — HYDROMORPHONE HYDROCHLORIDE 1 MG/ML
0.4 INJECTION, SOLUTION INTRAMUSCULAR; INTRAVENOUS; SUBCUTANEOUS EVERY 5 MIN PRN
Status: DISCONTINUED | OUTPATIENT
Start: 2022-04-09 | End: 2022-04-09 | Stop reason: HOSPADM

## 2022-04-09 RX ORDER — LABETALOL HYDROCHLORIDE 5 MG/ML
10 INJECTION, SOLUTION INTRAVENOUS
Status: DISCONTINUED | OUTPATIENT
Start: 2022-04-09 | End: 2022-04-09 | Stop reason: HOSPADM

## 2022-04-09 RX ORDER — ONDANSETRON 4 MG/1
4 TABLET, ORALLY DISINTEGRATING ORAL EVERY 30 MIN PRN
Status: DISCONTINUED | OUTPATIENT
Start: 2022-04-09 | End: 2022-04-09 | Stop reason: HOSPADM

## 2022-04-09 RX ADMIN — CALCIUM CARBONATE 600 MG (1,500 MG)-VITAMIN D3 400 UNIT TABLET 1 TABLET: at 08:47

## 2022-04-09 RX ADMIN — Medication 1 TABLET: at 08:47

## 2022-04-09 RX ADMIN — SENNOSIDES AND DOCUSATE SODIUM 2 TABLET: 50; 8.6 TABLET ORAL at 20:18

## 2022-04-09 RX ADMIN — PROPOFOL 150 MG: 10 INJECTION, EMULSION INTRAVENOUS at 10:34

## 2022-04-09 RX ADMIN — GABAPENTIN 600 MG: 600 TABLET, FILM COATED ORAL at 08:47

## 2022-04-09 RX ADMIN — LEVETIRACETAM 750 MG: 750 TABLET, FILM COATED ORAL at 08:50

## 2022-04-09 RX ADMIN — SODIUM CHLORIDE, POTASSIUM CHLORIDE, SODIUM LACTATE AND CALCIUM CHLORIDE: 600; 310; 30; 20 INJECTION, SOLUTION INTRAVENOUS at 10:21

## 2022-04-09 RX ADMIN — GABAPENTIN 1200 MG: 600 TABLET, FILM COATED ORAL at 21:25

## 2022-04-09 RX ADMIN — PHENYLEPHRINE HYDROCHLORIDE 100 MCG: 10 INJECTION INTRAVENOUS at 11:13

## 2022-04-09 RX ADMIN — ACETAMINOPHEN 975 MG: 325 TABLET ORAL at 13:52

## 2022-04-09 RX ADMIN — LEVETIRACETAM 750 MG: 750 TABLET, FILM COATED ORAL at 20:18

## 2022-04-09 RX ADMIN — FENTANYL CITRATE 150 MCG: 50 INJECTION, SOLUTION INTRAMUSCULAR; INTRAVENOUS at 10:34

## 2022-04-09 RX ADMIN — LEVETIRACETAM 750 MG: 750 TABLET, FILM COATED ORAL at 00:00

## 2022-04-09 RX ADMIN — OXYCODONE HYDROCHLORIDE 5 MG: 5 TABLET ORAL at 08:48

## 2022-04-09 RX ADMIN — SENNOSIDES AND DOCUSATE SODIUM 1 TABLET: 50; 8.6 TABLET ORAL at 08:47

## 2022-04-09 RX ADMIN — Medication 5 MG: at 11:04

## 2022-04-09 RX ADMIN — Medication 2 G: at 10:50

## 2022-04-09 RX ADMIN — PHENYLEPHRINE HYDROCHLORIDE 100 MCG: 10 INJECTION INTRAVENOUS at 10:53

## 2022-04-09 RX ADMIN — Medication 5 MG: at 10:57

## 2022-04-09 RX ADMIN — SUGAMMADEX 350 MG: 100 INJECTION, SOLUTION INTRAVENOUS at 12:10

## 2022-04-09 RX ADMIN — CYANOCOBALAMIN TAB 500 MCG 500 MCG: 500 TAB at 08:51

## 2022-04-09 RX ADMIN — PHENYLEPHRINE HYDROCHLORIDE 100 MCG: 10 INJECTION INTRAVENOUS at 11:22

## 2022-04-09 RX ADMIN — OXYCODONE HYDROCHLORIDE 5 MG: 5 TABLET ORAL at 18:29

## 2022-04-09 RX ADMIN — SENNOSIDES AND DOCUSATE SODIUM 1 TABLET: 50; 8.6 TABLET ORAL at 00:01

## 2022-04-09 RX ADMIN — DEXAMETHASONE SODIUM PHOSPHATE 4 MG: 4 INJECTION, SOLUTION INTRA-ARTICULAR; INTRALESIONAL; INTRAMUSCULAR; INTRAVENOUS; SOFT TISSUE at 10:53

## 2022-04-09 RX ADMIN — ACETAMINOPHEN 975 MG: 325 TABLET ORAL at 08:47

## 2022-04-09 RX ADMIN — FENTANYL CITRATE 50 MCG: 50 INJECTION, SOLUTION INTRAMUSCULAR; INTRAVENOUS at 12:42

## 2022-04-09 RX ADMIN — PANTOPRAZOLE SODIUM 40 MG: 40 TABLET, DELAYED RELEASE ORAL at 08:47

## 2022-04-09 RX ADMIN — Medication 10 MG: at 11:30

## 2022-04-09 RX ADMIN — ROCURONIUM BROMIDE 50 MG: 50 INJECTION, SOLUTION INTRAVENOUS at 10:34

## 2022-04-09 RX ADMIN — PHENYLEPHRINE HYDROCHLORIDE 100 MCG: 10 INJECTION INTRAVENOUS at 11:00

## 2022-04-09 RX ADMIN — METHOCARBAMOL 500 MG: 500 TABLET ORAL at 21:25

## 2022-04-09 RX ADMIN — ACETAMINOPHEN 975 MG: 325 TABLET ORAL at 20:18

## 2022-04-09 RX ADMIN — DULOXETINE HYDROCHLORIDE 60 MG: 60 CAPSULE, DELAYED RELEASE PELLETS ORAL at 08:51

## 2022-04-09 RX ADMIN — ONDANSETRON 4 MG: 2 INJECTION INTRAMUSCULAR; INTRAVENOUS at 11:22

## 2022-04-09 RX ADMIN — PHENYLEPHRINE HYDROCHLORIDE 100 MCG: 10 INJECTION INTRAVENOUS at 11:42

## 2022-04-09 RX ADMIN — PHENYLEPHRINE HYDROCHLORIDE 100 MCG: 10 INJECTION INTRAVENOUS at 11:06

## 2022-04-09 RX ADMIN — LIDOCAINE HYDROCHLORIDE 100 MG: 20 INJECTION, SOLUTION INFILTRATION; PERINEURAL at 10:34

## 2022-04-09 RX ADMIN — Medication 5 MG: at 11:45

## 2022-04-09 RX ADMIN — HYDROMORPHONE HYDROCHLORIDE 0.5 MG: 1 INJECTION, SOLUTION INTRAMUSCULAR; INTRAVENOUS; SUBCUTANEOUS at 11:30

## 2022-04-09 RX ADMIN — OXYCODONE HYDROCHLORIDE 5 MG: 5 TABLET ORAL at 13:52

## 2022-04-09 RX ADMIN — LABETALOL HYDROCHLORIDE 10 MG: 5 INJECTION INTRAVENOUS at 10:43

## 2022-04-09 RX ADMIN — PROPOFOL 100 MG: 10 INJECTION, EMULSION INTRAVENOUS at 10:41

## 2022-04-09 RX ADMIN — GABAPENTIN 600 MG: 600 TABLET, FILM COATED ORAL at 16:04

## 2022-04-09 RX ADMIN — FENTANYL CITRATE 50 MCG: 50 INJECTION, SOLUTION INTRAMUSCULAR; INTRAVENOUS at 12:57

## 2022-04-09 RX ADMIN — HYDROMORPHONE HYDROCHLORIDE 0.2 MG: 0.2 INJECTION, SOLUTION INTRAMUSCULAR; INTRAVENOUS; SUBCUTANEOUS at 15:38

## 2022-04-09 ASSESSMENT — ACTIVITIES OF DAILY LIVING (ADL)
ADLS_ACUITY_SCORE: 11
FALL_HISTORY_WITHIN_LAST_SIX_MONTHS: YES
ADLS_ACUITY_SCORE: 8
WALKING_OR_CLIMBING_STAIRS_DIFFICULTY: NO
ADLS_ACUITY_SCORE: 9
CHANGE_IN_FUNCTIONAL_STATUS_SINCE_ONSET_OF_CURRENT_ILLNESS/INJURY: YES
ADLS_ACUITY_SCORE: 8
DOING_ERRANDS_INDEPENDENTLY_DIFFICULTY: NO
DRESSING/BATHING_DIFFICULTY: NO
DIFFICULTY_COMMUNICATING: NO
TOILETING_ISSUES: NO
ADLS_ACUITY_SCORE: 8
ADLS_ACUITY_SCORE: 9
ADLS_ACUITY_SCORE: 8
ADLS_ACUITY_SCORE: 9
ADLS_ACUITY_SCORE: 8
ADLS_ACUITY_SCORE: 8
CONCENTRATING,_REMEMBERING_OR_MAKING_DECISIONS_DIFFICULTY: YES
ADLS_ACUITY_SCORE: 8
ADLS_ACUITY_SCORE: 8
NUMBER_OF_TIMES_PATIENT_HAS_FALLEN_WITHIN_LAST_SIX_MONTHS: 15
ADLS_ACUITY_SCORE: 8
HEARING_DIFFICULTY_OR_DEAF: NO
ADLS_ACUITY_SCORE: 8
WEAR_GLASSES_OR_BLIND: YES
ADLS_ACUITY_SCORE: 8
DIFFICULTY_EATING/SWALLOWING: NO
ADLS_ACUITY_SCORE: 9
ADLS_ACUITY_SCORE: 8
ADLS_ACUITY_SCORE: 8

## 2022-04-09 ASSESSMENT — VISUAL ACUITY: OU: NORMAL ACUITY

## 2022-04-09 NOTE — PROGRESS NOTES
"Neurosurgery brief note:    Ok for prophylactic dose lovenox as recommended by Hematology team.     Please notify neurosurgery of any plan to initiate therapeutic lovenox or apixaban.     Neurosurgery to arrange for follow-up in clinic in 2-3 weeks with repeat head CT.      Images reviewed and discussed with Dr. Santa and Dr. Lanza.  --    Luis \"Daniel\" MD Elsi   Pager: [phone]  Neurosurgery, PGY-5    Please contact neurosurgery resident on call with questions.    Dial * * [phone], enter [phone] when prompted.     "

## 2022-04-09 NOTE — PLAN OF CARE
Arrived from: PACU  Belongings/meds: 2 bags of belongings placed on chair in pts room  2 RN Skin Assessment Completed by: Yeison HANCOCK    Non-intact findings documented (yes/no/NA): L lip swollen, blanchable redness L elbow, L hip bruised, Incision on R hip covered w/primapore

## 2022-04-09 NOTE — ANESTHESIA PREPROCEDURE EVALUATION
Anesthesia Pre-Procedure Evaluation    Patient: Angeli Galindo   MRN: [id number] : 1960        Procedure : Procedure(s):  Right hip pinning          Past Medical History:   Diagnosis Date     Bilateral low back pain without sciatica      Chronic deep vein thrombosis (DVT) of lower extremity (H)      H/O splenectomy      Obesity due to excess calories     s/p gastric bypass, 320 lb to 170 lbs     Protein S deficiency (H)      Spherocytosis (H)       Past Surgical History:   Procedure Laterality Date     BACK SURGERY      L4/5 microdiskectomy      SECTION       EXCISE LESION TRUNK N/A 2021    Procedure: EXCISE CYST ABDOMINAL WALL;  Surgeon: Junior Velasco MD;  Location: [facility] Main OR     GASTRIC BYPASS  2016    neto en y     IR REMOVAL IVC FILTER        Allergies   Allergen Reactions     Shellfish-Derived Products Angioedema     Lisinopril Cough      Social History     Tobacco Use     Smoking status: Former Smoker     Years: 5.00     Quit date: 2000     Years since quittin.2     Smokeless tobacco: Never Used   Substance Use Topics     Alcohol use: Yes     Comment: Rare      Wt Readings from Last 1 Encounters:   22 84.4 kg (186 lb)        Anesthesia Evaluation   Pt has had prior anesthetic. Type: General.    No history of anesthetic complications       ROS/MED HX  ENT/Pulmonary: Comment:  Blowout left orbital rx   (-) sleep apnea and recent URI   Neurologic: Comment: History of recent recurrent falls  Subdural hematoma    (+) peripheral neuropathy,  (-) no CVA and no TIA   Cardiovascular:     (+) Dyslipidemia hypertension-----Taking blood thinners Previous cardiac testing   Echo: Date: Results:    Stress Test: Date: Results:    ECG Reviewed: Date: 22 Results:  NSR  Cath: Date: Results:      METS/Exercise Tolerance:     Hematologic: Comments: Hereditary Spherocytosis s/p splenectomy   Protein S Deficiency    (+) History of blood clots (HX DVT/PE x 2, FVLD shola been  on anticoagulation since 2010), pt is anticoagulated,  (-) anemia   Musculoskeletal: Comment: Chronic low back pain  R femur fx  (+) arthritis,     GI/Hepatic: Comment: S/P Rose Mary en Y bariatric surgery - neg GI/hepatic ROS  (-) GERD   Renal/Genitourinary:    (-) renal disease (renal cyst)   Endo:  - neg endo ROS   (+) Obesity,  (-) Type II DM   Psychiatric/Substance Use:  - neg psychiatric ROS  (-) psychiatric history   Infectious Disease: Comment: COVID NEGATIVE 4/8/22 - neg infectious disease ROS  (-) Recent Fever   Malignancy:  - neg malignancy ROS     Other:  - neg other ROS          Physical Exam    Airway        Mallampati: III   TM distance: > 3 FB   Neck ROM: full   Mouth opening: > 3 cm    Respiratory Devices and Support         Dental  no notable dental history         Cardiovascular   cardiovascular exam normal       Rhythm and rate: regular and normal     Pulmonary   pulmonary exam normal        breath sounds clear to auscultation           OUTSIDE LABS:  CBC:   Lab Results   Component Value Date    WBC 11.4 (H) 04/08/2022    WBC 11.9 (H) 04/08/2022    HGB 13.5 04/08/2022    HGB 13.6 04/08/2022    HCT 39.9 04/08/2022    HCT 39.8 04/08/2022     04/08/2022     04/08/2022     BMP:   Lab Results   Component Value Date     04/08/2022     04/08/2022    POTASSIUM 3.7 04/08/2022    POTASSIUM 4.0 04/08/2022    CHLORIDE 108 04/08/2022    CHLORIDE 108 04/08/2022    CO2 29 04/08/2022    CO2 27 04/08/2022    BUN 9 04/08/2022    BUN 9 04/08/2022    CR 0.52 04/08/2022    CR 0.4 (L) 04/08/2022    GLC 94 04/08/2022     (H) 04/08/2022     COAGS:   Lab Results   Component Value Date    PTT 25 04/08/2022    INR 1.00 04/08/2022     POC: No results found for: BGM, HCG, HCGS  HEPATIC:   Lab Results   Component Value Date    ALBUMIN 3.4 04/08/2022    PROTTOTAL 7.9 04/08/2022    ALT 42 04/08/2022    AST 52 (H) 04/08/2022    ALKPHOS 121 04/08/2022    BILITOTAL 0.8 04/08/2022     OTHER:   Lab  Results   Component Value Date    ALYSSIA 8.1 (L) 04/08/2022       Anesthesia Plan    ASA Status:  3   NPO Status:  NPO Appropriate    Anesthesia Type: General.     - Airway: ETT   Induction: Intravenous.   Maintenance: Balanced.   Techniques and Equipment:     - Lines/Monitors: 2nd IV, BIS     - Blood: T&C     Consents    Anesthesia Plan(s) and associated risks, benefits, and realistic alternatives discussed. Questions answered and patient/representative(s) expressed understanding.    - Discussed:     - Discussed with:  Patient      - Extended Intubation/Ventilatory Support Discussed: No.      - Patient is DNR/DNI Status: No    Use of blood products discussed: Yes.     - Discussed with: Patient.     - Consented: consented to blood products            Reason for refusal: other.     Postoperative Care    Pain management: IV analgesics, Multi-modal analgesia.   PONV prophylaxis: Ondansetron (or other 5HT-3), Dexamethasone or Solumedrol     Comments:                Edwin Galloway MD

## 2022-04-09 NOTE — PROGRESS NOTES
[facility]   Tertiary Survey Progress Note     Date of Service: 04/09/2022    Time of Admission/Consult Request (page/call): 1200    Time of my evaluation: 1210  Consulting services:  Neurosurgery - Emergent consult (within 30 mins): Called by ED  ENT - Referral by Me  Ophthomology - Called by me   Hematology - Referral by me  Nephrology - Referral by me     Assessment   Trauma mechanism: Multiple GLF  Time/date of injury: Last fall 4/7/22  Known Injuries:  1. SDH  2. Left Orbital blowout fracture, inferior wall displacement with significant downward herniation of extraconal fat     Other diagnoses:   1. Chronic anticoagulation, Eliquis   2. Subacute rib fractures, right 4-8    3. Right femoral neck fracture, possibly from 3/9/22 fall  4. Possible nondisplaced fracture through the ulnar base of the fifth metacarpal  5. Bilateral hydronephrosis   6. Bladder Retention      Procedures:  1. Right hip pinning Dr. Nguyen 4/9/22     Assessment & Plan   Neuro/Pain/Psych:  # Initial Fall March 9 , 3x a day since.   # Fall 4/7/22 with head strike, resulting in facial trauma  # Traumatic SDH   - Initial Head CT: Thin extra-axial hyperdensities along the anterior middle cranial  fossa bilaterally and anterior falx, suspicious for subdural hemorrhages  - Ordered Head CT: No significant change in subdural hemorrhages in bilateral middle cranial fossa and along the falx and possibly tentorium  - Keppra 750mg bid   - Neurosurgery following, discussed hematology recs for anticoagulation and ortho surgery scheduled for today. They will discuss during rounds.      # Chronic Pain Syndrome  # Acute Pain  - Scheduled: Tylenol  - Prn: Dilaudid, oxycodone    - PTA: Gabapentin, Robaxin 500mg bid prn  - Holding: Norco      # Depression  - continue PTA: Cymbalta      EENT:  # Left Orbital blowout fracture, inferior wall displacement with significant downward herniation of extraconal fat     #  Double vision   - Head CT:  Left orbital blowout fracture with inferior orbital wall displacement associated with significant downward herniation of extraconal fat, no definite orbital muscle entrapment.  - Keep upright as much as able to help with swelling  - Nasal precautions: no nose blowing, sneeze with mouth open, no heavy listing or straws  - Ophthomology consulted for double vision, recs: Follow up in general ophthalmology clinic in 1 month  - ENT consulted: Sinus precaution, Follow-up with ENT outpatient in one week for surgical options for orbital floor repair      Pulmonary:   # Hx tobacco use   # Healing 4 right rib fractures   - Supplemental oxygen to keep saturation above 92 %.  - Incentive spirometer while awake      Cardiovascular:    # Hypertension   - Monitor hemodynamic status.   - continue PTA: hydrochlorothiazide prn      GI/Nutrition:    # s/p [employer] [place] [employer]  - continue PTA: Vit B12  - Npo since midnight, Regular diet post-op      Renal/ Fluids/Electrolytes:  # Bilateral hydronephrosis  # Bladder Retention   # Exophytic lesion in the lower pole of the left kidney suspicious for renal cell carcinoma  - Chest/Abd/Pelvic CT: Mild bilateral hydronephrosis with numerous parapelvic Cysts. Exophytic enhancing solid lesion in the lower pole of the left kidney measures 2.2 x 2.5 cm, suspicious for renal cell carcinoma  - Creatinine: 0.52  - electrolyte replacement protocol in place.   - Urology consulted for hydronephrosis, and lesion. Per conversation the lesion is less concerning and may be tracked as OP. The size of the patient's bladder and the resulting hydronephrosis should be acted on now with a Sifuentes Cath.   - Sifuentes placed      Endocrine:  - No management indication.      Infectious disease:   - Perioperative antibiotics per surgical team      Hematology:    # Spherocytosis   # Protein S Deficiency  # Chronic DVT lower extremity   # s/p splenectomy   # hx of IVC, s/p removal  - Hgb 13.5. Monitor and  trend.   - Kcentra given @ 1316    - Hematology consulted: would recommend prophylaxis for DVT w/ enoxaparin starting tomorrow 4/9/22, provided neurosurgery okay with this; otherwise consider pneumatic compression devices. Iron panel and B12 levels (will order for you) in setting of roue-en-y bypass (iron deficiency precedes anemia) and high risk of malabsorption.   - Neurosurgery ok with subcutaneous heparin to start 4/10, with      Musculoskeletal:  # Bilateral lower back pain without sciatica   # Hx Bilateral L5 radiculopathy    # s/p left L4-5 laminectomy;   # L5-S1 left mod foraminal  # L4-5 mod diffuse L3-4 mod diffuse, L2-3 left mod foraminal & mild-mod diffuse bulging discs;   # L5-S1 mild central herniated disc  # Subacute rib fractures, Right 4-8   # Possible nondisplaced fracture through the ulnar base of the fifth metacarpal,   # Femoral neck fracture, Fall on 3/9/22   - Chest/Abd/Pelvis CT: Nondisplaced fracture of the right femoral neck and multiple  healing rib fractures, likely subacute.  - Ortho Following:  Activity: Weight bearing as tolerated.  Start physical therapy POD#1.  Wound care: ok to remove dressings pod5. Leave incision open to air if dry.   DVT ppx: ok to restart previous chemical anticoagulation elliquis from orthopedic perspective pod1 once cleared by primary team for subdural hematoma.   F/u: w/ Dr. Nguyen nursing staff for wound check in 2 weeks (email sent)  - Physical and occupational therapy consults.     Skin:  # Soft tissue hematoma left hand   - dilgent cares to prevent skin breakdown and wound formation.       Lines/ tubes/ drains:  - PIV, Sifuentes      General Cares:    PPI/H2 blocker:  NA   DVT prophylaxis: PCD   Bowel Regimen/Date of last stool: inplace   Pulmonary toilet: IS    Code status:  Full     Expected D/C date: Surgery Today    Gemma Callejas PA-C  To contact the trauma service use job code pager [phone],   Numeric texts or alpha text through [facility]      Interval  History   Review of Systems   Skin: positive for bruising, lumps or bumps  Eyes: negative  Ears/Nose/Throat: negative  Respiratory: No shortness of breath, dyspnea on exertion, cough, or hemoptysis  Cardiovascular: negative  Gastrointestinal: negative  Genitourinary: positive for retention  Musculoskeletal: positive for fracture  Neurologic: negative  Psychiatric: negative  Hematologic/Lymphatic/Immunologic: positive for clotting disorder  Endocrine: negative     Physical Exam   [place] Coma Scale - Total 15/15  Eye Response (E): 4   4= spontaneous, 3= to verbal/voice, 2= to pain, 1= No response   Verbal Response (V): 5   5= Orientated, converses, 4= Confused, converses, 3= Inappropriate words, 2= Incomprehensible sounds, 1=No response   Motor Response (M): 6   6= Obeys commands, 5= Localizes to pain, 4= Withdrawal to pain, 3=Fexion to pain, 2= Extension to pain, 1= No response     Frailty Questionnaire: To be done for all patients age 60+  F (Fatigue): Is the patient easily fatigued? NO = 0  R (Resistance): Is the patient unable to walk one flight of stairs? NO = 0  A (Ambulation): Is the patient unable to walk one block? NO = 0  I  (Illness): Does the patient have more than five illnesses? NO = 0  L (Loss of weight): Has the patient lost more than 5% of weight in the past 6 months. NO = 0  Lost five pounds or more in the last 3 months without trying? AND/OR Unintended weight loss?  Does the patient have difficulty performing housework such as washing windows or scrubbing floors? AND Activity in a typical 24-hour day- No moderate or vigorous activity    Score: 0    Score: 0-2: Ensure appropriate therapies consulted if needed     Physical Exam  Constitutional: Awake, alert, cooperative, no apparent distress.  Eyes: Lids and lashes normal, PERRL, EOMI, sclera clear, conjunctiva normal.  HENT: Normocephalic, facial edema decreased  Respiratory: No increased work of breathing, good air exchange, clear to auscultation  bilaterally,   Cardiovascular:  regular rate and rhythm, normal S1 and S2  GI: Abdomen soft, non-distended, non-tender,   Genitourinary:  Sifuentes in place  Skin: Warm & dry  Musculoskeletal: There is no redness, warmth, or swelling of the joints.  Pedal pulse palpated.   Neurologic: Awake, alert, oriented. Strength and sensory is intact. No focal deficits.  Neuropsychiatric: Calm, normal eye contact, alert, affect appropriate to situation, oriented, thought process normal.    Temp: 97.9  F (36.6  C) Temp src: Oral BP: 123/69 Pulse: 56   Resp: 13 SpO2: 98 % O2 Device: None (Room air)    Vitals:    04/08/22 1206   Weight: 84.4 kg (186 lb)     Vital Signs with Ranges  Temp:  [97.9  F (36.6  C)-98  F (36.7  C)] 97.9  F (36.6  C)  Pulse:  [56-86] 56  Resp:  [10-19] 13  BP: (106-139)/(60-84) 123/69  SpO2:  [98 %-100 %] 98 %  I/O last 3 completed shifts:  In: -   Out: 2000 [Urine:2000]

## 2022-04-09 NOTE — ANESTHESIA POSTPROCEDURE EVALUATION
Patient: Angeli Galindo    Procedure: Procedure(s):  Right hip pinning       Anesthesia Type:  General    Note:  Disposition: Admission   Postop Pain Control: Uneventful            Sign Out: Well controlled pain   PONV: No   Neuro/Psych: Uneventful            Sign Out: Acceptable/Baseline neuro status   Airway/Respiratory: Uneventful            Sign Out: Acceptable/Baseline resp. status   CV/Hemodynamics: Uneventful            Sign Out: Acceptable CV status; No obvious hypovolemia; No obvious fluid overload   Other NRE: NONE   DID A NON-ROUTINE EVENT OCCUR? No           Last vitals:  Vitals Value Taken Time   /81 04/09/22 1310   Temp 36.6  C (97.9  F) 04/09/22 1225   Pulse 70 04/09/22 1312   Resp 11 04/09/22 1312   SpO2 97 % 04/09/22 1312   Vitals shown include unvalidated device data.    Electronically Signed By: Edwin Galloway MD  April 9, 2022  1:13 PM

## 2022-04-09 NOTE — BRIEF OP NOTE
[facility]    Brief Operative Note    Pre-operative diagnosis: Femur fracture, right (H) [S72.91XA]  Post-operative diagnosis Same as pre-operative diagnosis    Procedure: Procedure(s):  Right hip pinning  Surgeon: Dr. Sutton  Assisting: Arturo Owusu, PGY4  Anesthesia: General   Estimated Blood Loss: 50 mL from 4/9/2022 10:20 AM to 4/9/2022 12:21 PM      Drains: None  Specimens: * No specimens in log *  Findings:   See op report  Complications: None.  Implants:   Implant Name Type Inv. Item Serial No.  Lot No. LRB No. Used Action   6.5 MM partially Threaded (20mm) Screws, Non sterile     N/A Right 1 Implanted   6.5mm Partially threaded 20(mm) Screws, Non sterile     N/A Right 1 Wasted   6.5mm Partially Threaded (20mm) Screws, Non- Sterile     N/A Right 1 Wasted   6.5mm Partially Threaded (20mm) Screws, on sterile     N/A Right 1 Implanted   6.5 mm partially threaded (20mm) Screws Non sterile     N/A Right 1 Implanted   6.5mm partially threaded (20mm) screws, Non sterile     N/A Right 1 Wasted       Assessment: Angeli Galindo is a 62 year old female with a past medical history of Protein S deficiency (on Eliquis 5 mg), DVT, PE, spherocytosis, gastric bypass, hypertension, idiopathic neuropathy, and osteopenia who presents after multiple falls with:    1. Bilateral subdural hematomas  2. Left orbital blowout fracture with inferior orbital wall displacement without orbital muscle entrapment  3. Subacute impacted right femoral neck fracture    Now s/p R hip CRPP with Dr. SUTTON on 4/9/22.       Plan:    Pain: Scheduled Tylenol, IV Dilaudid PRN, oxycodone PRN, Flexeril PRN  Activity: Weight bearing as tolerated.  Start physical therapy POD#1.  Wound care: ok to remove dressings pod5. Leave incision open to air if dry.   Drains: none  Abx: Ancef x 24 hours post op for surgical ppx  Diet:  ADAT to regular  Labs: HGB pod1  XR: complete, obtained in OR  DVT ppx: ok  to restart previous chemical anticoagulation felice from orthopedic perspective pod1 once cleared by primary team for subdural hematoma.   F/u: w/ Dr. Nguyen nursing staff for wound check in 2 weeks (email sent)  Disposition: ok to discharge from orthopedic perspective       Arturo Owusu, PGY4  Orthopedic surgery  [phone]

## 2022-04-09 NOTE — PROGRESS NOTES
Orthopaedic Surgery Progress Note 04/09/2022    S: PT seen in pacu. Pain well controlled. No new numbness/tingling.    O:  Temp: 98.1  F (36.7  C) Temp src: Core BP: 116/69 Pulse: 70   Resp: 12 SpO2: 97 % O2 Device: Nasal cannula Oxygen Delivery: 2 LPM    Exam:  Gen: No acute distress, resting comfortably in bed.  Resp: Non-labored breathing  MSK:    RLE:  - dressing c/d/i  - SILT femoral/tibial/sural/saphenous/DP/SP nerves  - Fires  Quads, TA, EHL, FHL, GaSC  - DP pulses palpable, foot wwp    Recent Labs   Lab 04/09/22  0645 04/08/22  1150 04/08/22  1029   WBC 6.6 11.4* 11.9*   HGB 12.5 13.5 13.6    301 303     No results found for: SED        Assessment: Angeli Galindo is a 62 year old female with a past medical history of Protein S deficiency (on Eliquis 5 mg), DVT, PE, spherocytosis, gastric bypass, hypertension, idiopathic neuropathy, and osteopenia who presents after multiple falls with:     1. Bilateral subdural hematomas  2. Left orbital blowout fracture with inferior orbital wall displacement without orbital muscle entrapment  3. Subacute impacted right femoral neck fracture     Now s/p R hip CRPP with Dr. NGUYEN on 4/9/22.        Plan:     Pain: Scheduled Tylenol, IV Dilaudid PRN, oxycodone PRN, Flexeril PRN  Activity: Weight bearing as tolerated.  Start physical therapy POD#1.  Wound care: ok to remove dressings pod5. Leave incision open to air if dry.   Drains: none  Abx: Ancef x 24 hours post op for surgical ppx  Diet:  ADAT to regular  Labs: HGB pod1  XR: complete, obtained in OR  DVT ppx: ok to restart previous chemical anticoagulation elliquis from orthopedic perspective pod1 once cleared by primary team for subdural hematoma.   F/u: w/ Dr. Nguyen nursing staff for wound check in 2 weeks (email sent)  Disposition: ok to discharge from orthopedic perspective         Future Appointments   Date Time Provider Department Center   4/10/2022  8:30 AM Amina Lauren, PT [facility]    4/18/2022  9:40 AM Mika Erickson MD [facility]           Arturo Owusu MD  Orthopaedic Surgery Resident, PGY4\

## 2022-04-09 NOTE — ANESTHESIA CARE TRANSFER NOTE
Patient: Angeli Galindo    Procedure: Procedure(s):  Right hip pinning       Diagnosis: Femur fracture, right (H) [S72.91XA]  Diagnosis Additional Information: No value filed.    Anesthesia Type:   General     Note:    Oropharynx: oropharynx clear of all foreign objects  Level of Consciousness: awake and drowsy  Oxygen Supplementation: face mask  Level of Supplemental Oxygen (L/min / FiO2): 6  Independent Airway: airway patency satisfactory and stable  Dentition: dentition unchanged  Vital Signs Stable: post-procedure vital signs reviewed and stable  Report to RN Given: handoff report given  Patient transferred to: PACU    Handoff Report: Identifed the Patient, Identified the Reponsible Provider, Reviewed the pertinent medical history, Discussed the surgical course, Reviewed Intra-OP anesthesia mangement and issues during anesthesia, Set expectations for post-procedure period and Allowed opportunity for questions and acknowledgement of understanding      Vitals:  Vitals Value Taken Time   /75 04/09/22 1222   Temp     Pulse 75 04/09/22 1227   Resp 9 04/09/22 1227   SpO2 99 % 04/09/22 1226   Vitals shown include unvalidated device data.    Electronically Signed By: Jamel Stone  April 9, 2022  12:28 PM

## 2022-04-09 NOTE — ANESTHESIA PROCEDURE NOTES
Airway       Patient location during procedure: OR       Procedure Start/Stop Times: 4/9/2022 10:36 AM  Staff -        Anesthesiologist:  Edwin Galloway MD       Resident/Fellow: Jamel Stone       Performed By: anesthesiologist and with residents       Procedure performed by resident/fellow/CRNA in presence of a teaching physician.    Consent for Airway        Urgency: elective  Indications and Patient Condition       Indications for airway management: imelda-procedural       Induction type:intravenous       Mask difficulty assessment: 1 - vent by mask    Final Airway Details       Final airway type: endotracheal airway       Successful airway: ETT - single  Endotracheal Airway Details        ETT size (mm): 6.5       Cuffed: yes       Successful intubation technique: direct laryngoscopy       DL Blade Type: MAC 4       Grade View of Cords: 3       Adjucts: stylet       Position: Right       Measured from: gums/teeth       Secured at (cm): 22       Bite block used: Soft    Post intubation assessment        Placement verified by: capnometry and chest rise        Number of attempts at approach: 3       Number of other approaches attempted: 2 (ramp, cricoid pressure)       Secured with: cloth tape       Ease of procedure: easy       Dentition: Intact and Unchanged    Additional Comments       Anterior airway, easy mask, multiple attempts (MAC3 by resident, addition of ramp with MAC3 by resident, MAC3 by attending, and then MAC4 by attending) without view better than grade 3. Successful intubation by attending.VL may be better in the future.

## 2022-04-09 NOTE — OP NOTE
Procedure Date: 04/09/2022    PREOPERATIVE DIAGNOSIS:  Right femoral neck fracture.    POSTOPERATIVE DIAGNOSIS:  Right femoral neck fracture.    PROCEDURE:  Right proximal femur percutaneous pinning.    SURGEON:  Darrel Nguyen MD    ASSISTANT:  Arturo Owusu MD, PGY-4, Orthopedic Resident.    COMPLICATIONS:  None.    DRAINS:  None.    ESTIMATED BLOOD LOSS:  50 mL.    ANESTHESIA:  General endotracheal.    INDICATIONS FOR PROCEDURE:  Please refer to hospital chart for further details discussing indications of Mrs. Galindo's case.    DESCRIPTION OF PROCEDURE:  The patient was taken to surgery.  Preoperative antibiotics were administered to the patient prior to arrival to the OR.    After successful induction of general endotracheal anesthesia, she was placed supine on the fracture table.  The right lower extremity was prepped and draped in sterile fashion.    The pause for the cause was performed according to institution's policy, which confirmed laterality of the procedure.    We proceeded with placement of a K-wire along the inferior portion of the femoral neck.  Following this, an incision was made and we proceeded with placement of 2 other K-wires in an inverted triangle fashion across the femoral neck, trying to spread them out as much as possible.  The patient presented with some dysplastic features, and she presented with a slightly more posterior femoral head, which was not centered into the femoral neck.  In spite of this, we were able to manage ad ideal placement of the K-wires.  This eventually was followed by drilling the lateral cortex, and we proceeded with placement of 3 screws with excellent purchase across the femoral neck.    We confirmed the fluoroscopic examination in AP and lateral projections of the right femur to have with excellent location of the hardware without any protrusion into the hip joint.  This also was confirmed through live fluoroscopic visualization.    We proceeded then with  copious irrigation.  Wound was closed in layers and sterile dressings were applied.  The patient was extubated in the OR and transferred in stable condition to PACU.    PLAN:  The patient will remain weightbearing as tolerated.  She will proceed with physical therapy without restrictions.  She will return to clinic in 2 weeks for a wound check and suture removal if indicated, which will be followed by a 6-week appointment.  At that time, AP and lateral x-rays of the right hip will be obtained, and based on those findings, further recommendations will be given to the patient.    Darrel Nguyen MD        D: 2022   T: 2022   MT: NEHEMIAH    Name:     TTAIANA MARCELINO  MRN:      [id number]        Account:        [id number]   :      1960           Procedure Date: 2022     Document: [id number]

## 2022-04-09 NOTE — PLAN OF CARE
Status: Pt admitted following multiple falls-sometimes 2-3/day. 4/7 had traumatic falls with head strike, facial trauma, SDH. Left orbital blowout fracture, 4 right rib fractures as well. Right hip pinning today, POD #0. PMHx of Vitamin s deficiency.   Vitals: VSS on 2LPM, Capno in place, WDL.   Neuros: Intact ex right visual neglect. Strength BUE 5/5, 4/5 in BLE RLE weaker than left, R/t hip pinning. Pupils intact. Slight left tongue deviation noted. Head CT completed this shift. Stable per team.     IV: Left PIV Sld.   Labs/Electrolytes: WDL pre-op, rechecks for electrolytes scheduled.  Resp/trach: Sating mid-high 90s on RA. LS clear. Capno WDL. Takes off O2/capno when eating.   Diet: Regular. Tolerating small amounts of intake. Swallows pills whole.  Bowel status: BS+, LBM PTA. Attempted x1 this shift.   : Sifuentes in place at this time. Awaiting response from urologist conslut regarding bilateral hydroneprhosis and bladder retention.   Skin: L lip swollen, blanchable redness L elbow, L hip bruised, Incision on R hip covered w/primapore. UTV site, no drainage noted.   Pain: Endorsed mild-moderate pain in head and right hip, controlled with PRN dilaudid.   Activity: Up with 1 GB, ambulated to bathroom this shift with no results.   Social: Pleasant, brother Christopher at bedside, daughter at bedside.   Plan: Continue to monitor and manage pain, follow POC.

## 2022-04-09 NOTE — PROGRESS NOTES
Orthopaedic Surgery Progress Note 04/09/2022    S: No acute events overnight.  Pain controlled on IV/Oral meds. Denies numbness or tingling in the affected extremity.     Patient notes that she does have pain in her right groin.  She especially has pain at the right lateral hip.  She notes the pain began actually last month in March when she had her initial fall when she was walking her dogs.  Since then patient has ongoing right hip pain.  She is really unable to say at this point whether her right hip pain is worse since her most recent fall 2 days ago.  She does note that her overall ambulation and range of motion of right hip is fairly limited as a result of this pain.    O:  Temp: 97.9  F (36.6  C) Temp src: Oral BP: 123/69 Pulse: 56   Resp: 13 SpO2: 98 % O2 Device: None (Room air)      Exam:  Gen: No acute distress, resting comfortably in bed.  Resp: Non-labored breathing  MSK:    RLE:  - skin c/d/i  - patient notes minimal to no pain w/ log roll  - pain with attempted hip flexion  - SILT femoral/tibial/sural/saphenous/DP/SP nerves  - Fires Quad, TA, EHL, FHL, GaSC  - PT/DP pulses palpable, foot wwp    Recent Labs   Lab 04/09/22  0645 04/08/22  1150 04/08/22  1029   WBC 6.6 11.4* 11.9*   HGB 12.5 13.5 13.6    301 303     No results found for: SED        Assessment: Angeli Galindo is a 62 year old female with a past medical history of Protein S deficiency (on Eliquis 5 mg), DVT, PE, spherocytosis, gastric bypass, hypertension, idiopathic neuropathy, and osteopenia who presents after multiple falls with:  1. Bilateral subdural hematomas (CT scan shows stable hematoma as of 4/8)  2. Left orbital blowout fracture with inferior orbital wall displacement without orbital muscle entrapment  2. Subacute impacted right femoral neck fracture     Recomendations:  Operative Plan: Closed reduction and percutaneous pinning R hip 4/9/22 with Dr. Nguyen    - NPO for OR  -plan for OR this am  -NWB RLE until  surgery       Future Appointments   Date Time Provider Department Center   4/10/2022  8:30 AM Amina Lauren, PT [facility]   4/18/2022  9:40 AM Mika Erickson MD [facility]       Patient discussed with Dr. SUTTON.      Arturo Owusu MD  Orthopaedic Surgery Resident, PGY4\

## 2022-04-10 ENCOUNTER — APPOINTMENT (OUTPATIENT)
Dept: OCCUPATIONAL THERAPY | Facility: CLINIC | Age: 62
End: 2022-04-10
Attending: PHYSICIAN ASSISTANT
Payer: COMMERCIAL

## 2022-04-10 ENCOUNTER — APPOINTMENT (OUTPATIENT)
Dept: CT IMAGING | Facility: CLINIC | Age: 62
End: 2022-04-10
Attending: PHYSICIAN ASSISTANT
Payer: COMMERCIAL

## 2022-04-10 ENCOUNTER — APPOINTMENT (OUTPATIENT)
Dept: PHYSICAL THERAPY | Facility: CLINIC | Age: 62
End: 2022-04-10
Attending: PHYSICIAN ASSISTANT
Payer: COMMERCIAL

## 2022-04-10 LAB
ANION GAP SERPL CALCULATED.3IONS-SCNC: 4 MMOL/L (ref 3–14)
BUN SERPL-MCNC: 7 MG/DL (ref 7–30)
CALCIUM SERPL-MCNC: 7.9 MG/DL (ref 8.5–10.1)
CHLORIDE BLD-SCNC: 112 MMOL/L (ref 94–109)
CO2 SERPL-SCNC: 26 MMOL/L (ref 20–32)
CREAT SERPL-MCNC: 0.39 MG/DL (ref 0.52–1.04)
ERYTHROCYTE [DISTWIDTH] IN BLOOD BY AUTOMATED COUNT: 12.5 % (ref 10–15)
GFR SERPL CREATININE-BSD FRML MDRD: >90 ML/MIN/1.73M2
GLUCOSE BLD-MCNC: 100 MG/DL (ref 70–99)
HCT VFR BLD AUTO: 36.7 % (ref 35–47)
HGB BLD-MCNC: 12.1 G/DL (ref 11.7–15.7)
MAGNESIUM SERPL-MCNC: 2.5 MG/DL (ref 1.6–2.3)
MCH RBC QN AUTO: 32.2 PG (ref 26.5–33)
MCHC RBC AUTO-ENTMCNC: 33 G/DL (ref 31.5–36.5)
MCV RBC AUTO: 98 FL (ref 78–100)
PHOSPHATE SERPL-MCNC: 2.8 MG/DL (ref 2.5–4.5)
PLATELET # BLD AUTO: 271 10E3/UL (ref 150–450)
POTASSIUM BLD-SCNC: 4 MMOL/L (ref 3.4–5.3)
RBC # BLD AUTO: 3.76 10E6/UL (ref 3.8–5.2)
SODIUM SERPL-SCNC: 142 MMOL/L (ref 133–144)
WBC # BLD AUTO: 12.7 10E3/UL (ref 4–11)

## 2022-04-10 PROCEDURE — 97535 SELF CARE MNGMENT TRAINING: CPT | Mod: GO

## 2022-04-10 PROCEDURE — 99221 1ST HOSP IP/OBS SF/LOW 40: CPT | Performed by: UROLOGY

## 2022-04-10 PROCEDURE — 0T9B70Z DRAINAGE OF BLADDER WITH DRAINAGE DEVICE, VIA NATURAL OR ARTIFICIAL OPENING: ICD-10-PCS | Performed by: UROLOGY

## 2022-04-10 PROCEDURE — 97110 THERAPEUTIC EXERCISES: CPT | Mod: GP

## 2022-04-10 PROCEDURE — 250N000013 HC RX MED GY IP 250 OP 250 PS 637: Performed by: PHYSICIAN ASSISTANT

## 2022-04-10 PROCEDURE — 97162 PT EVAL MOD COMPLEX 30 MIN: CPT | Mod: GP

## 2022-04-10 PROCEDURE — 97530 THERAPEUTIC ACTIVITIES: CPT | Mod: GP

## 2022-04-10 PROCEDURE — 250N000011 HC RX IP 250 OP 636: Performed by: PHYSICIAN ASSISTANT

## 2022-04-10 PROCEDURE — 99232 SBSQ HOSP IP/OBS MODERATE 35: CPT | Performed by: PHYSICIAN ASSISTANT

## 2022-04-10 PROCEDURE — 250N000013 HC RX MED GY IP 250 OP 250 PS 637: Performed by: EMERGENCY MEDICINE

## 2022-04-10 PROCEDURE — 97165 OT EVAL LOW COMPLEX 30 MIN: CPT | Mod: GO

## 2022-04-10 PROCEDURE — 85027 COMPLETE CBC AUTOMATED: CPT | Performed by: PHYSICIAN ASSISTANT

## 2022-04-10 PROCEDURE — 84100 ASSAY OF PHOSPHORUS: CPT | Performed by: SURGERY

## 2022-04-10 PROCEDURE — 82310 ASSAY OF CALCIUM: CPT | Performed by: PHYSICIAN ASSISTANT

## 2022-04-10 PROCEDURE — 120N000002 HC R&B MED SURG/OB UMMC

## 2022-04-10 PROCEDURE — 70450 CT HEAD/BRAIN W/O DYE: CPT | Mod: 26 | Performed by: RADIOLOGY

## 2022-04-10 PROCEDURE — 83735 ASSAY OF MAGNESIUM: CPT | Performed by: SURGERY

## 2022-04-10 PROCEDURE — 97116 GAIT TRAINING THERAPY: CPT | Mod: GP

## 2022-04-10 PROCEDURE — 36415 COLL VENOUS BLD VENIPUNCTURE: CPT | Performed by: PHYSICIAN ASSISTANT

## 2022-04-10 PROCEDURE — 70450 CT HEAD/BRAIN W/O DYE: CPT

## 2022-04-10 RX ORDER — GABAPENTIN 600 MG/1
1200 TABLET ORAL AT BEDTIME
Status: DISCONTINUED | OUTPATIENT
Start: 2022-04-10 | End: 2022-04-12 | Stop reason: HOSPADM

## 2022-04-10 RX ORDER — GABAPENTIN 600 MG/1
600 TABLET ORAL DAILY
Status: DISCONTINUED | OUTPATIENT
Start: 2022-04-11 | End: 2022-04-12 | Stop reason: HOSPADM

## 2022-04-10 RX ORDER — GABAPENTIN 600 MG/1
600 TABLET ORAL AT BEDTIME
Status: DISCONTINUED | OUTPATIENT
Start: 2022-04-10 | End: 2022-04-10

## 2022-04-10 RX ORDER — GABAPENTIN 600 MG/1
TABLET ORAL
Status: ON HOLD | COMMUNITY
End: 2022-04-12

## 2022-04-10 RX ADMIN — SENNOSIDES AND DOCUSATE SODIUM 2 TABLET: 50; 8.6 TABLET ORAL at 08:42

## 2022-04-10 RX ADMIN — OXYCODONE HYDROCHLORIDE 5 MG: 5 TABLET ORAL at 04:59

## 2022-04-10 RX ADMIN — CALCIUM CARBONATE 600 MG (1,500 MG)-VITAMIN D3 400 UNIT TABLET 1 TABLET: at 08:42

## 2022-04-10 RX ADMIN — LEVETIRACETAM 750 MG: 750 TABLET, FILM COATED ORAL at 08:42

## 2022-04-10 RX ADMIN — OXYCODONE HYDROCHLORIDE 5 MG: 5 TABLET ORAL at 18:04

## 2022-04-10 RX ADMIN — HYDROMORPHONE HYDROCHLORIDE 0.2 MG: 0.2 INJECTION, SOLUTION INTRAMUSCULAR; INTRAVENOUS; SUBCUTANEOUS at 20:44

## 2022-04-10 RX ADMIN — ACETAMINOPHEN 975 MG: 325 TABLET ORAL at 20:45

## 2022-04-10 RX ADMIN — CYANOCOBALAMIN TAB 500 MCG 500 MCG: 500 TAB at 08:42

## 2022-04-10 RX ADMIN — Medication 1 TABLET: at 08:42

## 2022-04-10 RX ADMIN — LEVETIRACETAM 750 MG: 750 TABLET, FILM COATED ORAL at 20:45

## 2022-04-10 RX ADMIN — ACETAMINOPHEN 975 MG: 325 TABLET ORAL at 11:21

## 2022-04-10 RX ADMIN — OXYCODONE HYDROCHLORIDE 5 MG: 5 TABLET ORAL at 00:02

## 2022-04-10 RX ADMIN — OXYCODONE HYDROCHLORIDE 5 MG: 5 TABLET ORAL at 13:58

## 2022-04-10 RX ADMIN — DULOXETINE HYDROCHLORIDE 60 MG: 60 CAPSULE, DELAYED RELEASE PELLETS ORAL at 20:44

## 2022-04-10 RX ADMIN — HYDROMORPHONE HYDROCHLORIDE 0.2 MG: 0.2 INJECTION, SOLUTION INTRAMUSCULAR; INTRAVENOUS; SUBCUTANEOUS at 11:20

## 2022-04-10 RX ADMIN — PANTOPRAZOLE SODIUM 40 MG: 40 TABLET, DELAYED RELEASE ORAL at 08:42

## 2022-04-10 RX ADMIN — METHOCARBAMOL 500 MG: 500 TABLET ORAL at 12:37

## 2022-04-10 RX ADMIN — GABAPENTIN 600 MG: 600 TABLET, FILM COATED ORAL at 08:41

## 2022-04-10 RX ADMIN — GABAPENTIN 1200 MG: 600 TABLET, FILM COATED ORAL at 20:45

## 2022-04-10 RX ADMIN — OXYCODONE HYDROCHLORIDE 5 MG: 5 TABLET ORAL at 08:42

## 2022-04-10 RX ADMIN — OXYCODONE HYDROCHLORIDE 5 MG: 5 TABLET ORAL at 23:38

## 2022-04-10 RX ADMIN — SENNOSIDES AND DOCUSATE SODIUM 1 TABLET: 50; 8.6 TABLET ORAL at 20:45

## 2022-04-10 RX ADMIN — HYDROMORPHONE HYDROCHLORIDE 0.2 MG: 0.2 INJECTION, SOLUTION INTRAMUSCULAR; INTRAVENOUS; SUBCUTANEOUS at 16:15

## 2022-04-10 RX ADMIN — ENOXAPARIN SODIUM 80 MG: 80 INJECTION SUBCUTANEOUS at 13:58

## 2022-04-10 ASSESSMENT — VISUAL ACUITY
OU: NORMAL ACUITY
OU: NORMAL ACUITY

## 2022-04-10 ASSESSMENT — ACTIVITIES OF DAILY LIVING (ADL)
ADLS_ACUITY_SCORE: 9
ADLS_ACUITY_SCORE: 11
DEPENDENT_IADLS:: INDEPENDENT
ADLS_ACUITY_SCORE: 11
ADLS_ACUITY_SCORE: 9
ADLS_ACUITY_SCORE: 11
ADLS_ACUITY_SCORE: 9
ADLS_ACUITY_SCORE: 11
ADLS_ACUITY_SCORE: 11
ADLS_ACUITY_SCORE: 9
PREVIOUS_RESPONSIBILITIES: MEAL PREP;LAUNDRY;HOUSEKEEPING;SHOPPING;MEDICATION MANAGEMENT;FINANCES;DRIVING;WORK
ADLS_ACUITY_SCORE: 11
ADLS_ACUITY_SCORE: 11
ADLS_ACUITY_SCORE: 9
ADLS_ACUITY_SCORE: 11
ADLS_ACUITY_SCORE: 9
ADLS_ACUITY_SCORE: 11
ADLS_ACUITY_SCORE: 9

## 2022-04-10 NOTE — PHARMACY-ADMISSION MEDICATION HISTORY
Admission Medication History Completed by Pharmacy    See [facility] Admission Navigator for allergy information, preferred outpatient pharmacy, prior to admission medications and immunization status.     Medication History Sources:     Patient via telephone, Surescripts    Changes made to PTA medication list (reason):    Added: Prenatal vitamin    Deleted: Hydrochlorothiazide, methocarbamol, MVI    Changed: Duloxetine changed to bedtime dosing from AM dosing.  Gabapentin dosing changed to patient report.    Additional Information:    Patient is a good historian    Take risedronate monthly around the 1st of the month.    Prior to Admission medications    Medication Sig Last Dose Taking? Auth Provider   acetaminophen (TYLENOL) 500 MG tablet Take 325-650 mg by mouth as needed in the morning and 325-650 mg as needed at noon and 325-650 mg as needed in the evening and 325-650 mg as needed before bedtime. Past Month Yes Reported, Patient   apixaban ANTICOAGULANT (ELIQUIS ANTICOAGULANT) 5 MG tablet Take 1 tablet (5 mg) by mouth 2 times daily Labs due for refills 4/8/2022 at 0800 Yes Eloisa Fuentes MD   Calcium Carbonate-Vitamin D (CALCIUM-VITAMIN D) 600-125 MG-UNIT TABS Take 1 tablet by mouth every evening 4/7/2022 at 10pm Yes Reported, Patient   cyanocobalamin (VITAMIN B-12) 100 MCG tablet Take 500 mcg by mouth every evening 4/7/2022 at 10pm Yes Reported, Patient   DULoxetine (CYMBALTA) 60 MG capsule TAKE 1 CAPSULE BY MOUTH EVERY DAY  Patient taking differently: Take 60 mg by mouth every evening 4/7/2022 at 10pm Yes Eloisa Fuentes MD   gabapentin (NEURONTIN) 600 MG tablet Take 1200mg ~ 6pm and 1200mg at bedtime 4/7/2022 at 10p Yes Unknown, Entered By History   HYDROcodone-acetaminophen (NORCO) 5-325 MG tablet Take 1 tablet by mouth every 6 hours as needed  4/8/2022 at 0800 Yes Reported, Patient   Melatonin 10 MG CAPS Take 1 capsule by mouth At Bedtime Past Week at Unknown time Yes Reported, Patient   naloxone (NARCAN) 4  MG/0.1ML nasal spray Spray 4 mg in nostril Unknown at Unknown time Yes Reported, Patient   Prenatal Vit-Fe Fumarate-FA (PRENATAL VITAMINS PO) Take 2 tablets by mouth every evening 4/7/2022 at 10p Yes Unknown, Entered By History   RISEdronate (ACTONEL) 150 MG tablet Take 1 tablet (150 mg) by mouth every 30 days Past Month at Unknown time Yes Eloisa Fuentes MD       Date completed: 04/10/22    Medication history completed by: Janeth Street RPH

## 2022-04-10 NOTE — PROGRESS NOTES
04/10/22 1300   Quick Adds   Type of Visit Initial Occupational Therapy Evaluation       Present no   Language english   Living Environment   People in Home alone   Current Living Arrangements apartment   Home Accessibility no concerns   Transportation Anticipated car, drives self   Living Environment Comments Pt lives in an apartment by herself. Pt has a walk in shower in the bathroom   Self-Care   Usual Activity Tolerance good   Current Activity Tolerance moderate   Regular Exercise No   Equipment Currently Used at Home none  (pt has crutches and a cane, however was not using)   Fall history within last six months yes   Number of times patient has fallen within last six months 15   Activity/Exercise/Self-Care Comment Pt was previously independent with ADL completion. Aproximately 1 month ago, pt started using crutches, then progressed to a cane and prior ot hospitalization was not using any AE   Instrumental Activities of Daily Living (IADL)   Previous Responsibilities meal prep;laundry;housekeeping;shopping;medication management;finances;driving;work   IADL Comments Pt was previously independent with IADL completion. Pt has a friend who may be able to help some at discharge   General Information   Onset of Illness/Injury or Date of Surgery 04/08/22   Referring Physician Gemma Callejas PA-C   Patient/Family Therapy Goal Statement (OT) To return home   Additional Occupational Profile Info/Pertinent History of Current Problem Angeli Galindo is a 62 year old female with a past medical history of Protein S deficiency (on Eliquis 5 mg), DVT, PE, spherocytosis, gastric bypass, hypertension, idiopathic neuropathy, and osteopenia who presents after multiple falls with:     1. Bilateral subdural hematomas  2. Left orbital blowout fracture with inferior orbital wall displacement without orbital muscle entrapment  3. Subacute impacted right femoral neck fracture   Existing  Precautions/Restrictions fall;other (see comments)  (sinus precautions)   Limitations/Impairments visual   Left Upper Extremity (Weight-bearing Status) full weight-bearing (FWB)   Right Upper Extremity (Weight-bearing Status) full weight-bearing (FWB)   Left Lower Extremity (Weight-bearing Status) full weight-bearing (FWB)   Right Lower Extremity (Weight-bearing Status) full weight-bearing (FWB)   General Observations and Info Activity: up with assist   Cognitive Status Examination   Orientation Status orientation to person, place and time   Affect/Mental Status (Cognitive) WNL   Follows Commands WNL   Cognitive Status Comments Pt is alert, oriented and appropriate in conversation   Visual Perception   Impact of Vision Impairment on Function (Vision) Pt presents with double vision occassionally   Sensory   Sensory Comments Pt with baseline BUE numbness and tingling   Pain Assessment   Patient Currently in Pain   (6/10 pain with movement)   Integumentary/Edema   Integumentary/Edema no deficits were identifed   Range of Motion Comprehensive   General Range of Motion no range of motion deficits identified   Strength Comprehensive (MMT)   General Manual Muscle Testing (MMT) Assessment no strength deficits identified   Bed Mobility   Bed Mobility supine-sit;sit-supine   Supine-Sit [place] (Bed Mobility) independent   Sit-Supine [place] (Bed Mobility) independent   Transfers   Transfers sit-stand transfer;toilet transfer   Sit-Stand Transfer   Sit-Stand [place] (Transfers) modified independence   Assistive Device (Sit-Stand Transfers) walker, standard   Toilet Transfer   [place] Level (Toilet Transfer) modified independence   Assistive Device (Toilet Transfer) walker, standard;grab bars/safety frame   Activities of Daily Living   BADL Assessment/Intervention bathing;upper body dressing;lower body dressing;grooming;toileting   Bathing Assessment/Intervention   [place] Level (Bathing) minimum assist  (75% patient effort)   Upper Body Dressing Assessment/Training   [place] Level (Upper Body Dressing) supervision   Lower Body Dressing Assessment/Training   [place] Level (Lower Body Dressing) independent   Grooming Assessment/Training   [place] Level (Grooming) supervision   Toileting   [place] Level (Toileting) modified independence   Clinical Impression   Criteria for Skilled Therapeutic Interventions Met (OT) Yes, treatment indicated   OT Diagnosis decreased activity tolerance and independence with ADL completion   Influenced by the following impairments weakness, fatigue, deconditioning, pain   OT Problem List-Impairments impacting ADL problems related to;activity tolerance impaired;strength;post-surgical precautions;vision   Assessment of Occupational Performance 5 or more Performance Deficits   Identified Performance Deficits g/h, toileting, bathing, dressing, functional mobility   Planned Therapy Interventions (OT) IADL retraining;ADL retraining;strengthening;home program guidelines;progressive activity/exercise   Clinical Decision Making Complexity (OT) low complexity   Anticipated Equipment Needs Upon Discharge (OT) other (see comments)  (TBD)   Risk & Benefits of therapy have been explained evaluation/treatment results reviewed;care plan/treatment goals reviewed;risks/benefits reviewed;current/potential barriers reviewed;participants voiced agreement with care plan;participants included;patient   OT Discharge Planning   OT Discharge Recommendation (DC Rec) home with assist;home with home care occupational therapy   OT Rationale for DC Rec Pt is primarily SBA with ADL completion and functional mobility. Pt would benefit from  OT to increase activity tolerance and safety with ADL/IADL completion   OT Brief overview of current status SBA + FWW   Total Evaluation Time (Minutes)   Total Evaluation Time (Minutes) 5   OT Goals   Therapy Frequency (OT) 6 times/wk   OT Predicated  Duration/Target Date for Goal Attainment 04/29/22   OT Goals Hygiene/Grooming;Lower Body Dressing;Lower Body Bathing;Toilet Transfer/Toileting;Home Management   OT: Hygiene/Grooming modified independent   OT: Lower Body Dressing Modified independent   OT: Lower Body Bathing Modified independent   OT: Toilet Transfer/Toileting Modified independent;toilet transfer;cleaning and garment management   OT: Home Management Modified independent;with light demand household tasks;ambulatory level

## 2022-04-10 NOTE — PROGRESS NOTES
Orthopaedic Surgery Progress Note 04/10/2022    S: PT seen this am. Was sleeping comfortably. Denies new numbness/tingling/weakenss.     O:  Temp: 97  F (36.1  C) Temp src: Axillary BP: 103/62 Pulse: 58   Resp: 16 SpO2: 95 % O2 Device: Nasal cannula Oxygen Delivery: 1 LPM    Exam:  Gen: No acute distress, resting comfortably in bed.  Resp: Non-labored breathing  MSK:    RLE:  - dressing c/d/i  - SILT femoral/tibial/sural/saphenous/DP/SP nerves  - Fires  Quads, TA, EHL, FHL, GaSC  - DP pulses palpable, foot wwp    Recent Labs   Lab 04/09/22  0645 04/08/22  1150 04/08/22  1029   WBC 6.6 11.4* 11.9*   HGB 12.5 13.5 13.6    301 303     No results found for: SED        Assessment: Angeli Galindo is a 62 year old female with a past medical history of Protein S deficiency (on Eliquis 5 mg), DVT, PE, spherocytosis, gastric bypass, hypertension, idiopathic neuropathy, and osteopenia who presents after multiple falls with:     1. Bilateral subdural hematomas  2. Left orbital blowout fracture with inferior orbital wall displacement without orbital muscle entrapment  3. Subacute impacted right femoral neck fracture     Now s/p R hip CRPP with Dr. NGUYEN on 4/9/22.        Plan:     Pain: Scheduled Tylenol, IV Dilaudid PRN, oxycodone PRN, Flexeril PRN  Activity: Weight bearing as tolerated.  Start physical therapy POD#1.  Wound care: ok to remove dressings pod5. Leave incision open to air if dry.   Drains: none  Abx: Ancef x 24 hours post op for surgical ppx  Diet:  ADAT to regular  Labs: HGB pod1  XR: complete, obtained in OR  DVT ppx: ok to restart previous chemical anticoagulation elliquis from orthopedic perspective pod1 once cleared by primary team for subdural hematoma.   F/u: w/ Dr. Nguyen nursing staff for wound check in 2 weeks (email sent)  Disposition: ok to discharge from orthopedic perspective once passes PT         Future Appointments   Date Time Provider Department Center   4/10/2022  8:30 AM Amina Lauren  Radha, PT [facility] O   4/18/2022  9:40 AM Mika Erickson MD [facility]           Arturo Owusu MD  Orthopaedic Surgery Resident, PGY4

## 2022-04-10 NOTE — PROGRESS NOTES
No new changes from previous note. Dilaudid and oxycodone utilized these 4 hours for pain. Head CT @ 2000. 6 hours after first Lovenox injection. Possible void trial tomorrow

## 2022-04-10 NOTE — PLAN OF CARE
Status: Pt admitted following multiple falls-sometimes 2-3/day. 4/7 had traumatic falls with head strike, facial trauma, SDH. Left orbital blowout fracture, 4 right rib fractures as well. Right hip pinning 4/9. PMHx of Vitamin s deficiency. Chronic DVT lower extremity - did not put on SCDs  Vitals: AVSS on 1LPM, Capno in place, WDL.   Neuros: Intact except right visual neglect. Strength BUE 5/5, 4/5 in BLE RLE weaker than left.   IV: PIV SL  Labs/Electrolytes: WNL  Resp/trach: Lung sounds clear  Diet: Regular with fair PO  Bowel status: BS+x4, LBM 4/9  : Sifuentes in place at this time. Awaiting response from urologist conslut regarding bilateral hydroneprhosis and bladder retention.   Skin: L lip swollen, blanchable redness L elbow, L hip bruised, Incision on R hip covered with primipaore- C-D-I  Pain: Head, lower back and right hip well controlled with PRN Oxycodone and Robaxin  Activity: Up with 1 GB  Social: Daughter is her main contact  Plan: Continue to monitor neuros and treat pain.

## 2022-04-10 NOTE — CONSULTS
Care Management Initial Consult    General Information  Assessment completed with: Children (daughter), Clara Hutchison  Type of CM/SW Visit: Initial Assessment    Primary Care Provider verified and updated as needed: Yes   Readmission within the last 30 days: no previous admission in last 30 days      Reason for Consult: discharge planning  Advance Care Planning: Not discussed    Communication Assessment  Patient's communication style: spoken language (English or Bilingual)    Hearing Difficulty or Deaf: no   Wear Glasses or Blind: yes (reading)    Cognitive  Cognitive/Neuro/Behavioral: WDL  Level of Consciousness: alert  Arousal Level: opens eyes spontaneously  Orientation: oriented x 4  Mood/Behavior: calm, cooperative  Best Language: 0 - No aphasia  Speech: clear, spontaneous, logical    Living Environment:   People in home: alone     Current living Arrangements: apartment      Able to return to prior arrangements: yes    Family/Social Support:  Care provided by: self  Provides care for: no one  Marital Status: Single  Children          Description of Support System: Involved    Support Assessment: Lacks necessary supervision and assistance    Current Resources:   Patient receiving home care services: No  Community Resources:  none  Equipment currently used at home: none  Supplies currently used at home:  none    Employment/Financial:  Employment Status: retired        Financial Concerns:        Lifestyle & Psychosocial Needs:  Social Determinants of Health     Tobacco Use: Medium Risk     Smoking Tobacco Use: Former Smoker     Smokeless Tobacco Use: Never Used   Alcohol Use: Not on file   Financial Resource Strain: Not on file   Food Insecurity: Not on file   Transportation Needs: Not on file   Physical Activity: Not on file   Stress: Not on file   Social Connections: Not on file   Intimate Partner Violence: Not on file   Depression: Not at risk     PHQ-2 Score: 0   Housing Stability: Not on file       Functional  Status:  Prior to admission patient needed assistance:   Dependent ADLs:: Independent  Dependent IADLs:: Independent  Assesssment of Functional Status: Not at baseline with ADL Functioning    Mental Health Status:  Mental Health Status: Current Concern (generalized depression)  Mental Health Management: Medication    Chemical Dependency Status:  Chemical Dependency Status: No Current Concerns    Values/Beliefs:  Spiritual, Cultural Beliefs, [employer] Practices, Values that affect care: no            Additional Information:  RNCC completed CM consult for discharge planning with daughter, Clara Hutchison (pt was sleeping). Angeli Galindo is a 62 year old female seen for continued pain in her hip after her fall last month, continued falls where she landed on her face causing a nose bleed, found to have a SDH in the setting of anticoagulation, left orbit blow out, along with confusion.    Patient lives alone in an apartment. Has 3 children, but none live close by. Clara lives in [place]. She does not have other family support. She was independent with ADLs prior to her last fall. PT/OT recommend home care therapies. Writer sent a referral to [facility] care. Anticipated discharge Tuesday. Daughter can drive her home.    PT/OT to help with strength/endurance to help with ADLs and IADLs.     [facility]   Phone: [phone]   Fax: [phone]     Inpatient cares continue per MD team plans of care.  The inpatient care management team will continue to follow ilna.    Clinton Cramer RN, MSN  Casual RN Care Coordinator  [facility]  Phone: [phone]

## 2022-04-10 NOTE — CONSULTS
[facility] Urology Consultation    Angeli Galindo MRN# [id number]   Age: 62 year old YOB: 1960     Date of Admission:  4/8/2022    Date of Consult:   04/10/2022           Assessment and Plan:   Assessment:   Angeli Galindo is a 62 year old female with a past medical history of Protein S deficiency (on Eliquis 5 mg), DVT, PE, spherocytosis, gastric bypass, hypertension, idiopathic neuropathy, and osteopenia who presents after multiple falls with multiple injuries and was incidentally found on imaging to have a large distended bladder, bilateral hydro, and an incidental left lower pole renal mass.     For her bilateral hydronephrosis and retention, a box catheter has been placed. This will give her bladder and kidneys an opportunity to drain the backed up fluid as a result of her retention. She may have difficulty with emptying her bladder secondary to high stool burden, narcotic use, lack of ambulation, anticholinergic use, or an underlying primary bladder or neurogenic abnormality.     As for her renal mass, it is solid appearing and small. Many of these masses are RCC but while under 3 cm in size, the likelihood of metastasis is extremely unlikely. As the prognosis of these masses is very good, she should complete her workup for falls and recover from her acute injuries now before undergoing workup in the outpatient setting.         Plan:   - UA/Urine culture   - Box catheter for maximal decompression of urinary tract  - May re-image in 72h from the placement of the box catheter to monitor for interval resolution of the hydronephrosis  - trial of void pending re-imaging but would minimize narcotics, anticholinergics, work with patient to get back to ambulatory status, decrease stool burden to maximize chances of successfully voiding   - Outpatient follow up for her renal mass     Discussed with staff, Dr. Renee            Chief Complaint:   Renal mass/hydronephrosis          History  of Present Illness:   Angeli Galindo is a 62 year old female with a past medical history of Protein S deficiency (on Eliquis 5 mg), DVT, PE, spherocytosis, gastric bypass, hypertension, idiopathic neuropathy, and osteopenia who presents after multiple falls with multiple injuries and was incidentally found on imaging to have a large distended bladder, bilateral hydro, and an incidental left lower pole renal mass.     She states that she is unsure why she has been falling lately. She states that all she remembers is getting off of the floor confused about why it happened. 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 She denies UTI symptoms. She denies other urologic history.           Past Medical History:     Past Medical History:   Diagnosis Date     Bilateral low back pain without sciatica      Chronic deep vein thrombosis (DVT) of lower extremity (H)      H/O splenectomy      Obesity due to excess calories     s/p gastric bypass, 320 lb to 170 lbs     Protein S deficiency (H)      Spherocytosis (H)              Past Surgical History:     Past Surgical History:   Procedure Laterality Date     BACK SURGERY      L4/5 microdiskectomy      SECTION       EXCISE LESION TRUNK N/A 2021    Procedure: EXCISE CYST ABDOMINAL WALL;  Surgeon: Junior Velasco MD;  Location: [facility] Main OR     GASTRIC BYPASS  2016    neto en y     IR REMOVAL IVC FILTER               Social History:     Social History     Tobacco Use     Smoking status: Former Smoker     Years: 5.00     Quit date: 2000     Years since quittin.2     Smokeless tobacco: Never Used   Substance Use Topics     Alcohol use: Yes     Comment: Rare             Family History:     Family History   Problem Relation Age of Onset     Parkinsonism Mother      Diabetes Type 2  Father                 Allergies:     Allergies   Allergen Reactions      Shellfish-Derived Products Angioedema     Lisinopril Cough             Medications:     Current Facility-Administered Medications   Medication     acetaminophen (TYLENOL) tablet 975 mg     calcium carbonate 600 mg-vitamin D 400 units (CALTRATE) per tablet 1 tablet     cyanocobalamin (VITAMIN B-12) tablet 500 mcg     DULoxetine (CYMBALTA) DR capsule 60 mg     [START ON 4/11/2022] enoxaparin ANTICOAGULANT (LOVENOX) injection 80 mg     gabapentin (NEURONTIN) tablet 1,200 mg     [START ON 4/11/2022] gabapentin (NEURONTIN) tablet 600 mg     hydrochlorothiazide (HYDRODIURIL) tablet 25 mg     HYDROmorphone (DILAUDID) injection 0.2 mg     levETIRAcetam (KEPPRA) tablet 750 mg     lidocaine (LMX4) cream     lidocaine 1 % 0.1-1 mL     methocarbamol (ROBAXIN) tablet 500 mg     multivitamin w/minerals (THERA-VIT-M) tablet 1 tablet     ondansetron (ZOFRAN-ODT) ODT tab 4 mg    Or     ondansetron (ZOFRAN) injection 4 mg     oxyCODONE (ROXICODONE) tablet 5 mg     pantoprazole (PROTONIX) EC tablet 40 mg     polyethylene glycol (MIRALAX) Packet 17 g     senna-docusate (SENOKOT-S/PERICOLACE) 8.6-50 MG per tablet 1-2 tablet     sodium chloride (PF) 0.9% PF flush 3 mL     sodium chloride (PF) 0.9% PF flush 3 mL               Review of Systems:   Positive findings in BOLD, otherwise negative   Constitutional: chills, fatigue, fever, weight loss  Respiratory: cough and shortness of breath  Cardiovascular: chest pain, palpitations, racing heart beat   Gastrointestinal: abdominal pain, constipation, diarrhea, nausea, vomiting  Genitourinary: Per HPI   Skin: rash  Neuro: weakness, confusion, focal deficits   All other systems reviewed and are negative          Physical Exam:   All vitals have been reviewed  Temp:  [96.3  F (35.7  C)-97.6  F (36.4  C)] 97  F (36.1  C)  Pulse:  [58-92] 86  Resp:  [8-16] 16  BP: (100-142)/(59-73) 115/67  SpO2:  [92 %-98 %] 98 %    Intake/Output Summary (Last 24 hours) at 4/10/2022 7169  Last data filed at  4/10/2022 1559  Gross per 24 hour   Intake 680 ml   Output 2315 ml   Net -1635 ml     Physical Exam:  General:NAD  HEENT: Normocephalic and atraumatic.   CV: Regular rate, non-cyanotic in appearance   Pulm: Normal respiratory effort on room air  Abdomen: Abdomen soft, non-tender and non-distended.   : Sifuentes catheter draining clear yellow urine   Extremities: Warm and well perfused  Neuro: CNII-XII grossly intact          Data:   All laboratory data reviewed    Results:  BMP  Recent Labs   Lab 04/10/22  0834 04/09/22  0902 04/09/22  0645 04/08/22  1150 04/08/22  1149 04/08/22  1029     --  142 140  --  140   POTASSIUM 4.0  --  3.9 3.7  --  4.0   CHLORIDE 112*  --  111* 108  --  108   CO2 26  --  26 29  --  27   BUN 7  --  8 9  --  9   CR 0.39*  --  0.37* 0.52 0.4* 0.50*   * 73 94 94  --  100*     CBC  Recent Labs   Lab 04/10/22  0834 04/09/22  0645 04/08/22  1150 04/08/22  1029   WBC 12.7* 6.6 11.4* 11.9*   HGB 12.1 12.5 13.5 13.6    289 301 303     LFT  Recent Labs   Lab 04/08/22  1150 04/08/22  1148 04/08/22  1029   AST 52*  --  49*   ALT 42  --  46   ALKPHOS 121  --  129   BILITOTAL 0.8  --  0.8   ALBUMIN 3.4  --  3.4   INR 1.00 0.9*  --      Recent Labs   Lab 04/10/22  0834 04/09/22  0902 04/09/22  0645 04/08/22  1150 04/08/22  1029   * 73 94 94 100*       Imaging:  CT April 8, 2022:    IMPRESSION:   1. Nondisplaced fracture of the right femoral neck and multiple  healing rib fractures, likely subacute.  2. No additional acute pathology of the lungs or abdomen/pelvis.  3. Exophytic lesion in the lower pole of the left kidney suspicious  for renal cell carcinoma. No clear evidence of metastatic disease.  4. Colonic diverticulosis without evidence of acute diverticulitis.     [Urgent Result: Right femoral neck fracture]     Finding was identified on 4/8/2022 1:04 PM.      Dr. Tejada was contacted by Dr. Robles at 4/8/2022 1:39 PM and  verbalized understanding of the urgent finding.       Exophytic solid lesion lower pole of the left kidney concerning for  renal cell carcinoma with no evidence of metastatic disease discussed  with Dr. Tejada at 2:55 PM 4/8/2022 by Dr. Sanchez from radiology.     I have personally reviewed the examination and initial interpretation  and I agree with the findings.     MD Boom MORENO MD          Attestation:  This patient was seen and evaluated by me, with the resident taking notes and acting as scribe.  I have reviewed and edited the note above to reflect my findings.  The physical exam and or any procedures were performed by me and the pertinant details are outlined above.      Johnny Renee MD

## 2022-04-10 NOTE — PROGRESS NOTES
[facility]  Trauma Service Progress Note    Date of Service: 04/10/2022    Trauma mechanism: Multiple GLF  Time/date of injury: Last fall 4/7/22  Known Injuries:  1. SDH  2. Left Orbital blowout fracture, inferior wall displacement with significant downward herniation of extraconal fat     Other diagnoses:   1. Chronic anticoagulation, Eliquis   2. Subacute rib fractures, right 4-8    3. Right femoral neck fracture, possibly from 3/9/22 fall  4. Possible nondisplaced fracture through the ulnar base of the fifth metacarpal  5. Bilateral hydronephrosis, undiagnosed, chronic   6. Bladder Retention, undiagnosed, chronic      Procedures:  1. Right hip pinning Dr. Nguyen 4/9/22     Assessment & Plan   Neuro/Pain/Psych:  # Initial Fall March 9, ~3x a day since.   # Fall 4/7/22 with head strike, resulting in facial trauma  # Traumatic SDH   - Repeat Head CT post op ordered d/t increased somnolence: No significant change in subdural hemorrhages in bilateral middle cranial fossa and along the falx and possibly tentorium  - Keppra 750mg bid x7  - Neurosurgery following, Ok for prophylactic dose lovenox as recommended by Hematology team. Please notify neurosurgery of any plan to initiate therapeutic lovenox or apixaban. Neurosurgery to arrange for follow-up in clinic in 2-3 weeks with repeat head CT. - Per discussion Lovenox to start 4/10, then Eliquis at follow-up in 2 week with follow-up Head CT     # Chronic Pain Syndrome  # Acute Pain  - Increased somnolence at night on 4/8 and 4/9, had ordered PTA Gabapentin at 600mg bid then 1200 mg at at bedtime per pharmacy looking at the [place] . Per the patient on 4/10, the dosing in Epic was correct, 1200mg 4 times a day. Patient had medication changes recently because of walking on undiagnosed femoral neck fracture. I discussed with her that I think these higher doses are causing her fatigue and somnolence.   - Change Gabapentin to 600mg @  1800, 1200mg @ 2100.   - Scheduled: Tylenol  - Prn: Dilaudid, oxycodone    - continue PTA: Robaxin 500mg bid prn  - Holding: Norco, patient may have been taking extra d/t increased pain      # Depression  - continue PTA: Cymbalta      EENT:  # Left Orbital blowout fracture, inferior wall displacement with significant downward herniation of extraconal fat     # Double vision   - Head CT:  Left orbital blowout fracture with inferior orbital wall displacement associated with significant downward herniation of extraconal fat, no definite orbital muscle entrapment.  - Keep upright as much as able to help with swelling  - Nasal precautions: no nose blowing, sneeze with mouth open, no heavy listing or straws  - Ophthomology consulted for double vision, recs: Follow up in general ophthalmology clinic in 1 month  - ENT consulted: Sinus precaution, Follow-up with ENT outpatient in one week for surgical options for orbital floor repair      Pulmonary:   # Hx tobacco use   # Healing 4 right rib fractures   - Supplemental oxygen to keep saturation above 92 %. Nasal Cannula 1 L  - Incentive spirometer while awake      Cardiovascular:    # Hypertension   - Monitor hemodynamic status.   - continue PTA: hydrochlorothiazide prn      GI/Nutrition:    # s/p [employer]  - continue PTA: Vit B12  - Regular diet      Renal/ Fluids/Electrolytes:  # Bilateral hydronephrosis  # Bladder Retention   # Exophytic lesion in the lower pole of the left kidney suspicious for renal cell carcinoma  - Chest/Abd/Pelvic CT: Mild bilateral hydronephrosis with numerous parapelvic Cysts. Exophytic enhancing solid lesion in the lower pole of the left kidney measures 2.2 x 2.5 cm, suspicious for renal cell carcinoma  - Creatinine: 0.39  - electrolyte replacement protocol in place.   - Urology consulted for hydronephrosis, and lesion. Per conversation 4/10: continue Sifuentes at this time, kidney US tomorrow.     Endocrine:  - No management indication.       Infectious disease:   - Perioperative antibiotics per surgical team      Hematology:    # Spherocytosis   # Protein S Deficiency  # Chronic DVT lower extremity   # s/p splenectomy   # hx of IVC, s/p removal  - Hgb 12.1. Monitor and trend.   - Kcentra given 4/8   - Hematology consulted: Per conversation 4/10: will continue to follow the next couple of day. Unknown if pt truly has Protein S def since tested while on warfarin. Pt will continue to need prophylaxis. Lovenox started today (4/10) 1mg/kg bid    - Neurosurgery and Ortho ok with starting Lovenox today per conversations on 4/9     Musculoskeletal:  # Bilateral lower back pain without sciatica   # Hx Bilateral L5 radiculopathy    # s/p left L4-5 laminectomy;   # L5-S1 left mod foraminal  # L4-5 mod diffuse L3-4 mod diffuse, L2-3 left mod foraminal & mild-mod diffuse bulging discs;   # L5-S1 mild central herniated disc  # s/p Bilateral L5 transforaminal epidural steroid injections done under fluoroscopic guidance with contrast enhancement 3/25, d/t hip pain   # Subacute rib fractures, Right 4-8   # Possible nondisplaced fracture through the ulnar base of the fifth metacarpal,   # Femoral neck fracture, probably from fall on 3/9/22, not seen on initial xrays    - 3/12: [facility] visit: seen for hip pain. Xrays of right femur 2 views: No acute fracture. Osteopenia., and Hip: No acute fracture or dislocation.  - Chest/Abd/Pelvis CT: Nondisplaced fracture of the right femoral neck and multiple  healing rib fractures, likely subacute.  - Ortho Following:  Activity: Weight bearing as tolerated.  Start physical therapy POD#1.  Wound care: ok to remove dressings pod5. Leave incision open to air if dry.   DVT ppx: ok to restart previous chemical anticoagulation elliquis from orthopedic perspective pod1 once cleared by primary team for subdural hematoma.   F/u: w/ Dr. Nguyen nursing staff for wound check in 2 weeks (email sent)  - Physical and occupational  therapy consults.     Skin:  # Soft tissue hematoma left hand, improved   - dilgent cares to prevent skin breakdown and wound formation.       Lines/ tubes/ drains:  - PIV, Sifuentes       General Cares:                 PPI/H2 blocker:  NA                DVT prophylaxis: PCD                Bowel Regimen/Date of last stool: inplace                Pulmonary toilet: IS    Code status:  Full     Discharge goals:     Adequate pain management: yes    VSS x24 hours: yes    Hemoglobin stable x 48 hours: yes    Ambulating safely and/or therapy evals complete:  In process    Drains/lines removed or plan in place to manage: in process    Teaching done: in process    Other:  Expected D/C date: ~ 2 days    Gemma Callejas PA-C  To contact the trauma service use job code pager [phone],   Numeric texts or alpha text through [facility]     Interval History   Patient more awake this morning. We discussed her somnolence over the last 2 nights, and how its a complete change compared to her earlier in the day. She knows Gabapentin makes her tired so she takes it later in the day. I told her now that we have surgically fixed the fracture that we can hopefully backoff on the higher doses. We changed it per her wished, 600 at 6pm and 1200 at 9pm to help her sleep.     ROS x 8 negative with exception of those things listed in interval hx    Physical Exam   Temp: 97  F (36.1  C) Temp src: Axillary BP: 103/62 Pulse: 58   Resp: 16 SpO2: 95 % O2 Device: Nasal cannula Oxygen Delivery: 1 LPM  Vitals:    04/08/22 1206   Weight: 84.4 kg (186 lb)     Vital Signs with Ranges  Temp:  [95.8  F (35.4  C)-98.2  F (36.8  C)] 97  F (36.1  C)  Pulse:  [58-95] 58  Resp:  [8-19] 16  BP: (101-142)/(53-81) 103/62  SpO2:  [92 %-99 %] 95 %  I/O last 3 completed shifts:  In: 940 [P.O.:440; I.V.:500]  Out: 1275 [Urine:1225; Blood:50]    Independence Coma Scale - Total 15/15  Eye Response (E): 4   4= spontaneous, 3= to verbal/voice, 2= to pain, 1= No response   Verbal Response  (V): 5   5= Orientated, converses, 4= Confused, converses, 3= Inappropriate words, 2= Incomprehensible sounds, 1=No response   Motor Response (M): 6   6= Obeys commands, 5= Localizes to pain, 4= Withdrawal to pain, 3=Fexion to pain, 2= Extension to pain, 1= No response     Constitutional: Awake, alert, cooperative, no apparent distress.  Eyes: Lids and lashes normal, PERRL, EOMI, sclera clear, conjunctiva normal.  HENT: Normocephalic, facial edema resolved  Respiratory: No increased work of breathing, good air exchange, clear to auscultation bilaterally,   Cardiovascular:  regular rate and rhythm, normal S1 and S2  GI: Abdomen soft, non-distended, non-tender,   Genitourinary:  Sifuentes in place  Skin: Warm & dry  Musculoskeletal: There is no redness, warmth, or swelling of the joints.   Neurologic: Awake, alert, oriented. Strength and sensory is intact. No focal deficits.  Neuropsychiatric: Calm, normal eye contact, alert, affect appropriate to situation, oriented, thought process normal.

## 2022-04-10 NOTE — PROGRESS NOTES
Notified Trauma PA Gemma Callejas: Pt daughter Clara Hutchison-number in chart-concerned about pt discharge-could use update from team. Pt also wondering when isauro can come out. Thanks Lei [phone]

## 2022-04-10 NOTE — PLAN OF CARE
Status: Pt admitted following multiple falls-sometimes 2-3/day. 4/7 had traumatic falls with head strike, facial trauma, SDH. Left orbital blowout fracture, 4 right rib fractures as well. Right hip pinning, POD #1. PMHx of Vitamin s deficiency.   Vitals: VSS on RA.    Neuros: Intact ex right visual neglect. Strength BUE 5/5, 4/5 in BLE RLE weaker than left, R/t hip pinning. Pupils intact. Left eyelid slightly closed compared to right r/t fracture.    IV: Left PIV Sld.   Labs/Electrolytes: WDL  Resp/trach: Sating mid-high 90s on RA. LS clear.  Diet: Regular. Tolerating small amounts of intake. Swallows pills whole.  Bowel status: BS+, LBM today.   : Box in place at this time. Awaiting response from urologist conslut regarding bilateral hydroneprhosis and bladder retention.   Skin: L lip swollen, blanchable redness L elbow, L hip bruised, Incision on R hip covered w/primapore. UTV site, no drainage noted.   Pain: Endorsed mild-moderate pain in head and right hip, controlled with PRN dilaudidx1, oxyx2, robaxin 1.  Activity: Up with 1 GB, walker. Walked hallway x1 this shift, worked with PT this shift.   Social: Pleasant, Daughter at bedside. Per pt daughter, family has used norco in past mixed with alcohol, may have contributed to falls.   Plan: Continue to monitor and manage pain, follow POC. Possible voiding trials tomorrow. For box removal. TCU vs home at this time. Social work consult in place.

## 2022-04-10 NOTE — PROGRESS NOTES
"   04/10/22 1327   Quick Adds   Type of Visit Initial PT Evaluation   Living Environment   People in Home alone   Current Living Arrangements apartment   Home Accessibility no concerns   Transportation Anticipated car, drives self   Living Environment Comments Pt has a walk-in shower.   Self-Care   Usual Activity Tolerance good   Current Activity Tolerance moderate   Regular Exercise No   Equipment Currently Used at Home none   Fall history within last six months yes   Number of times patient has fallen within last six months 15   Activity/Exercise/Self-Care Comment Pt was previously independent with ADL completion. Aproximately 1 month ago, pt started using crutches, then progressed to a cane and prior ot hospitalization was not using any AE.   General Information   Onset of Illness/Injury or Date of Surgery 04/08/22   Referring Physician Gemma Callejas PA-C   Patient/Family Therapy Goals Statement (PT) Pt would like to return home.   Pertinent History of Current Problem (include personal factors and/or comorbidities that impact the POC) Per chart \"Angeli Galindo is a 62 year old female with a past medical history of Protein S deficiency (on Eliquis 5 mg), DVT, PE, spherocytosis, gastric bypass, hypertension, idiopathic neuropathy, and osteopenia who presents after multiple falls with:Bilateral subdural hematomas, Left orbital blowout fracture with inferior orbital wall displacement without orbital muscle entrapment,and Subacute impacted right femoral neck fracture. Now s/p R hip CRPP with Dr. SUTTON on 4/9/22.   Existing Precautions/Restrictions fall   Weight-Bearing Status - RLE weight-bearing as tolerated   Cognition   Affect/Mental Status (Cognition) WNL   Orientation Status (Cognition) oriented x 4   Pain Assessment   Patient Currently in Pain Yes, see Vital Sign flowsheet   Posture    Posture Forward head position;Protracted shoulders   Range of Motion (ROM)   ROM Comment B LE ROM WFL.   Strength (Manual " Muscle Testing)   Strength Comments L LE grossly at least 4/5; R LE grossly at least >3/5 though not formally tested 2/2 pain.   Bed Mobility   Comment, (Bed Mobility) Pt completes supine > sit with SBA, pt completes sit > supine with CGA at R LE.   Transfers   Comment, (Transfers) Pt transfers sit <> stand with SBA.   Gait/Stairs (Locomotion)   Comment, (Gait/Stairs) Pt ambulates with FWW and CGA-SBA at slow gait speed. Pt with decreased R step length/foot clearance. Pt with tendency to deviate to R when ambulating resulting in walker hitting objects in environment.   Balance   Balance Comments Pt requires use of FWW for standing balance.   Sensory Examination   Sensory Perception Comments Pt reports baseline N/T in LEs from feet up to knees.   Clinical Impression   Criteria for Skilled Therapeutic Intervention Yes, treatment indicated   PT Diagnosis (PT) Impaired functional mobility   Influenced by the following impairments Weakness, impaired standing balance, decreased activity tolerance, pain   Functional limitations due to impairments Bed mobility, transfers, gait   Clinical Presentation (PT Evaluation Complexity) Evolving/Changing   Clinical Presentation Rationale Clinical judgement   Clinical Decision Making (Complexity) moderate complexity   Planned Therapy Interventions (PT) balance training;bed mobility training;gait training;home exercise program;patient/family education;postural re-education;strengthening;transfer training   Anticipated Equipment Needs at Discharge (PT) walker, rolling   Risk & Benefits of therapy have been explained evaluation/treatment results reviewed;care plan/treatment goals reviewed;risks/benefits reviewed;patient   PT Discharge Planning   PT Discharge Recommendation (DC Rec) home with assist;home with home care physical therapy   PT Rationale for DC Rec Recommend pt discharge home with assist, FWW (PT to issue at time of dc), and  PT for progression of strength, balance,  activity tolerance, and mobility safety/IND.   Physical Therapy Goals   PT Frequency 6x/week   PT Predicated Duration/Target Date for Goal Attainment 04/17/22   PT: Bed Mobility Independent;Supine to/from sit;Within precautions   PT: Transfers Independent;Sit to/from stand;Within precautions   PT: Gait Modified independent;Greater than 200 feet;Assistive device;Within precautions

## 2022-04-10 NOTE — PROGRESS NOTES
HEMATOLOGY Progress NOTE       ASSESSMENT AND PLAN   Angeli Galindo is a 62 y.o. female with a history Hereditary Spherocytosis s/p splenectomy (at age 5), and reported Protein S deficiency, history of unprovoked DVT and PE on anticoagulation with apixaban, history of recent recurrent falls, neuropathy, chronic lower back pain and history of  Gastric Sleeve, Gastric bypass surgery who was admitted to the hospital after recent recurrent falls with a new Subdural hematoma, right femoral head fracture and incidental kidney mass c/f RCC.     Subdural is stable can restart anticoagulation per Neurosurgery Will review Protein S levels drawn yesterday off anticoagulants    Recommendations      - Lovenox 1mg/k BID for 2 weeks then resume eliquis as previously     -, provided neurosurgery okay with this; otherwise consider pneumatic compression devices.       - Iron panel and B12 levels (will order for you) in setting of roue-en-y bypass (iron deficiency precedes anemia) and high risk of malabsorption.       Alex Couch M.D.  [phone]          History of Present Illness   Per chart search, patient was seen in the clinic today where she presented after malaise after falling yesterday and CT demonstrated thin supratentorial Subdural hematoma and left blow-out orbital fracture and fx of righ femoral head Reportedly her first fall was on 3/9/22 and began having some groin pain but all imaging negative. She has since been falling multiple times a day, feeling unsteady, with some mix of light headedness and gait imbalance associated with these falls.     INTERVAL HISTORY  Pt tolerated pinning right femu. CT of head show sub dural stable if not improved.    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    2008: developed Left DVT w/ PE after prolonged immobilization after a back injury. Started on warfarin.   2012 : developed a largely unprovoked right DVT  lower extremity, work-up at that time was negative for any thrombophilic/genic conditions, except for reportedly a protein S deficiency (which we doubt) given that it was reportedly performed while patient was on warfarin.   Hx of Gastric bypass  S/P Splenectomy  Spherocytosis    Family history: daughter has HS as well, initially diagnosed after a parvovirus infection triggered acute anemia.   Patient herself had splenectomy at age 5. Her father apparently also did.   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 EOMI otherwise. Oropharynx: mild hard palate ecchymosis posteriorly.  Neurologic: deferred gait, sensation grossly intact  Cardiac: Regular rate and rhythm, normal S1, S2, no murmurs gallops or rubs    Pulmonary: chest clear to auscultation bilaterally,  no wheezes, rales, or rhonchi   Abdomen: soft, non-distended, no irregular masses, no rebound or guarding.   Hematologic: oral ecchymosis on posterior hard palate as above.   Lymphatic: No cervical, supra/infraclavicular, lymphadenopathy.   Extremities: right hip bandaged post op  Psychiatric: Linear thought processes, normal speech, mood appropriate affect.     Labs:   Lab Results   Component Value Date    WBC 12.7 04/10/2022     Lab Results   Component Value Date    RBC 3.76 04/10/2022     Lab Results   Component Value Date    HGB 12.1 04/10/2022     Lab Results   Component Value Date    HCT 36.7 04/10/2022     No components found for: MCT  Lab Results   Component Value Date    MCV  98 04/10/2022     Lab Results   Component Value Date    MCH 32.2 04/10/2022     Lab Results   Component Value Date    MCHC 33.0 04/10/2022     Lab Results   Component Value Date    RDW 12.5 04/10/2022     Lab Results   Component Value Date     04/10/2022       Imaging reviewed 4/8/22 :   XR left hand:   Impression:  Possible nondisplaced fracture through the ulnar base of the fifth  metacarpal on the AP view.   XR Pelvis   IMPRESSION: Minimally impacted subcapital fracture of the right  femoral neck.   CT Chest Abdomen Pelvis   IMPRESSION:   1. Nondisplaced fracture of the right femoral neck and multiple  healing rib fractures, likely subacute.  2. No additional acute pathology of the lungs or abdomen/pelvis.  3. Exophytic lesion in the lower pole of the left kidney suspicious  for renal cell carcinoma. No clear evidence of metastatic disease.  4. Colonic diverticulosis without evidence of acute diverticulitis.     [Urgent Result: Right femoral neck fracture]     Finding was identified on 4/8/2022 1:04 PM.      Dr. Tejada was contacted by Dr. Robles at 4/8/2022 1:39 PM and  verbalized understanding of the urgent finding.      Exophytic solid lesion lower pole of the left kidney concerning for  renal cell carcinoma with no evidence of metastatic disease discussed  with Dr. Tejada at 2:55 PM 4/8/2022 by Dr. Sanchez from radiology.

## 2022-04-11 ENCOUNTER — APPOINTMENT (OUTPATIENT)
Dept: ULTRASOUND IMAGING | Facility: CLINIC | Age: 62
End: 2022-04-11
Attending: NURSE PRACTITIONER
Payer: COMMERCIAL

## 2022-04-11 ENCOUNTER — APPOINTMENT (OUTPATIENT)
Dept: OCCUPATIONAL THERAPY | Facility: CLINIC | Age: 62
End: 2022-04-11
Payer: COMMERCIAL

## 2022-04-11 ENCOUNTER — MYC MEDICAL ADVICE (OUTPATIENT)
Dept: ORTHOPEDICS | Facility: CLINIC | Age: 62
End: 2022-04-11

## 2022-04-11 LAB
ANION GAP SERPL CALCULATED.3IONS-SCNC: 6 MMOL/L (ref 3–14)
ATRIAL RATE - MUSE: 76 BPM
BUN SERPL-MCNC: 9 MG/DL (ref 7–30)
CALCIUM SERPL-MCNC: 8 MG/DL (ref 8.5–10.1)
CHLORIDE BLD-SCNC: 112 MMOL/L (ref 94–109)
CO2 SERPL-SCNC: 25 MMOL/L (ref 20–32)
CREAT SERPL-MCNC: 0.42 MG/DL (ref 0.52–1.04)
DIASTOLIC BLOOD PRESSURE - MUSE: NORMAL MMHG
ERYTHROCYTE [DISTWIDTH] IN BLOOD BY AUTOMATED COUNT: 12.8 % (ref 10–15)
GFR SERPL CREATININE-BSD FRML MDRD: >90 ML/MIN/1.73M2
GLUCOSE BLD-MCNC: 91 MG/DL (ref 70–99)
HCT VFR BLD AUTO: 37.6 % (ref 35–47)
HGB BLD-MCNC: 12 G/DL (ref 11.7–15.7)
INTERPRETATION ECG - MUSE: NORMAL
MAGNESIUM SERPL-MCNC: 2.4 MG/DL (ref 1.6–2.3)
MCH RBC QN AUTO: 31.7 PG (ref 26.5–33)
MCHC RBC AUTO-ENTMCNC: 31.9 G/DL (ref 31.5–36.5)
MCV RBC AUTO: 100 FL (ref 78–100)
P AXIS - MUSE: 48 DEGREES
PHOSPHATE SERPL-MCNC: 3 MG/DL (ref 2.5–4.5)
PLATELET # BLD AUTO: 280 10E3/UL (ref 150–450)
POTASSIUM BLD-SCNC: 4.1 MMOL/L (ref 3.4–5.3)
PR INTERVAL - MUSE: 148 MS
PROT S FREE AG ACT/NOR PPP IA: 133 % (ref 55–125)
QRS DURATION - MUSE: 90 MS
QT - MUSE: 380 MS
QTC - MUSE: 427 MS
R AXIS - MUSE: -7 DEGREES
RBC # BLD AUTO: 3.78 10E6/UL (ref 3.8–5.2)
SODIUM SERPL-SCNC: 143 MMOL/L (ref 133–144)
SYSTOLIC BLOOD PRESSURE - MUSE: NORMAL MMHG
T AXIS - MUSE: 3 DEGREES
VENTRICULAR RATE- MUSE: 76 BPM
WBC # BLD AUTO: 8 10E3/UL (ref 4–11)

## 2022-04-11 PROCEDURE — 76770 US EXAM ABDO BACK WALL COMP: CPT | Mod: 26 | Performed by: RADIOLOGY

## 2022-04-11 PROCEDURE — 85027 COMPLETE CBC AUTOMATED: CPT | Performed by: PHYSICIAN ASSISTANT

## 2022-04-11 PROCEDURE — 250N000013 HC RX MED GY IP 250 OP 250 PS 637: Performed by: EMERGENCY MEDICINE

## 2022-04-11 PROCEDURE — 250N000011 HC RX IP 250 OP 636: Performed by: PHYSICIAN ASSISTANT

## 2022-04-11 PROCEDURE — 36415 COLL VENOUS BLD VENIPUNCTURE: CPT | Performed by: PHYSICIAN ASSISTANT

## 2022-04-11 PROCEDURE — 83735 ASSAY OF MAGNESIUM: CPT | Performed by: SURGERY

## 2022-04-11 PROCEDURE — 76770 US EXAM ABDO BACK WALL COMP: CPT

## 2022-04-11 PROCEDURE — 99233 SBSQ HOSP IP/OBS HIGH 50: CPT | Mod: GC | Performed by: INTERNAL MEDICINE

## 2022-04-11 PROCEDURE — 120N000002 HC R&B MED SURG/OB UMMC

## 2022-04-11 PROCEDURE — 250N000013 HC RX MED GY IP 250 OP 250 PS 637: Performed by: PHYSICIAN ASSISTANT

## 2022-04-11 PROCEDURE — 80048 BASIC METABOLIC PNL TOTAL CA: CPT | Performed by: PHYSICIAN ASSISTANT

## 2022-04-11 PROCEDURE — 250N000013 HC RX MED GY IP 250 OP 250 PS 637: Performed by: NURSE PRACTITIONER

## 2022-04-11 PROCEDURE — 250N000011 HC RX IP 250 OP 636: Performed by: NURSE PRACTITIONER

## 2022-04-11 PROCEDURE — 84100 ASSAY OF PHOSPHORUS: CPT | Performed by: SURGERY

## 2022-04-11 PROCEDURE — 97530 THERAPEUTIC ACTIVITIES: CPT | Mod: GO

## 2022-04-11 PROCEDURE — 99232 SBSQ HOSP IP/OBS MODERATE 35: CPT | Performed by: NURSE PRACTITIONER

## 2022-04-11 RX ORDER — PRENATAL VIT/IRON FUM/FOLIC AC 27MG-0.8MG
1 TABLET ORAL EVERY EVENING
Status: DISCONTINUED | OUTPATIENT
Start: 2022-04-11 | End: 2022-04-12 | Stop reason: HOSPADM

## 2022-04-11 RX ADMIN — OXYCODONE HYDROCHLORIDE 5 MG: 5 TABLET ORAL at 19:00

## 2022-04-11 RX ADMIN — ACETAMINOPHEN 975 MG: 325 TABLET ORAL at 20:30

## 2022-04-11 RX ADMIN — OXYCODONE HYDROCHLORIDE 5 MG: 5 TABLET ORAL at 22:15

## 2022-04-11 RX ADMIN — HYDROMORPHONE HYDROCHLORIDE 0.2 MG: 0.2 INJECTION, SOLUTION INTRAMUSCULAR; INTRAVENOUS; SUBCUTANEOUS at 17:45

## 2022-04-11 RX ADMIN — OXYCODONE HYDROCHLORIDE 5 MG: 5 TABLET ORAL at 11:39

## 2022-04-11 RX ADMIN — ENOXAPARIN SODIUM 80 MG: 80 INJECTION SUBCUTANEOUS at 22:16

## 2022-04-11 RX ADMIN — GABAPENTIN 1200 MG: 600 TABLET, FILM COATED ORAL at 22:15

## 2022-04-11 RX ADMIN — LEVETIRACETAM 750 MG: 750 TABLET, FILM COATED ORAL at 20:30

## 2022-04-11 RX ADMIN — OXYCODONE HYDROCHLORIDE 5 MG: 5 TABLET ORAL at 04:33

## 2022-04-11 RX ADMIN — ACETAMINOPHEN 975 MG: 325 TABLET ORAL at 04:30

## 2022-04-11 RX ADMIN — PRENATAL VIT W/ FE FUMARATE-FA TAB 27-0.8 MG 1 TABLET: 27-0.8 TAB at 20:34

## 2022-04-11 RX ADMIN — ACETAMINOPHEN 975 MG: 325 TABLET ORAL at 11:38

## 2022-04-11 RX ADMIN — DULOXETINE HYDROCHLORIDE 60 MG: 60 CAPSULE, DELAYED RELEASE PELLETS ORAL at 20:30

## 2022-04-11 RX ADMIN — CYANOCOBALAMIN TAB 500 MCG 500 MCG: 500 TAB at 20:30

## 2022-04-11 RX ADMIN — ENOXAPARIN SODIUM 80 MG: 80 INJECTION SUBCUTANEOUS at 11:38

## 2022-04-11 RX ADMIN — GABAPENTIN 600 MG: 600 TABLET, FILM COATED ORAL at 17:45

## 2022-04-11 RX ADMIN — OXYCODONE HYDROCHLORIDE 5 MG: 5 TABLET ORAL at 08:51

## 2022-04-11 RX ADMIN — OXYCODONE HYDROCHLORIDE 5 MG: 5 TABLET ORAL at 15:44

## 2022-04-11 RX ADMIN — LEVETIRACETAM 750 MG: 750 TABLET, FILM COATED ORAL at 08:51

## 2022-04-11 RX ADMIN — CALCIUM CARBONATE 600 MG (1,500 MG)-VITAMIN D3 400 UNIT TABLET 1 TABLET: at 20:30

## 2022-04-11 RX ADMIN — SENNOSIDES AND DOCUSATE SODIUM 1 TABLET: 50; 8.6 TABLET ORAL at 08:51

## 2022-04-11 RX ADMIN — METHOCARBAMOL 500 MG: 500 TABLET ORAL at 16:30

## 2022-04-11 RX ADMIN — PANTOPRAZOLE SODIUM 40 MG: 40 TABLET, DELAYED RELEASE ORAL at 08:51

## 2022-04-11 RX ADMIN — POLYETHYLENE GLYCOL 3350 17 G: 17 POWDER, FOR SOLUTION ORAL at 16:29

## 2022-04-11 RX ADMIN — Medication 1 TABLET: at 20:30

## 2022-04-11 ASSESSMENT — ACTIVITIES OF DAILY LIVING (ADL)
ADLS_ACUITY_SCORE: 7
ADLS_ACUITY_SCORE: 11
ADLS_ACUITY_SCORE: 7
ADLS_ACUITY_SCORE: 7
ADLS_ACUITY_SCORE: 11
ADLS_ACUITY_SCORE: 11
ADLS_ACUITY_SCORE: 7
ADLS_ACUITY_SCORE: 11
ADLS_ACUITY_SCORE: 7
ADLS_ACUITY_SCORE: 7
ADLS_ACUITY_SCORE: 11
ADLS_ACUITY_SCORE: 7
ADLS_ACUITY_SCORE: 11
ADLS_ACUITY_SCORE: 7
ADLS_ACUITY_SCORE: 11

## 2022-04-11 NOTE — PROGRESS NOTES
[facility]  Trauma Service Progress Note    Date of Service (when I saw the patient): 04/11/2022     Assessment & Plan   Trauma mechanism: Multiple GLF  Time/date of injury: Last fall 4/7/22  Known Injuries:  1. SDH  2. Left Orbital blowout fracture, inferior wall displacement with significant downward herniation of extraconal fat     Other diagnoses:   1. Chronic anticoagulation, Eliquis   2. Subacute rib fractures, right 4-8    3. Right femoral neck fracture, possibly from 3/9/22 fall  4. Possible nondisplaced fracture through the ulnar base of the fifth metacarpal  5. Bilateral hydronephrosis, undiagnosed, chronic   6. Bladder Retention, undiagnosed, chronic       Procedures:  1. Right hip pinning Dr. Nguyen 4/9/22     Neuro/Pain/Psych:  # Multiple falls  # Fall 4/7/22 with head strike, resulting in facial trauma  # Traumatic SDH   - Repeat Head CT post op obtained postop due to increased somnolence: No significant change in subdural hemorrhages in bilateral middle cranial fossa and along the falx and possibly tentorium  - Keppra 750mg bid x7  - Neurosurgery following, Ok with lovenox as recommended by Hematology team. CT head was obtained post initiation 04/11 with a stable SDH.   Neurosurgery to arrange for follow-up in clinic in 2-3 weeks with repeat head CT. - Per discussion Lovenox OK to start 4/10, then Eliquis at follow-up in 2 week with follow-up Head CT     # Chronic Pain Syndrome  # Acute Pain  - Increased somnolence at night on 4/8 and 4/9, had ordered PTA Gabapentin at 600mg bid then 1200 mg at at bedtime per pharmacy looking at the MN . Per the patient on 4/10, the dosing in Epic was correct, 1200mg 4 times a day. Concerned the high doses may have contributed to increase somnolence and falls.  - Change Gabapentin to 600mg @ 1800, 1200mg @ 2100.   - Scheduled: Tylenol  - Prn: Dilaudid, oxycodone    - continue PTA: Robaxin 500mg bid prn     # Depression  -  continue PTA: Cymbalta      EENT:  # Left Orbital blowout fracture, inferior wall displacement with significant downward herniation of extraconal fat     # Double vision, improved today  - Head CT:  Left orbital blowout fracture with inferior orbital wall displacement associated with significant downward herniation of extraconal fat, no definite orbital muscle entrapment.  - Keep upright as much as able to help with swelling  - Nasal precautions: no nose blowing, sneeze with mouth open, no heavy listing or straws  - Ophthomology consulted for double vision, recs: Follow up in general ophthalmology clinic in 1 month  - ENT consulted: Sinus precaution, Follow-up with ENT outpatient in one week for surgical options for orbital floor repair      Pulmonary:   # Hx tobacco use   # Healing (subacute) 4 right rib fractures   - Supplemental oxygen to keep saturation above 92 %. Nasal Cannula 1 L  - Incentive spirometer while awake      Cardiovascular:    # Hypertension   - Monitor hemodynamic status.   - continue PTA: hydrochlorothiazide prn      GI/Nutrition:    # s/p [place] en Y  - continue PTA: Vit B12  - Regular diet      Renal/ Fluids/Electrolytes:  # Bilateral hydronephrosis  # Bladder Retention   # Exophytic lesion in the lower pole of the left kidney suspicious for renal cell carcinoma  - Chest/Abd/Pelvic CT: Mild bilateral hydronephrosis with numerous parapelvic Cysts. Exophytic enhancing solid lesion in the lower pole of the left kidney measures 2.2 x 2.5 cm, suspicious for renal cell carcinoma  - electrolyte replacement protocol in place.   - Urology consulted for hydronephrosis, and lesion. Per conversation 4/10: continue, recommended box  - Follow up renal ultrasound today  - Viding trial pending ultrasound result.  - Aggressive bowel regimen, avoid anticholinergics as able     Endocrine:  - No management indication.      Infectious disease:   - Perioperative antibiotics per surgical team,  completed     Hematology:    # Coagulopathy  # Spherocytosis   # Protein S Deficiency  # Chronic DVT lower extremity   # s/p splenectomy   # hx of IVC, s/p removal  - On Apixaban long term for anticoagulation, was reversed with Kcentra on admission 04/08, due to ICH   - Hgb 12.1. Monitor and trend.   - Hematology consulted:   - Protein S deficiency labs sent while off Apixaban  - Will continue to follow  - Lovenox started today (4/10) 1mg/kg bid    - Neurosurgery and Ortho ok with starting Lovenox per conversations on 4/9     Musculoskeletal:  # Bilateral lower back pain without sciatica , s/p left L4-5 laminectomy;   # L5-S1 chronic pain issues with disc herniation  # s/p Bilateral L5 transforaminal epidural steroid injections done under fluoroscopic guidance with contrast enhancement 3/25, d/t hip pain   # Subacute rib fractures, Right 4-8   # Possible nondisplaced fracture through the ulnar base of the fifth metacarpal,   # Femoral neck fracture, probably from fall on 3/9/22, not seen on initial xrays    - 3/12: [facility] visit: seen for hip pain. Xrays of right femur 2 views: No acute fracture. Osteopenia., and Hip: No acute fracture or dislocation.  - Chest/Abd/Pelvis CT: Nondisplaced fracture of the right femoral neck and multiple  healing rib fractures, likely subacute.  - Ortho Following:   s/p right hip CRPP with Dr. SUTTON on 4/9/22  Activity: Weight bearing as tolerated.  Wound care: ok to remove dressings pod5. Leave incision open to air if dry.   DVT ppx: Lovenox  F/u: w/ Dr. Sutton nursing staff for wound check in 2 weeks (email sent)  - Physical and occupational therapy consults.     Skin:  # Soft tissue hematoma left hand, left hip ecchymotic  - dilgent cares to prevent skin breakdown and wound formation.       Lines/ tubes/ drains:  - PIV, Sifuentes       General Cares:                 PPI/H2 blocker:  NA                DVT prophylaxis: PCD                Bowel Regimen/Date of last stool:  inplace                Pulmonary toilet: IS     Code status:  Full               Discharge goals:     Adequate pain management: yes    VSS x24 hours: yes    Hemoglobin stable x 48 hours: yes    Ambulating safely and/or therapy evals complete: yes    Drains/lines removed or plan in place to manage: yes    Teaching done: in process    Other:  Expected D/C date: Tues vs Wed     Interval History   Slept well, reports adequate pain control. Tolerating diet. Denies shortness of breath or chest pain.  ROS x 8 negative with exception of those things listed in interval hx    Physical Exam   Temp: 97.2  F (36.2  C) Temp src: Oral BP: 110/66 Pulse: 58   Resp: 16 SpO2: 95 % O2 Device: None (Room air)    Vitals:    04/08/22 1206   Weight: 84.4 kg (186 lb)     Vital Signs with Ranges  Temp:  [96.7  F (35.9  C)-98  F (36.7  C)] 97.2  F (36.2  C)  Pulse:  [58-86] 58  Resp:  [16-20] 16  BP: ()/(51-67) 110/66  SpO2:  [94 %-99 %] 95 %  I/O last 3 completed shifts:  In: 480 [P.O.:480]  Out: 2440 [Urine:2440]    Evette Coma Scale - Total 15/15  Eye Response (E): 4   4= spontaneous, 3= to verbal/voice, 2= to pain, 1= No response   Verbal Response (V): 5   5= Orientated, converses, 4= Confused, converses, 3= Inappropriate words, 2= Incomprehensible sounds, 1=No response   Motor Response (M): 6   6= Obeys commands, 5= Localizes to pain, 4= Withdrawal to pain, 3=Fexion to pain, 2= Extension to pain, 1= No response   Constitutional: Awake, alert, cooperative, no apparent distress.  HENT: Normocephalic, facial edema resolved  Respiratory: Clear bilaterally without increased work of breathing  Cardiovascular:  regular rate and rhythm, normal S1 and S2  GI: Abdomen soft, non-distended, non-tender,   Genitourinary:  Sifuentes in place with clear yellow urine  Skin: Warm & dry, right hip surgical dressing CDI, left hip ecchymosis   Musculoskeletal: There is no redness, warmth, or swelling of the joints.   Neurologic: Awake, alert, oriented.  Strength and sensory is intact. No focal deficits.  Neuropsychiatric: Calm, normal eye contact, alert, affect appropriate to situation, oriented, thought process normal.    YAHIR Mendieta CNP  To contact the trauma service use job code pager [phone],   Numeric texts or alpha text through [facility]

## 2022-04-11 NOTE — PROGRESS NOTES
Nutrition Services Brief Note    Patient with positive MST for recent weight loss    Wt Readings from Last 10 Encounters:   04/08/22 84.4 kg (186 lb)   04/08/22 82.1 kg (181 lb)   09/20/21 82.3 kg (181 lb 8 oz)   08/27/21 82.2 kg (181 lb 3.2 oz)   Weight stable.     Unit RD will continue to monitor per protocol  Elisabeth Bradley, MS/RD/LD  6A/7D RD pager: [phone]  Weekend/Holiday RD pager: [phone]

## 2022-04-11 NOTE — PLAN OF CARE
Status: Pt admitted following multiple falls-sometimes 2-3/day. 4/7 had traumatic falls with head strike, facial trauma, SDH. Left orbital blowout fracture, 4 right rib fractures as well. Right hip pinning 4/9. PMHx of Vitamin s deficiency. Chronic DVT lower extremity - did not put on SCDs  Vitals: AVSS on RA  Neuros: Intact except right visual neglect. Strength BUE 5/5, 4/5 in BLE RLE weaker than left.   IV: PIV SL  Labs/Electrolytes: WNL  Resp/trach: Lung sounds clear  Diet: Regular with good PO  Bowel status: BS+x4, LBM 4/10  : Sifuentes in place at this time. Probable trail void today  Skin: L lip swollen, blanchable redness L elbow, L hip bruised, Incision on R hip covered with primipaore- C-D-I  Pain: Head, lower back and right hip well controlled with PRN Oxycodone and Robaxin  Activity: Up with 1 GB  Social: Daughter is her main contact  Plan: Continue to monitor neuros and treat pain. Possible trial void pending kidney ultrasound results and D/c to home Tuesday with therapies.

## 2022-04-11 NOTE — PROGRESS NOTES
Care Management Follow Up    Length of Stay (days): 3    Expected Discharge Date: 04/12/2022 or 4-     Concerns to be Addressed:   Discharge planning    Patient plan of care discussed at interdisciplinary rounds: Yes    Anticipated Discharge Disposition:  Home     Anticipated Discharge Services:  Skilled home care RN, PT & OT.   Anticipated Discharge DME:      Referrals Placed by CM/SW:  [facility]  Private pay costs discussed: Not applicable    Additional Information:  In 6A Discharge Rounds Leonard Ferro NP reported plan is Urology consult, may need a Renal US today or tomorrow. Possible pt may discharge with a box catheter. Anticipate discharge on 4-12 or 4-13.     In[facility] by Belia Roche, [facility] can accept [facility] care referral. They also requested home care RN orders in addition to PT & OT.       Mariam Nguyễn, RN Care Coordinator  Unit [address], [facility]  Phone:  [phone]

## 2022-04-11 NOTE — PROGRESS NOTES
Orthopaedic Surgery Progress Note 04/11/2022    S: DOLORESO. Was sleeping comfortably. Denies new numbness/tingling/weakenss. Pain well controlled. Has ambulated with PT. NO concerns today, eager to discontinue home.     O:  Temp: 97.2  F (36.2  C) Temp src: Oral BP: 110/66 Pulse: 58   Resp: 16 SpO2: 95 % O2 Device: None (Room air)      Exam:  Gen: No acute distress, resting comfortably in bed.  Resp: Non-labored breathing  MSK:    RLE:  - dressing c/d/i  - SILT femoral/tibial/sural/saphenous/DP/SP nerves  - Fires  Quads, TA, EHL, FHL, GaSC  - DP pulses palpable, foot wwp    Recent Labs   Lab 04/11/22  0650 04/10/22  0834 04/09/22  0645   WBC 8.0 12.7* 6.6   HGB 12.0 12.1 12.5    271 289     No results found for: SED        Assessment: Angeli Galindo is a 62 year old female with a past medical history of Protein S deficiency (on Eliquis 5 mg), DVT, PE, spherocytosis, gastric bypass, hypertension, idiopathic neuropathy, and osteopenia who presents after multiple falls with:     1. Bilateral subdural hematomas  2. Left orbital blowout fracture with inferior orbital wall displacement without orbital muscle entrapment  3. Subacute impacted right femoral neck fracture     Now s/p R hip CRPP with Dr. NGUYEN on 4/9/22.        Plan:     Pain: Scheduled Tylenol, IV Dilaudid PRN, oxycodone PRN, Flexeril PRN  Activity: Weight bearing as tolerated.  Start physical therapy POD#1.  Wound care: ok to remove dressings pod5. Leave incision open to air if dry.   Drains: none  Abx: Ancef x 24 hours post op for surgical ppx  Diet:  ADAT to regular  Labs: HGB pod1  XR: complete, obtained in OR  DVT ppx: ok to restart previous chemical anticoagulation elliquis from orthopedic perspective pod1 once cleared by primary team for subdural hematoma.   F/u: w/ Dr. Nguyen nursing staff for wound check in 2 weeks (email sent)  Disposition: ok to discharge from orthopedic perspective once passes PT         Future Appointments   Date Time Provider  Department Center   4/10/2022  8:30 AM Amina Lauren, PT [facility]   4/18/2022  9:40 AM Mika Erickson MD [facility]     JORGE VALENTIN PA-C  4/11/2022 9:20 AM  Orthopaedic Surgery     Thank you for allowing me to participate in this patient's care. Please page me directly any questions/concerns.   Securely message with the Vocera Web Console (learn more here)  Text page via [employer] Paging/Directory    If there is no response, if it is a weekend, or if it is during evening hours, please page the orthopaedic surgery resident on call via [employer] Paging/Directory

## 2022-04-11 NOTE — PLAN OF CARE
Status: Admitted 4/8 after multiple falls. Fall on 4/7 that resulted in SDH, L orbital fracture, rib fractures, and R neck fracture. S/p R hip pinning on 4/9.   Vitals: VSS on RA  Neuros: AO x4. Baseline N/T to bilateral feet. Pt denied visual field cut and blurry/double vision.  IV: PIV SL  Labs/Electrolytes: WNL  Resp/trach: WNL  Diet: Regular diet  Bowel status: LBM 4/10  : Box in place per Urology. Good output  Skin: L hip bruise, L cheek bruise. R hip incision covered with primapore.  Pain: Head and R hip pain controlled with PRN oxycodone and scheduled Tylenol  Activity: A1, GB, walker.  Plan: Continue to monitor and follow POC. Might discharge home with box catheter  Updates this shift: Renal ultrasound completed this shift.

## 2022-04-11 NOTE — PROGRESS NOTES
HEMATOLOGY Progress NOTE       ASSESSMENT AND PLAN   Angeli Galindo is a 62 y.o. female with a history Hereditary Spherocytosis s/p splenectomy (at age 5), and reported Protein S deficiency(now disproved), history of provoked DVT/PE and a provoked DVT on anticoagulation with apixaban, history of recent recurrent falls, neuropathy, chronic lower back pain and history of  Gastric Sleeve, Gastric bypass surgery who was admitted to the hospital after recent recurrent falls with a new Subdural hematoma, right femoral head fracture and incidental kidney mass c/f RCC.     Subdural is stable can restart anticoagulation per Neurosurgery. Protein S levels drawn 4/8/22 while off anticoagulants is normal at 133, confirming our suspicion that she isn't protein S deficient.  Iron panel normal, B12 levels normal. (obtained in setting of roue-en y bypass), with borderline low ferritin, likely even reactionary and so patient would likely benefit from iron supplementation. We discussed with her and she would initially prefer a trial of PO iron initially which is likely a good idea if she has adapted well absorption-wise since her roue-en-y.     Recommendations     - Lovenox 1mg/k BID for 2 weeks then resume eliquis as previously, provided neurosurgery okay with this; otherwise consider pneumatic compression devices.     - Start daily or every other day oral iron supplementation    - refer to bleeding and clotting disorders clinic at discharge for ongoing consideration of need for anticoagulation in this patient w/ hereditary spherocytosis and recurrent falls and both provoked and unprovoked DVT history.     - remove protein S deficiency from diagnosis/problem list.     Hematology will sign off at this time. We appreciate your allow us to participate in the care of Dr. Angeli Galindo.     Case discussed with attending Yvan Olea who agrees with the above.     Katy Yadav MD   Internal Medicine - PGY1   Pager #  [phone]  Attending  The patient was seen and examined by me separate from the resident/fellow provider.The note above reflects my assessment and plan. I have personally reviewed today's labs,vital and radiology results. The points of care that were added by me are:    Interesting her Protein S is normal . I suspect her unprovoked PE and DVT may have been related to post splenectomy  in an HS pt. Would give pt oral iron but may need IV iron with her Rose Mary en Y.Interms of longe term anticoagulations I would like her to be seen in the [facility] in a few month. She may not need it and with her falls anticoagulation  may be risky  Alex Couch M.D.  [phone]     +++++++++++++++++++++++++++++++++++++++++++++++++++++++++++++++    History of Present Illness   Per chart search, patient was seen in the clinic today where she presented after malaise after falling yesterday and CT demonstrated thin supratentorial Subdural hematoma and left blow-out orbital fracture and fx of righ femoral head Reportedly her first fall was on 3/9/22 and began having some groin pain but all imaging negative. She has since been falling multiple times a day, feeling unsteady, with some mix of light headedness and gait imbalance associated with these falls.     INTERVAL HISTORY  Pain of hip ongoing, somewhat good response to pain medications. CT of head show sub dural stable if not improved. No fevers, chills, chest pain, shortness of breath, nausea, abdominal pain or vomiting.     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    2008: developed Left DVT w/ PE after prolonged immobilization after a back injury. Started on warfarin.   2012 : developed a largely unprovoked right DVT lower extremity, work-up at that time was negative for any thrombophilic/genic conditions, except for reportedly a protein S deficiency (which we doubt) given that it was reportedly performed while  patient was on warfarin.   Hx of Gastric bypass  S/P Splenectomy  Spherocytosis    Family history: daughter has HS as well, initially diagnosed after a parvovirus infection triggered acute anemia.   Patient herself had splenectomy at age 5. Her father apparently also did.   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 EOMI otherwise. Oropharynx: mild hard palate ecchymosis posteriorly.  Neurologic: deferred gait, sensation grossly intact   Cardiac: Regular rate and rhythm, normal S1, S2, no murmurs gallops or rubs    Pulmonary: chest clear to auscultation bilaterally,  no wheezes, rales, or rhonchi   Abdomen: soft, non-distended, no irregular masses, no rebound or guarding.   Hematologic: oral ecchymosis on posterior hard palate as above.   Lymphatic: No cervical, supra/infraclavicular, lymphadenopathy.   Extremities: right hip bandaged post op  Psychiatric: Linear thought processes, normal speech, mood appropriate affect.     Labs:   Lab Results   Component Value Date    WBC 12.7 04/10/2022     Lab Results   Component Value Date    RBC 3.76 04/10/2022     Lab Results   Component Value Date    HGB 12.1 04/10/2022     Lab Results   Component Value Date    HCT 36.7 04/10/2022     No components found for: MCT  Lab Results   Component Value Date    MCV 98 04/10/2022     Lab Results   Component Value Date    MCH 32.2 04/10/2022     Lab Results   Component Value Date    MCHC 33.0 04/10/2022     Lab Results   Component Value Date    RDW 12.5 04/10/2022      Lab Results   Component Value Date     04/10/2022       Imaging reviewed 4/8/22 :   XR left hand:   Impression:  Possible nondisplaced fracture through the ulnar base of the fifth  metacarpal on the AP view.   XR Pelvis   IMPRESSION: Minimally impacted subcapital fracture of the right  femoral neck.    CT chest Abdomen Pelvis 4/10                                                                    Impression:  No significant change in bilateral middle cranial fossa subdural  hemorrhages and minimal residual subdural hemorrhage along the right  tentorial leaflet.        CT Chest Abdomen Pelvis 4/8 /22    IMPRESSION:   1. Nondisplaced fracture of the right femoral neck and multiple  healing rib fractures, likely subacute.  2. No additional acute pathology of the lungs or abdomen/pelvis.  3. Exophytic lesion in the lower pole of the left kidney suspicious  for renal cell carcinoma. No clear evidence of metastatic disease.  4. Colonic diverticulosis without evidence of acute diverticulitis.     [Urgent Result: Right femoral neck fracture]     Finding was identified on 4/8/2022 1:04 PM.      Dr. Tejada was contacted by Dr. Robles at 4/8/2022 1:39 PM and  verbalized understanding of the urgent finding.      Exophytic solid lesion lower pole of the left kidney concerning for  renal cell carcinoma with no evidence of metastatic disease discussed  with Dr. Tejada at 2:55 PM 4/8/2022 by Dr. Sanchez from radiology.

## 2022-04-11 NOTE — TELEPHONE ENCOUNTER
FUTURE VISIT INFORMATION      FUTURE VISIT INFORMATION:    Date: 4/18/22    Time: 9:40AM    Location: [facility] [place]  REFERRAL INFORMATION:    Referring provider:      Referring providers clinic:      Reason for visit/diagnosis      RECORDS REQUESTED FROM:       Clinic name Comments Records Status Imaging Status   [facility]  4/8/22 - .... hospital encounter  4/8/22 ENT consult with Michelle De Los Santos PA-C EPIC    Imaging 4/10/22 CT Head   4/8/22 CT Head and CT Facial Bone  Epic PACS

## 2022-04-12 ENCOUNTER — APPOINTMENT (OUTPATIENT)
Dept: OCCUPATIONAL THERAPY | Facility: CLINIC | Age: 62
End: 2022-04-12
Payer: COMMERCIAL

## 2022-04-12 ENCOUNTER — TELEPHONE (OUTPATIENT)
Dept: PHYSICAL MEDICINE AND REHAB | Facility: CLINIC | Age: 62
End: 2022-04-12

## 2022-04-12 VITALS
RESPIRATION RATE: 16 BRPM | WEIGHT: 186 LBS | DIASTOLIC BLOOD PRESSURE: 72 MMHG | TEMPERATURE: 97.1 F | OXYGEN SATURATION: 98 % | SYSTOLIC BLOOD PRESSURE: 114 MMHG | HEART RATE: 77 BPM | BODY MASS INDEX: 29.13 KG/M2

## 2022-04-12 DIAGNOSIS — Z86.2 H/O PROTEIN S DEFICIENCY: ICD-10-CM

## 2022-04-12 DIAGNOSIS — S06.5XAA SUBDURAL HEMATOMA (H): Primary | ICD-10-CM

## 2022-04-12 PROCEDURE — 99239 HOSP IP/OBS DSCHRG MGMT >30: CPT | Performed by: NURSE PRACTITIONER

## 2022-04-12 PROCEDURE — 97535 SELF CARE MNGMENT TRAINING: CPT | Mod: GO

## 2022-04-12 PROCEDURE — 250N000013 HC RX MED GY IP 250 OP 250 PS 637: Performed by: EMERGENCY MEDICINE

## 2022-04-12 PROCEDURE — 250N000011 HC RX IP 250 OP 636: Performed by: NURSE PRACTITIONER

## 2022-04-12 PROCEDURE — 250N000011 HC RX IP 250 OP 636: Performed by: PHYSICIAN ASSISTANT

## 2022-04-12 PROCEDURE — 250N000013 HC RX MED GY IP 250 OP 250 PS 637: Performed by: PHYSICIAN ASSISTANT

## 2022-04-12 PROCEDURE — 250N000009 HC RX 250

## 2022-04-12 RX ORDER — OXYCODONE HYDROCHLORIDE 5 MG/1
5 TABLET ORAL EVERY 6 HOURS PRN
Qty: 16 TABLET | Refills: 0 | Status: SHIPPED | OUTPATIENT
Start: 2022-04-12 | End: 2022-04-16

## 2022-04-12 RX ORDER — AMOXICILLIN 250 MG
1 CAPSULE ORAL 2 TIMES DAILY PRN
Qty: 14 TABLET | Refills: 0 | Status: SHIPPED | OUTPATIENT
Start: 2022-04-12 | End: 2023-04-04

## 2022-04-12 RX ORDER — ACETAMINOPHEN 325 MG/1
975 TABLET ORAL 3 TIMES DAILY
COMMUNITY
Start: 2022-04-12 | End: 2022-06-01

## 2022-04-12 RX ORDER — MAGNESIUM HYDROXIDE 1200 MG/15ML
LIQUID ORAL
Status: COMPLETED
Start: 2022-04-12 | End: 2022-04-12

## 2022-04-12 RX ORDER — LEVETIRACETAM 750 MG/1
750 TABLET ORAL 2 TIMES DAILY
Qty: 6 TABLET | Refills: 0 | Status: SHIPPED | OUTPATIENT
Start: 2022-04-12 | End: 2022-06-01

## 2022-04-12 RX ORDER — APIXABAN 5 MG/1
TABLET, FILM COATED ORAL
Qty: 60 TABLET | Refills: 0 | OUTPATIENT
Start: 2022-04-12

## 2022-04-12 RX ORDER — GABAPENTIN 600 MG/1
600 TABLET ORAL 2 TIMES DAILY
Start: 2022-04-12 | End: 2023-04-04

## 2022-04-12 RX ADMIN — GABAPENTIN 600 MG: 600 TABLET, FILM COATED ORAL at 08:43

## 2022-04-12 RX ADMIN — PANTOPRAZOLE SODIUM 40 MG: 40 TABLET, DELAYED RELEASE ORAL at 08:44

## 2022-04-12 RX ADMIN — SENNOSIDES AND DOCUSATE SODIUM 1 TABLET: 50; 8.6 TABLET ORAL at 08:43

## 2022-04-12 RX ADMIN — METHOCARBAMOL 500 MG: 500 TABLET ORAL at 08:43

## 2022-04-12 RX ADMIN — ENOXAPARIN SODIUM 80 MG: 80 INJECTION SUBCUTANEOUS at 10:53

## 2022-04-12 RX ADMIN — ACETAMINOPHEN 975 MG: 325 TABLET ORAL at 11:52

## 2022-04-12 RX ADMIN — HYDROMORPHONE HYDROCHLORIDE 0.2 MG: 0.2 INJECTION, SOLUTION INTRAMUSCULAR; INTRAVENOUS; SUBCUTANEOUS at 10:27

## 2022-04-12 RX ADMIN — ACETAMINOPHEN 975 MG: 325 TABLET ORAL at 04:04

## 2022-04-12 RX ADMIN — OXYCODONE HYDROCHLORIDE 5 MG: 5 TABLET ORAL at 04:13

## 2022-04-12 RX ADMIN — LEVETIRACETAM 750 MG: 750 TABLET, FILM COATED ORAL at 08:44

## 2022-04-12 RX ADMIN — OXYCODONE HYDROCHLORIDE 5 MG: 5 TABLET ORAL at 01:12

## 2022-04-12 RX ADMIN — SODIUM CHLORIDE: 900 IRRIGANT IRRIGATION at 10:27

## 2022-04-12 RX ADMIN — OXYCODONE HYDROCHLORIDE 5 MG: 5 TABLET ORAL at 08:44

## 2022-04-12 ASSESSMENT — ACTIVITIES OF DAILY LIVING (ADL)
ADLS_ACUITY_SCORE: 7
ADLS_ACUITY_SCORE: 7
ADLS_ACUITY_SCORE: 9
ADLS_ACUITY_SCORE: 9
ADLS_ACUITY_SCORE: 7
ADLS_ACUITY_SCORE: 7
ADLS_ACUITY_SCORE: 9

## 2022-04-12 NOTE — PLAN OF CARE
Occupational Therapy Discharge Summary    Reason for therapy discharge:    Discharged to home with home therapy.    Progress towards therapy goal(s). See goals on Care Plan in [facility] electronic health record for goal details.  Goals partially met.  Barriers to achieving goals:   discharge from facility.    Therapy recommendation(s):    Continued therapy is recommended.  Rationale/Recommendations:  to increase activity tolerance and safety with ADL/IADL completion.

## 2022-04-12 NOTE — PROGRESS NOTES
Brief Urology Note    ID:  62 year old with multiple medical co-morbidities including idiopathic neuropathy recently admitted with injuries from a fall, incidentally with very distended bladder, bilateral hydronephrosis, incidental renal mass.    Interval:  Repeat renal US today still with significant hydronephrosis on my read, however improved from prior imaging. UA grossly negative.    Assessment:  Given normal Creatinine, no evidence of urinary infection, and appearance of likely chronic bilateral hydronephrosis with distended bladder improving with catheterization there is no role for inpatient urologic intervention. She will also need follow up for incidental renal mass.    Plan:  - Continue Sifuentes until patient nearing discharge.  - Patient can have a trial of void. If she is able to void with low residual she needs to practice timed voiding (every 2-3 hours while awake).   - Maximize bowel regimen, minimize anticholinergics  - She is likely to fail a trial of void, in which case she can be taught intermittent catheterization v. have an indwelling catheter replaced until Urology follow up  - I will arrange for follow up for her renal mass with re-imaging in the next few months.   - If she discharges with a catheter and needs earlier Urology follow up please contact our service to hep arrange this.  - Urology will sign off.    Theresa Mckay MD  Urology Resident

## 2022-04-12 NOTE — DISCHARGE SUMMARY
[facility]    Discharge Summary  Trauma Surgery Service    Date of Admission:  4/8/2022  Date of Discharge:  4/13/2022  Attending Physician: Dr. LAKSHMI Hogan  Discharging Provider: Leonard Ferro CNP  Date of Service (when I saw the patient): 04/13/22    Primary Provider: Eloisa Fuentes  Primary Care clinic: [address] [place] [address]  Phone: [phone]  Fax number: [phone]     Discharge Diagnoses   Subdural hematoma (H)  Closed blow-out fracture of left orbital floor (H)  Closed subcapital fracture of right femur, initial encounter (H)  Closed nondisplaced fracture of base of fifth metacarpal bone of left hand, initial encounter  Protein S deficiency (H)    Hospital Course   Angeli Galindo is a 62 year old female who presented from her Primary Care Clinic as a transfer. She was seen for continued pain in her hip after a fall last month, and multiple subsequent falls most recently where she landed on her face resulting in a nose bleed. She also reported double vision. Work-up at the Primary Clinic was concerning for an acute Subdural Hematoma in the setting of anticoagulation, left orbit blow out fracture along with confusion. A Rapid Response was called and she was transferred to the Emergency Department for further evaluation.    She underwent comprehensive trauma imaging in the ED. Other new injuries included a right femoral neck fracture noted on CT. She was admitted to the trauma service for management of her injuries.    # Fall 4/7/22 with head strike, resulting in facial trauma  # Traumatic Sub Dural Hematoma (SDH)  She was seen and evaluated by Neurosurgery for the traumatic SDH. Anticoagulation was reversed with K centra. She was monitored with frequent neurological exams and her most recent CT head with initiation of anticoagulation with:   No significant change in bilateral middle cranial fossa subdural  hemorrhages and minimal residual  subdural hemorrhage along the right  tentorial leaflet.  - Keppra 750mg bid x 7days for seizure prophylaxis  Neurosurgery to arrange for follow-up in clinic in 2-3 weeks with repeat head CT. - Per discussion OK to continue full anticoagulation with Lovenox, then Eliquis at follow-up in 2 week with follow-up Head CT. She was discharged with a total 2 weeks supply of Enoxaparin.     # Chronic Pain Syndrome  # Acute Pain  Per the [facility] review and per the patient she was on 1200mg 4 times a day, recently increased for right hip pain.Concerned the high doses may have contributed to increase somnolence and falls. She was agreeable changing the dose to Gabapentin to 600mg @ 1800, 1200mg @ 2100. In addition to other multinodal regimen which seem to provide adequate pain control prior to discharge. We anticipate being able to daisy over the next few days  # Depression  - continue PTA: Cymbalta      EENT:  # Left Orbital blowout fracture, inferior wall displacement with significant downward herniation of extraconal fat     # Double vision, improved today  - Head CT:  Left orbital blowout fracture with inferior orbital wall displacement associated with significant downward herniation of extraconal fat, no definite orbital muscle entrapment.  - She was seen and evaluated by Ophthomology for double vision, with the following recommendations  Follow up in general ophthalmology clinic in 1 month    - She was also seen by the Ear Nose and Throat team for periorbital fractures and her injuries were managed conservatively with recommended sinus precautions (Nasal precautions: no nose blowing, sneeze with mouth open, no heavy listing or straws)  Follow-up recommended with ENT outpatient in one week for surgical options for orbital floor repair.     Pulmonary:   # Hx tobacco use   # Healing (subacute) 4 right rib fractures   No acute interventions inpatient. These a typically managed conservatively on a multinodal pain control  regimen and pulmonary hygiene.     Cardiovascular:    # Hypertension   - continue PTA: hydrochlorothiazide prn      Renal/ Fluids/Electrolytes:  # Bilateral hydronephrosis  # Bladder Retention   # Exophytic lesion in the lower pole of the left kidney suspicious for renal cell carcinoma  - Chest/Abd/Pelvic CT: Mild bilateral hydronephrosis with numerous parapelvic Cysts. Exophytic enhancing solid lesion in the lower pole of the left kidney measures 2.2 x 2.5 cm, suspicious for renal cell carcinoma  - Urology consulted for hydronephrosis, and lesion. A box catheter was placed for bladder decompression. A follow up renal ultrasound was obtained prior to discharge, with a stable mild hydronephrosis. The is was discussed with Urology and they recommended a voiding trial with box removal prior to discharge and follow up outpatient. She was able to empty her bladder post box removal with a post void bladder scan of zero.     Hematology:    # Coagulopathy on chronic anticoagulation  # Spherocytosis   # Protein S Deficiency  # Chronic DVT lower extremity   # s/p splenectomy   # hx of IVC, s/p removal  On Apixaban long term for anticoagulation, was reversed with Kcentra on admission 04/08, due to an acute subdural hematoma. She was seen and evaluated by hematology and protein S deficiency labs sent while off Apixaban. They recommended:    - Lovenox 1mg/k BID for 2 weeks then resume eliquis as previously. See discussions with Neurosurgery above    - Start daily or every other day oral iron supplementation, discussed with patient    - refer to bleeding and clotting disorders clinic at discharge for ongoing consideration of need for anticoagulation in this patient with hereditary spherocytosis and recurrent falls and both provoked and unprovoked DVT history.     Musculoskeletal:  # Bilateral lower back pain without sciatica , s/p left L4-5 laminectomy;   # L5-S1 chronic pain issues with disc herniation  # s/p Bilateral L5  transforaminal epidural steroid injections done under fluoroscopic guidance with contrast enhancement 3/25, d/t hip pain   # Subacute rib fractures, Right 4-8   # Possible nondisplaced fracture through the ulnar base of the fifth metacarpal,   # Femoral neck fracture, probably from fall on 3/9/22, not seen on initial xrays    - 3/12: [facility] visit: seen for hip pain. Xrays of right femur 2 views: No acute fracture. Osteopenia., and Hip: No acute fracture or dislocation.  - Chest/Abd/Pelvis CT: Nondisplaced fracture of the right femoral neck and multiple  healing rib fractures, likely subacute.  Orthopedic Surgery recommendations:   She underwent a right proximal femur percutaneous pinning with Dr. SUTTON on 4/9/22  Activity: Weight bearing as tolerated to the right lower extremity   Wound care: Can remove dressings pod 5. Leave incision open to air if dry.   DVT ppx: Lovenox  Follow with Dr. Sutton nursing staff for wound check in 2 weeks  Therapy Recommendations:   Prior to discharge she was mostly  Independent with ambulation  Current status of physical and Occupational therapies on discharge:   Home with assist and home therapies    Significant Results and Procedures   DATE: 04/09/2022  Procedure(s):  Right hip pinning  Surgeon(s): Surgeon(s) and Role:     * Darrel Sutton MD - Primary     * Wayne Gee MD - Assisting  Non-operative procedures None performed  Code Status   Full Code    SUBJECTIVE: Prior to discharge Angeli reported adequate pain control and her vision was less blurry.The discharge and follow up plan was discussed with her.    Physical Exam   Temp: 97.1  F (36.2  C) Temp src: Oral BP: 114/72 Pulse: 77   Resp: 16 SpO2: 98 % O2 Device: None (Room air)    Vitals:    04/08/22 1206   Weight: 84.4 kg (186 lb)     Vital Signs with Ranges  Temp:  [97.1  F (36.2  C)] 97.1  F (36.2  C)  Pulse:  [77] 77  Resp:  [16] 16  BP: (114)/(72) 114/72  SpO2:  [98 %] 98 %  I/O last 3  completed shifts:  In: 720 [P.O.:720]  Out: 4165 [Urine:4165]    Evette Coma Scale - Total 15/15  Eye Response (E): 4   4= spontaneous, 3= to verbal/voice, 2= to pain, 1= No response   Verbal Response (V): 5   5= Orientated, converses, 4= Confused, converses, 3= Inappropriate words, 2= Incomprehensible sounds, 1=No response   Motor Response (M): 6   6= Obeys commands, 5= Localizes to pain, 4= Withdrawal to pain, 3=Fexion to pain, 2= Extension to pain, 1= No response   Constitutional: Awake, alert, cooperative, no apparent distress.  HENT: Normocephalic, facial edema improved  Respiratory: Clear bilaterally without increased work of breathing  Cardiovascular:  regular rate and rhythm, normal S1 and S2  GI: Abdomen soft, non-distended, non-tender,   Genitourinary:  voided post box removal  Skin: Warm & dry, right hip surgical dressing CDI, left hip ecchymosis, soft  Musculoskeletal: There is no redness, warmth, or swelling of the joints.   Neurologic: Awake, alert, oriented. Strength and sensory is intact. No focal deficits.  Neuropsychiatric: Calm, normal eye contact, alert, affect appropriate to situation, oriented, thought process normal.       Discharge Disposition   Discharged to home  Condition at discharge: Stable  Discharge VS: Blood pressure 114/72, pulse 77, temperature 97.1  F (36.2  C), temperature source Oral, resp. rate 16, weight 84.4 kg (186 lb), SpO2 98 %.    Consultations This Hospital Stay   ENT IP CONSULT  OPHTHALMOLOGY IP CONSULT  PHYSICAL THERAPY ADULT IP CONSULT  OCCUPATIONAL THERAPY ADULT IP CONSULT  HEMATOLOGY ADULT IP CONSULT  NEPHROLOGY GENERAL ADULT IP CONSULT  UROLOGY IP CONSULT  UROLOGY IP CONSULT  CARE MANAGEMENT / SOCIAL WORK IP CONSULT    Discharge Orders      CT Head w/o contrast*     Neurosurgery Referral      Home Care Referral      Adult Oncology/Hematology Referral      Reason for your hospital stay    Multiple falls  Known Injuries:  1. Traumatic Subdural hematoma  2. Left  Orbital blowout fracture, inferior wall displacement with significant downward herniation of extraconal fat     Other diagnoses:   1. Chronic anticoagulation, Eliquis   2. Subacute rib fractures, right 4-8    3. Right femoral neck fracture, possibly from 3/9/22 fall  4. Possible nondisplaced fracture through the ulnar base of the fifth metacarpal  5. Bilateral hydronephrosis, undiagnosed, chronic   6. Bladder Retention, undiagnosed, chronic       Procedures:  1. Right hip pinning Dr. Nguyen 4/9/22     Activity    Your activity upon discharge: activity as tolerated  OK to weight bear as tolerated to the right lower extremity     Adult [facility] Follow-up and recommended labs and tests    Follow up with your primary care provider for continued medical care and hospital follow up in 5-10 days.    Orthopaedic Clinic with Dr. Nguyen nursing staff for wound check in 2 weeks (04/22/2022 @0845am)  [facility]  Floor 4   [address] [place] [address]   Appointments: [phone]     Neurosurgery Clinic in 2 weeks with a repeat CT head  [facility]  Floor 3   [address] [place] [address]  Appointments: [phone]    [facility] in 1 month, referral provided   [phone]    Urology Clinic in a month or sooner if having retention issues with Dr. Kendall  [address] [place] [address]  Clinic phone: [phone]    Ear Nose and Throat with Dr. Erickson in 1 week for the Orbital floor fracture (04/18 @ 0925am)  7th Floor [facility] [place]  [address] [place] [address]  Appointments: [phone]    Ophthalmology (Eye) Clinic in 1 month  [facility]  Floor 4   [address] [place] [address]   Appointments: [phone]     You have been involved in a recent trauma incident resulting in an injury.  Studies show us that people affected by trauma have higher levels of  "post-traumatic stress disorder (PTSD) and/or depressive symptoms during the year following an injury.     Please consider the following.  Have you:  Had migraines about the event(s) or thought about the event(s) when you didn't want to?  Tried hard not to think about the event(s) or went out of your way to avoid situations that reminded you of the event(s)?  Been constantly on guard, watchful, or easily startled?  Felt numb or detached from people, activities, or your surroundings?   Felt guilty or unable to stop blaming yourself or others for the event(s) or any problems the event (s) may have caused?    If you answered \"yes\" to 3 or more of these questions, or if you simply want to discuss any of your feelings further, we recommend that you talk with your Primary Care Provider or a mental health professional.      Appointments on [place] and/or [facility] (with [facility] or [facility] provider or service). Call [phone] if you haven't heard regarding these appointments within 7 days of discharge.     Wound care and dressings    Instructions to care for your wound at home: remove dressing in post operative day 5, Ok to leave open to air     Discharge Instructions    Do not take Apixaban, NSAIDS, Aspirin or any other blood thinner until your follow up appointment with Neurosurgery. Ok to take the Lovenox injections as prescribed until then.     Walker Order    DME Documentation: Impaired gait   Describe the reason for need to support medical necessity: Strength, balance, and activity tolerance deficits.     I, the undersigned, certify that the above prescribed supplies are medically necessary for this patient and is both reasonable and necessary in reference to accepted standards of medical and necessary in reference to accepted standards of medical practice in the treatment of this patient's condition and is not prescribed as a convenience.     Walker Order    DME Documentation:   Describe the reason for need to " support medical necessity: impaired gait.     I, the undersigned, certify that the above prescribed supplies are medically necessary for this patient and is both reasonable and necessary in reference to accepted standards of medical and necessary in reference to accepted standards of medical practice in the treatment of this patient's condition and is not prescribed as a convenience.     Diet    Follow this diet upon discharge: Regular     Discharge Medications   Discharge Medication List as of 4/12/2022  1:02 PM      START taking these medications    Details   enoxaparin ANTICOAGULANT (LOVENOX) 80 MG/0.8ML syringe Inject 0.8 mLs (80 mg) Subcutaneous in the morning and 0.8 mLs (80 mg) in the evening. Do all this for 13 days., Disp-20.8 mL, R-0, E-Prescribe      levETIRAcetam (KEPPRA) 750 MG tablet Take 1 tablet (750 mg) by mouth in the morning and 1 tablet (750 mg) in the evening. Do all this for 3 days., Disp-6 tablet, R-0, E-Prescribe      oxyCODONE (ROXICODONE) 5 MG tablet Take 1 tablet (5 mg) by mouth every 6 hours as needed for moderate to severe pain, Disp-16 tablet, R-0, Local Print      senna-docusate (SENOKOT-S/PERICOLACE) 8.6-50 MG tablet Take 1 tablet by mouth 2 times daily as needed for constipation, Disp-14 tablet, R-0, E-Prescribe         CONTINUE these medications which have CHANGED    Details   acetaminophen (TYLENOL) 325 MG tablet Take 3 tablets (975 mg) by mouth 3 times daily, OTC      gabapentin (NEURONTIN) 600 MG tablet Take 1 tablet (600 mg) by mouth in the morning and 1 tablet (600 mg) in the evening. Take 600mg in the morning and 1200mg at bedtime, No Print Out         CONTINUE these medications which have NOT CHANGED    Details   Calcium Carbonate-Vitamin D (CALCIUM-VITAMIN D) 600-125 MG-UNIT TABS Take 1 tablet by mouth every evening, Historical      cyanocobalamin (VITAMIN B-12) 100 MCG tablet Take 500 mcg by mouth every evening, Historical      DULoxetine (CYMBALTA) 60 MG capsule TAKE 1  CAPSULE BY MOUTH EVERY DAY, Disp-90 capsule, R-0, E-Prescribe      Melatonin 10 MG CAPS Take 1 capsule by mouth At Bedtime, Historical      naloxone (NARCAN) 4 MG/0.1ML nasal spray Spray 4 mg in nostril, Historical      Prenatal Vit-Fe Fumarate-FA (PRENATAL VITAMINS PO) Take 2 tablets by mouth every evening, Historical      RISEdronate (ACTONEL) 150 MG tablet Take 1 tablet (150 mg) by mouth every 30 days, Disp-4 tablet, R-3, E-Prescribe         STOP taking these medications       apixaban ANTICOAGULANT (ELIQUIS ANTICOAGULANT) 5 MG tablet Comments:   Reason for Stopping:         hydrochlorothiazide (HYDRODIURIL) 25 MG tablet Comments:   Reason for Stopping:         HYDROcodone-acetaminophen (NORCO) 5-325 MG tablet Comments:   Reason for Stopping:         methocarbamol (ROBAXIN) 500 MG tablet Comments:   Reason for Stopping:             Allergies   Allergies   Allergen Reactions     Shellfish-Derived Products Angioedema     Lisinopril Cough     Data   Most Recent 3 CBC's:  Recent Labs   Lab Test 04/11/22  0650 04/10/22  0834 04/09/22  0645   WBC 8.0 12.7* 6.6   HGB 12.0 12.1 12.5    98 95    271 289      Most Recent 3 BMP's:  Recent Labs   Lab Test 04/11/22  0650 04/10/22  0834 04/09/22  0902 04/09/22  0645    142  --  142   POTASSIUM 4.1 4.0  --  3.9   CHLORIDE 112* 112*  --  111*   CO2 25 26  --  26   BUN 9 7  --  8   CR 0.42* 0.39*  --  0.37*   ANIONGAP 6 4  --  5   ALYSSIA 8.0* 7.9*  --  8.0*   GLC 91 100* 73 94     Most Recent 2 LFT's:  Recent Labs   Lab Test 04/08/22  1150 04/08/22  1029   AST 52* 49*   ALT 42 46   ALKPHOS 121 129   BILITOTAL 0.8 0.8     Most Recent INR's and Anticoagulation Dosing History:  Anticoagulation Dose History     Recent Dosing and Labs Latest Ref Rng & Units 4/8/2022 4/8/2022    INR 0.85 - 1.15 0.9(L) 1.00        Most Recent 3 Troponin's:No lab results found.  Most Recent 6 Bacteria Isolates From Any Culture (See EPIC Reports for Culture Details):No lab results  found.  Most Recent TSH, T4 and A1c Labs:No lab results found.  Results for orders placed or performed during the hospital encounter of 04/08/22   Cervical spine CT w/o contrast    Narrative    CT CERVICAL SPINE W/O CONTRAST 4/8/2022 12:56 PM    Provided History: falls, eval for injury    Comparison: None    Technique: Using multidetector thin collimation helical acquisition  technique, axial, coronal and sagittal CT images through the cervical  spine were obtained without intravenous contrast.     Findings:  The cervical vertebrae are normally aligned. Normal cervical lordosis.  No acute fracture or subluxation. No prevertebral edema. There is mild  discogenic narrowing at C3-C7 and mild to moderate narrowing at C5-6.  The findings on a level by level basis are as follows:    C2-3: No spinal canal or neural foraminal stenosis.    C3-4:  Uncinate hypertrophy with mild bilateral neural canal  narrowing. No spinal canal narrowing.    C4-5:  Uncinate and facet hypertrophy with mild bilateral neural  foraminal narrowing. No spinal canal narrowing.    C5-6:  Disc osteophyte complex with right central disc extrusion. Mild  uncinate and facet hypertrophy. Mild-to-moderate spinal canal and  severe right neural foraminal narrowing. Mild left neural foraminal  narrowing.    C6-7:  No spinal canal or neural foraminal  stenosis.    C7-T1:  No spinal canal or neural foraminal stenosis.     No abnormality of the paraspinous soft tissues.      Impression    Impression:   1.  No acute fracture or subluxation.  2.  Multilevel cervical spondylosis, greatest at C5-6 where there is  mild-to-moderate spinal canal and severe right neural foraminal  narrowing.    I have personally reviewed the examination and initial interpretation  and I agree with the findings.    VIKTORIYA CAMACHO MD         SYSTEM ID:  [id number]   CT Chest/Abdomen/Pelvis w Contrast     Value    Radiologist flags Right femoral neck fracture (Urgent)    Addendum: 4/8/2022  "   Please add to the body of the report the following in the section  titled \"Abdomen/Pelvis\":  The spleen has been resected. Lobulated enhancing nodule in the  anterior left upper quadrant adjacent to the colon consistent with  splenosis/hypertrophied splenule.    JOSEFA REESE MD         SYSTEM ID:  [id number]      Narrative    EXAM: CT CHEST/ABDOMEN/PELVIS W CONTRAST, 4/8/2022    TECHNIQUE:  Helical CT images from the thoracic inlet through the  symphysis pubis were obtained after the administration of intravenous  contrast. Coronal and sagittal reformatted images were generated at a  workstation for further assessment.     HISTORY: fall, on anticoagulation, scattered bruising, eval for  injury.    COMPARISON: None.    FINDINGS:     Chest: Basilar atelectasis. No acute airspace disease, suspicious  pulmonary lesion, pleural effusion, or pneumothorax. Heart is normal  size without pericardial effusion. No central pulmonary embolism.  Nonaneurysmal thoracic aorta. Major branches. No thoracic  lymphadenopathy by size criteria.    Abdomen/Pelvis: Cholecystectomy with presumed reservoir effect in the  common bile duct. Symmetric intrahepatic biliary dilation. Common duct  measures up to 1.6 cm but appears to taper to the ampulla. No  radiopaque gallstones or inflammatory stranding surrounding the  extrahepatic bile ducts. No discrete lesions. Atrophic pancreas  without focal lesion or ductal dilatation. The spleen is unremarkable.    Gastric bypass with Rose Mary-en-Y reconstruction. No abnormally dilated or  thickened loops of bowel, free air or free fluid in the  abdomen/pelvis. Colonic diverticulosis without evidence for acute  diverticulitis. Mild bilateral hydronephrosis with numerous parapelvic  cysts. No renal calculi. Exophytic enhancing solid lesion in the lower  pole of the left kidney measures 2.2 x 2.5 cm, series 505 image 332.  Well-distended urinary bladder. Nonaneurysmal abdominal aorta and  major " branches.     Bones / Soft Tissues: Nondisplaced fracture of the right femoral neck.  Healing fractures of right ribs 4-8, likely subacute.       Impression    IMPRESSION:   1. Nondisplaced fracture of the right femoral neck and multiple  healing rib fractures, likely subacute.  2. No additional acute pathology of the lungs or abdomen/pelvis.  3. Exophytic lesion in the lower pole of the left kidney suspicious  for renal cell carcinoma. No clear evidence of metastatic disease.  4. Colonic diverticulosis without evidence of acute diverticulitis.    [Urgent Result: Right femoral neck fracture]    Finding was identified on 4/8/2022 1:04 PM.     Dr. Tejada was contacted by Dr. Robles at 4/8/2022 1:39 PM and  verbalized understanding of the urgent finding.     Exophytic solid lesion lower pole of the left kidney concerning for  renal cell carcinoma with no evidence of metastatic disease discussed  with Dr. Tejada at 2:55 PM 4/8/2022 by Dr. Reese from radiology.    I have personally reviewed the examination and initial interpretation  and I agree with the findings.    JOSEFA REESE MD         SYSTEM ID:  [id number]   XR Femur Right 2 Views    Narrative    EXAM: XR PELVIS 1/2 VW, XR FEMUR RIGHT 2 VIEW  4/8/2022 1:20 PM      HISTORY: right hip pain after fall    COMPARISON: CT chest abdomen pelvis same-day.    FINDINGS: AP pelvis. AP and lateral use of the right femur.    Minimally impacted subcapital fracture of the proximal right  femur/femoral neck. Remainder of femur is intact.    Mild to moderate degenerative changes of the hips. Degenerative  changes of the right knee. Bones appear osteopenic lumbar spondylosis.  Soft tissues unremarkable. Excreting contrast in the right ureter.      Impression    IMPRESSION: Minimally impacted subcapital fracture of the right  femoral neck.    AGUSTIN HARTMAN MD (Joe)         SYSTEM ID:  [id number]   XR Pelvis 1/2 Views    Narrative    EXAM: XR PELVIS 1/2 VW, XR FEMUR RIGHT  2 VIEW  4/8/2022 1:20 PM      HISTORY: right hip pain after fall    COMPARISON: CT chest abdomen pelvis same-day.    FINDINGS: AP pelvis. AP and lateral use of the right femur.    Minimally impacted subcapital fracture of the proximal right  femur/femoral neck. Remainder of femur is intact.    Mild to moderate degenerative changes of the hips. Degenerative  changes of the right knee. Bones appear osteopenic lumbar spondylosis.  Soft tissues unremarkable. Excreting contrast in the right ureter.      Impression    IMPRESSION: Minimally impacted subcapital fracture of the right  femoral neck.    AGUSTIN HARTMAN MD (Joe)         SYSTEM ID:  [id number]   XR Hand Left G/E 3 Views    Narrative    3 views left hand radiographs 4/8/2022 1:27 PM    History: bruising fall, eval for fracture     Comparison: None    Findings:    PA, oblique and lateral view(s) of the left hand were obtained.     Possible nondisplaced fracture through the ulnar base of the fifth  metacarpal on the AP view. No erosion.    Moderate degenerative changes of the first carpometacarpal and  triscaphe joints.  Additional scattered degenerative change in the  interphalangeal joints, particularly distally.    Dorsal soft tissue swelling.      Impression    Impression:  Possible nondisplaced fracture through the ulnar base of the fifth  metacarpal on the AP view.    AGUSTIN HARTMAN MD (Joe)         SYSTEM ID:  [id number]   CT Thoracic Spine w Contrast    Narrative    Thoracic and Lumbar spine CT without contrast    History: Fall.    Comparison: None available    Technique: Axial, coronal, and sagittal multiplanar reconstructions  obtained from CT of the chest, abdomen and pelvis.    Findings:  Thoracic spine: The posterior elements of T1 partially columnated from  thoracic spine CT study. There is no acute fracture or subluxation.  There is no prevertebral edema. No significant spinal canal stenosis.  Severe left T7-8 neural foraminal narrowing.  Multilevel degenerative  osteophytic spurring. No soft tissue abnormality in the visualized  paraspinous tissues anteriorly.    Lumbar Spine: There is no acute fracture or subluxation. Counting down  from the cervical spine there are 5 lumbar-type vertebrae. 9 mm  anterolisthesis of L4 on L5. Trace retrolisthesis of L5 on S1. Mild  spinal canal narrowing at L2-3, L3-4, L4-5 and L5-S1 secondary to  facet hypertrophy. Multilevel neural foraminal narrowing secondary to  facet hypertrophy. Disc space narrowing greatest at L5-S1.    The visualized paraspinous tissues anteriorly are unremarkable.  Partial visualization of parapelvic cysts bilaterally. 1.9 cm mass  involving the inferior pole of the left kidney. Partial visualization  intrahepatic biliary dilatation. Please see chest, abdomen and pelvis  CT for further details.      Impression    Impression:   1. Thoracic spine:  No acute fracture or subluxation of the thoracic  spine.   2. Lumbar Spine: No acute fracture or subluxation of the lumbar spine.  3. Multilevel degenerative changes of the thoracic and lumbar spine as  detailed above.  4. 1.9 cm mass arising from the inferior pole of the left kidney. This  is renal cell carcinoma until proven otherwise. Please see same day  chest, abdomen and pelvis CT report for further details.    I have personally reviewed the examination and initial interpretation  and I agree with the findings.    VIKTORIYA CAMACHO MD         SYSTEM ID:  [id number]   CT Lumbar Spine w Contrast    Narrative    Thoracic and Lumbar spine CT without contrast    History: Fall.    Comparison: None available    Technique: Axial, coronal, and sagittal multiplanar reconstructions  obtained from CT of the chest, abdomen and pelvis.    Findings:  Thoracic spine: The posterior elements of T1 partially columnated from  thoracic spine CT study. There is no acute fracture or subluxation.  There is no prevertebral edema. No significant spinal canal  stenosis.  Severe left T7-8 neural foraminal narrowing. Multilevel degenerative  osteophytic spurring. No soft tissue abnormality in the visualized  paraspinous tissues anteriorly.    Lumbar Spine: There is no acute fracture or subluxation. Counting down  from the cervical spine there are 5 lumbar-type vertebrae. 9 mm  anterolisthesis of L4 on L5. Trace retrolisthesis of L5 on S1. Mild  spinal canal narrowing at L2-3, L3-4, L4-5 and L5-S1 secondary to  facet hypertrophy. Multilevel neural foraminal narrowing secondary to  facet hypertrophy. Disc space narrowing greatest at L5-S1.    The visualized paraspinous tissues anteriorly are unremarkable.  Partial visualization of parapelvic cysts bilaterally. 1.9 cm mass  involving the inferior pole of the left kidney. Partial visualization  intrahepatic biliary dilatation. Please see chest, abdomen and pelvis  CT for further details.      Impression    Impression:   1. Thoracic spine:  No acute fracture or subluxation of the thoracic  spine.   2. Lumbar Spine: No acute fracture or subluxation of the lumbar spine.  3. Multilevel degenerative changes of the thoracic and lumbar spine as  detailed above.  4. 1.9 cm mass arising from the inferior pole of the left kidney. This  is renal cell carcinoma until proven otherwise. Please see same day  chest, abdomen and pelvis CT report for further details.    I have personally reviewed the examination and initial interpretation  and I agree with the findings.    VIKTORIYA CAMACHO MD         SYSTEM ID:  [id number]   CT Head w/o Contrast    Narrative    CT HEAD W/O CONTRAST 4/8/2022 3:33 PM    History: Head trauma, skull fracture or hematoma (Age 19-64y)     Comparison: 4/6/2012 dated CT    Technique: Using multidetector thin collimation helical acquisition  technique, axial, coronal and sagittal CT images from the skull base  to the vertex were obtained without intravenous contrast.   (topogram) image(s) also obtained and  reviewed.    Findings:   No significant change in subdural hemorrhages in bilateral middle  cranial fossa and along the falx and tentorium.    There is no intracranial hemorrhage, mass effect, or midline shift.  Gray/white matter differentiation in both cerebral hemispheres is  preserved. Ventricles are proportionate to the cerebral sulci. The  basal cisterns are clear. Patchy periventricular hypodensity,  consistent with chronic small ischemic disease.    The bony calvaria and the bones of the skull base are normal. Left  orbital blowout fracture. Complete opacification of the left maxillary  sinus. The remaining paranasal sinuses and mastoid air cells are  clear.       Impression    Impression:  1. No significant change in subdural hemorrhages in bilateral middle  cranial fossa and along the falx and possibly tentorium.  2. Chronic small vessel ischemic disease.   3. Left orbital b,ow-out fracture. Refer to the same day face CT for  details.     JJ NAILS MD         SYSTEM ID:  [id number]   CT Facial Bones without Contrast    Narrative    CT FACIAL BONES WITHOUT CONTRAST 4/8/2022 3:33 PM    History:  Trauma - Facial Injury    Comparison: Same day CT head    Technique: Using thin collimation multidetector helical acquisition  technique, axial and coronal thin section CT images were reconstructed  through the facial bones. Images were reviewed in bone and soft tissue  windows.    Findings: Comminuted left orbital floor and medial wall fracture with  herniation of orbital fat into the left maxillary sinus. Downward  displacement of fracture fragments in the left maxillary sinus.  Inferior displacement of the left medial and inferior rectus muscles  without entrapment. Globe is intact. There is preseptal air. The  ocular globes are intact. The cribriform plate appears intact.  Alignment of the remaining facial bones appears normal. No other  fractures identified.    There are blood products within the left  maxillary sinus. Mucosal  thickening in the left anterior ethmoidal air cells.      Impression    Impression:   Comminuted left orbital floor and medial wall fracture with herniation  of orbital fat and fracture fragments into the maxillary sinus.  Opacification of the left maxillary sinus. Inferior displacement of  the inferior and medial rectus muscles.      I have personally reviewed the examination and initial interpretation  and I agree with the findings.    JJ NAILS MD         SYSTEM ID:  [id number]   XR Surgery ISABEL Fluoro L/T 5 Min w Stills    Narrative    This exam was marked as non-reportable because it will not be read by a   radiologist or a [place] non-radiologist provider.         CT Head w/o Contrast    Narrative    CT HEAD W/O CONTRAST 4/9/2022 4:47 PM    History: Trauma - Head Injury     Comparison: CTs 4/8/2022    Technique: Using multidetector thin collimation helical acquisition  technique, axial, coronal and sagittal CT images from the skull base  to the vertex were obtained without intravenous contrast.   (topogram) image(s) also obtained and reviewed.    Findings:   No significant change in subdural hematomas located bilateral middle  cranial fossas.    Parafalcine and tentorial subdural hematomas near completely resolved.  There is no new intracranial hemorrhage, mass effect, or midline  shift. Gray/white matter differentiation in both cerebral hemispheres  is preserved. Mild generalized parenchymal volume loss. Patchy  periventricular white matter hypoattenuation, compatible with chronic  small vessel ischemic disease. Ventricles are proportionate to the  cerebral sulci. The basal cisterns are clear.    The orbits are grossly unremarkable left orbital blowout fracture  better described on CT face 4/8/2022. Opacification of the left  maxillary sinus. The mastoid air cells are clear.      Impression    Impression:  1. No substantial change in subdural hematomas along the  bilateral  middle cranial fossa. Tentorial and parafalcine hematomas near  completely resolved.   2. Mild to moderate leukoaraiosis.   3. Left orbital blowout fracture, unchanged.    I have personally reviewed the examination and initial interpretation  and I agree with the findings.    JJ NAILS MD         SYSTEM ID:  [id number]   CT Head w/o Contrast    Narrative    CT HEAD W/O CONTRAST 4/10/2022 9:15 PM    History: Trauma - Head Injury     Comparison: Head CT from yesterday    Technique: Using multidetector thin collimation helical acquisition  technique, axial, coronal and sagittal CT images from the skull base  to the vertex were obtained without intravenous contrast.   (topogram) image(s) also obtained and reviewed.    Findings:   No significant change in subdural hemorrhage causing bilateral middle  cranial fossa. Parafalcine and tentorial subdural hematomas near  completely resolved with minimal residual subdural hematoma along the  right tentorial leaflet.    There is no intracranial hemorrhage, mass effect, or midline shift.  Gray/white matter differentiation in both cerebral hemispheres is  preserved. Ventricles are proportionate to the cerebral sulci. The  basal cisterns are clear. Patchy periventricular hypoattenuation  consistent with chronic small vessel ischemic disease.    The bony calvaria and the bones of the skull base are normal. Complete  opacification of the left maxillary sinus. Remaining paranasal sinuses  are clear. Mastoid air cells are clear. Left orbital blowout fracture.      Impression    Impression:  No significant change in bilateral middle cranial fossa subdural  hemorrhages and minimal residual subdural hemorrhage along the right  tentorial leaflet.    JJ NAILS MD         SYSTEM ID:  [id number]   US Renal Complete    Narrative    EXAMINATION: US RENAL COMPLETE, 4/11/2022 1:16 PM     COMPARISON: CT chest abdomen and pelvis 4/8/2022    HISTORY: Follow-up bilateral  hydronephrosis, known left renal lesion    TECHNIQUE: The kidneys and bladder were scanned in the standard  fashion with specialized ultrasound transducer(s) using both gray  scale and limited color/spectral Doppler techniques.    FINDINGS:    Right kidney: Measures 12.2 cm in length. Parenchyma is of normal  thickness and echogenicity. No focal mass. Mild hydronephrosis.    Left kidney: Not well visualized well due to overlying bowel gas, and  unable to assess lesion left kidney seen on CT chest abdomen and  pelvis dated 4/8/2022. Mild hydronephrosis.     Bladder: Decompressed with Sifuentes in place.      Impression    IMPRESSION:  1.  Mild bilateral hydronephrosis.  2.  Unable to fully visualize the left kidney due to overlying bowel  gas, and unable to assess the lesion seen in the left kidney seen on  CT chest abdomen pelvis dated 4/8/2022.    I have personally reviewed the examination and initial interpretation  and I agree with the findings.    GRETCHEN TROTTER MD         SYSTEM ID:  [id number]       Time Spent on this Encounter   I, YAHIR Mendieta CNP, personally saw the patient today and spent greater than 30 minutes discharging this patient.    We appreciate the opportunity to care for your patient while in the hospital.  Should you have any questions about their injuries or this discharge summary our contact information is below.    Trauma Services  [facility]   Department of Critical Care and Acute Care Surgery  [address] [place] [id number]  Office: [phone]

## 2022-04-12 NOTE — TELEPHONE ENCOUNTER
Patient is calling to give  a condition update. Please call to discuss.  Patient fell and fractured her orbit

## 2022-04-12 NOTE — PLAN OF CARE
BP 94/54 (BP Location: Right arm)   Pulse 63   Temp 98.4  F (36.9  C) (Oral)   Resp 18   Wt 84.4 kg (186 lb)   SpO2 92%   BMI 29.13 kg/m       Time: 9511-5609    Reason for admission: Subdural hematoma, L orbital FX, R femoral neck FX, and R rib FX.    Activity: Assist of one person.   Pain: C/o LE pain and headache, received PRN Oxycodone every three hours and scheduled Tylenol, and it was effective.   Neuro: Alert and oriented, UE 5/5, RLE 4/4, LLE 5/5, numbness and tingling LE.   Cardiac: WDL  Resp: Denied SOB, lung sounds clear bilaterally.   GI/: Patient had BM yesterday, bowel sounds present x four quadrants, Sifuentes catheter in place for urine retention, she had 650 ml, clear, yellow urine output this shift.   Lines: L PIV saline locked.   Skin: Bruise L cheek, and R hip FX, incision site covered with premapore dressing, dressing intact.   Labs/Imaging: No lab or imaging this shift.     New changes to shift: No change.     Plan: Voiding trial before discharge today.

## 2022-04-12 NOTE — PLAN OF CARE
Physical Therapy Discharge Summary    Reason for therapy discharge:    Discharged to home with home therapy.    Progress towards therapy goal(s). See goals on Care Plan in [facility] electronic health record for goal details.  Goals partially met.  Barriers to achieving goals:   discharge from facility.    Therapy recommendation(s):    Continued therapy is recommended.  Rationale/Recommendations:  KEVAN PT.

## 2022-04-12 NOTE — PLAN OF CARE
Status: Admitted 4/8 after falling 4/7 sustaining SDH, R rib/neck fractures, L orbital fx. S/p R hip pinning 4/9  Vitals: VSS on RA  Neuros: A&Ox4, N/T to feet, R field cut  IV: PIV SL  Diet: Regular, poor PO  Bowel status: BM this afternoon  : Sifuentes in place for retention. Per urology note, ultrasound showing significant hydronephrosis - will have a trial of void before discharge (no orders yet)  Skin: Bruising to L face and hip, R hip incision covered with primapore  Pain: Left frontal headache controlled with PRN oxy q3, IV dilaudid x1, scheduled tylenol  Activity: Up with SBA, GB, walker  Social: Pts daughter here this afternoon  Plan: Likely to have trial of void tomorrow, potential discharge. Continue POC

## 2022-04-12 NOTE — PROGRESS NOTES
Orthopaedic Surgery Progress Note 04/12/2022    S: DOLORESO. Was sleeping comfortably. Denies new numbness/tingling/weakenss. Pain well controlled. Has ambulated with PT. NO concerns today, eager to discharge home.     O:  Temp: 96.9  F (36.1  C) Temp src: Oral BP: 110/65 Pulse: 61   Resp: 16 SpO2: 95 % O2 Device: None (Room air)      Exam:  Gen: No acute distress, resting comfortably in bed.  Resp: Non-labored breathing  MSK:    RLE:  - dressing c/d/i  - SILT femoral/tibial/sural/saphenous/DP/SP nerves  - Fires  Quads, TA, EHL, FHL, GaSC  - DP pulses palpable, foot wwp    Recent Labs   Lab 04/11/22  0650 04/10/22  0834 04/09/22  0645   WBC 8.0 12.7* 6.6   HGB 12.0 12.1 12.5    271 289     No results found for: SED        Assessment: Angeli Galindo is a 62 year old female with a past medical history of Protein S deficiency (on Eliquis 5 mg), DVT, PE, spherocytosis, gastric bypass, hypertension, idiopathic neuropathy, and osteopenia who presents after multiple falls with:     1. Bilateral subdural hematomas  2. Left orbital blowout fracture with inferior orbital wall displacement without orbital muscle entrapment  3. Subacute impacted right femoral neck fracture     Now s/p R hip CRPP with Dr. NGUYEN on 4/9/22.        Plan:     Pain: Scheduled Tylenol, IV Dilaudid PRN, oxycodone PRN, Flexeril PRN  Activity: Weight bearing as tolerated.  Start physical therapy POD#1.  Wound care: ok to remove dressings pod5. Leave incision open to air if dry.   Drains: none  Abx: Ancef x 24 hours post op for surgical ppx  Diet:  ADAT to regular  Labs: HGB pod1  XR: complete, obtained in OR  DVT ppx: ok to restart previous chemical anticoagulation elliquis from orthopedic perspective pod1 once cleared by primary team for subdural hematoma.   F/u: w/ Dr. Nguyen nursing staff for wound check in 2 weeks (email sent)  Disposition: ok to discharge from orthopedic perspective once passes PT         Future Appointments   Date Time Provider  Department Center   4/10/2022  8:30 AM Amina Lauren, PT [facility]   4/18/2022  9:40 AM Mika Erickson MD [facility]     Orthopedic will follow peripherally at this time. Please page with any questions or concerns.     JORGE VALENTIN PA-C  4/12/2022 9:50 AM  Orthopaedic Surgery     Thank you for allowing me to participate in this patient's care. Please page me directly any questions/concerns.   Securely message with the Vocera Web Console (learn more here)  Text page via [employer] Paging/Directory    If there is no response, if it is a weekend, or if it is during evening hours, please page the orthopaedic surgery resident on call via [facility] Paging/Directory

## 2022-04-12 NOTE — PLAN OF CARE
Discharge time/date: 4/12/2022 1350  Walked or Wheelchair: Wheelchair  PIV removed: Yes  Reviewed AVS with patient: Yes  Medication due times added to AVS in EPIC: Yes  Verbalized understanding of discharge with teachback: Yes  Medications retrieved from pharmacy: Yes  Supplies sent home: Yes  Belongings from security with patient: Yes

## 2022-04-12 NOTE — PROGRESS NOTES
Care Management Follow Up    Length of Stay (days): 4    Expected Discharge Date: 04/13/2022     Concerns to be Addressed:  Discharge planning     Patient plan of care discussed at interdisciplinary rounds: Yes    Anticipated Discharge Disposition:  Home     Anticipated Discharge Services:  Skilled home care, RN, PT & OT.   Anticipated Discharge DME:  Walker    Patient/family educated on Medicare website which has current facility and service quality ratings:  Pt has BCBS.   Education Provided on the Discharge Plan:  Yes  Patient/Family in Agreement with the Plan: yes    Referrals Placed by CM/SW:  [facility]  Private pay costs discussed: Not applicable    Additional Information:  In 6A Discharge Rounds nursing reported plan is trial of void, if not successful then will have box placed. Urology consulting. Up with 1 assist & a walker. WBAT RLE.   Spoke with OT, they report pt is doing well; home OT recommended. Will need a walker at discharge. Daughter is obtaining a reacher & shower chair.     Addendum:  Informed by Leonard Ferro NP, pt was able to void, will discharge home today. Pt may stay with her brother after discharge.  Met with pt and her brother. Pt alert, sitting up in chair. Pt aware of plan for discharge today. Pt said she will be returning to her home, not her brother's home. She has a 100 pound dog, they were talking about care arrangements for the dog since she probably won't be able to manage him right now.   Reviewed with pt that [facility] has accepted her for skilled home care. Pt was in agreement, she is familiar with home care. Confirmed pt's home address. She lives in [address] [facility]. This was not on the facesheet; I called Admissions and this was added. Pt denied any other needs.       Mariam Nguyễn, RN Care Coordinator  [address], [facility]  Phone:  [phone]

## 2022-04-13 ENCOUNTER — PATIENT OUTREACH (OUTPATIENT)
Dept: CARE COORDINATION | Facility: CLINIC | Age: 62
End: 2022-04-13
Payer: COMMERCIAL

## 2022-04-13 DIAGNOSIS — Z71.89 OTHER SPECIFIED COUNSELING: ICD-10-CM

## 2022-04-13 NOTE — TELEPHONE ENCOUNTER
Spoke with patient who stated she had a fall on 3/9/22 while in [place] she slipped on ice. Patient didn't realize how bad her fall was. States she has 2 subdural hematomas, right fractured hip that now has 3 pins, fracture to her left orbit and will also need surgery on this. States the Bilateral L5 transforaminal epidural steroid injections done under fluoroscopic guidance with contrast enhancement that she had on 3/25/22 only helped for a couple of days. Patient stated she had double vision that has since cleared up, she was given a prescription for oxycodone, but only takes this as needed. She is mainly taking tylenol and Gabapentin 600 mg at dinner and 1,200 mg at night. States she is doing better now. Patient stated it is still too hard to drive so she would prefer a video visit. Appt scheduled for 4/19.  (LOV: 3/3/22-televisit)    Patient only has insurance through the end of April since she was fired from her job as they thought she was impaired when patient stated she was in pain. Nothing further needed at this time. Update forwarded to Girish Vargas as FYI prior to upcoming appointment.

## 2022-04-13 NOTE — PROGRESS NOTES
"Clinic Care Coordination Contact  [facility]/Voicemail       Clinical Data: Care Coordinator Outreach  Outreach attempted x 1.  Message says \"Your cannot be completed at this time, please try your call again later.\"  Plan:Care Coordinator will try to reach patient again in 1-2 business days.    Christine Lucas, [facility]  [phone]  [facility]    "

## 2022-04-14 ENCOUNTER — MEDICAL CORRESPONDENCE (OUTPATIENT)
Dept: HEALTH INFORMATION MANAGEMENT | Facility: CLINIC | Age: 62
End: 2022-04-14
Payer: COMMERCIAL

## 2022-04-14 ENCOUNTER — TELEPHONE (OUTPATIENT)
Dept: INTERNAL MEDICINE | Facility: CLINIC | Age: 62
End: 2022-04-14
Payer: COMMERCIAL

## 2022-04-14 NOTE — PROGRESS NOTES
Clinic Care Coordination Contact  [facility]: Post-Discharge Note  SITUATION                                                      Admission:    Admission Date: 04/08/22   Reason for Admission: Subdural hematoma  Discharge:   Discharge Date: 04/13/22  Discharge Diagnosis: Subdural hematoma    BACKGROUND                                                      Per hospital discharge summary and inpatient provider notes:  Angeli Galindo is a 62 year old female who presented from her Primary Care Clinic as a transfer. She was seen for continued pain in her hip after a fall last month, and multiple subsequent falls most recently where she landed on her face resulting in a nose bleed. She also reported double vision. Work-up at the Primary Clinic was concerning for an acute Subdural Hematoma in the setting of anticoagulation, left orbit blow out fracture along with confusion. A Rapid Response was called and she was transferred to the Emergency Department for further evaluation.     She underwent comprehensive trauma imaging in the ED. Other new injuries included a right femoral neck fracture noted on CT. She was admitted to the trauma service for management of her injuries.         ASSESSMENT      Enrollment  Primary Care Care Coordination Status: Not a Candidate    Discharge Assessment  How are you doing now that you are home?: Patient reports she is feeling so much better, so happy she went to the hospital.  Has HC RN scheduled for today at 1300, no questions  How are your symptoms? (Red Flag symptoms escalate to triage hotline per guidelines): Improved  Do you feel your condition is stable enough to be safe at home until your provider visit?: Yes  Does the patient have their discharge instructions? : Yes  Does the patient have questions regarding their discharge instructions? : No  Were you started on any new medications or were there changes to any of your previous medications? : Yes  Does the patient have all of  their medications?: Yes  Do you have questions regarding any of your medications? : No  Discharge follow-up appointment scheduled within 14 calendar days? : Yes  Discharge Follow Up Appointment Date: 04/18/22  Discharge Follow Up Appointment Scheduled with?: Specialty Care Provider    Post-op ([facility] Only)  If the patient had a surgery or procedure, do they have any questions for a nurse?: No             PLAN                                                      Outpatient Plan:    Follow up with your primary care provider for continued medical care and hospital follow up in 5-10 days.     Orthopaedic Clinic with Dr. Nguyen nursing staff for wound check in 2 weeks (04/22/2022 @0845am)  [facility]  Floor 4   [address] [place] [address]   Appointments: [phone]      Neurosurgery Clinic in 2 weeks with a repeat CT head  [facility]  Floor 3   [address] [place], [place] [address]  Appointments: [phone]     [facility] in 1 month, referral provided   [phone]     Urology Clinic in a month or sooner if having retention issues with Dr. Kendall  [address] [place] [address]  Clinic phone: [phone]     Ear Nose and Throat with Dr. Erickson in 1 week for the Orbital floor fracture (04/18 @ 0925am)  7th Floor [place] [facility]  [address] [place] [address]  Appointments: [phone]     Ophthalmology (Eye) Clinic in 1 month  [facility]  Floor 4   [address] [place] [address]   Appointments: [phone]          Future Appointments   Date Time Provider Department Center   4/18/2022  9:40 AM Mika Erickson MD [facility]   4/22/2022  9:00 AM Nurse, Beka SHEFFIELD [facility]   6/1/2022  8:30 AM Ce Robertson MD [facility]         For any urgent concerns, please contact our 24 hour nurse triage line: [phone] ([phone])          Saritha Vázquez, [facility]  [phone]  [facility]

## 2022-04-14 NOTE — TELEPHONE ENCOUNTER
[facility] Call Center    Phone Message    May a detailed message be left on voicemail: yes     Reason for Call: Order(s): Home Care Orders: Physical Therapy (PT): eval and treat and Skilled Nursing: once a week for nine weeks.     Action Taken: Message routed to:  [facility] ([facility] pcc    Travel Screening: Not Applicable

## 2022-04-15 ENCOUNTER — TELEPHONE (OUTPATIENT)
Dept: NEUROSURGERY | Facility: CLINIC | Age: 62
End: 2022-04-15
Payer: COMMERCIAL

## 2022-04-15 NOTE — TELEPHONE ENCOUNTER
Called Jacinda and left a secure VM.  Gave verbal orders per Dr. Fuentes for  Order(s): Home Care Orders: Physical Therapy (PT): eval and treat and Skilled Nursing: once a week for nine weeks.      Peng Ortiz CMA ([facility]) at 9:21 AM on 4/15/2022

## 2022-04-15 NOTE — TELEPHONE ENCOUNTER
Writer RISHI for pt explaining that CT and follow up visit has been coordinated for 4/25 with an arrival time of 8am.    Please help pt reschedule if needed. Pt should have head CT (ordered) in two weeks and a return, in person or virtual visit, with Dejah Parker once CT has been completed, can be same day    Alaina Armstrong

## 2022-04-18 ENCOUNTER — OFFICE VISIT (OUTPATIENT)
Dept: OTOLARYNGOLOGY | Facility: CLINIC | Age: 62
End: 2022-04-18
Payer: COMMERCIAL

## 2022-04-18 ENCOUNTER — PRE VISIT (OUTPATIENT)
Dept: OTOLARYNGOLOGY | Facility: CLINIC | Age: 62
End: 2022-04-18

## 2022-04-18 ENCOUNTER — TELEPHONE (OUTPATIENT)
Dept: INTERNAL MEDICINE | Facility: CLINIC | Age: 62
End: 2022-04-18

## 2022-04-18 VITALS
HEIGHT: 67 IN | SYSTOLIC BLOOD PRESSURE: 124 MMHG | DIASTOLIC BLOOD PRESSURE: 76 MMHG | WEIGHT: 162 LBS | BODY MASS INDEX: 25.43 KG/M2 | HEART RATE: 76 BPM

## 2022-04-18 DIAGNOSIS — S02.32XA CLOSED BLOW-OUT FRACTURE OF LEFT ORBITAL FLOOR (H): Primary | ICD-10-CM

## 2022-04-18 PROCEDURE — 99203 OFFICE O/P NEW LOW 30 MIN: CPT | Performed by: STUDENT IN AN ORGANIZED HEALTH CARE EDUCATION/TRAINING PROGRAM

## 2022-04-18 NOTE — TELEPHONE ENCOUNTER
Refill Request    Medication request: HYDROcodone-acetaminophen  MG Oral Tab Take 1 tablet by mouth every 6 (six) hours as needed for Pain.     LOV:3/3/22 televist  Due back to clinic per last office note:  \"Follow-up:  3 months\"  NOV: 2022 Maribell Lgaunas MD      [facility]/Last refill: 3/22/22 #120    Urine drug screen (if applicable): none  Pain contract:  on 2020    LOV plan (if weaning or changing medications): no change

## 2022-04-18 NOTE — LETTER
2022       RE: Angeli Galindo  [address]  [place] [address]     Dear Colleague,    Thank you for referring your patient, Angeli Galindo, to the [facility] [place] [facility]. Please see a copy of my visit note below.    HISTORY OF PRESENT ILLNESS:  I had the pleasure of meeting Ms. Galindo today in clinic.  She is a pleasant 62-year-old woman with a history of a fall on 2022.  She eventually presented to hospital care on 2022 after having significant right hip pain as well as confusion.  She was found to have several injuries including a subdural hematoma, left orbital floor fracture and a right hip femoral neck fracture.  She underwent pinning of the right femoral fracture and the subdural hematoma was observed.  She is here today for followup of her orbital floor trauma.  She says that her initial diplopia has since resolved.  She has not noticed any significant cosmetic changes to her eye.  Overall, she is making good progress with her recovery.  She continues to have a right right-sided temporal headache.    PAST MEDICAL HISTORY:    1.  DVT.  2.  Subdural hematoma.  3.  Right hip fracture.    PAST SURGICAL HISTORY:    1.  .  2.  Gastric bypass.  3.  Back surgery.    MEDICATIONS:    1.  Duloxetine.  2.  Gabapentin.  3.  Risedronate.    ALLERGIES:  LISINOPRIL.    SOCIAL HISTORY:  She is a former smoker and quit in .  She does drink alcohol, but rarely.   She works as a Nurse Practitioner.    FAMILY HISTORY:  None.    REVIEW OF SYSTEMS:  A 10-point review of system was performed and noted in the HPI.    PHYSICAL EXAMINATION:  She is alert, in no acute distress.  She has no obvious enophthalmos.  Her extraocular movements are intact.  She has some paresthesias of the left V2 distribution.  She has no lesions seen in the oral cavity or oropharynx.  She has no palpable  cervical lymphadenopathy.    ASSESSMENT AND PLAN:  A 62-year-old woman with a left orbital floor fracture sustained on 03/09/2022.  She is about six weeks out from her injury at this time.  She has no evidence of diplopia or enophthalmus on exam.  I reviewed with her the indications for orbital floor repair and at this time, I think that she is content with nonoperative management, which I support.  I did review with her the indications are diplopia and ophthalmos.  She can follow up with me as needed.      Mika Erickson M.D.      Head and Neck Surgical Oncology and Microvascular Reconstruction  Department of Otolaryngology - Head and Neck Surgery  [facility]        30 minutes spent on the date of the encounter in chart review, patient visit, review of tests, documentation and/or discussion with other providers about the issues documented above.         Again, thank you for allowing me to participate in the care of your patient.      Sincerely,    Mika Erickson MD

## 2022-04-18 NOTE — PROGRESS NOTES
HISTORY OF PRESENT ILLNESS:  I had the pleasure of meeting Ms. Galindo today in clinic.  She is a pleasant 62-year-old woman with a history of a fall on 2022.  She eventually presented to hospital care on 2022 after having significant right hip pain as well as confusion.  She was found to have several injuries including a subdural hematoma, left orbital floor fracture and a right hip femoral neck fracture.  She underwent pinning of the right femoral fracture and the subdural hematoma was observed.  She is here today for followup of her orbital floor trauma.  She says that her initial diplopia has since resolved.  She has not noticed any significant cosmetic changes to her eye.  Overall, she is making good progress with her recovery.  She continues to have a right right-sided temporal headache.    PAST MEDICAL HISTORY:    1.  DVT.  2.  Subdural hematoma.  3.  Right hip fracture.    PAST SURGICAL HISTORY:    1.  .  2.  Gastric bypass.  3.  Back surgery.    MEDICATIONS:    1.  Duloxetine.  2.  Gabapentin.  3.  Risedronate.    ALLERGIES:  LISINOPRIL.    SOCIAL HISTORY:  She is a former smoker and quit in .  She does drink alcohol, but rarely.   She works as a Nurse Practitioner.    FAMILY HISTORY:  None.    REVIEW OF SYSTEMS:  A 10-point review of system was performed and noted in the HPI.    PHYSICAL EXAMINATION:  She is alert, in no acute distress.  She has no obvious enophthalmos.  Her extraocular movements are intact.  She has some paresthesias of the left V2 distribution.  She has no lesions seen in the oral cavity or oropharynx.  She has no palpable cervical lymphadenopathy.    ASSESSMENT AND PLAN:  A 62-year-old woman with a left orbital floor fracture sustained on 2022.  She is about six weeks out from her injury at this time.  She has no evidence of diplopia or enophthalmus on exam.  I reviewed with her the indications for orbital floor repair and at this time, I think that she  is content with nonoperative management, which I support.  I did review with her the indications are diplopia and ophthalmos.  She can follow up with me as needed.      Mika Erickson M.D.      Head and Neck Surgical Oncology and Microvascular Reconstruction  Department of Otolaryngology - Head and Neck Surgery  [facility]        30 minutes spent on the date of the encounter in chart review, patient visit, review of tests, documentation and/or discussion with other providers about the issues documented above.

## 2022-04-18 NOTE — TELEPHONE ENCOUNTER
[facility] Call Center    Phone Message    May a detailed message be left on voicemail: yes     Reason for Call: Order(s): Home Care Orders: Physical Therapy (PT): 2 x week for 1 week. To address home exercise program for balance and mobility.      [facility] PT [facility]#: [phone]    Action Taken: Message routed to:  [facility] PCC    Travel Screening: Not Applicable

## 2022-04-18 NOTE — PATIENT INSTRUCTIONS
1. Please follow-up in clinic as needed.   2. Please call the ENT clinic with any questions,concerns, new or worsening symptoms.    -Clinic number is [phone]   - Mariela's direct line (Dr. Erickson's nurse) [phone]

## 2022-04-18 NOTE — NURSING NOTE
"Chief Complaint   Patient presents with     Consult       Blood pressure 124/76, pulse 76, height 1.702 m (5' 7\"), weight 73.5 kg (162 lb).    Gisell Lawson, EMT    "

## 2022-04-18 NOTE — TELEPHONE ENCOUNTER
Called Edilma and left a secure VM.  Gave verbal orders per Dr. Fuentes for Order(s): Home Care Orders: Physical Therapy (PT): 2 x week for 1 week. To address home exercise program for balance and mobility.        Peng Ortiz CMA ([facility]) at 2:42 PM on 4/18/2022

## 2022-04-19 ENCOUNTER — TELEPHONE (OUTPATIENT)
Dept: INTERNAL MEDICINE | Facility: CLINIC | Age: 62
End: 2022-04-19
Payer: COMMERCIAL

## 2022-04-19 ENCOUNTER — EXTERNAL RECORD (OUTPATIENT)
Dept: HEALTH INFORMATION MANAGEMENT | Facility: OTHER | Age: 62
End: 2022-04-19

## 2022-04-19 ENCOUNTER — TELEMEDICINE (OUTPATIENT)
Dept: PHYSICAL MEDICINE AND REHAB | Facility: CLINIC | Age: 62
End: 2022-04-19

## 2022-04-19 DIAGNOSIS — M54.16 LUMBAR RADICULOPATHY: Primary | ICD-10-CM

## 2022-04-19 DIAGNOSIS — S02.85XD CLOSED FRACTURE OF ORBIT WITH ROUTINE HEALING: ICD-10-CM

## 2022-04-19 DIAGNOSIS — S72.001D CLOSED FRACTURE OF RIGHT HIP WITH ROUTINE HEALING: ICD-10-CM

## 2022-04-19 DIAGNOSIS — M48.061 SPINAL STENOSIS, LUMBAR REGION, WITHOUT NEUROGENIC CLAUDICATION: ICD-10-CM

## 2022-04-19 DIAGNOSIS — M51.9 LUMBAR DISC DISEASE: ICD-10-CM

## 2022-04-19 DIAGNOSIS — M47.816 LUMBAR FACET ARTHROPATHY: ICD-10-CM

## 2022-04-19 DIAGNOSIS — M76.61 TENDONITIS, ACHILLES, RIGHT: ICD-10-CM

## 2022-04-19 DIAGNOSIS — S06.5X9A SUBDURAL HEMATOMA (HCC): ICD-10-CM

## 2022-04-19 DIAGNOSIS — M96.1 POSTLAMINECTOMY SYNDROME, LUMBAR REGION: ICD-10-CM

## 2022-04-19 DIAGNOSIS — M51.26 LUMBAR HERNIATED DISC: ICD-10-CM

## 2022-04-19 DIAGNOSIS — G60.9 IDIOPATHIC PERIPHERAL NEUROPATHY: ICD-10-CM

## 2022-04-19 DIAGNOSIS — M43.10 ACQUIRED SPONDYLOLISTHESIS: ICD-10-CM

## 2022-04-19 DIAGNOSIS — M48.061 SPINAL STENOSIS OF LUMBAR REGION WITHOUT NEUROGENIC CLAUDICATION: ICD-10-CM

## 2022-04-19 PROBLEM — S06.5XAA SUBDURAL HEMATOMA: Status: ACTIVE | Noted: 2022-04-19

## 2022-04-19 PROBLEM — S06.5XAA SUBDURAL HEMATOMA (HCC): Status: ACTIVE | Noted: 2022-04-19

## 2022-04-19 PROCEDURE — 99214 OFFICE O/P EST MOD 30 MIN: CPT | Performed by: PHYSICAL MEDICINE & REHABILITATION

## 2022-04-19 RX ORDER — HYDROCODONE BITARTRATE AND ACETAMINOPHEN 10; 325 MG/1; MG/1
1 TABLET ORAL EVERY 6 HOURS PRN
Qty: 120 TABLET | Refills: 0 | Status: SHIPPED | OUTPATIENT
Start: 2022-04-19 | End: 2022-05-19

## 2022-04-22 ENCOUNTER — OFFICE VISIT (OUTPATIENT)
Dept: ORTHOPEDICS | Facility: CLINIC | Age: 62
End: 2022-04-22
Payer: COMMERCIAL

## 2022-04-22 DIAGNOSIS — Z98.890 STATUS POST SURGERY: Primary | ICD-10-CM

## 2022-04-22 PROCEDURE — 99024 POSTOP FOLLOW-UP VISIT: CPT

## 2022-04-22 NOTE — PROGRESS NOTES
Reason for visit:    Angeli Galindo came in to the clinic for a two week post op check.    Her surgery was done 4/9/2022 by Dr Nguyen.  She had a Right hip pinning.     Assessment:    Angeli came into the clinic WBAT using a rolling walker.    The Surgical wounds were exposed and found to be well-healed, there no sutures to remove. Skin was c/d/i. The incision looks great and Angeli reports to be doing very well.  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    Plan:    She was told to continue WBAT.     She has an appointment to see Dr. Nguyen at 6 weeks post op with x-rays and at that time Dr. Nguyen will determine further restrictions.    She has our phone number and will call with questions or problems.    Corrie Roger ATC

## 2022-04-25 ENCOUNTER — OFFICE VISIT (OUTPATIENT)
Dept: INTERNAL MEDICINE | Facility: CLINIC | Age: 62
End: 2022-04-25

## 2022-04-25 ENCOUNTER — ANCILLARY PROCEDURE (OUTPATIENT)
Dept: CT IMAGING | Facility: CLINIC | Age: 62
End: 2022-04-25
Attending: STUDENT IN AN ORGANIZED HEALTH CARE EDUCATION/TRAINING PROGRAM
Payer: COMMERCIAL

## 2022-04-25 ENCOUNTER — OFFICE VISIT (OUTPATIENT)
Dept: NEUROSURGERY | Facility: CLINIC | Age: 62
End: 2022-04-25
Attending: STUDENT IN AN ORGANIZED HEALTH CARE EDUCATION/TRAINING PROGRAM
Payer: COMMERCIAL

## 2022-04-25 VITALS
RESPIRATION RATE: 12 BRPM | BODY MASS INDEX: 25.27 KG/M2 | SYSTOLIC BLOOD PRESSURE: 137 MMHG | OXYGEN SATURATION: 100 % | HEIGHT: 67 IN | DIASTOLIC BLOOD PRESSURE: 85 MMHG | TEMPERATURE: 97.6 F | HEART RATE: 67 BPM | WEIGHT: 161 LBS

## 2022-04-25 VITALS — SYSTOLIC BLOOD PRESSURE: 138 MMHG | OXYGEN SATURATION: 94 % | HEART RATE: 63 BPM | DIASTOLIC BLOOD PRESSURE: 85 MMHG

## 2022-04-25 DIAGNOSIS — S06.5XAA SUBDURAL HEMATOMA (H): ICD-10-CM

## 2022-04-25 DIAGNOSIS — S72.001D CLOSED FRACTURE OF RIGHT HIP REQUIRING OPERATIVE REPAIR WITH ROUTINE HEALING, SUBSEQUENT ENCOUNTER: ICD-10-CM

## 2022-04-25 DIAGNOSIS — Z79.01 LONG TERM CURRENT USE OF ANTICOAGULANT THERAPY: ICD-10-CM

## 2022-04-25 DIAGNOSIS — Z98.84 H/O GASTRIC BYPASS: Primary | ICD-10-CM

## 2022-04-25 DIAGNOSIS — E55.9 VITAMIN D DEFICIENCY: ICD-10-CM

## 2022-04-25 DIAGNOSIS — N28.9 RENAL LESION: ICD-10-CM

## 2022-04-25 DIAGNOSIS — Z09 HOSPITAL DISCHARGE FOLLOW-UP: ICD-10-CM

## 2022-04-25 PROCEDURE — 99495 TRANSJ CARE MGMT MOD F2F 14D: CPT | Performed by: INTERNAL MEDICINE

## 2022-04-25 PROCEDURE — 70450 CT HEAD/BRAIN W/O DYE: CPT | Mod: GC | Performed by: STUDENT IN AN ORGANIZED HEALTH CARE EDUCATION/TRAINING PROGRAM

## 2022-04-25 PROCEDURE — 99213 OFFICE O/P EST LOW 20 MIN: CPT | Performed by: NURSE PRACTITIONER

## 2022-04-25 RX ORDER — HYDROCODONE BITARTRATE AND ACETAMINOPHEN 10; 325 MG/1; MG/1
1 TABLET ORAL 4 TIMES DAILY PRN
COMMUNITY
Start: 2022-04-19

## 2022-04-25 RX ORDER — ERGOCALCIFEROL 1.25 MG/1
50000 CAPSULE, LIQUID FILLED ORAL WEEKLY
Qty: 12 CAPSULE | Refills: 0 | Status: SHIPPED | OUTPATIENT
Start: 2022-04-25 | End: 2022-07-14

## 2022-04-25 ASSESSMENT — PAIN SCALES - GENERAL: PAINLEVEL: NO PAIN (0)

## 2022-04-25 NOTE — PATIENT INSTRUCTIONS
I recommend that we check your vitamins/minerals since you are at risk of deficiencies given your gastric bypass surgery.   I will order a panel of labs--get done in the next month or so.    Start high dose vitamin D.    I will check with Urology about your kidney scan and necessary follow up.
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
## 2022-04-25 NOTE — NURSING NOTE
Chief Complaint   Patient presents with     Hospital F/U     Patient comes in for a hospital follow up.         Cornell Jesus MA on 4/25/2022 at 1:33 PM

## 2022-04-25 NOTE — PROGRESS NOTES
Neurosurgery Clinic    Thank you for referring Angeli Galindo to the pediatric neurosurgery clinic at the [facility] [place].   I had the opportunity to meet with Angeli Galindo  on April 25, 2022.    As you know, Angeli is a 62 year old female with a history of protein S deficiency with a history of DVT/PE in 2010/2013, on Eliquis, who presents today for 2 week follow up for subdural hematoma. She reports that she had initially fallen on 3/9/2022, and started to have some groin pain and increased falls. On 4/8/2022, she was attempting to get to the bathroom quickly and fell forward striking her face. She reports experiencing some light headedness, weakness and vision changes for which she presented tot he emergency department. She was found to have a comminuted left orbital floor fracture with orbital fat and fracture fragments into the maxillary sinus, as well as subdural hemorrhages in bilateral middle cranial fossa. While admitted, she underwent surgery with ortho for a right femur fracture, and was switched from Eliquis to Lovenox 80mg BID. She reports since she has been home, she has been doing well overall. She denies any overt headaches but does report some discomfort behind her eyes bilaterally, worse on the right, and worse with any bright lights. She states that she has felt an overall cloudy feeling since, but is able to be functional. She had one episode of dizziness today, but otherwise denies any dizziness. 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  She follows with PM&R for lower back pain and injections. She will see internal medicine today who follows her for her hematological needs and medication management.       Past Medical History:   Diagnosis Date     Bilateral low back pain without sciatica      Chronic deep vein thrombosis (DVT) of lower extremity (H)     protein C deficiency, refuted during hospital 4/22 with normal  levels     H/O splenectomy      Obesity due to excess calories     s/p gastric bypass, 320 lb to 170 lbs     Spherocytosis (H)        Past Surgical History:   Procedure Laterality Date     BACK SURGERY      L4/5 microdiskectomy      SECTION       EXCISE LESION TRUNK N/A 2021    Procedure: EXCISE CYST ABDOMINAL WALL;  Surgeon: Junior Velasco MD;  Location: [facility] Main OR     GASTRIC BYPASS  2016    neto en y     IR REMOVAL IVC FILTER       OPEN REDUCTION INTERNAL FIXATION FEMUR MIDSHAFT Right 2022    Procedure: Right hip pinning;  Surgeon: Darrel Nguyen MD;  Location:  OR       ALLERGIES:  Shellfish-derived products and Lisinopril    Current Outpatient Medications   Medication Sig Dispense Refill     acetaminophen (TYLENOL) 325 MG tablet Take 3 tablets (975 mg) by mouth 3 times daily       Calcium Carbonate-Vitamin D (CALCIUM-VITAMIN D) 600-125 MG-UNIT TABS Take 1 tablet by mouth every evening       cyanocobalamin (VITAMIN B-12) 100 MCG tablet Take 500 mcg by mouth every evening       DULoxetine (CYMBALTA) 60 MG capsule TAKE 1 CAPSULE BY MOUTH EVERY DAY (Patient taking differently: Take 60 mg by mouth every evening) 90 capsule 0     enoxaparin ANTICOAGULANT (LOVENOX) 80 MG/0.8ML syringe Inject 0.8 mLs (80 mg) Subcutaneous in the morning and 0.8 mLs (80 mg) in the evening. Do all this for 13 days. 20.8 mL 0     gabapentin (NEURONTIN) 600 MG tablet Take 1 tablet (600 mg) by mouth in the morning and 1 tablet (600 mg) in the evening. Take 600mg in the morning and 1200mg at bedtime       levETIRAcetam (KEPPRA) 750 MG tablet Take 1 tablet (750 mg) by mouth in the morning and 1 tablet (750 mg) in the evening. Do all this for 3 days. 6 tablet 0     Melatonin 10 MG CAPS Take 1 capsule by mouth At Bedtime       naloxone (NARCAN) 4 MG/0.1ML nasal spray Spray 4 mg in nostril       Prenatal Vit-Fe Fumarate-FA (PRENATAL VITAMINS PO) Take 2 tablets by mouth every evening       RISEdronate  (ACTONEL) 150 MG tablet Take 1 tablet (150 mg) by mouth every 30 days 4 tablet 3     senna-docusate (SENOKOT-S/PERICOLACE) 8.6-50 MG tablet Take 1 tablet by mouth 2 times daily as needed for constipation 14 tablet 0       Family History   Problem Relation Age of Onset     Parkinsonism Mother      Diabetes Type 2  Father        Social History     Tobacco Use     Smoking status: Former Smoker     Years: 5.00     Quit date: 2000     Years since quittin.3     Smokeless tobacco: Never Used   Substance Use Topics     Alcohol use: Yes     Comment: Rare       On review of systems, a 10 point ROS of systems including Constitutional, Eyes, Respiratory, Cardiovascular, Gastroenterology, Genitourinary, Integumentary, Muscularskeletal, Psychiatric were all negative except for pertinent positives noted in my HPI.     PHYSICAL EXAM:   There were no vitals taken for this visit.    Awake and alert, no acute distress  PERRL, EOMi  MAEx4, symmetric strength and tone, although some right lower extremity limited range of motion due to recent ortho surgery  Face symmetrical, tongue midline, small bruised area noted on L cheek    IMAGES:   CT HEAD W/O CONTRAST 2022 8:44 AM     History: Subdural hematoma (H)      Comparison: Head CT 4/10/2022, 2022     Technique: Using multidetector thin collimation helical acquisition  technique, axial, coronal and sagittal CT images from the skull base  to the vertex were obtained without intravenous contrast.     Findings: Resolution of previous thin subdural hemorrhage along the  posterior right tentorial leaflet. No intracranial hemorrhage, mass  effect, or midline shift. Gray-white matter differentiation in both  cerebral hemispheres is preserved. Ventricles are proportionate to the  cerebral sulci. The basal cisterns are clear. Unchanged patchy  hypoattenuation of periventricular and subcortical white matter,  consistent with chronic small vessel ischemic disease. Stable  looking  old small lacunar infarct in the left inferior cerebellum.     The bony calvaria and the bones of the skull base are normal.  The  visualized portions of the paranasal sinuses and mastoid air cells are  clear.                                                      Impression:  1. Resolution of previous thin subdural hemorrhage along the right  tentorial leaflet.  2. Chronic small vessel ischemic disease and old small lacunar infarct  in the inferior left cerebellum, unchanged.    ASSESSMENT:  Angeli Melvinlowski, 62 year old female with Protein S deficiency on Lovenox to be restarted on Eliquis per internal medicines recommendation, with resolving SDH along right tentorial leaflet and concussion like symptoms, neurologically stable and progressing well    PLAN:  - she will no longer have insurance in a few weeks due to recently lay off and I am comfortable with her restarting Eliquis due to overall resolution of SDH. Ideally I would like for her to be rescanned in 2 weeks after switching anticoagulants, however due to her recent situation understand this may not be feasible. Therefore, we discussed at length the signs and symptoms of when to seek emergent medical care  -I would like for her to discuss concussion symptoms and management with her PM&R physician  - Angeli Galindo and family were counseled to please contact us with any acute worsening of symptoms, or with any questions or concerns.     This patient was discussed with neurosurgery faculty, who agrees with the above.  Dejah Parker NP on 4/25/2022 at 8:38 AM

## 2022-04-25 NOTE — LETTER
4/25/2022       RE: Angeli Galindo  [address]  [place] [address]     Dear Colleague,    Thank you for referring your patient, Angeli Galindo, to the [facility] [place] at [facility]. Please see a copy of my visit note below.           Neurosurgery Clinic    Thank you for referring Angeli Galindo to the pediatric neurosurgery clinic at the [facility].   I had the opportunity to meet with Angeli Galindo  on April 25, 2022.    As you know, Angeli is a 62 year old female with a history of protein S deficiency with a history of DVT/PE in 2010/2013, on Eliquis, who presents today for 2 week follow up for subdural hematoma. She reports that she had initially fallen on 3/9/2022, and started to have some groin pain and increased falls. On 4/8/2022, she was attempting to get to the bathroom quickly and fell forward striking her face. She reports experiencing some light headedness, weakness and vision changes for which she presented tot he emergency department. She was found to have a comminuted left orbital floor fracture with orbital fat and fracture fragments into the maxillary sinus, as well as subdural hemorrhages in bilateral middle cranial fossa. While admitted, she underwent surgery with ortho for a right femur fracture, and was switched from Eliquis to Lovenox 80mg BID. She reports since she has been home, she has been doing well overall. She denies any overt headaches but does report some discomfort behind her eyes bilaterally, worse on the right, and worse with any bright lights. She states that she has felt an overall cloudy feeling since, but is able to be functional. She had one episode of dizziness today, but otherwise denies any dizziness. 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  She follows with PM&R for lower back pain and  injections. She will see internal medicine today who follows her for her hematological needs and medication management.       Past Medical History:   Diagnosis Date     Bilateral low back pain without sciatica      Chronic deep vein thrombosis (DVT) of lower extremity (H)     protein C deficiency, refuted during hospital  with normal levels     H/O splenectomy      Obesity due to excess calories     s/p gastric bypass, 320 lb to 170 lbs     Spherocytosis (H)        Past Surgical History:   Procedure Laterality Date     BACK SURGERY      L4/5 microdiskectomy      SECTION       EXCISE LESION TRUNK N/A 2021    Procedure: EXCISE CYST ABDOMINAL WALL;  Surgeon: Junior Velasco MD;  Location: [facility] Main OR     GASTRIC BYPASS  2016    neto en y     IR REMOVAL IVC FILTER       OPEN REDUCTION INTERNAL FIXATION FEMUR MIDSHAFT Right 2022    Procedure: Right hip pinning;  Surgeon: Darrel Nguyen MD;  Location:  OR       ALLERGIES:  Shellfish-derived products and Lisinopril    Current Outpatient Medications   Medication Sig Dispense Refill     acetaminophen (TYLENOL) 325 MG tablet Take 3 tablets (975 mg) by mouth 3 times daily       Calcium Carbonate-Vitamin D (CALCIUM-VITAMIN D) 600-125 MG-UNIT TABS Take 1 tablet by mouth every evening       cyanocobalamin (VITAMIN B-12) 100 MCG tablet Take 500 mcg by mouth every evening       DULoxetine (CYMBALTA) 60 MG capsule TAKE 1 CAPSULE BY MOUTH EVERY DAY (Patient taking differently: Take 60 mg by mouth every evening) 90 capsule 0     enoxaparin ANTICOAGULANT (LOVENOX) 80 MG/0.8ML syringe Inject 0.8 mLs (80 mg) Subcutaneous in the morning and 0.8 mLs (80 mg) in the evening. Do all this for 13 days. 20.8 mL 0     gabapentin (NEURONTIN) 600 MG tablet Take 1 tablet (600 mg) by mouth in the morning and 1 tablet (600 mg) in the evening. Take 600mg in the morning and 1200mg at bedtime       levETIRAcetam (KEPPRA) 750 MG tablet Take 1 tablet (750 mg)  by mouth in the morning and 1 tablet (750 mg) in the evening. Do all this for 3 days. 6 tablet 0     Melatonin 10 MG CAPS Take 1 capsule by mouth At Bedtime       naloxone (NARCAN) 4 MG/0.1ML nasal spray Spray 4 mg in nostril       Prenatal Vit-Fe Fumarate-FA (PRENATAL VITAMINS PO) Take 2 tablets by mouth every evening       RISEdronate (ACTONEL) 150 MG tablet Take 1 tablet (150 mg) by mouth every 30 days 4 tablet 3     senna-docusate (SENOKOT-S/PERICOLACE) 8.6-50 MG tablet Take 1 tablet by mouth 2 times daily as needed for constipation 14 tablet 0       Family History   Problem Relation Age of Onset     Parkinsonism Mother      Diabetes Type 2  Father        Social History     Tobacco Use     Smoking status: Former Smoker     Years: 5.00     Quit date: 2000     Years since quittin.3     Smokeless tobacco: Never Used   Substance Use Topics     Alcohol use: Yes     Comment: Rare       On review of systems, a 10 point ROS of systems including Constitutional, Eyes, Respiratory, Cardiovascular, Gastroenterology, Genitourinary, Integumentary, Muscularskeletal, Psychiatric were all negative except for pertinent positives noted in my HPI.     PHYSICAL EXAM:   There were no vitals taken for this visit.    Awake and alert, no acute distress  PERRL, EOMi  MAEx4, symmetric strength and tone, although some right lower extremity limited range of motion due to recent ortho surgery  Face symmetrical, tongue midline, small bruised area noted on L cheek    IMAGES:   CT HEAD W/O CONTRAST 2022 8:44 AM     History: Subdural hematoma (H)      Comparison: Head CT 4/10/2022, 2022     Technique: Using multidetector thin collimation helical acquisition  technique, axial, coronal and sagittal CT images from the skull base  to the vertex were obtained without intravenous contrast.     Findings: Resolution of previous thin subdural hemorrhage along the  posterior right tentorial leaflet. No intracranial hemorrhage,  mass  effect, or midline shift. Gray-white matter differentiation in both  cerebral hemispheres is preserved. Ventricles are proportionate to the  cerebral sulci. The basal cisterns are clear. Unchanged patchy  hypoattenuation of periventricular and subcortical white matter,  consistent with chronic small vessel ischemic disease. Stable looking  old small lacunar infarct in the left inferior cerebellum.     The bony calvaria and the bones of the skull base are normal.  The  visualized portions of the paranasal sinuses and mastoid air cells are  clear.                                                      Impression:  1. Resolution of previous thin subdural hemorrhage along the right  tentorial leaflet.  2. Chronic small vessel ischemic disease and old small lacunar infarct  in the inferior left cerebellum, unchanged.    ASSESSMENT:  Angelijoel Galindo, 62 year old female with Protein S deficiency on Lovenox to be restarted on Eliquis per internal medicines recommendation, with resolving SDH along right tentorial leaflet and concussion like symptoms, neurologically stable and progressing well    PLAN:  - she will no longer have insurance in a few weeks due to recently lay off and I am comfortable with her restarting Eliquis due to overall resolution of SDH. Ideally I would like for her to be rescanned in 2 weeks after switching anticoagulants, however due to her recent situation understand this may not be feasible. Therefore, we discussed at length the signs and symptoms of when to seek emergent medical care  -I would like for her to discuss concussion symptoms and management with her PM&R physician  - Angeli Galindo and family were counseled to please contact us with any acute worsening of symptoms, or with any questions or concerns.       This patient was discussed with neurosurgery faculty, who agrees with the above.  Dejah Parker NP on 4/25/2022 at 8:38 AM

## 2022-04-27 ENCOUNTER — TELEPHONE (OUTPATIENT)
Dept: INTERNAL MEDICINE | Facility: CLINIC | Age: 62
End: 2022-04-27
Payer: COMMERCIAL

## 2022-04-27 NOTE — TELEPHONE ENCOUNTER
[facility] Call Center    Phone Message    May a detailed message be left on voicemail: yes     Reason for Call: Other: Kelly calling to update Dr Fuentes that Pt is discharged from home care effective today, 4/27/22     Action Taken: Message routed to:  [facility]    Travel Screening: Not Applicable

## 2022-04-29 ENCOUNTER — TELEPHONE (OUTPATIENT)
Dept: INTERNAL MEDICINE | Facility: CLINIC | Age: 62
End: 2022-04-29
Payer: COMMERCIAL

## 2022-04-29 ENCOUNTER — MEDICAL CORRESPONDENCE (OUTPATIENT)
Dept: HEALTH INFORMATION MANAGEMENT | Facility: CLINIC | Age: 62
End: 2022-04-29
Payer: COMMERCIAL

## 2022-04-29 DIAGNOSIS — Z53.9 DIAGNOSIS NOT YET DEFINED: Primary | ICD-10-CM

## 2022-04-29 NOTE — TELEPHONE ENCOUNTER
[facility] Call Center    Phone Message    May a detailed message be left on voicemail: yes     Reason for Call: Other: Yael from [facility] requesting call back to verify the clinic received an order, it was faxed on 4/21/22     Action Taken: Message routed to:  [facility] pcc

## 2022-04-30 ENCOUNTER — LAB (OUTPATIENT)
Dept: LAB | Facility: CLINIC | Age: 62
End: 2022-04-30
Payer: COMMERCIAL

## 2022-04-30 ENCOUNTER — ANCILLARY PROCEDURE (OUTPATIENT)
Dept: CT IMAGING | Facility: CLINIC | Age: 62
End: 2022-04-30
Attending: INTERNAL MEDICINE
Payer: COMMERCIAL

## 2022-04-30 DIAGNOSIS — N28.9 RENAL LESION: ICD-10-CM

## 2022-04-30 DIAGNOSIS — Z98.84 H/O GASTRIC BYPASS: ICD-10-CM

## 2022-04-30 LAB — FOLATE SERPL-MCNC: >20 NG/ML

## 2022-04-30 PROCEDURE — 36415 COLL VENOUS BLD VENIPUNCTURE: CPT | Performed by: PATHOLOGY

## 2022-04-30 PROCEDURE — 99000 SPECIMEN HANDLING OFFICE-LAB: CPT | Performed by: PATHOLOGY

## 2022-04-30 PROCEDURE — 74178 CT ABD&PLV WO CNTR FLWD CNTR: CPT | Performed by: RADIOLOGY

## 2022-04-30 PROCEDURE — 84630 ASSAY OF ZINC: CPT | Mod: 90 | Performed by: PATHOLOGY

## 2022-04-30 PROCEDURE — 82525 ASSAY OF COPPER: CPT | Mod: 90 | Performed by: PATHOLOGY

## 2022-04-30 PROCEDURE — 82746 ASSAY OF FOLIC ACID SERUM: CPT | Mod: 90 | Performed by: PATHOLOGY

## 2022-04-30 PROCEDURE — 84446 ASSAY OF VITAMIN E: CPT | Mod: 90 | Performed by: PATHOLOGY

## 2022-04-30 PROCEDURE — 84425 ASSAY OF VITAMIN B-1: CPT | Mod: 90 | Performed by: PATHOLOGY

## 2022-04-30 RX ORDER — IOPAMIDOL 755 MG/ML
99 INJECTION, SOLUTION INTRAVASCULAR ONCE
Status: COMPLETED | OUTPATIENT
Start: 2022-04-30 | End: 2022-04-30

## 2022-04-30 RX ADMIN — IOPAMIDOL 99 ML: 755 INJECTION, SOLUTION INTRAVASCULAR at 10:02

## 2022-05-02 NOTE — TELEPHONE ENCOUNTER
Phone call returned to Yael at [facility] confirming order was faxed back today and she did receive it.    June Novoa on 5/2/2022 at 10:55 AM

## 2022-05-04 LAB
A-TOCOPHEROL VIT E SERPL-MCNC: 9.3 MG/L
BETA+GAMMA TOCOPHEROL SERPL-MCNC: 0.6 MG/L

## 2022-05-05 LAB
COPPER SERPL-MCNC: 86.4 UG/DL
VIT B1 PYROPHOSHATE BLD-SCNC: 144 NMOL/L
ZINC SERPL-MCNC: 80.8 UG/DL

## 2022-05-09 ENCOUNTER — TELEPHONE (OUTPATIENT)
Dept: UROLOGY | Facility: CLINIC | Age: 62
End: 2022-05-09
Payer: COMMERCIAL

## 2022-05-09 NOTE — TELEPHONE ENCOUNTER
[facility] Call Center    Phone Message    May a detailed message be left on voicemail: yes     Reason for Call: Other: Pt called back to say the 6/1 at 12pm with Dr Renee is good.  Please reschedule pt for that date/time. Writer is unable to see the appt saved.   Thank you     Action Taken: Message routed to:  [facility] uro    Travel Screening: Not Applicable

## 2022-05-09 NOTE — TELEPHONE ENCOUNTER
LVM for patient to come in for a sooner appointment. Please schedule her 6-1 at 12:00 with Nydia Aguilar.

## 2022-05-10 ENCOUNTER — MYC MEDICAL ADVICE (OUTPATIENT)
Dept: UROLOGY | Facility: CLINIC | Age: 62
End: 2022-05-10
Payer: COMMERCIAL

## 2022-05-16 ENCOUNTER — PRE VISIT (OUTPATIENT)
Dept: UROLOGY | Facility: CLINIC | Age: 62
End: 2022-05-16
Payer: COMMERCIAL

## 2022-05-16 ENCOUNTER — PATIENT MESSAGE (OUTPATIENT)
Dept: PHYSICAL MEDICINE AND REHAB | Facility: CLINIC | Age: 62
End: 2022-05-16

## 2022-05-16 ENCOUNTER — TELEPHONE (OUTPATIENT)
Dept: OPHTHALMOLOGY | Facility: CLINIC | Age: 62
End: 2022-05-16
Payer: COMMERCIAL

## 2022-05-16 DIAGNOSIS — Z98.890 STATUS POST SURGERY: Primary | ICD-10-CM

## 2022-05-16 NOTE — TELEPHONE ENCOUNTER
Called and spoke to Angeli     Made her an appointment with Dr. Black for 6/8 @ 930 am for Orbital blowout fracture - needs follow up in general clinic    Vicky Rogers Communication Facilitator on 5/16/2022 at 1:23 PM

## 2022-05-16 NOTE — CONFIDENTIAL NOTE
Reason for visit: consult     Relevant information: renal lesion    Records/imaging/labs/orders: in epic    Pt called: n/a    At Rooming: eyal Arana  5/16/2022  8:32 AM

## 2022-05-17 DIAGNOSIS — F32.A DEPRESSION, UNSPECIFIED DEPRESSION TYPE: ICD-10-CM

## 2022-05-17 NOTE — TELEPHONE ENCOUNTER
Refill Request    Medication request: HYDROcodone-acetaminophen  MG Oral Tab. Take 1 tablet by mouth every 6 (six) hours as needed for Pain. LOV: 2022 - TELEMEDICINE w/ Dr. Michele Babcock  Due back to clinic per last office note: Per Dr. Michele Babcock: \"Follow-up: 3 months\"  NOV: NONE     [place] 22 #120  Confirmed with Greggwendy Samuels with [facility] that last refill was dispensed: 22 #120 . Urine drug screen (if applicable): none  Pain contract:  on 2020    LOV plan (if weaning or changing medications): Per Dr. Michele Babcock: Willian James will continue with the [address] [place] [address] for the pain QID. \"    Will message patient via algrano to update and to make follow-up appointment.

## 2022-05-17 NOTE — TELEPHONE ENCOUNTER
From: [facility]  To: Kailey Hernandez MD  Sent: 5/16/2022 5:41 PM CDT  Subject: One month check in    Hi Dr Valery Hernandez,   One month check in as we discussed. Hip is amazing! Still have vertigo. When I have it, I close my eyes and still see things spinning. It usually goes away after about an hour. So less, but still present. Occasional, but less frequent double vision. My \"word search\" button in my brain is still off. Improved, but there are times each day when I have to pause. Sometimes I don't find the appropriate word at all. I have headaches every day. Ugh! I would say overall, things are improving, but still am symptomatic. Back is OK. Still riding on the ORLIN, but it is starting to taper. Livable though. I will make an appointment for July. Will try to make a face-to-face appt.      Hope all is well with you, Simón Perez

## 2022-05-18 RX ORDER — HYDROCODONE BITARTRATE AND ACETAMINOPHEN 10; 325 MG/1; MG/1
1 TABLET ORAL EVERY 6 HOURS PRN
Qty: 120 TABLET | Refills: 0 | Status: SHIPPED | OUTPATIENT
Start: 2022-05-19 | End: 2022-06-18

## 2022-05-18 RX ORDER — DULOXETIN HYDROCHLORIDE 60 MG/1
60 CAPSULE, DELAYED RELEASE ORAL DAILY
Qty: 90 CAPSULE | Refills: 1 | Status: SHIPPED | OUTPATIENT
Start: 2022-05-18

## 2022-05-18 NOTE — TELEPHONE ENCOUNTER
Last Clinic Visit: 4/25/2022  [facility] [place]

## 2022-05-23 NOTE — TELEPHONE ENCOUNTER
Medication request: Norco 10-325mg q6h prn pain    LOV 3/13/19  NOV 9/9/19    [place]: 6/6/19, filled 3 days early    Per Dr. Gaye Anderson at [address] [facility] to 4 times daily
Prescription is ready for  at the [facility] front office.
Pt came into office on today  prescription verified Id.
23-May-2022

## 2022-05-24 ENCOUNTER — ANCILLARY PROCEDURE (OUTPATIENT)
Dept: GENERAL RADIOLOGY | Facility: CLINIC | Age: 62
End: 2022-05-24
Attending: ORTHOPAEDIC SURGERY
Payer: COMMERCIAL

## 2022-05-24 ENCOUNTER — OFFICE VISIT (OUTPATIENT)
Dept: ORTHOPEDICS | Facility: CLINIC | Age: 62
End: 2022-05-24
Payer: COMMERCIAL

## 2022-05-24 DIAGNOSIS — Z98.890 STATUS POST SURGERY: ICD-10-CM

## 2022-05-24 DIAGNOSIS — M25.559 HIP PAIN: Primary | ICD-10-CM

## 2022-05-24 PROCEDURE — 99024 POSTOP FOLLOW-UP VISIT: CPT | Performed by: ORTHOPAEDIC SURGERY

## 2022-05-24 PROCEDURE — 73502 X-RAY EXAM HIP UNI 2-3 VIEWS: CPT | Mod: RT | Performed by: RADIOLOGY

## 2022-05-24 RX ORDER — ALBUTEROL SULFATE 90 UG/1
AEROSOL, METERED RESPIRATORY (INHALATION)
COMMUNITY
Start: 2022-01-10 | End: 2022-06-01

## 2022-05-24 RX ORDER — HYDROXYZINE HYDROCHLORIDE 25 MG/1
TABLET, FILM COATED ORAL
COMMUNITY
Start: 2021-11-13 | End: 2022-06-01

## 2022-05-24 NOTE — PROGRESS NOTES
CHIEF COMPLAINT:  Status post right hip pinning performed on 04/08/2022.    HISTORY OF PRESENT ILLNESS:  Mrs. Galindo presents today for further followup.  The patient reports to be doing extremely well.  Denies to have any difficulties.  Denies to have any pain.  Reports to be extremely pleased with how things are going.    PHYSICAL EXAMINATION:  On today's exam, she presented with a completely unremarkable exam with no pain whatsoever across the hip joint with forced internal and external rotation.  Surgical incision is well healed per the patient's report.    AP and lateral x-rays of the hip were obtained today, which are significant for showing excellent consolidation of the fracture site.  Hardware is intact and in place.  There are no signs of osteolysis or osteonecrosis.    ASSESSMENT:  Status post right hip pinning.    PLAN:  Discussed with patient that she is making excellent progress.  She has no restrictions.  The patient will follow up on a p.r.n. basis.  All questions were answered.  The patient already stated that she is planning to flora the company that did not de-ice the surface where she fell.    All questions were answered.

## 2022-05-24 NOTE — LETTER
5/24/2022         RE: Angeli Galindo  [address]  [place] [address]        Dear Colleague,    Thank you for referring your patient, Angeli Galindo, to the [facility] [place]. Please see a copy of my visit note below.    CHIEF COMPLAINT:  Status post right hip pinning performed on 04/08/2022.    HISTORY OF PRESENT ILLNESS:  Mrs. Galindo presents today for further followup.  The patient reports to be doing extremely well.  Denies to have any difficulties.  Denies to have any pain.  Reports to be extremely pleased with how things are going.    PHYSICAL EXAMINATION:  On today's exam, she presented with a completely unremarkable exam with no pain whatsoever across the hip joint with forced internal and external rotation.  Surgical incision is well healed per the patient's report.    AP and lateral x-rays of the hip were obtained today, which are significant for showing excellent consolidation of the fracture site.  Hardware is intact and in place.  There are no signs of osteolysis or osteonecrosis.    ASSESSMENT:  Status post right hip pinning.    PLAN:  Discussed with patient that she is making excellent progress.  She has no restrictions.  The patient will follow up on a p.r.n. basis.  All questions were answered.  The patient already stated that she is planning to flora the company that did not de-ice the surface where she fell.    All questions were answered.        Darrel Nguyen MD

## 2022-05-24 NOTE — NURSING NOTE
Reason For Visit:   Chief Complaint   Patient presents with     RECHECK     6 weeks POP Right hip pinning DOS 4/8/22. Doing very well       There were no vitals taken for this visit.         Corrie Roger, ATC

## 2022-06-01 ENCOUNTER — OFFICE VISIT (OUTPATIENT)
Dept: UROLOGY | Facility: CLINIC | Age: 62
End: 2022-06-01
Payer: COMMERCIAL

## 2022-06-01 ENCOUNTER — OFFICE VISIT (OUTPATIENT)
Dept: HEMATOLOGY | Facility: CLINIC | Age: 62
End: 2022-06-01
Attending: INTERNAL MEDICINE

## 2022-06-01 VITALS — SYSTOLIC BLOOD PRESSURE: 150 MMHG | DIASTOLIC BLOOD PRESSURE: 84 MMHG | HEART RATE: 68 BPM

## 2022-06-01 VITALS
HEART RATE: 74 BPM | TEMPERATURE: 98.5 F | HEIGHT: 67 IN | OXYGEN SATURATION: 95 % | DIASTOLIC BLOOD PRESSURE: 94 MMHG | SYSTOLIC BLOOD PRESSURE: 167 MMHG | BODY MASS INDEX: 25.74 KG/M2 | RESPIRATION RATE: 16 BRPM | WEIGHT: 164 LBS

## 2022-06-01 DIAGNOSIS — Z90.81 H/O SPLENECTOMY: ICD-10-CM

## 2022-06-01 DIAGNOSIS — D68.59 PROTEIN S DEFICIENCY (H): ICD-10-CM

## 2022-06-01 DIAGNOSIS — Z79.01 CHRONIC ANTICOAGULATION: ICD-10-CM

## 2022-06-01 DIAGNOSIS — N28.89 LEFT RENAL MASS: Primary | ICD-10-CM

## 2022-06-01 DIAGNOSIS — Z86.2 H/O PROTEIN S DEFICIENCY: ICD-10-CM

## 2022-06-01 DIAGNOSIS — Z98.84 HISTORY OF ROUX-EN-Y GASTRIC BYPASS: ICD-10-CM

## 2022-06-01 DIAGNOSIS — S06.5XAA SUBDURAL HEMATOMA (H): ICD-10-CM

## 2022-06-01 DIAGNOSIS — D58.0 SPHEROCYTOSIS, HEREDITARY (H): ICD-10-CM

## 2022-06-01 DIAGNOSIS — Z86.718 HISTORY OF VENOUS THROMBOEMBOLISM: Primary | ICD-10-CM

## 2022-06-01 PROCEDURE — 99214 OFFICE O/P EST MOD 30 MIN: CPT | Performed by: INTERNAL MEDICINE

## 2022-06-01 PROCEDURE — 99214 OFFICE O/P EST MOD 30 MIN: CPT | Performed by: UROLOGY

## 2022-06-01 PROCEDURE — G0463 HOSPITAL OUTPT CLINIC VISIT: HCPCS

## 2022-06-01 ASSESSMENT — PAIN SCALES - GENERAL: PAINLEVEL: SEVERE PAIN (7)

## 2022-06-01 NOTE — LETTER
6/1/2022       RE: Angeli Galindo  [address]  [place] [address]     Dear Colleague,    Thank you for referring your patient, Angeli Galindo, to the [facility]OLOG[facility] [place] [facility]. Please see a copy of my visit note below.    Urology Oncology Clinic   Renal mass             Chief Complaint:   Left renal mass            Consult or Referral:     Angeli Galindo is a 62 year old female seen in consultation.         History of Present Illness:    Angeli Galindo is a 62 year old female with a past medical history of Protein S deficiency (on Eliquis 5 mg), DVT, PE, spherocytosis, gastric bypass, hypertension, idiopathic neuropathy, and osteopenia who presented to [facility] after multiple falls with multiple injuries including subdural hematoma and left orbital blowout fracture with inferior wall displacement managed conservatively and femoral neck fracture status post right proximal femur percutaneous pinning on 4/9/2022. She was found on imaging to have a large distended bladder, bilateral hydro, and an incidental left lower pole renal mass. Hydronephrosis resolved after Sifuentes catheter placement and she was discharged from the hospital on 4/13/2022 and was referred to us for further evaluation of the renal mass.      She has recovered well from her right hip pinning. She is no longer on Lovenox. She is fully functional now with no other fall episodes. She was laid off and is currently on [employer] insurance.       There is not a family history of renal cell cancer.  The patient has a history of smoking and currently is not smoking.    ECOG Performance Score: 0 - Independent           Past Medical History:     Past Medical History:   Diagnosis Date     Bilateral low back pain without sciatica      Chronic deep vein thrombosis (DVT) of lower extremity (H)     protein C deficiency, refuted during hospital 4/22 with  normal levels     H/O splenectomy      Obesity due to excess calories     s/p gastric bypass, 320 lb to 170 lbs     Spherocytosis (H)             Past Surgical History:     Past Surgical History:   Procedure Laterality Date     BACK SURGERY      L4/5 microdiskectomy      SECTION       CHOLECYSTECTOMY N/A      EXCISE LESION TRUNK N/A 2021    Procedure: EXCISE CYST ABDOMINAL WALL;  Surgeon: Junior Velasco MD;  Location: [facility] Main OR     GASTRIC BYPASS  2016    neto en y     IR REMOVAL IVC FILTER       OPEN REDUCTION INTERNAL FIXATION FEMUR MIDSHAFT Right 2022    Procedure: Right hip pinning;  Surgeon: Darrel Nguyen MD;  Location: [facility] OR            Problem List:     Patient Active Problem List    Diagnosis Date Noted     Subdural hematoma (H) 2022     Priority: Medium     Closed blow-out fracture of left orbital floor (H) 2022     Priority: Medium            Medications     Current Outpatient Medications   Medication     apixaban ANTICOAGULANT (ELIQUIS) 2.5 MG tablet     Calcium Carbonate-Vitamin D (CALCIUM-VITAMIN D) 600-125 MG-UNIT TABS     DULoxetine (CYMBALTA) 60 MG capsule     gabapentin (NEURONTIN) 600 MG tablet     HYDROcodone-acetaminophen (NORCO)  MG per tablet     Melatonin 10 MG CAPS     Prenatal Vit-Fe Fumarate-FA (PRENATAL VITAMINS PO)     RISEdronate (ACTONEL) 150 MG tablet     senna-docusate (SENOKOT-S/PERICOLACE) 8.6-50 MG tablet     vitamin D2 (ERGOCALCIFEROL) 07860 units (1250 mcg) capsule     No current facility-administered medications for this visit.            Family History:     Family History   Problem Relation Age of Onset     Parkinsonism Mother      Diabetes Type 2  Father             Social History:     Social History     Socioeconomic History     Marital status:      Spouse name: Not on file     Number of children: Not on file     Years of education: Not on file     Highest education level: Not on file   Occupational  History     Not on file   Tobacco Use     Smoking status: Former Smoker     Years: 5.00     Quit date: 2000     Years since quittin.4     Smokeless tobacco: Never Used   Vaping Use     Vaping Use: Never used   Substance and Sexual Activity     Alcohol use: Yes     Comment: Rare     Drug use: Never     Sexual activity: Not on file   Other Topics Concern     Parent/sibling w/ CABG, MI or angioplasty before 65F 55M? Not Asked   Social History Narrative    2 children, daughter is 32 and granddaughter     Daughter is 31 yo lives in [place]    Adopted 3 special needs adults grown    \[facility]\""     Social Determinants of Health     Financial Resource Strain: Not on file   Food Insecurity: Not on file   Transportation Needs: Not on file   Physical Activity: Not on file   Stress: Not on file   Social Connections: Not on file   Intimate Partner Violence: Not on file   Housing Stability: Not on file            Allergies:   Shellfish-derived products and Lisinopril         Review of Systems:  From intake questionnaire     Negative 14 system review except as noted on HPI, nurse's note see below.         Physical Exam:     Patient is a 62 year old  female There is no height or weight on file to calculate BMI.  Constitutional: Vitals: Blood pressure (!) 150/84, pulse 68.  General Appearance Adult: Alert, no acute distress, oriented  HENT: throat/mouth:normal, good dentition  Neck: No adenopathy,masses or thyromegaly  Lungs/Repiratory: no respiratory distress, or pursed lip breathing  Heart: No obvious jugular venous distension present, normal heart rate  Abdomen: soft, nontender, no organomegaly or masses, large LUQ incision from prior splenectomy   Hematologic/Lymphatics: No cervical or supraclavicular adenopathy  Musculoskeltal: extremities normal, no peripheral edema  Skin: no noted suspicious lesions or rashes  Neuro: Alert, oriented, speech and mentation normal  Psych: affect and mood normal  Gait: Normal  without assistance  : deferred          Labs and Pathology:    I personally reviewed all applicable laboratory and pathology data reviewed with patient  Significant for normal creatinine     Lab Results   Component Value Date    WBC 8.0 04/11/2022     Lab Results   Component Value Date    RBC 3.78 04/11/2022     Lab Results   Component Value Date    HGB 12.0 04/11/2022     Lab Results   Component Value Date    HCT 37.6 04/11/2022     No components found for: MCT  Lab Results   Component Value Date     04/11/2022     Lab Results   Component Value Date    MCH 31.7 04/11/2022     Lab Results   Component Value Date    MCHC 31.9 04/11/2022     Lab Results   Component Value Date    RDW 12.8 04/11/2022     Lab Results   Component Value Date     04/11/2022       Last Comprehensive Metabolic Panel:  Sodium   Date Value Ref Range Status   04/11/2022 143 133 - 144 mmol/L Final     Potassium   Date Value Ref Range Status   04/11/2022 4.1 3.4 - 5.3 mmol/L Final     Chloride   Date Value Ref Range Status   04/11/2022 112 (H) 94 - 109 mmol/L Final     Carbon Dioxide (CO2)   Date Value Ref Range Status   04/11/2022 25 20 - 32 mmol/L Final     Anion Gap   Date Value Ref Range Status   04/11/2022 6 3 - 14 mmol/L Final     Glucose   Date Value Ref Range Status   04/11/2022 91 70 - 99 mg/dL Final     Urea Nitrogen   Date Value Ref Range Status   04/11/2022 9 7 - 30 mg/dL Final     Creatinine   Date Value Ref Range Status   04/11/2022 0.42 (L) 0.52 - 1.04 mg/dL Final     GFR Estimate   Date Value Ref Range Status   04/11/2022 >90 >60 mL/min/1.73m2 Final     Comment:     Effective December 21, 2021 eGFRcr in adults is calculated using the 2021 CKD-EPI creatinine equation which includes age and gender (Jacey et al., NEJM, DOI: 10.1056/XSPRml5175151)     GFR, ESTIMATED POCT   Date Value Ref Range Status   04/08/2022 >60 >60 mL/min/1.73m2 Final     Calcium   Date Value Ref Range Status   04/11/2022 8.0 (L) 8.5 - 10.1 mg/dL  Final     Bilirubin Total   Date Value Ref Range Status   04/08/2022 0.8 0.2 - 1.3 mg/dL Final     Alkaline Phosphatase   Date Value Ref Range Status   04/08/2022 121 40 - 150 U/L Final     ALT   Date Value Ref Range Status   04/08/2022 42 0 - 50 U/L Final     AST   Date Value Ref Range Status   04/08/2022 52 (H) 0 - 45 U/L Final         INR   Date Value Ref Range Status   04/08/2022 1.00 0.85 - 1.15 Final     INR POCT   Date Value Ref Range Status   04/08/2022 0.9 (L) 2.0 - 3.0 Final          Imaging:    I personally viewed all applicable imaging and interpreted them and went over the results with the patient.  Mass/lesion details are as follows    The mass/lesion is located in the Left side and is primarily located in the lower pole.  The tumor is also primarily exophytic.  The mass/lesion primarily lies on the lateral surface. With regard to the collecting system, the edge of the tumor arrives more than 7 mm from the collecting system.    EXAMINATION: CT ABDOMEN WO & W & PELVIS W CONTRAST,  4/30/2022 10:11 AM     TECHNIQUE:  Helical CT images from the lung bases through the  symphysis pubis were obtained with IV contrast. Contrast dose: Isovue  370 99cc     COMPARISON: Ultrasound 4/11/2022; CT exam 482     HISTORY: Kidney cancer, initial workup; renal mass on CT in April,  further evaluation, do in July between 7/1-7/19; Renal lesion     FINDINGS:     Abdomen and pelvis: No adrenal gland nodules. 2 mm nonobstructive  stone in the inferior pole of the left kidney. No right renal stones.  No bilateral hydronephrosis. Multiple parapelvic renal cysts  bilaterally. No filling defects within the opacified portions of  bilateral pyelocalyceal system and ureters. Partially distended  urinary bladder. Pelvic phleboliths. Surgically absent uterus. No  pelvic or adnexal masses.     No arterially enhancing liver lesions. Lesion in the inferior pole of  the left kidney may, exophytic, measuring 2.2 x 2.2 x 2.2 cm, series  9  image 161 and series 10 image 55. No additional suspicious renal  lesions. No vascular involvement.     Remaining of the abdomen and pelvis: No suspicious liver lesions.  Patent liver vasculature. Moderate-to-marked intrahepatic biliary tree  dilation. Marked dilated common bile duct measuring up to 20 mm with  smooth tapering up to the level of the distal fibula. No evidence for  choledocholithiasis or obstructive masses. Findings could be related  to reservoir effect postcholecystectomy. Surgical clips in the  gallbladder fossa and at the casie hepatis. Atrophic pancreas. Main  pancreatic duct is not dilated. The spleen is not identified in the  present study, presumably surgically absent.     Postoperative changes of gastric bypass. No dilated loops of bowel. No  bowel wall thickening. Moderate to large colonic stool burden. Colonic  diverticulosis. No associated CT findings of acute diverticulitis. The  appendix is not confidently identified in the present study. No free  fluid. No free air.     No inguinal lymphadenopathy. No suspicious pelvic lymph nodes. No  abdominal aortic aneurysm. Atherosclerotic calcifications of the  abdominal aorta and its major branches. Subcentimeter retroperitoneal  and mesenteric lymph nodes are not enlarged by size criteria.     Lung bases: Cardiac size within normal limits. No pericardial or  pleural effusions. Bilateral lower lobes atelectatic changes as well  as in the lingula greater than middle lobe.     Bones: Degenerative changes of the spine, bilateral SI joints and  hips. Enthesopathic changes of the pelvis and hips. Mild diffuse bone  demineralization. Postoperative changes of ORIF in the right femur,  partially visualized. There is adjacent heterotopic bone formation.  Additional scattered sclerotic foci are too small to characterize,  presumably benign bone islands. Grade 1 anterolisthesis of L4 over L5.  Mild disc height narrowing at L5 over S1. Partially  visualized flowing  osteophytes in the lower thoracic spine. Mild lumbar curvature with  convexity to the right. No convincing aggressive bone lesions.                                                                      IMPRESSION:  1. Overall stable exophytic enhancing solid lesion in the inferior  pole of the left kidney, remain highly concerning for renal cell  carcinoma until proven otherwise.  2. No significant interval change in intra and extrahepatic biliary  tree dilation as described above, likely due to reservoir effect from  prior cholecystectomy.  3. Colonic diverticulosis. Additional incidental findings as described  above.     RUBY ROTH MD       Outside and Past Medical records:    I spent 20 minutes reviewing outside and past medical records including neurosurgery, internal medicine and orthopedics          Assessment and Plan:     Assessment:  62 year old female with several comorbidities with a 2 cm left renal mass    It was my pleasure to see this patient today for discussion of a 2 cm left sided renal mass. I reviewed the available laboratory work and cross-sectional imaging with the patient.     We discussed that solid mass in the kidney have a high likelihood of being malignant, in the realm of 75-80%. There are benign entities such as oncocytomas and angiomyolipomas. Renal mass typically grow approximately 2-3 mm per year. Growth rate most associated with an aggressive tumor is greater than 7-8 mm per year. I explained that the majority of those that are renal cell carcinoma would be non-aggressive varieties of renal cell carcinoma. There is a <1% chance of metastasis for renal masses under 4 cm.     In general, treatment methods of renal mass include active surveillance, percutaneous ablation, partial nephrectomy, and radical nephrectomy.   We also discussed indications for percutaneous biopsy with biopsy-directed therapy based on the results of the biopsy versus percutaneous  ablation in the absence of biopsy results versus partial nephrectomy. We discussed circumstances where biopsy might change the plan for care and situations where a patient potentially interested in surveillance might find the information helpful or not helpful.  I explained why radical nephrectomy would be a reasonable choice for a tumor like this. We discussed the reasons why things like systemic therapy or radiotherapy would not be treatment options for this renal mass.     I explained to patient that on active surveillance, a lesion this small would be expected to remain stable and not grow over time in approximately one-third of patients. I explained that for a lesion this small, lack of growth means that there would be a very low risk of progression of disease, and a low risk of adverse outcomes. We discussed the fact that for lesions that do grow, the average rate of growth is 2 to 3 mm per year, and we discussed the fact that lesions that have the potential to metastasize in the seem to grow much faster at approximately 8 mm per year or more. I explained that a period of surveillance followed by active therapy based on finding growth appears to result in treatment being just as efficacious than as it would be if it had been offered in the original setting.     We discussed the advantages and disadvantages of proceeding directly to treatment and reviewed the options of percutaneous ablation versus partial nephrectomy. I explained the expected rates of cure, which should be very high, and the possibility of local recurrence or systemic spread, which should be very low. We discussed the relative efficacy as well as the degree of renal preservation expected. I reviewed complications for each and the advantages and disadvantages of each approach. Ablation has an overall 6% complication rate including bleeding, hematoma, discomfort, urinary leak, urinary tract infection, stricture, damage to surrounding structures.  Partial nephrectomy could be an option however does have a higher complication rate.    After a lengthy discussion regarding the efficacy of active treatment, the data surrounding active surveillance, and the pros and cons of biopsy versus proceeding with one of the above without biopsy, the patient is most interested in proceeding with active surveillance for now and would consider surgery vs ablation down the road once she has another job and has a more affordable insurance in place.         Plan:  Follow up in 4 months with MR renal mass protocol     60 total minutes spent on the date of the encounter including direct interaction with the patient, performing chart review, documentation and further activities as noted above      Johnny Renee MD   Department of Urology   [facility]

## 2022-06-01 NOTE — PATIENT INSTRUCTIONS
Please follow-up in 4 months with Dr. Hines with renal MR prior.    It was a pleasure meeting with you today.  Thank you for allowing me and my team the privilege of caring for you today.  YOU are the reason we are here, and I truly hope we provided you with the excellent service you deserve.  Please let us know if there is anything else we can do for you so that we can be sure you are leaving completely satisfied with your care experience.

## 2022-06-01 NOTE — NURSING NOTE
Chief Complaint   Patient presents with     Consult For     Kidney mass       Blood pressure (!) 150/84, pulse 68. There is no height or weight on file to calculate BMI.    Patient Active Problem List   Diagnosis     Subdural hematoma (H)     Closed blow-out fracture of left orbital floor (H)       Allergies   Allergen Reactions     Shellfish-Derived Products Angioedema     Lisinopril Cough       Current Outpatient Medications   Medication Sig Dispense Refill     apixaban ANTICOAGULANT (ELIQUIS) 2.5 MG tablet Take 1 tablet (2.5 mg) by mouth 2 times daily 180 tablet 3     Calcium Carbonate-Vitamin D (CALCIUM-VITAMIN D) 600-125 MG-UNIT TABS Take 1 tablet by mouth every evening       DULoxetine (CYMBALTA) 60 MG capsule Take 1 capsule (60 mg) by mouth daily 90 capsule 1     gabapentin (NEURONTIN) 600 MG tablet Take 1 tablet (600 mg) by mouth in the morning and 1 tablet (600 mg) in the evening. Take 600mg in the morning and 1200mg at bedtime       HYDROcodone-acetaminophen (NORCO)  MG per tablet Take 1 tablet by mouth See Admin Instructions       Melatonin 10 MG CAPS Take 1 capsule by mouth At Bedtime       Prenatal Vit-Fe Fumarate-FA (PRENATAL VITAMINS PO) Take 2 tablets by mouth every evening       RISEdronate (ACTONEL) 150 MG tablet Take 1 tablet (150 mg) by mouth every 30 days 4 tablet 3     senna-docusate (SENOKOT-S/PERICOLACE) 8.6-50 MG tablet Take 1 tablet by mouth 2 times daily as needed for constipation 14 tablet 0     vitamin D2 (ERGOCALCIFEROL) 65202 units (1250 mcg) capsule Take 1 capsule (50,000 Units) by mouth once a week 12 capsule 0       Social History     Tobacco Use     Smoking status: Former Smoker     Years: 5.00     Quit date: 2000     Years since quittin.4     Smokeless tobacco: Never Used   Vaping Use     Vaping Use: Never used   Substance Use Topics     Alcohol use: Yes     Comment: Rare     Drug use: Never       Alba Jaeger  2022  11:49 AM

## 2022-06-01 NOTE — LETTER
"2022       RE: Angeli Galindo  [address]  [place] [address]     Dear Colleague,    Thank you for referring your patient, Angeli Galindo, to the [facility] at [facility]. Please see a copy of my visit note below.    Vitals completed along with medication and allergy review by Ijeoma Martinez CMA.        [facility]  [address] [place] [id number]  Phone: [phone], Fax: [phone]    Outpatient Visit Note:    Patient: Angeli Galindo  MRN: [id number]  : 1960  KATELIN: 2022    Reason for Consultation:  VTE with recent trauma-related subdural    Assessment:  In summary, Angeli Galindo is a 62 year old woman with hereditary spherocytosis s/p splenectomy and history of provoked and unprovoked VTE ( and ) who presents to establish care.     1. Subdural hematoma  2.  History of VTE x2--- see below  3. Hereditary spherocytosis s/p splenectomy  4. History of Rose Mary-en-y gastric bypass  5. Potential history of protein S deficiency  6. Exophytic left kidney mass 2.2 x 2.5 cm, series 505 image 332.    Thrombosis history (from patient and chart)  First VTE and PE- ddx . Risk factor(s): 1. Back surgery   8. Second VTE- location bilateral lower extremities ddx 2015    Angeli had previously been identified as having protein S deficiency in the setting of acute thrombosis. However, she has had normal levels of free protein S antigen on more than one occasion. She does have hereditary spherocytosis-- in setting of hemolysis there can be increased [id number] protein --- which also binds to free protein S - reducing relative amounts. Therefore, I do not feel that Angeli has hereditary protein S deficiency- rather more likely has a \"situational\" deficiency.     Angeli has had one provoked thrombosis and then had bilateral age-in " determinant thrombosis found in 2015. Given these age-in determinant ones are likely unprovoked- I would endorse indefinate anticoagulation for her. Furthermore, Angeli herself is very determined to no have a further VTE event.     Angeli is s/p Rose Mary-en-Y gastric bypass. This surgery modified location(s) where DOAC are processed. Therefore, I would recommend that we check her apixaban levels to ensure she is adequately covered. Furthermore, as we discussed in clinic the In AMPLIFY EXTension, the patients randomized to apixaban 2.5 mg orally 2 times a day had the same rate of venous thrombo-embolism recurrence (1.7%) that the 5 mg BID comparison arm. In addition, they had similar risk of major bleeding (0.2%) as placebo (0.5 %). Therefore, I would recommend lowering her dose.     Last- Angeli will be going through evaluation for her left renal mass. Discussed briefly that if she has cancer- malignancy is a big  of thrombosis-- which is another reason to stay on anticoagulation.       Plan:  1. Majority of today's visit was spent counseling the patient regarding anticougulation.  2.   Orders Placed This Encounter   Procedures     Apixaban   3. Reduce apixaban to 2.5 mg PO BID  4. Recommend post-splenectomy vaccinations per PCP    The patient is given our center's contact information and is instructed to call if she should have any further questions or concerns.  Otherwise, we will plan on seeing her back Follow up with Provider - 12 months .      Shima Alvarez, nurse clinician, is assigned to provide patient care coordination and education.     Patient understands and agrees with the above plan and recommendation.    Ce Robertson MD/PhD   of Medicine  Division of Hematology, Oncology and Transplantation    ----------------------------------------------------------------------------------------------------------------------  History of Present Illness:  Angeli Melvinlowski is a 62 year old woman  "with hereditary spherocytosis s/p splenectomy and history of provoked and unprovoked VTE ( and ) who presents to establish care.     Was placed on coumadin by Dr. Greenberg at the [facility] due to her history of unprovoked thrombosis. She had a provoked thrombosis after back surgery in  and used coumadin at that time. Per patient, she had persistent leg swelling after this, and was found to have bilateral VTEs. She was placed on warfarin again and the leg swelling resolved. Her evaluation done at [facility] was negative for inherited thrombophilia- but she was noted to have episodes of low free protein S off anticoagualtion. Based on this and the unprovoked thrombosis- she was placed on warfarin.     Angeli changed to apixaban after she was having issues with warfarin after her gastric sleeve and gastric bypass surgeries. She denies having any increased bleeding symptoms.     At present she is \"not quite 100%\" back from her subdural hematoma and trauma. She is doing better with her hip fracture. She is back on Eliquis without issues. She has not noted any new leg swelling.       Past Medical History:  Past Medical History:   Diagnosis Date     Bilateral low back pain without sciatica      Chronic deep vein thrombosis (DVT) of lower extremity (H)     protein C deficiency, refuted during hospital  with normal levels     H/O splenectomy      Obesity due to excess calories     s/p gastric bypass, 320 lb to 170 lbs     Spherocytosis (H)        Past Surgical History:  Past Surgical History:   Procedure Laterality Date     BACK SURGERY      L4/5 microdiskectomy      SECTION       EXCISE LESION TRUNK N/A 2021    Procedure: EXCISE CYST ABDOMINAL WALL;  Surgeon: Junior Velasco MD;  Location: [facility] Main OR     GASTRIC BYPASS  2016    neto en y     IR REMOVAL IVC FILTER       OPEN REDUCTION INTERNAL FIXATION FEMUR MIDSHAFT Right 2022    Procedure: Right hip " caleb;  Surgeon: Darrel Nguyen MD;  Location:  OR       Medications:  Current Outpatient Medications   Medication Sig Dispense Refill     Calcium Carbonate-Vitamin D (CALCIUM-VITAMIN D) 600-125 MG-UNIT TABS Take 1 tablet by mouth every evening       cyanocobalamin (VITAMIN B-12) 100 MCG tablet Take 500 mcg by mouth every evening       DULoxetine (CYMBALTA) 60 MG capsule Take 1 capsule (60 mg) by mouth daily 90 capsule 1     gabapentin (NEURONTIN) 600 MG tablet Take 1 tablet (600 mg) by mouth in the morning and 1 tablet (600 mg) in the evening. Take 600mg in the morning and 1200mg at bedtime       HYDROcodone-acetaminophen (NORCO)  MG per tablet Take 1 tablet by mouth See Admin Instructions       Melatonin 10 MG CAPS Take 1 capsule by mouth At Bedtime       Prenatal Vit-Fe Fumarate-FA (PRENATAL VITAMINS PO) Take 2 tablets by mouth every evening       RISEdronate (ACTONEL) 150 MG tablet Take 1 tablet (150 mg) by mouth every 30 days 4 tablet 3     senna-docusate (SENOKOT-S/PERICOLACE) 8.6-50 MG tablet Take 1 tablet by mouth 2 times daily as needed for constipation 14 tablet 0     vitamin D2 (ERGOCALCIFEROL) 81976 units (1250 mcg) capsule Take 1 capsule (50,000 Units) by mouth once a week 12 capsule 0     acetaminophen (TYLENOL) 325 MG tablet Take 3 tablets (975 mg) by mouth 3 times daily (Patient not taking: Reported on 6/1/2022)          Allergies:  Allergies   Allergen Reactions     Shellfish-Derived Products Angioedema     Lisinopril Cough     Immunizations  Immunization History   Administered Date(s) Administered     COVID-19,PF,Pfizer (12+ Yrs) 01/22/2021, 02/12/2021     Influenza Vaccine IM > 6 months Valent IIV4 (Alfuria,Fluzone) 10/15/2019, 09/20/2021     Mantoux Tuberculin Skin Test 04/23/2019       ROS:  Remainder of a comprehensive 14 point ROS is negative unless noted above.    Social History:  Previously worked at [employer]---   Denies any tobacco use. No significant alcohol  "use. Denies any illicit drug use.     Family History:  1 daughter- lives in [place]- hereditary spherocytosis, hx parvovirus, splenectomy  1 daughter- does not have hereditary spherocytosis    3 adopted children-     4 siblings- several with spherocytosis    Father- - Hodgkin lymphoma  Mother- - seizures NOS    Objectives:  Vitals: BP (!) 167/94   Pulse 74   Temp 98.5  F (36.9  C) (Oral)   Resp 16   Ht 1.702 m (5' 7\")   Wt 74.4 kg (164 lb)   SpO2 95%   BMI 25.69 kg/m    Exam:   Constitutional: Appears well, no distress  HEENT: Pupils equal and reactive to light. No scleral icterus or hemorrhage. Nares without evidence of telangiectasia. Mucous membranes moist with no wet purpura. Dentition overall ok with no signs of decay. No pharyngeal exudates. No lymphadenopathy, no thyromeagaly  CV: regular rate and rhythm, no murmurs  Respiratory: clear  GI: abdomen soft, nontender, without guarding or rebound. No hepatomeagaly. No splenomegaly. Mus/Skele: no edema  Skin: no petechiae, no ecchymosis.  Neuro: CN II-XII intact. Normal gait. AOx3  Heme/Lymph: no supraclavicular, axillary or umbilical adenopathy.     Labs:  WBC Count   Date Value Ref Range Status   2022 8.0 4.0 - 11.0 10e3/uL Final   04/10/2022 12.7 (H) 4.0 - 11.0 10e3/uL Final   2022 6.6 4.0 - 11.0 10e3/uL Final   2022 11.4 (H) 4.0 - 11.0 10e3/uL Final     Hemoglobin   Date Value Ref Range Status   2022 12.0 11.7 - 15.7 g/dL Final   04/10/2022 12.1 11.7 - 15.7 g/dL Final   2022 12.5 11.7 - 15.7 g/dL Final   2022 13.5 11.7 - 15.7 g/dL Final     Hematocrit   Date Value Ref Range Status   2022 37.6 35.0 - 47.0 % Final   04/10/2022 36.7 35.0 - 47.0 % Final   2022 37.8 35.0 - 47.0 % Final   2022 39.9 35.0 - 47.0 % Final     Platelet Count   Date Value Ref Range Status   2022 280 150 - 450 10e3/uL Final   04/10/2022 271 150 - 450 10e3/uL Final   2022 289 150 - 450 10e3/uL Final "   04/08/2022 301 150 - 450 10e3/uL Final     Protein S Antigen Free   Date Value Ref Range Status   04/09/2022 133 (H) 55 - 125 % Final     Comment:     The presence of rheumatoid factor may cause a false   elevation of Protein S level in this assay.         Imaging:  CT Head 4/11/22  CT HEAD W/O CONTRAST 4/25/2022 8:44 AM     History: Subdural hematoma (H)      Comparison: Head CT 4/10/2022, 4/8/2022     Technique: Using multidetector thin collimation helical acquisition  technique, axial, coronal and sagittal CT images from the skull base  to the vertex were obtained without intravenous contrast.     Findings: Resolution of previous thin subdural hemorrhage along the  posterior right tentorial leaflet. No intracranial hemorrhage, mass  effect, or midline shift. Gray-white matter differentiation in both  cerebral hemispheres is preserved. Ventricles are proportionate to the  cerebral sulci. The basal cisterns are clear. Unchanged patchy  hypoattenuation of periventricular and subcortical white matter,  consistent with chronic small vessel ischemic disease. Stable looking  old small lacunar infarct in the left inferior cerebellum.     The bony calvaria and the bones of the skull base are normal.  The  visualized portions of the paranasal sinuses and mastoid air cells are  clear.                                                                      Impression:     1. Resolution of previous thin subdural hemorrhage along the right  tentorial leaflet.  2. Chronic small vessel ischemic disease and old small lacunar infarct  in the inferior left cerebellum, unchanged.     I have personally reviewed the examination and initial interpretation  and I agree with the findings.     CAS ANGELES MD     XR Pelvis w Hip RT 1 View  Narrative: EXAM: XR PELVIS AND HIP RIGHT 1 VIEW  5/24/2022 1:17 PM      HISTORY: Status post surgery    COMPARISON: 4/8/2022    FINDINGS: AP pelvis and frog-leg lateral right hip views  were  obtained.    3 internal screws through the right femoral neck. No evidence of  hardware complication. Healing minimally impacted subcapital proximal  right femoral fracture. Alignment unchanged. Mild degenerative changes  of the hips. Lumbar spondylosis. Soft tissues unremarkable.   Impression: IMPRESSION: Healing right hip fracture status post ORIF. Alignment  unchanged.    AGUSTIN HARTMAN MD (Joe)         SYSTEM ID:  [id number]      11/11/2015  Impression:         Right:  Partially-occlusive chronic deep vein thrombosis of the right popliteal vein             by duplex ultrasound scan.             Valvular incompetence of the right lower extremity.             No evidence of acute venous thrombosis of the right lower extremity assessed.             Small caliber of one of two gastrocnemius vein; can't exclude chronic venous             changes.     Left:   Partially-occlusive chronic deep vein thrombosis of the left soleal vein by             duplex ultrasound scan.             No evidence of acute venous thrombosis of the left lower extremity assessed             veins.     11/09/2017  Impression:       Right:  Partially-occlusive chronic deep vein thrombosis of the right popliteal vein            by duplex ultrasound scan.           Valvular incompetence of the right posterior tibial vein.           No evidence of acute deep vein thrombosis of the right lower extremity.   Left:   Partially-occlusive chronic deep vein thrombosis of the left soleal vein by            duplex ultrasound scan.           No evidence of acute deep vein thrombosis of the left lower extremity.    Comparison:    Compared to the previous study done on 11/11/2015, there has been no                 significant change bilaterally.            Again, thank you for allowing me to participate in the care of your patient.      Sincerely,    Ce Robertson MD

## 2022-06-01 NOTE — PROGRESS NOTES
[facility]rology [facility]cology [facility]   Renal mass             Chief Complaint:   Left renal mass            Consult or Referral:     Angeli Galindo is a 62 year old female seen in consultation.         History of Present Illness:    Angeli Galindo is a 62 year old female with a past medical history of Protein S deficiency (on Eliquis 5 mg), DVT, PE, spherocytosis, gastric bypass, hypertension, idiopathic neuropathy, and osteopenia who presented to [facility] after multiple falls with multiple injuries including subdural hematoma and left orbital blowout fracture with inferior wall displacement managed conservatively and femoral neck fracture status post right proximal femur percutaneous pinning on 2022. She was found on imaging to have a large distended bladder, bilateral hydro, and an incidental left lower pole renal mass. Hydronephrosis resolved after Sifuentes catheter placement and she was discharged from the hospital on 2022 and was referred to us for further evaluation of the renal mass.      She has recovered well from her right hip pinning. She is no longer on Lovenox. She is fully functional now with no other fall episodes. She was laid off and is currently on [employer] insurance.       There is not a family history of renal cell cancer.  The patient has a history of smoking and currently is not smoking.    ECOG Performance Score: 0 - Independent           Past Medical History:     Past Medical History:   Diagnosis Date     Bilateral low back pain without sciatica      Chronic deep vein thrombosis (DVT) of lower extremity (H)     protein C deficiency, refuted during hospital  with normal levels     H/O splenectomy      Obesity due to excess calories     s/p gastric bypass, 320 lb to 170 lbs     Spherocytosis (H)             Past Surgical History:     Past Surgical History:   Procedure Laterality Date     BACK SURGERY      L4/5 microdiskectomy      SECTION       CHOLECYSTECTOMY N/A      EXCISE  LESION TRUNK N/A 2021    Procedure: EXCISE CYST ABDOMINAL WALL;  Surgeon: Junior Velasco MD;  Location: [facility]     GASTRIC BYPASS  2016    neto en y     IR REMOVAL IVC FILTER       OPEN REDUCTION INTERNAL FIXATION FEMUR MIDSHAFT Right 2022    Procedure: Right hip pinning;  Surgeon: Darrel Nguyen MD;  Location: [facility]            Problem List:     Patient Active Problem List    Diagnosis Date Noted     Subdural hematoma (H) 2022     Priority: Medium     Closed blow-out fracture of left orbital floor (H) 2022     Priority: Medium            Medications     Current Outpatient Medications   Medication     apixaban ANTICOAGULANT (ELIQUIS) 2.5 MG tablet     Calcium Carbonate-Vitamin D (CALCIUM-VITAMIN D) 600-125 MG-UNIT TABS     DULoxetine (CYMBALTA) 60 MG capsule     gabapentin (NEURONTIN) 600 MG tablet     HYDROcodone-acetaminophen (NORCO)  MG per tablet     Melatonin 10 MG CAPS     Prenatal Vit-Fe Fumarate-FA (PRENATAL VITAMINS PO)     RISEdronate (ACTONEL) 150 MG tablet     senna-docusate (SENOKOT-S/PERICOLACE) 8.6-50 MG tablet     vitamin D2 (ERGOCALCIFEROL) 22500 units (1250 mcg) capsule     No current facility-administered medications for this visit.            Family History:     Family History   Problem Relation Age of Onset     Parkinsonism Mother      Diabetes Type 2  Father             Social History:     Social History     Socioeconomic History     Marital status:      Spouse name: Not on file     Number of children: Not on file     Years of education: Not on file     Highest education level: Not on file   Occupational History     Not on file   Tobacco Use     Smoking status: Former Smoker     Years: 5.00     Quit date: 2000     Years since quittin.4     Smokeless tobacco: Never Used   Vaping Use     Vaping Use: Never used   Substance and Sexual Activity     Alcohol use: Yes     Comment: Rare     Drug use: Never     Sexual activity: Not on  file   Other Topics Concern     Parent/sibling w/ CABG, MI or angioplasty before 65F 55M? Not Asked   Social History Narrative    2 children, daughter is 32 and granddaughter     Daughter is 31 yo lives in [place]    Adopted 3 special needs adults grown    [facility]     Social Determinants of Health     Financial Resource Strain: Not on file   Food Insecurity: Not on file   Transportation Needs: Not on file   Physical Activity: Not on file   Stress: Not on file   Social Connections: Not on file   Intimate Partner Violence: Not on file   Housing Stability: Not on file            Allergies:   Shellfish-derived products and Lisinopril         Review of Systems:  From intake questionnaire     Negative 14 system review except as noted on HPI, nurse's note see below.         Physical Exam:     Patient is a 62 year old  female There is no height or weight on file to calculate BMI.  Constitutional: Vitals: Blood pressure (!) 150/84, pulse 68.  General Appearance Adult: Alert, no acute distress, oriented  HENT: throat/mouth:normal, good dentition  Neck: No adenopathy,masses or thyromegaly  Lungs/Repiratory: no respiratory distress, or pursed lip breathing  Heart: No obvious jugular venous distension present, normal heart rate  Abdomen: soft, nontender, no organomegaly or masses, large LUQ incision from prior splenectomy   Hematologic/Lymphatics: No cervical or supraclavicular adenopathy  Musculoskeltal: extremities normal, no peripheral edema  Skin: no noted suspicious lesions or rashes  Neuro: Alert, oriented, speech and mentation normal  Psych: affect and mood normal  Gait: Normal without assistance  : deferred          Labs and Pathology:    I personally reviewed all applicable laboratory and pathology data reviewed with patient  Significant for normal creatinine     Lab Results   Component Value Date    WBC 8.0 04/11/2022     Lab Results   Component Value Date    RBC 3.78 04/11/2022     Lab Results    Component Value Date    HGB 12.0 04/11/2022     Lab Results   Component Value Date    HCT 37.6 04/11/2022     No components found for: MCT  Lab Results   Component Value Date     04/11/2022     Lab Results   Component Value Date    MCH 31.7 04/11/2022     Lab Results   Component Value Date    MCHC 31.9 04/11/2022     Lab Results   Component Value Date    RDW 12.8 04/11/2022     Lab Results   Component Value Date     04/11/2022       Last Comprehensive Metabolic Panel:  Sodium   Date Value Ref Range Status   04/11/2022 143 133 - 144 mmol/L Final     Potassium   Date Value Ref Range Status   04/11/2022 4.1 3.4 - 5.3 mmol/L Final     Chloride   Date Value Ref Range Status   04/11/2022 112 (H) 94 - 109 mmol/L Final     Carbon Dioxide (CO2)   Date Value Ref Range Status   04/11/2022 25 20 - 32 mmol/L Final     Anion Gap   Date Value Ref Range Status   04/11/2022 6 3 - 14 mmol/L Final     Glucose   Date Value Ref Range Status   04/11/2022 91 70 - 99 mg/dL Final     Urea Nitrogen   Date Value Ref Range Status   04/11/2022 9 7 - 30 mg/dL Final     Creatinine   Date Value Ref Range Status   04/11/2022 0.42 (L) 0.52 - 1.04 mg/dL Final     GFR Estimate   Date Value Ref Range Status   04/11/2022 >90 >60 mL/min/1.73m2 Final     Comment:     Effective December 21, 2021 eGFRcr in adults is calculated using the 2021 CKD-EPI creatinine equation which includes age and gender (Jacey et al., NEJ, DOI: 10.1056/ATKRuf3581199)     GFR, ESTIMATED POCT   Date Value Ref Range Status   04/08/2022 >60 >60 mL/min/1.73m2 Final     Calcium   Date Value Ref Range Status   04/11/2022 8.0 (L) 8.5 - 10.1 mg/dL Final     Bilirubin Total   Date Value Ref Range Status   04/08/2022 0.8 0.2 - 1.3 mg/dL Final     Alkaline Phosphatase   Date Value Ref Range Status   04/08/2022 121 40 - 150 U/L Final     ALT   Date Value Ref Range Status   04/08/2022 42 0 - 50 U/L Final     AST   Date Value Ref Range Status   04/08/2022 52 (H) 0 - 45 U/L  Final         INR   Date Value Ref Range Status   04/08/2022 1.00 0.85 - 1.15 Final     INR POCT   Date Value Ref Range Status   04/08/2022 0.9 (L) 2.0 - 3.0 Final          Imaging:    I personally viewed all applicable imaging and interpreted them and went over the results with the patient.  Mass/lesion details are as follows    The mass/lesion is located in the Left side and is primarily located in the lower pole.  The tumor is also primarily exophytic.  The mass/lesion primarily lies on the lateral surface. With regard to the collecting system, the edge of the tumor arrives more than 7 mm from the collecting system.    EXAMINATION: CT ABDOMEN WO & W & PELVIS W CONTRAST,  4/30/2022 10:11 AM     TECHNIQUE:  Helical CT images from the lung bases through the  symphysis pubis were obtained with IV contrast. Contrast dose: Isovue  370 99cc     COMPARISON: Ultrasound 4/11/2022; CT exam 482     HISTORY: Kidney cancer, initial workup; renal mass on CT in April,  further evaluation, do in July between 7/1-7/19; Renal lesion     FINDINGS:     Abdomen and pelvis: No adrenal gland nodules. 2 mm nonobstructive  stone in the inferior pole of the left kidney. No right renal stones.  No bilateral hydronephrosis. Multiple parapelvic renal cysts  bilaterally. No filling defects within the opacified portions of  bilateral pyelocalyceal system and ureters. Partially distended  urinary bladder. Pelvic phleboliths. Surgically absent uterus. No  pelvic or adnexal masses.     No arterially enhancing liver lesions. Lesion in the inferior pole of  the left kidney may, exophytic, measuring 2.2 x 2.2 x 2.2 cm, series 9  image 161 and series 10 image 55. No additional suspicious renal  lesions. No vascular involvement.     Remaining of the abdomen and pelvis: No suspicious liver lesions.  Patent liver vasculature. Moderate-to-marked intrahepatic biliary tree  dilation. Marked dilated common bile duct measuring up to 20 mm with  smooth  tapering up to the level of the distal fibula. No evidence for  choledocholithiasis or obstructive masses. Findings could be related  to reservoir effect postcholecystectomy. Surgical clips in the  gallbladder fossa and at the casie hepatis. Atrophic pancreas. Main  pancreatic duct is not dilated. The spleen is not identified in the  present study, presumably surgically absent.     Postoperative changes of gastric bypass. No dilated loops of bowel. No  bowel wall thickening. Moderate to large colonic stool burden. Colonic  diverticulosis. No associated CT findings of acute diverticulitis. The  appendix is not confidently identified in the present study. No free  fluid. No free air.     No inguinal lymphadenopathy. No suspicious pelvic lymph nodes. No  abdominal aortic aneurysm. Atherosclerotic calcifications of the  abdominal aorta and its major branches. Subcentimeter retroperitoneal  and mesenteric lymph nodes are not enlarged by size criteria.     Lung bases: Cardiac size within normal limits. No pericardial or  pleural effusions. Bilateral lower lobes atelectatic changes as well  as in the lingula greater than middle lobe.     Bones: Degenerative changes of the spine, bilateral SI joints and  hips. Enthesopathic changes of the pelvis and hips. Mild diffuse bone  demineralization. Postoperative changes of ORIF in the right femur,  partially visualized. There is adjacent heterotopic bone formation.  Additional scattered sclerotic foci are too small to characterize,  presumably benign bone islands. Grade 1 anterolisthesis of L4 over L5.  Mild disc height narrowing at L5 over S1. Partially visualized flowing  osteophytes in the lower thoracic spine. Mild lumbar curvature with  convexity to the right. No convincing aggressive bone lesions.                                                                      IMPRESSION:  1. Overall stable exophytic enhancing solid lesion in the inferior  pole of the left kidney,  remain highly concerning for renal cell  carcinoma until proven otherwise.  2. No significant interval change in intra and extrahepatic biliary  tree dilation as described above, likely due to reservoir effect from  prior cholecystectomy.  3. Colonic diverticulosis. Additional incidental findings as described  above.     RUBY ROTH MD       Outside and Past Medical records:    I spent 20 minutes reviewing outside and past medical records including neurosurgery, internal medicine and orthopedics          Assessment and Plan:     Assessment:  62 year old female with several comorbidities with a 2 cm left renal mass    It was my pleasure to see this patient today for discussion of a 2 cm left sided renal mass. I reviewed the available laboratory work and cross-sectional imaging with the patient.     We discussed that solid mass in the kidney have a high likelihood of being malignant, in the realm of 75-80%. There are benign entities such as oncocytomas and angiomyolipomas. Renal mass typically grow approximately 2-3 mm per year. Growth rate most associated with an aggressive tumor is greater than 7-8 mm per year. I explained that the majority of those that are renal cell carcinoma would be non-aggressive varieties of renal cell carcinoma. There is a <1% chance of metastasis for renal masses under 4 cm.     In general, treatment methods of renal mass include active surveillance, percutaneous ablation, partial nephrectomy, and radical nephrectomy.   We also discussed indications for percutaneous biopsy with biopsy-directed therapy based on the results of the biopsy versus percutaneous ablation in the absence of biopsy results versus partial nephrectomy. We discussed circumstances where biopsy might change the plan for care and situations where a patient potentially interested in surveillance might find the information helpful or not helpful.  I explained why radical nephrectomy would be a reasonable choice for a  tumor like this. We discussed the reasons why things like systemic therapy or radiotherapy would not be treatment options for this renal mass.     I explained to patient that on active surveillance, a lesion this small would be expected to remain stable and not grow over time in approximately one-third of patients. I explained that for a lesion this small, lack of growth means that there would be a very low risk of progression of disease, and a low risk of adverse outcomes. We discussed the fact that for lesions that do grow, the average rate of growth is 2 to 3 mm per year, and we discussed the fact that lesions that have the potential to metastasize in the seem to grow much faster at approximately 8 mm per year or more. I explained that a period of surveillance followed by active therapy based on finding growth appears to result in treatment being just as efficacious than as it would be if it had been offered in the original setting.     We discussed the advantages and disadvantages of proceeding directly to treatment and reviewed the options of percutaneous ablation versus partial nephrectomy. I explained the expected rates of cure, which should be very high, and the possibility of local recurrence or systemic spread, which should be very low. We discussed the relative efficacy as well as the degree of renal preservation expected. I reviewed complications for each and the advantages and disadvantages of each approach. Ablation has an overall 6% complication rate including bleeding, hematoma, discomfort, urinary leak, urinary tract infection, stricture, damage to surrounding structures. Partial nephrectomy could be an option however does have a higher complication rate.    After a lengthy discussion regarding the efficacy of active treatment, the data surrounding active surveillance, and the pros and cons of biopsy versus proceeding with one of the above without biopsy, the patient is most interested in proceeding  with active surveillance for now and would consider surgery vs ablation down the road once she has another job and has a more affordable insurance in place.         Plan:  Follow up in 4 months with MR renal mass protocol     60 total minutes spent on the date of the encounter including direct interaction with the patient, performing chart review, documentation and further activities as noted above      Johnny Renee MD   Department of Urology   [facility]

## 2022-06-01 NOTE — PATIENT INSTRUCTIONS
[facility]  [facility]  [address] [place] [address] [address], [place] [address]  Main: [phone], Fax: [phone]    It was a pleasure seeing you today.  Thank you for allowing us to be involved in your care.  Please let us know if there is anything else we can do for you, so that we can be sure you are leaving completely satisfied with your care experience.    Labs Friday.  Our lab is down the hallway in our clinic space.  We will communicate these to you by [employer]. With your permission we may leave a detailed voicemail, please see below for our contact information should you have any questions about your results. Also, please note that some lab results may take a week or so to get back. Feel free to call or message if you have not heard back from us within 7-10 days.    For your upcoming procedure/surgery, please hold Eliquis 24-48 h prior to our procedure and restart it 12-24 hours after with surgeon approval.    Please stay on your blood thinner:  Eliquis 2.5 mg twice a day  Please call us with any bleeding issues that you may have.    Patient Education & Resources:  For additional information, please see the following web links:  www.stoptheclot.org, www.clotconnect.org.    Call the [facility]  at [phone].     -If surgeries or procedures are planned (for holding instructions).     -If off anticoagulation, please call during high risk times (long-distance travel, broken bones or trauma, immobilization, surgery, pregnancy, or taking estrogen).     -Any new symptoms of DVT (deep vein thrombosis) or PE (pulmonary embolism)    -pain     -swelling     -redness    -warmth    -shortness of breath    -chest pain    -coughing up blood    We would like a provider on our team to see you at least annually for optimal care and to allow us to continue to prescribe for you.  Our physician assistants that are specialized in bleeding  and clotting disorders that you may be able to see more readily.    Return to clinic in  1 year.  Please schedule return appointment with Dr. Robertson.    Your nurse clinician is Shima Alvarez -[phone].   If they are unavailable and you have immediate concerns, please call [phone] and ask for a nurse.              .

## 2022-06-01 NOTE — PROGRESS NOTES
"    [facility]  [facility] [place] [address]  Phone: [phone], Fax: [phone]    Outpatient Visit Note:    Patient: Angeli Galindo  MRN: [id number]  : 1960  KATELIN: 2022    Reason for Consultation:  VTE with recent trauma-related subdural    Assessment:  In summary, Angeli Galindo is a 62 year old woman with hereditary spherocytosis s/p splenectomy and history of provoked and unprovoked VTE ( and ) who presents to establish care.     1. Subdural hematoma  2.  History of VTE x2--- see below  3. Hereditary spherocytosis s/p splenectomy  4. History of Rose Mary-en-y gastric bypass  5. Potential history of protein S deficiency  6. Exophytic left kidney mass 2.2 x 2.5 cm, series 505 image 332.    Thrombosis history (from patient and chart)  First VTE and PE- ddx . Risk factor(s): 1. Back surgery   8. Second VTE- location bilateral lower extremities ddx 2015    Angeli had previously been identified as having protein S deficiency in the setting of acute thrombosis. However, she has had normal levels of free protein S antigen on more than one occasion. She does have hereditary spherocytosis-- in setting of hemolysis there can be increased [id number] protein --- which also binds to free protein S - reducing relative amounts. Therefore, I do not feel that Angeli has hereditary protein S deficiency- rather more likely has a \"situational\" deficiency.     Angeli has had one provoked thrombosis and then had bilateral age-in determinant thrombosis found in . Given these age-in determinant ones are likely unprovoked- I would endorse indefinate anticoagulation for her. Furthermore, Angeli herself is very determined to no have a further VTE event.     Angeli is s/p Rose Mary-en-Y gastric bypass. This surgery modified location(s) where DOAC are processed. Therefore, I would recommend that we check her apixaban levels to ensure she is adequately covered. " Furthermore, as we discussed in clinic the In AMPLIFY EXTension, the patients randomized to apixaban 2.5 mg orally 2 times a day had the same rate of venous thrombo-embolism recurrence (1.7%) that the 5 mg BID comparison arm. In addition, they had similar risk of major bleeding (0.2%) as placebo (0.5 %). Therefore, I would recommend lowering her dose.     Last- Angeli will be going through evaluation for her left renal mass. Discussed briefly that if she has cancer- malignancy is a big  of thrombosis-- which is another reason to stay on anticoagulation.       Plan:  1. Majority of today's visit was spent counseling the patient regarding anticougulation.  2.   Orders Placed This Encounter   Procedures     Apixaban   3. Reduce apixaban to 2.5 mg PO BID  4. Recommend post-splenectomy vaccinations per PCP    The patient is given our center's contact information and is instructed to call if she should have any further questions or concerns.  Otherwise, we will plan on seeing her back Follow up with Provider - 12 months .      Shima Alvarez, nurse clinician, is assigned to provide patient care coordination and education.     Patient understands and agrees with the above plan and recommendation.    Ce Robertson MD/PhD   of Medicine  Division of Hematology, Oncology and Transplantation    ----------------------------------------------------------------------------------------------------------------------  History of Present Illness:  Angeli Galindo is a 62 year old woman with hereditary spherocytosis s/p splenectomy and history of provoked and unprovoked VTE (2010 and 2015) who presents to establish care.     Was placed on coumadin by Dr. Greenberg at the [facility] due to her history of unprovoked thrombosis. She had a provoked thrombosis after back surgery in 2010 and used coumadin at that time. Per patient, she had persistent leg swelling after this, and was found to have bilateral  "VTEs. She was placed on warfarin again and the leg swelling resolved. Her evaluation done at [facility] was negative for inherited thrombophilia- but she was noted to have episodes of low free protein S off anticoagualtion. Based on this and the unprovoked thrombosis- she was placed on warfarin.     Angeli changed to apixaban after she was having issues with warfarin after her gastric sleeve and gastric bypass surgeries. She denies having any increased bleeding symptoms.     At present she is \"not quite 100%\" back from her subdural hematoma and trauma. She is doing better with her hip fracture. She is back on Eliquis without issues. She has not noted any new leg swelling.       Past Medical History:  Past Medical History:   Diagnosis Date     Bilateral low back pain without sciatica      Chronic deep vein thrombosis (DVT) of lower extremity (H)     protein C deficiency, refuted during hospital  with normal levels     H/O splenectomy      Obesity due to excess calories     s/p gastric bypass, 320 lb to 170 lbs     Spherocytosis (H)        Past Surgical History:  Past Surgical History:   Procedure Laterality Date     BACK SURGERY      L4/5 microdiskectomy      SECTION       EXCISE LESION TRUNK N/A 2021    Procedure: EXCISE CYST ABDOMINAL WALL;  Surgeon: Junior Velasco MD;  Location: [facility] Main OR     GASTRIC BYPASS  2016    neto en y     IR REMOVAL IVC FILTER       OPEN REDUCTION INTERNAL FIXATION FEMUR MIDSHAFT Right 2022    Procedure: Right hip pinning;  Surgeon: Darrel Nguyen MD;  Location:  OR       Medications:  Current Outpatient Medications   Medication Sig Dispense Refill     Calcium Carbonate-Vitamin D (CALCIUM-VITAMIN D) 600-125 MG-UNIT TABS Take 1 tablet by mouth every evening       cyanocobalamin (VITAMIN B-12) 100 MCG tablet Take 500 mcg by mouth every evening       DULoxetine (CYMBALTA) 60 MG capsule Take 1 capsule (60 mg) by mouth daily 90 capsule " 1     gabapentin (NEURONTIN) 600 MG tablet Take 1 tablet (600 mg) by mouth in the morning and 1 tablet (600 mg) in the evening. Take 600mg in the morning and 1200mg at bedtime       HYDROcodone-acetaminophen (NORCO)  MG per tablet Take 1 tablet by mouth See Admin Instructions       Melatonin 10 MG CAPS Take 1 capsule by mouth At Bedtime       Prenatal Vit-Fe Fumarate-FA (PRENATAL VITAMINS PO) Take 2 tablets by mouth every evening       RISEdronate (ACTONEL) 150 MG tablet Take 1 tablet (150 mg) by mouth every 30 days 4 tablet 3     senna-docusate (SENOKOT-S/PERICOLACE) 8.6-50 MG tablet Take 1 tablet by mouth 2 times daily as needed for constipation 14 tablet 0     vitamin D2 (ERGOCALCIFEROL) 09364 units (1250 mcg) capsule Take 1 capsule (50,000 Units) by mouth once a week 12 capsule 0     acetaminophen (TYLENOL) 325 MG tablet Take 3 tablets (975 mg) by mouth 3 times daily (Patient not taking: Reported on 2022)          Allergies:  Allergies   Allergen Reactions     Shellfish-Derived Products Angioedema     Lisinopril Cough     Immunizations  Immunization History   Administered Date(s) Administered     COVID-19,PF,Pfizer (12+ Yrs) 2021, 2021     Influenza Vaccine IM > 6 months Valent IIV4 (Alfuria,Fluzone) 10/15/2019, 2021     Mantoux Tuberculin Skin Test 2019       ROS:  Remainder of a comprehensive 14 point ROS is negative unless noted above.    Social History:  Previously worked at [employer]---   Denies any tobacco use. No significant alcohol use. Denies any illicit drug use.     Family History:  1 daughter- lives in [place]- hereditary spherocytosis, hx parvovirus, splenectomy  1 daughter- does not have hereditary spherocytosis    3 adopted children-     4 siblings- several with spherocytosis    Father- - Hodgkin lymphoma  Mother- - seizures NOS    Objectives:  Vitals: BP (!) 167/94   Pulse 74   Temp 98.5  F (36.9  C) (Oral)   Resp 16   Ht 1.702 m (5'  "7\")   Wt 74.4 kg (164 lb)   SpO2 95%   BMI 25.69 kg/m    Exam:   Constitutional: Appears well, no distress  HEENT: Pupils equal and reactive to light. No scleral icterus or hemorrhage. Nares without evidence of telangiectasia. Mucous membranes moist with no wet purpura. Dentition overall ok with no signs of decay. No pharyngeal exudates. No lymphadenopathy, no thyromeagaly  CV: regular rate and rhythm, no murmurs  Respiratory: clear  GI: abdomen soft, nontender, without guarding or rebound. No hepatomeagaly. No splenomegaly. Mus/Skele: no edema  Skin: no petechiae, no ecchymosis.  Neuro: CN II-XII intact. Normal gait. AOx3  Heme/Lymph: no supraclavicular, axillary or umbilical adenopathy.     Labs:  WBC Count   Date Value Ref Range Status   04/11/2022 8.0 4.0 - 11.0 10e3/uL Final   04/10/2022 12.7 (H) 4.0 - 11.0 10e3/uL Final   04/09/2022 6.6 4.0 - 11.0 10e3/uL Final   04/08/2022 11.4 (H) 4.0 - 11.0 10e3/uL Final     Hemoglobin   Date Value Ref Range Status   04/11/2022 12.0 11.7 - 15.7 g/dL Final   04/10/2022 12.1 11.7 - 15.7 g/dL Final   04/09/2022 12.5 11.7 - 15.7 g/dL Final   04/08/2022 13.5 11.7 - 15.7 g/dL Final     Hematocrit   Date Value Ref Range Status   04/11/2022 37.6 35.0 - 47.0 % Final   04/10/2022 36.7 35.0 - 47.0 % Final   04/09/2022 37.8 35.0 - 47.0 % Final   04/08/2022 39.9 35.0 - 47.0 % Final     Platelet Count   Date Value Ref Range Status   04/11/2022 280 150 - 450 10e3/uL Final   04/10/2022 271 150 - 450 10e3/uL Final   04/09/2022 289 150 - 450 10e3/uL Final   04/08/2022 301 150 - 450 10e3/uL Final     Protein S Antigen Free   Date Value Ref Range Status   04/09/2022 133 (H) 55 - 125 % Final     Comment:     The presence of rheumatoid factor may cause a false   elevation of Protein S level in this assay.         Imaging:  CT Head 4/11/22  CT HEAD W/O CONTRAST 4/25/2022 8:44 AM     History: Subdural hematoma (H)      Comparison: Head CT 4/10/2022, 4/8/2022     Technique: Using multidetector " thin collimation helical acquisition  technique, axial, coronal and sagittal CT images from the skull base  to the vertex were obtained without intravenous contrast.     Findings: Resolution of previous thin subdural hemorrhage along the  posterior right tentorial leaflet. No intracranial hemorrhage, mass  effect, or midline shift. Gray-white matter differentiation in both  cerebral hemispheres is preserved. Ventricles are proportionate to the  cerebral sulci. The basal cisterns are clear. Unchanged patchy  hypoattenuation of periventricular and subcortical white matter,  consistent with chronic small vessel ischemic disease. Stable looking  old small lacunar infarct in the left inferior cerebellum.     The bony calvaria and the bones of the skull base are normal.  The  visualized portions of the paranasal sinuses and mastoid air cells are  clear.                                                                      Impression:     1. Resolution of previous thin subdural hemorrhage along the right  tentorial leaflet.  2. Chronic small vessel ischemic disease and old small lacunar infarct  in the inferior left cerebellum, unchanged.     I have personally reviewed the examination and initial interpretation  and I agree with the findings.     CAS ANGELES MD     XR Pelvis w Hip RT 1 View  Narrative: EXAM: XR PELVIS AND HIP RIGHT 1 VIEW  5/24/2022 1:17 PM      HISTORY: Status post surgery    COMPARISON: 4/8/2022    FINDINGS: AP pelvis and frog-leg lateral right hip views were  obtained.    3 internal screws through the right femoral neck. No evidence of  hardware complication. Healing minimally impacted subcapital proximal  right femoral fracture. Alignment unchanged. Mild degenerative changes  of the hips. Lumbar spondylosis. Soft tissues unremarkable.   Impression: IMPRESSION: Healing right hip fracture status post ORIF. Alignment  unchanged.    AGUSTIN HARTMAN MD (Joe)         SYSTEM ID:   [id number]      11/11/2015  Impression:         Right:  Partially-occlusive chronic deep vein thrombosis of the right popliteal vein             by duplex ultrasound scan.             Valvular incompetence of the right lower extremity.             No evidence of acute venous thrombosis of the right lower extremity assessed.             Small caliber of one of two gastrocnemius vein; can't exclude chronic venous             changes.     Left:   Partially-occlusive chronic deep vein thrombosis of the left soleal vein by             duplex ultrasound scan.             No evidence of acute venous thrombosis of the left lower extremity assessed             veins.     11/09/2017  Impression:       Right:  Partially-occlusive chronic deep vein thrombosis of the right popliteal vein            by duplex ultrasound scan.           Valvular incompetence of the right posterior tibial vein.           No evidence of acute deep vein thrombosis of the right lower extremity.   Left:   Partially-occlusive chronic deep vein thrombosis of the left soleal vein by            duplex ultrasound scan.           No evidence of acute deep vein thrombosis of the left lower extremity.    Comparison:    Compared to the previous study done on 11/11/2015, there has been no                 significant change bilaterally.

## 2022-06-02 PROBLEM — S02.32XA: Status: RESOLVED | Noted: 2022-04-08 | Resolved: 2022-06-02

## 2022-06-03 ENCOUNTER — LAB (OUTPATIENT)
Dept: LAB | Facility: HOSPITAL | Age: 62
End: 2022-06-03

## 2022-06-03 DIAGNOSIS — Z86.718 HISTORY OF VENOUS THROMBOEMBOLISM: ICD-10-CM

## 2022-06-03 PROCEDURE — 36415 COLL VENOUS BLD VENIPUNCTURE: CPT

## 2022-06-03 PROCEDURE — 80299 QUANTITATIVE ASSAY DRUG: CPT

## 2022-06-04 LAB — APIXABAN PPP CHRO-MCNC: 65 NG/ML

## 2022-06-06 ENCOUNTER — TELEPHONE (OUTPATIENT)
Dept: INTERNAL MEDICINE | Facility: CLINIC | Age: 62
End: 2022-06-06

## 2022-06-06 NOTE — TELEPHONE ENCOUNTER
[facility] Call Center    Phone Message    May a detailed message be left on voicemail: yes     Reason for Call: Other: Pt requesting an Order for a CT scan, pt stated she is having some dizziness and scheduled an appt for tomorrow with Cayla Rodriges but would like to have a CT scan done prior to that appt. Or if there might be another appt she get in for today an urgent appt     Action Taken: Message routed to:  [facility] ([facility]): jose juan

## 2022-06-06 NOTE — TELEPHONE ENCOUNTER
Pt will need to see provider before any imagining can be ordered. Jade Vick LPN 6/6/2022 3:33 PM

## 2022-06-07 ENCOUNTER — ANCILLARY PROCEDURE (OUTPATIENT)
Dept: CT IMAGING | Facility: CLINIC | Age: 62
End: 2022-06-07
Attending: NURSE PRACTITIONER
Payer: COMMERCIAL

## 2022-06-07 ENCOUNTER — OFFICE VISIT (OUTPATIENT)
Dept: INTERNAL MEDICINE | Facility: CLINIC | Age: 62
End: 2022-06-07

## 2022-06-07 VITALS
HEART RATE: 78 BPM | BODY MASS INDEX: 25.06 KG/M2 | WEIGHT: 160 LBS | OXYGEN SATURATION: 97 % | DIASTOLIC BLOOD PRESSURE: 89 MMHG | SYSTOLIC BLOOD PRESSURE: 139 MMHG

## 2022-06-07 DIAGNOSIS — S06.5XAA SUBDURAL HEMATOMA (H): ICD-10-CM

## 2022-06-07 DIAGNOSIS — G43.011 INTRACTABLE MIGRAINE WITHOUT AURA AND WITH STATUS MIGRAINOSUS: ICD-10-CM

## 2022-06-07 DIAGNOSIS — S06.5XAA SUBDURAL HEMATOMA (H): Primary | ICD-10-CM

## 2022-06-07 PROCEDURE — 70450 CT HEAD/BRAIN W/O DYE: CPT | Mod: GC | Performed by: RADIOLOGY

## 2022-06-07 PROCEDURE — 99215 OFFICE O/P EST HI 40 MIN: CPT | Performed by: NURSE PRACTITIONER

## 2022-06-07 RX ORDER — SUMATRIPTAN 50 MG/1
50 TABLET, FILM COATED ORAL
Qty: 12 TABLET | Refills: 0 | Status: SHIPPED | OUTPATIENT
Start: 2022-06-07 | End: 2022-06-08

## 2022-06-07 ASSESSMENT — PAIN SCALES - GENERAL: PAINLEVEL: SEVERE PAIN (6)

## 2022-06-07 NOTE — NURSING NOTE
Chief Complaint   Patient presents with     Dizziness     Pt here to discuss dizziness since Saturday night, worsens at times. Also has migraine. Had head injury in Mar/Apr       Abner Colvin CMA, EMT at 8:56 AM on 6/7/2022.

## 2022-06-07 NOTE — PROGRESS NOTES
S: Angeli Galindo is a 62 year old female here for concern about dizziness which occurred 22.  She stopped taking her Eloquis since then as she is concerned this could be a head bleed.  She incurred a head injury in 2022.    She has also experienced what she describes as a migraine headache because it does not resolve. The headache is occipital. Her dizziness is not as serious as it was on Saturday.         Patient Active Problem List   Diagnosis     Subdural hematoma (H)            Past Medical History:   Diagnosis Date     Bilateral low back pain without sciatica      Chronic deep vein thrombosis (DVT) of lower extremity (H)     protein C deficiency, refuted during hospital  with normal levels     Closed blow-out fracture of left orbital floor (H) 2022     H/O splenectomy      Obesity due to excess calories     s/p gastric bypass, 320 lb to 170 lbs     Spherocytosis (H)             Past Surgical History:   Procedure Laterality Date     BACK SURGERY      L4/5 microdiskectomy      SECTION       CHOLECYSTECTOMY N/A      EXCISE LESION TRUNK N/A 2021    Procedure: EXCISE CYST ABDOMINAL WALL;  Surgeon: Junior Velasco MD;  Location: [facility] Main OR     GASTRIC BYPASS  2016    neto en y     IR REMOVAL IVC FILTER       OPEN REDUCTION INTERNAL FIXATION FEMUR MIDSHAFT Right 2022    Procedure: Right hip pinning;  Surgeon: Darrel Nguyen MD;  Location:  OR            Social History     Tobacco Use     Smoking status: Former Smoker     Years: 5.00     Quit date: 2000     Years since quittin.4     Smokeless tobacco: Never Used   Substance Use Topics     Alcohol use: Yes     Comment: Rare            Family History   Problem Relation Age of Onset     Parkinsonism Mother      Diabetes Type 2  Father                Allergies   Allergen Reactions     Shellfish-Derived Products Angioedema     Lisinopril Cough            Current Outpatient Medications   Medication  Sig Dispense Refill     apixaban ANTICOAGULANT (ELIQUIS) 2.5 MG tablet Take 1 tablet (2.5 mg) by mouth 2 times daily 180 tablet 3     Calcium Carbonate-Vitamin D (CALCIUM-VITAMIN D) 600-125 MG-UNIT TABS Take 1 tablet by mouth every evening       DULoxetine (CYMBALTA) 60 MG capsule Take 1 capsule (60 mg) by mouth daily 90 capsule 1     gabapentin (NEURONTIN) 600 MG tablet Take 1 tablet (600 mg) by mouth in the morning and 1 tablet (600 mg) in the evening. Take 600mg in the morning and 1200mg at bedtime       Melatonin 10 MG CAPS Take 1 capsule by mouth At Bedtime       Prenatal Vit-Fe Fumarate-FA (PRENATAL VITAMINS PO) Take 2 tablets by mouth every evening       RISEdronate (ACTONEL) 150 MG tablet Take 1 tablet (150 mg) by mouth every 30 days 4 tablet 3     senna-docusate (SENOKOT-S/PERICOLACE) 8.6-50 MG tablet Take 1 tablet by mouth 2 times daily as needed for constipation 14 tablet 0     vitamin D2 (ERGOCALCIFEROL) 73427 units (1250 mcg) capsule Take 1 capsule (50,000 Units) by mouth once a week 12 capsule 0     HYDROcodone-acetaminophen (NORCO)  MG per tablet Take 1 tablet by mouth See Admin Instructions (Patient not taking: Reported on 6/7/2022)         REVIEW OF SYSTEMS:  See above.    O:   There were no vitals taken for this visit.  GENERAL APPEARANCE: healthy, alert and no distress  EYES: EOMI,  PERRL  HENT: ear canals and TM's normal and nose and mouth without ulcers or lesions  RESP: no distress  CV: regular rates and rhythm, normal S1 S2, no S3 or S4 and no murmur, click or rub   NEURO: normal.  PSYCHE: normal.     A/P:  Angeli was seen today for dizziness.    Diagnoses and all orders for this visit:    Subdural hematoma (H)  -     CT Head w/o contrast; Future    Intractable migraine without aura and with status migrainosus  -     SUMAtriptan (IMITREX) 50 MG tablet; Take 1 tablet (50 mg) by mouth at onset of headache for migraine May repeat in 2 hours. Max 4 tablets/24 hours.      6/7/22  9:48 AM  [id number] [facility] [place]        PACS Images     Show images for CT Head w/o contrast           Study Result    Narrative & Impression   CT HEAD W/O CONTRAST 6/7/2022 9:48 AM     Provided History: Cerebral hemorrhage suspected; Headache; hx of  subdural hematoma. No has dizziness and vision changes for 4 days.   Stopped Eloquis 2 days ago.;     ICD-10: Subdural hematoma (H)     Comparison: CT dated 4/25/2022 and 4/10/2022.     Technique: Using multidetector thin collimation helical acquisition  technique, axial, coronal and sagittal CT images from the skull base  to the vertex were obtained without intravenous contrast.      Findings:    No acute intracranial hemorrhage. No intracranial mass effect or  midline shift. The ventricles are proportionate to the cerebral sulci.  No hydrocephalus. There are areas of hypoattenuation in the right  frontal periventricular white matter, left centrum semiovale,  bilateral parietal periventricular white matter. These are  nonspecific, could represent moderate chronic microvascular ischemic  changes or demyelination of other etiologies. No evidence of  parenchymal or extra-axial hemorrhage. No new loss of gray-white  matter differentiation.. The basal cisterns are patent. Moderate  leukoaraiosis. Mild diffuse cerebral and cerebellar parenchymal volume  loss.     Hyperostosis frontalis interna. The visualized paranasal sinuses are  clear. The mastoid air cells are clear.                                                                      Impression:   1. No acute intracranial pathology. No evidence of intracranial  hemorrhage.  2. Nonspecific supratentorial periventricular white matter  hypoattenuation, might be due to moderate chronic microvascular  ischemic changes or demyelination of other etiologies.      I have personally reviewed the examination and initial interpretation  and I agree with the findings.     LETY QUINTERO MD          SYSTEM ID:  [id number]        She was called with results and will try Sumatriptan.     The patient voiced understanding of the information discussed and all questions were answered.     Total time spent today with this patient including chart review, exam time with patient and documentation : 42 minutes.      Cayla SINCLAIR, CNP

## 2022-06-08 RX ORDER — SUMATRIPTAN 50 MG/1
50 TABLET, FILM COATED ORAL
Qty: 12 TABLET | Refills: 0 | Status: SHIPPED | OUTPATIENT
Start: 2022-06-08 | End: 2022-07-13

## 2022-06-13 NOTE — TELEPHONE ENCOUNTER
Refill Request    Medication request: HYDROcodone-acetaminophen  MG Oral Tab Take 1 tablet by mouth every 6 (six) hours as needed for Pain. LOV: 22-televisit  Due back to clinic per last office note:  \"Follow-up:  3 months\"  NOV: 8/3/2022 Jonah Morin MD      [place]120    Urine drug screen (if applicable): none  Pain contract:  on 2020    LOV plan (if weaning or changing medications): Per  at [address] [place] [address]: \"She will continue with the [address] [place] [address] for the pain QID. \"

## 2022-06-14 RX ORDER — HYDROCODONE BITARTRATE AND ACETAMINOPHEN 10; 325 MG/1; MG/1
1 TABLET ORAL EVERY 6 HOURS PRN
Qty: 120 TABLET | Refills: 0 | Status: SHIPPED | OUTPATIENT
Start: 2022-06-18 | End: 2022-07-18

## 2022-07-01 ENCOUNTER — TELEPHONE (OUTPATIENT)
Dept: OPHTHALMOLOGY | Facility: CLINIC | Age: 62
End: 2022-07-01

## 2022-07-01 NOTE — TELEPHONE ENCOUNTER
Pt to f/u in acute clinic in next 2-4 weeks per message received by Dr. Rowe    Clinic to reach out for scheduling    Alexy Alvares RN 3:19 PM 07/01/22

## 2022-07-05 ENCOUNTER — TELEPHONE (OUTPATIENT)
Dept: OPHTHALMOLOGY | Facility: CLINIC | Age: 62
End: 2022-07-05

## 2022-07-05 NOTE — TELEPHONE ENCOUNTER
I spoke to the patient to reschedule her Optometrist appointment.  Dr. Rowe would like her to see general Ophthalmology or Acute Ophthalmology appointment.  The patient prefers to call back for appointment scheduling.

## 2022-07-11 DIAGNOSIS — Z98.84 H/O GASTRIC BYPASS: ICD-10-CM

## 2022-07-11 DIAGNOSIS — E55.9 VITAMIN D DEFICIENCY: ICD-10-CM

## 2022-07-11 RX ORDER — HYDROCODONE BITARTRATE AND ACETAMINOPHEN 10; 325 MG/1; MG/1
1 TABLET ORAL EVERY 6 HOURS PRN
Qty: 120 TABLET | Refills: 0 | Status: SHIPPED | OUTPATIENT
Start: 2022-07-11 | End: 2022-08-10

## 2022-07-11 RX ORDER — GABAPENTIN 600 MG/1
600 TABLET ORAL 4 TIMES DAILY
Qty: 120 TABLET | Refills: 3 | Status: SHIPPED | OUTPATIENT
Start: 2022-07-11

## 2022-07-11 NOTE — TELEPHONE ENCOUNTER
Refill Request    Medication request: HYDROcodone-acetaminophen  MG Oral Tab Take 1 tablet by mouth every 6 (six) hours as needed for Pain. LOV:Visit date not found   Due back to clinic per last office note:  3 months  NOV: 8/3/2022 Bo Mendez MD      [place] 22 #120    Urine drug screen (if applicable): none  Pain contract:  20    LOV plan (if weaning or changing medications): \"She will continue with the Norco for the pain QID. \"        Refill Request    Medication request: GABAPENTIN 600 MG Oral Tab  TAKE 1 TABLET BY MOUTH 4 TIMES A DAY     [place] 22 #120 3refills

## 2022-07-13 ENCOUNTER — MYC REFILL (OUTPATIENT)
Dept: INTERNAL MEDICINE | Facility: CLINIC | Age: 62
End: 2022-07-13

## 2022-07-13 DIAGNOSIS — G43.011 INTRACTABLE MIGRAINE WITHOUT AURA AND WITH STATUS MIGRAINOSUS: ICD-10-CM

## 2022-07-13 NOTE — TELEPHONE ENCOUNTER
VITAMIN D2 1.25MG(50,000 UNIT)     Last Written Prescription Date:  4/25/22  Last Fill Quantity: 12,   # refills: 0  Last Office Visit : 6/7/22  Future Office visit:  none    Routing refill request to provider for review/approval because:  Drug dosage of 50,000 units is not on the [facility], P or [facility] refill protocol . Last Vit D screening=12 on 4/8/22

## 2022-07-14 RX ORDER — SUMATRIPTAN 50 MG/1
50 TABLET, FILM COATED ORAL
Qty: 12 TABLET | Refills: 0 | Status: SHIPPED | OUTPATIENT
Start: 2022-07-14 | End: 2022-09-13

## 2022-07-14 RX ORDER — ERGOCALCIFEROL 1.25 MG/1
CAPSULE, LIQUID FILLED ORAL
Qty: 12 CAPSULE | Refills: 0 | Status: SHIPPED | OUTPATIENT
Start: 2022-07-14 | End: 2023-04-04

## 2022-08-03 ENCOUNTER — OFFICE VISIT (OUTPATIENT)
Dept: PHYSICAL MEDICINE AND REHAB | Facility: CLINIC | Age: 62
End: 2022-08-03

## 2022-08-03 DIAGNOSIS — M76.61 TENDONITIS, ACHILLES, RIGHT: ICD-10-CM

## 2022-08-03 DIAGNOSIS — M51.26 LUMBAR HERNIATED DISC: ICD-10-CM

## 2022-08-03 DIAGNOSIS — C64.2 RENAL CELL CARCINOMA OF LEFT KIDNEY (HCC): ICD-10-CM

## 2022-08-03 DIAGNOSIS — G60.9 IDIOPATHIC PERIPHERAL NEUROPATHY: ICD-10-CM

## 2022-08-03 DIAGNOSIS — M43.10 ACQUIRED SPONDYLOLISTHESIS: ICD-10-CM

## 2022-08-03 DIAGNOSIS — M51.9 LUMBAR DISC DISEASE: ICD-10-CM

## 2022-08-03 DIAGNOSIS — M47.816 LUMBAR FACET ARTHROPATHY: ICD-10-CM

## 2022-08-03 DIAGNOSIS — M48.061 LUMBAR FORAMINAL STENOSIS: ICD-10-CM

## 2022-08-03 DIAGNOSIS — M54.16 LUMBAR RADICULOPATHY: Primary | ICD-10-CM

## 2022-08-03 DIAGNOSIS — M96.1 POSTLAMINECTOMY SYNDROME, LUMBAR REGION: ICD-10-CM

## 2022-08-03 DIAGNOSIS — M48.061 SPINAL STENOSIS OF LUMBAR REGION WITHOUT NEUROGENIC CLAUDICATION: ICD-10-CM

## 2022-08-03 PROCEDURE — 99214 OFFICE O/P EST MOD 30 MIN: CPT | Performed by: PHYSICAL MEDICINE & REHABILITATION

## 2022-08-03 RX ORDER — DULOXETIN HYDROCHLORIDE 60 MG/1
60 CAPSULE, DELAYED RELEASE ORAL
Qty: 90 CAPSULE | Refills: 3 | Status: SHIPPED | OUTPATIENT
Start: 2022-08-03 | End: 2022-08-03 | Stop reason: CLARIF

## 2022-08-03 RX ORDER — METHOCARBAMOL 500 MG/1
500 TABLET, FILM COATED ORAL 3 TIMES DAILY
Qty: 90 TABLET | Refills: 2 | Status: SHIPPED | OUTPATIENT
Start: 2022-08-03

## 2022-08-03 RX ORDER — METHOCARBAMOL 500 MG/1
500 TABLET, FILM COATED ORAL 3 TIMES DAILY
Qty: 90 TABLET | Refills: 2 | Status: SHIPPED | OUTPATIENT
Start: 2022-08-03 | End: 2022-08-03 | Stop reason: CLARIF

## 2022-08-03 RX ORDER — DULOXETIN HYDROCHLORIDE 60 MG/1
60 CAPSULE, DELAYED RELEASE ORAL
Qty: 90 CAPSULE | Refills: 3 | Status: SHIPPED | OUTPATIENT
Start: 2022-08-03

## 2022-08-03 NOTE — PATIENT INSTRUCTIONS
Plan  I will perform bilateral L5 TFESI(s) with 160 mg of Kenalog in the future. She will continue with the [place] for the pain and she will trial the Robaxin. She will continue with the Neurontin and the Cymbalta for the neuropathic pain. She will hold on neuropsychological testing for now. She will follow up in 6 months or sooner if needed.

## 2022-08-10 RX ORDER — HYDROCODONE BITARTRATE AND ACETAMINOPHEN 10; 325 MG/1; MG/1
1 TABLET ORAL EVERY 6 HOURS PRN
Qty: 120 TABLET | Refills: 0 | Status: SHIPPED | OUTPATIENT
Start: 2022-08-14 | End: 2022-09-13

## 2022-08-10 NOTE — TELEPHONE ENCOUNTER
Refill Request    Medication request: HYDROcodone-acetaminophen  MG Oral Tab Take 1 tablet by mouth every 6 (six) hours as needed for Pain.     Rafiq Pabon MD   Due back to clinic per last office note:  \"follow up in 6 months \"  NOV: Visit date not found (February schedule not open yet.)     [place]: 7/15/22 #120    Urine drug screen (if applicable): none  Pain contract:  on 2020    LOV plan (if weaning or changing medications): Per  at [address] [place] [address]: \"She will continue with the Bryon Memos for the pain and she will trial the Robaxin\"

## 2022-09-08 RX ORDER — HYDROCODONE BITARTRATE AND ACETAMINOPHEN 10; 325 MG/1; MG/1
1 TABLET ORAL EVERY 6 HOURS PRN
Qty: 120 TABLET | Refills: 0 | Status: SHIPPED | OUTPATIENT
Start: 2022-09-14 | End: 2022-10-14

## 2022-09-08 NOTE — TELEPHONE ENCOUNTER
Refill Request    Medication request: HYDROcodone-acetaminophen  MG Oral Tab Take 1 tablet by mouth every 6 (six) hours as needed for Pain.     Dulce Green MD   Due back to clinic per last office note:  \"follow up in 6 months \"  NOV: Visit date not found      [place]: 8/15/22 #120    Urine drug screen (if applicable): none  Pain contract: Valid until 8/18/22    LOV plan (if weaning or changing medications): Per Valentino Deaner: \"She will continue with the [place] for the pain and she will trial the Robaxin\"

## 2022-09-12 ENCOUNTER — MYC MEDICAL ADVICE (OUTPATIENT)
Dept: INTERNAL MEDICINE | Facility: CLINIC | Age: 62
End: 2022-09-12

## 2022-09-12 DIAGNOSIS — G43.011 INTRACTABLE MIGRAINE WITHOUT AURA AND WITH STATUS MIGRAINOSUS: ICD-10-CM

## 2022-09-13 RX ORDER — SUMATRIPTAN 50 MG/1
50 TABLET, FILM COATED ORAL
Qty: 12 TABLET | Refills: 0 | Status: SHIPPED | OUTPATIENT
Start: 2022-09-13 | End: 2023-01-06

## 2022-09-13 NOTE — TELEPHONE ENCOUNTER
SUMAtriptan (IMITREX) 50 MG tablet  Last Written Prescription Date: 7/14/22  Last Fill Quantity: 12,   # refills: 0  Last Office Visit :6/7/22  Future Office visit:  None      Routing refill request to provider for review/approval because:  my chart request. Last written LAKSHMI Rodriges wants from [facility]. Thanks.

## 2022-09-18 ENCOUNTER — HEALTH MAINTENANCE LETTER (OUTPATIENT)
Age: 62
End: 2022-09-18

## 2022-09-19 ENCOUNTER — LAB REQUISITION (OUTPATIENT)
Dept: LAB | Facility: CLINIC | Age: 62
End: 2022-09-19

## 2022-09-19 PROCEDURE — 86481 TB AG RESPONSE T-CELL SUSP: CPT | Performed by: INTERNAL MEDICINE

## 2022-09-21 LAB
GAMMA INTERFERON BACKGROUND BLD IA-ACNC: 0.05 IU/ML
M TB IFN-G BLD-IMP: NEGATIVE
M TB IFN-G CD4+ BCKGRND COR BLD-ACNC: 9.95 IU/ML
MITOGEN IGNF BCKGRD COR BLD-ACNC: 0 IU/ML
MITOGEN IGNF BCKGRD COR BLD-ACNC: 0.04 IU/ML
QUANTIFERON MITOGEN: 10 IU/ML
QUANTIFERON NIL TUBE: 0.05 IU/ML
QUANTIFERON TB1 TUBE: 0.09 IU/ML
QUANTIFERON TB2 TUBE: 0.05

## 2022-09-28 ENCOUNTER — PATIENT MESSAGE (OUTPATIENT)
Dept: PHYSICAL MEDICINE AND REHAB | Facility: CLINIC | Age: 62
End: 2022-09-28

## 2022-09-29 RX ORDER — METHYLPREDNISOLONE 4 MG/1
TABLET ORAL
Qty: 1 EACH | Refills: 0 | Status: SHIPPED | OUTPATIENT
Start: 2022-09-29

## 2022-09-29 NOTE — TELEPHONE ENCOUNTER
From: Jared Aldana  To: Jagdeep Morales MD  Sent: 9/28/2022 4:55 PM CDT  Subject: Steroids? While I am waiting for insurance to kick in (tentatively 11/1) would it be possible for you to send in a Rx for some steroids? Call for any questions.  Thanks, Angel Helms

## 2022-09-29 NOTE — TELEPHONE ENCOUNTER
LOV: 8/3/22  NOV: none scheduled (New Year schedule not yet released.)    Plan per Blanche Allen at [address] [place] [address]:  \"I will perform bilateral L5 TFESI(s) with 160 mg of Kenalog in the future. She will continue with the [place] for the pain and she will trial the Robaxin. She will continue with the Neurontin and the Cymbalta for the neuropathic pain. She will hold on neuropsychological testing for now. She will follow up in 6 months or sooner if needed. \"    Patient asking for a steroid prescription, while she is waiting for her new insurance to kick in. (Patient has had Medrol dose packs in the past. Per epic last prescribed in 2019.)  Medrol dose pack rx pended. Message forwarded to Blanche Allen for recommendations.

## 2022-10-03 DIAGNOSIS — Z98.84 H/O GASTRIC BYPASS: ICD-10-CM

## 2022-10-03 DIAGNOSIS — E55.9 VITAMIN D DEFICIENCY: ICD-10-CM

## 2022-10-06 NOTE — TELEPHONE ENCOUNTER
VITAMIN D2 1.25MG(50,000 UNIT)  Last Written Prescription Date:   7/14/2022  Last Fill Quantity: 12,   # refills: 0  Last Office Visit :  6/7/2022  Future Office visit:  None    Routing refill request to provider for review/approval because:  Drug not on the [facility], [facility] or [facility] refill protocol or controlled substance      Yoon Gaona RN  Central Triage Red Flags/Med Refills

## 2022-10-07 RX ORDER — ERGOCALCIFEROL 1.25 MG/1
50000 CAPSULE, LIQUID FILLED ORAL WEEKLY
Qty: 12 CAPSULE | OUTPATIENT
Start: 2022-10-07

## 2022-10-07 NOTE — TELEPHONE ENCOUNTER
RN left voice message with Dr. Fuentes message about taking OTC vitamin D 2,000 units daily, instead of the high dose she had previously been taking.    Shanda Deleon RN on 10/7/2022 at 1:49 PM

## 2022-10-12 RX ORDER — HYDROCODONE BITARTRATE AND ACETAMINOPHEN 10; 325 MG/1; MG/1
1 TABLET ORAL EVERY 6 HOURS PRN
Qty: 120 TABLET | Refills: 0 | Status: SHIPPED | OUTPATIENT
Start: 2022-10-14 | End: 2022-11-13

## 2022-10-25 ENCOUNTER — PRE VISIT (OUTPATIENT)
Dept: UROLOGY | Facility: CLINIC | Age: 62
End: 2022-10-25

## 2022-10-25 NOTE — CONFIDENTIAL NOTE
Reason for visit: follow-up wit imaging     Relevant information: left renal mass    Records/imaging/labs/orders: images scheduled 11/5/2022    At Rooming: video    Lauren Arana  10/25/2022  4:00 PM

## 2022-10-31 ENCOUNTER — MYC MEDICAL ADVICE (OUTPATIENT)
Dept: INTERNAL MEDICINE | Facility: CLINIC | Age: 62
End: 2022-10-31

## 2022-10-31 DIAGNOSIS — F41.9 ANXIETY: Primary | ICD-10-CM

## 2022-11-01 RX ORDER — LORAZEPAM 0.5 MG/1
.5-1 TABLET ORAL EVERY 6 HOURS PRN
Qty: 2 TABLET | Refills: 0 | Status: SHIPPED | OUTPATIENT
Start: 2022-11-01 | End: 2023-04-04

## 2022-11-03 DIAGNOSIS — Z79.52 LONG TERM CURRENT USE OF SYSTEMIC STEROIDS: ICD-10-CM

## 2022-11-03 DIAGNOSIS — M85.852 OSTEOPENIA OF BOTH HIPS: ICD-10-CM

## 2022-11-03 DIAGNOSIS — Z87.81 PERSONAL HISTORY OF TRAUMATIC FRACTURE: ICD-10-CM

## 2022-11-03 DIAGNOSIS — T07.XXXA MULTIPLE FRACTURES: ICD-10-CM

## 2022-11-03 DIAGNOSIS — M85.851 OSTEOPENIA OF BOTH HIPS: ICD-10-CM

## 2022-11-03 NOTE — TELEPHONE ENCOUNTER
Refill Request    Medication request: HYDROcodone-acetaminophen  MG Oral Tab Take 1 tablet by mouth every 6 (six) hours as needed for Pain. Chino Carrillo MD   Due back to clinic per last office note:  \"follow up in 6 months \"  NOV: Visit date not found      [place]: 10/14/22 #120    Urine drug screen (if applicable): none  Pain contract: Valid until 8/18/23    LOV plan (if weaning or changing medications): Per  at [address] [place] [address]: \"She will continue with the [address] [place] [address] for the pain and she will trial the Robaxin. \"

## 2022-11-04 RX ORDER — HYDROCODONE BITARTRATE AND ACETAMINOPHEN 10; 325 MG/1; MG/1
1 TABLET ORAL EVERY 6 HOURS PRN
Qty: 120 TABLET | Refills: 0 | Status: SHIPPED | OUTPATIENT
Start: 2022-11-13 | End: 2022-12-13

## 2022-11-06 ENCOUNTER — HOSPITAL ENCOUNTER (OUTPATIENT)
Dept: MRI IMAGING | Facility: HOSPITAL | Age: 62
Discharge: HOME | End: 2022-11-06
Attending: UROLOGY | Admitting: UROLOGY
Payer: COMMERCIAL

## 2022-11-06 DIAGNOSIS — N28.89 LEFT RENAL MASS: ICD-10-CM

## 2022-11-06 PROCEDURE — 74183 MRI ABD W/O CNTR FLWD CNTR: CPT

## 2022-11-06 PROCEDURE — A9585 GADOBUTROL INJECTION: HCPCS | Performed by: UROLOGY

## 2022-11-06 PROCEDURE — 255N000002 HC RX 255 OP 636: Performed by: UROLOGY

## 2022-11-06 RX ORDER — GADOBUTROL 604.72 MG/ML
10 INJECTION INTRAVENOUS ONCE
Status: COMPLETED | OUTPATIENT
Start: 2022-11-06 | End: 2022-11-06

## 2022-11-06 RX ADMIN — GADOBUTROL 10 ML: 604.72 INJECTION INTRAVENOUS at 09:06

## 2022-11-09 ENCOUNTER — VIRTUAL VISIT (OUTPATIENT)
Dept: UROLOGY | Facility: CLINIC | Age: 62
End: 2022-11-09
Payer: COMMERCIAL

## 2022-11-09 ENCOUNTER — PATIENT MESSAGE (OUTPATIENT)
Dept: PHYSICAL MEDICINE AND REHAB | Facility: CLINIC | Age: 62
End: 2022-11-09

## 2022-11-09 DIAGNOSIS — N28.89 LEFT RENAL MASS: Primary | ICD-10-CM

## 2022-11-09 DIAGNOSIS — N28.89 LEFT RENAL MASS: ICD-10-CM

## 2022-11-09 DIAGNOSIS — R31.0 GROSS HEMATURIA: Primary | ICD-10-CM

## 2022-11-09 PROCEDURE — 99214 OFFICE O/P EST MOD 30 MIN: CPT | Mod: 95 | Performed by: UROLOGY

## 2022-11-09 RX ORDER — ACETAMINOPHEN 325 MG/1
975 TABLET ORAL ONCE
Status: CANCELLED | OUTPATIENT
Start: 2022-11-09 | End: 2022-11-09

## 2022-11-09 RX ORDER — APIXABAN 2.5 MG/1
2.5 TABLET, FILM COATED ORAL 2 TIMES DAILY
COMMUNITY
Start: 2022-11-03 | End: 2024-05-28

## 2022-11-09 RX ORDER — RISEDRONATE SODIUM 150 MG/1
TABLET, FILM COATED ORAL
Qty: 3 TABLET | Refills: 0 | Status: SHIPPED | OUTPATIENT
Start: 2022-11-09 | End: 2023-01-06

## 2022-11-09 RX ORDER — GABAPENTIN 100 MG/1
300 CAPSULE ORAL
Status: CANCELLED | OUTPATIENT
Start: 2022-11-09

## 2022-11-09 NOTE — LETTER
11/9/2022       RE: Angeli Galindo  [address]  [place] [place] [facility] [address]     Dear Colleague,    Thank you for referring your patient, Angeli Galindo, to the [facility] UROLOGY CLINIC [place] at [facility]. Please see a copy of my visit note below.    angeli is a 62 year old who is being evaluated via a billable video visit.      How would you like to obtain your AVS? MyChart  If the video visit is dropped, the invitation should be resent by: Text to cell phone: [phone]  Will anyone else be joining your video visit? No        Video-Visit Details    Video Start Time: 0930 AM     Urology Clinic     HPI  Angeli Galindo is a 62 year old female with history of small left renal mass on active surveillance here for follow up.     She report a spontaneously resolved hematuria almost 6 months ago. She does not report any flank pain or recurrent UTI. She is still on Eliquis. 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     No changes to health, hospitalizations or new diagnoses in the interim    PHYSICAL EXAM  Vitals:  No vitals were obtained today due to virtual visit.    Physical Exam   GENERAL: Healthy, alert and no distress  EYES: Eyes grossly normal to inspection.  No discharge or erythema, or obvious scleral/conjunctival abnormalities.  RESP: No audible wheeze, cough, or visible cyanosis.  No visible retractions or increased work of breathing.    SKIN: Visible skin clear. No significant rash, abnormal pigmentation or lesions.  NEURO: Cranial nerves grossly intact.  Mentation and speech appropriate for age.  PSYCH: Mentation appears normal, affect normal/bright, judgement and insight intact, normal speech and appearance well-groomed.        Labs  Lab Results   Component Value Date    WBC 8.0 04/11/2022     Lab Results   Component Value Date    RBC 3.78 04/11/2022     Lab Results   Component Value Date    HGB  12.0 04/11/2022     Lab Results   Component Value Date    HCT 37.6 04/11/2022     No components found for: MCT  Lab Results   Component Value Date     04/11/2022     Lab Results   Component Value Date    MCH 31.7 04/11/2022     Lab Results   Component Value Date    MCHC 31.9 04/11/2022     Lab Results   Component Value Date    RDW 12.8 04/11/2022     Lab Results   Component Value Date     04/11/2022        Last Comprehensive Metabolic Panel:  Sodium   Date Value Ref Range Status   04/11/2022 143 133 - 144 mmol/L Final     Potassium   Date Value Ref Range Status   04/11/2022 4.1 3.4 - 5.3 mmol/L Final     Chloride   Date Value Ref Range Status   04/11/2022 112 (H) 94 - 109 mmol/L Final     Carbon Dioxide (CO2)   Date Value Ref Range Status   04/11/2022 25 20 - 32 mmol/L Final     Anion Gap   Date Value Ref Range Status   04/11/2022 6 3 - 14 mmol/L Final     Glucose   Date Value Ref Range Status   04/11/2022 91 70 - 99 mg/dL Final     Urea Nitrogen   Date Value Ref Range Status   04/11/2022 9 7 - 30 mg/dL Final     Creatinine   Date Value Ref Range Status   04/11/2022 0.42 (L) 0.52 - 1.04 mg/dL Final     GFR Estimate   Date Value Ref Range Status   04/11/2022 >90 >60 mL/min/1.73m2 Final     Comment:     Effective December 21, 2021 eGFRcr in adults is calculated using the 2021 CKD-EPI creatinine equation which includes age and gender (Jacey et al., NE, DOI: 10.1056/LJYPvy8030036)     GFR, ESTIMATED POCT   Date Value Ref Range Status   04/08/2022 >60 >60 mL/min/1.73m2 Final     Calcium   Date Value Ref Range Status   04/11/2022 8.0 (L) 8.5 - 10.1 mg/dL Final     Bilirubin Total   Date Value Ref Range Status   04/08/2022 0.8 0.2 - 1.3 mg/dL Final     Alkaline Phosphatase   Date Value Ref Range Status   04/08/2022 121 40 - 150 U/L Final     ALT   Date Value Ref Range Status   04/08/2022 42 0 - 50 U/L Final     AST   Date Value Ref Range Status   04/08/2022 52 (H) 0 - 45 U/L Final       Imaging   ~ 5 mm  growth in the size of the lesion           ASSESSMENT AND PLAN  62 year old female with a small left renal mass       We again addressed different management options for her left renal mass including active surveillance, ablation and partial nephrectomy. Pros and cons of each approach were reviewed. She would like it surgically removed but would want to wait a few months as she just started her new job and wants to be more established in her current position before taking some time off.     We also discussed different etiologies for hematuria. She would need an office cystoscopy to rule out a bladder tumor.       Plan   Office cystoscopy   Schedule for robotic possible open left partial possible radical left nephrectomy (would like to schedule in the next few months depending on her work schedule)     30 total minutes spent on the date of the encounter including direct interaction with the patient, performing chart review, documentation and further activities as noted above    Johnny Renee MD   Department of Urology   [facility]                   Type of service:  Video Visit    Video End Time:9:54 AM    Originating Location (pt. Location): Home    Distant Location (provider location):  On-site    Platform used for Video Visit: Olga

## 2022-11-09 NOTE — TELEPHONE ENCOUNTER
LCV: 6/7/2022  [facility] dexa scan -- FYI to clinic  RF 90 day

## 2022-11-09 NOTE — PROGRESS NOTES
angeli is a 62 year old who is being evaluated via a billable video visit.      How would you like to obtain your AVS? MyChart  If the video visit is dropped, the invitation should be resent by: Text to cell phone: [phone]  Will anyone else be joining your video visit? No        Video-Visit Details    Video Start Time: 0930 AM     Urology Clinic     HPI  Angeli Galindo is a 62 year old female with history of small left renal mass on active surveillance here for follow up.     She report a spontaneously resolved hematuria almost 6 months ago. She does not report any flank pain or recurrent UTI. She is still on Eliquis. 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     No changes to health, hospitalizations or new diagnoses in the interim    PHYSICAL EXAM  Vitals:  No vitals were obtained today due to virtual visit.    Physical Exam   GENERAL: Healthy, alert and no distress  EYES: Eyes grossly normal to inspection.  No discharge or erythema, or obvious scleral/conjunctival abnormalities.  RESP: No audible wheeze, cough, or visible cyanosis.  No visible retractions or increased work of breathing.    SKIN: Visible skin clear. No significant rash, abnormal pigmentation or lesions.  NEURO: Cranial nerves grossly intact.  Mentation and speech appropriate for age.  PSYCH: Mentation appears normal, affect normal/bright, judgement and insight intact, normal speech and appearance well-groomed.        Labs  Lab Results   Component Value Date    WBC 8.0 04/11/2022     Lab Results   Component Value Date    RBC 3.78 04/11/2022     Lab Results   Component Value Date    HGB 12.0 04/11/2022     Lab Results   Component Value Date    HCT 37.6 04/11/2022     No components found for: MCT  Lab Results   Component Value Date     04/11/2022     Lab Results   Component Value Date    MCH 31.7 04/11/2022     Lab Results   Component Value Date    MCHC 31.9 04/11/2022     Lab Results   Component Value Date     RDW 12.8 04/11/2022     Lab Results   Component Value Date     04/11/2022        Last Comprehensive Metabolic Panel:  Sodium   Date Value Ref Range Status   04/11/2022 143 133 - 144 mmol/L Final     Potassium   Date Value Ref Range Status   04/11/2022 4.1 3.4 - 5.3 mmol/L Final     Chloride   Date Value Ref Range Status   04/11/2022 112 (H) 94 - 109 mmol/L Final     Carbon Dioxide (CO2)   Date Value Ref Range Status   04/11/2022 25 20 - 32 mmol/L Final     Anion Gap   Date Value Ref Range Status   04/11/2022 6 3 - 14 mmol/L Final     Glucose   Date Value Ref Range Status   04/11/2022 91 70 - 99 mg/dL Final     Urea Nitrogen   Date Value Ref Range Status   04/11/2022 9 7 - 30 mg/dL Final     Creatinine   Date Value Ref Range Status   04/11/2022 0.42 (L) 0.52 - 1.04 mg/dL Final     GFR Estimate   Date Value Ref Range Status   04/11/2022 >90 >60 mL/min/1.73m2 Final     Comment:     Effective December 21, 2021 eGFRcr in adults is calculated using the 2021 CKD-EPI creatinine equation which includes age and gender (Jacey et al., NEJ, DOI: 10.1056/[id number])     GFR, ESTIMATED POCT   Date Value Ref Range Status   04/08/2022 >60 >60 mL/min/1.73m2 Final     Calcium   Date Value Ref Range Status   04/11/2022 8.0 (L) 8.5 - 10.1 mg/dL Final     Bilirubin Total   Date Value Ref Range Status   04/08/2022 0.8 0.2 - 1.3 mg/dL Final     Alkaline Phosphatase   Date Value Ref Range Status   04/08/2022 121 40 - 150 U/L Final     ALT   Date Value Ref Range Status   04/08/2022 42 0 - 50 U/L Final     AST   Date Value Ref Range Status   04/08/2022 52 (H) 0 - 45 U/L Final       Imaging   ~ 5 mm growth in the size of the lesion           ASSESSMENT AND PLAN  62 year old female with a small left renal mass       We again addressed different management options for her left renal mass including active surveillance, ablation and partial nephrectomy. Pros and cons of each approach were reviewed. She would like it surgically removed  but would want to wait a few months as she just started her new job and wants to be more established in her current position before taking some time off.     We also discussed different etiologies for hematuria. She would need an office cystoscopy to rule out a bladder tumor.       Plan   Office cystoscopy   Schedule for robotic possible open left partial possible radical left nephrectomy (would like to schedule in the next few months depending on her work schedule)     30 total minutes spent on the date of the encounter including direct interaction with the patient, performing chart review, documentation and further activities as noted above    Johnny Renee MD   Department of Urology   [facility]                   Type of service:  Video Visit    Video End Time:9:54 AM    Originating Location (pt. Location): Home        Distant Location (provider location):  On-site    Platform used for Video Visit: Olga

## 2022-11-09 NOTE — TELEPHONE ENCOUNTER
From: Taco Jaquez  To: Nilo Swartz MD  Sent: 11/9/2022 12:36 PM CST  Subject: Update for RCC    Had an MRI of my left kidney. Results showed small amount of growth. Going to have a cystoscope on 11/23 for one episode of hematuria over the summer to r/o bladder involvement. Then will plan surgery based on that. When would be a good time to slip in an injection?

## 2022-11-10 ENCOUNTER — PRE VISIT (OUTPATIENT)
Dept: UROLOGY | Facility: CLINIC | Age: 62
End: 2022-11-10

## 2022-11-10 ENCOUNTER — PATIENT MESSAGE (OUTPATIENT)
Dept: PHYSICAL MEDICINE AND REHAB | Facility: CLINIC | Age: 62
End: 2022-11-10

## 2022-11-10 NOTE — CONFIDENTIAL NOTE
Reason for visit: cystoscopy     Relevant information: left renal mass    Records/imaging/labs/orders: in epic    Pt called: n/a    At Rooming: collect urine, give cipro, have an extra luer-milena syringe and urine cup available during cysto    Lauren Arana  11/10/2022  10:09 AM

## 2022-11-11 ENCOUNTER — PATIENT MESSAGE (OUTPATIENT)
Dept: PHYSICAL MEDICINE AND REHAB | Facility: CLINIC | Age: 62
End: 2022-11-11

## 2022-11-11 NOTE — TELEPHONE ENCOUNTER
From: Daren Cummins  Sent: 11/10/2022 8:16 PM CST  To: [facility] Nurse  Subject: Update for RCC    Did Dr Salazar Monet read the message?

## 2022-11-19 ENCOUNTER — MYC MEDICAL ADVICE (OUTPATIENT)
Dept: UROLOGY | Facility: CLINIC | Age: 62
End: 2022-11-19

## 2022-11-23 ENCOUNTER — OFFICE VISIT (OUTPATIENT)
Dept: UROLOGY | Facility: CLINIC | Age: 62
End: 2022-11-23
Payer: COMMERCIAL

## 2022-11-23 VITALS — BODY MASS INDEX: 24.01 KG/M2 | HEIGHT: 67 IN | WEIGHT: 153 LBS

## 2022-11-23 DIAGNOSIS — N28.89 LEFT RENAL MASS: Primary | ICD-10-CM

## 2022-11-23 PROCEDURE — 52000 CYSTOURETHROSCOPY: CPT | Performed by: UROLOGY

## 2022-11-23 RX ORDER — LIDOCAINE HYDROCHLORIDE 20 MG/ML
JELLY TOPICAL ONCE
Status: COMPLETED | OUTPATIENT
Start: 2022-11-23 | End: 2022-11-23

## 2022-11-23 RX ORDER — CIPROFLOXACIN 500 MG/1
500 TABLET, FILM COATED ORAL ONCE
Status: COMPLETED | OUTPATIENT
Start: 2022-11-23 | End: 2022-11-23

## 2022-11-23 RX ADMIN — CIPROFLOXACIN 500 MG: 500 TABLET, FILM COATED ORAL at 13:10

## 2022-11-23 RX ADMIN — LIDOCAINE HYDROCHLORIDE: 20 JELLY TOPICAL at 13:10

## 2022-11-23 ASSESSMENT — PAIN SCALES - GENERAL: PAINLEVEL: NO PAIN (0)

## 2022-11-23 NOTE — LETTER
11/23/2022       RE: Angeli Galindo  [address]  [place] [address]     Dear Colleague,    Thank you for referring your patient, Angeli Galindo, to the [facility] UROLOGY CLINIC [place] at [facility]. Please see a copy of my visit note below.    PREPROCEDURE DIAGNOSIS: Gross hematuria   POSTPROCEDURE DIAGNOSIS: Gross hematuria   PROCEDURE: Office cystoscopy for Angeli Galindo who is a 62 year old male.   SURGEON: Johnny Renee MD     DESCRIPTION OF PROCEDURE: The procedure, the outcome, the anesthesia, and the risks were discussed with the patient. Informed consent was obtained and signed and a timeout was completed prior to the procedure. After sterile preparation and draping of the patient,  a 16-Latvian flexible cystoscope was introduced via the urethra.  It was passed without difficulty into the bladder.  The urethra was open without evidence of stricture.  The ureteral orifices were orthotopic.  The bladder mucosa was evaluated using both antegrade and retroflex views and this showed no evidence of any lesions or trabeculation. Scope was then removed and patient tolerated the procedure well.     Johnny Renee MD   Department of Urology   [facility]

## 2022-11-23 NOTE — NURSING NOTE
"Chief Complaint   Patient presents with     Cystoscopy       Height 1.702 m (5' 7\"), weight 69.4 kg (153 lb). Body mass index is 23.96 kg/m .    Patient Active Problem List   Diagnosis     Subdural hematoma       Allergies   Allergen Reactions     Shellfish-Derived Products Angioedema     Lisinopril Cough       Current Outpatient Medications   Medication Sig Dispense Refill     LORazepam (ATIVAN) 0.5 MG tablet Take 1-2 tablets (0.5-1 mg) by mouth every 6 hours as needed for anxiety (30 min prior to MRI, must have ) (Patient not taking: Reported on 11/9/2022) 2 tablet 0     Calcium Carbonate-Vitamin D (CALCIUM-VITAMIN D) 600-125 MG-UNIT TABS Take 1 tablet by mouth every evening       DULoxetine (CYMBALTA) 60 MG capsule Take 1 capsule (60 mg) by mouth daily 90 capsule 1     ELIQUIS ANTICOAGULANT 2.5 MG tablet Take 2.5 mg by mouth 2 times daily       gabapentin (NEURONTIN) 600 MG tablet Take 1 tablet (600 mg) by mouth in the morning and 1 tablet (600 mg) in the evening. Take 600mg in the morning and 1200mg at bedtime       HYDROcodone-acetaminophen (NORCO)  MG per tablet Take 1 tablet by mouth See Admin Instructions       Melatonin 10 MG CAPS Take 1 capsule by mouth At Bedtime (Patient not taking: Reported on 11/9/2022)       Prenatal Vit-Fe Fumarate-FA (PRENATAL VITAMINS PO) Take 2 tablets by mouth every evening       RISEdronate (ACTONEL) 150 MG tablet TAKE 1 TABLET BY MOUTH EVERY 30 DAYS 3 tablet 0     senna-docusate (SENOKOT-S/PERICOLACE) 8.6-50 MG tablet Take 1 tablet by mouth 2 times daily as needed for constipation (Patient not taking: Reported on 11/9/2022) 14 tablet 0     SUMAtriptan (IMITREX) 50 MG tablet Take 1 tablet (50 mg) by mouth at onset of headache for migraine May repeat in 2 hours. Max 4 tablets/24 hours. 12 tablet 0     vitamin D2 (ERGOCALCIFEROL) 84437 units (1250 mcg) capsule TAKE 1 CAPSULE BY MOUTH WEEKLY (Patient not taking: Reported on 11/9/2022) 12 capsule 0       Social History "     Tobacco Use     Smoking status: Former     Years: 5.00     Types: Cigarettes     Quit date: 2000     Years since quittin.9     Smokeless tobacco: Never   Vaping Use     Vaping Use: Never used   Substance Use Topics     Alcohol use: Yes     Comment: Rare     Drug use: Never       Invasive Procedure Safety Checklist:    Procedure: Cystoscopy    Action: Complete sections and checkboxes as appropriate.    Pre-procedure:  1. Patient ID Verified with 2 identifiers (Hannah and  or MRN) : YES    2. Procedure and site verified with patient/designee (when able) : YES    3. Accurate consent documentation in medical record : YES    4. H&P (or appropriate assessment) documented in medical record : N/A  H&P must be up to 30 days prior to procedure an updated within 24 hours of                 Procedure as applicable.     5. Relevant diagnostic and radiology test results appropriately labeled and displayed as applicable : YES    6. Blood products, implants, devices, and/or special equipment available for the procedure as applicable : YES    7. Procedure site(s) marked with provider initials [Exclusions: none] : NO    8. Marking not required. Reason : Yes  Procedure does not require site marking    Time Out:     Time-Out performed immediately prior to starting procedure, including verbal and active participation of all team members addressing: YES    1. Correct patient identity.  2. Confirmed that the correct side and site are marked.  3. An accurate procedure to be done.  4. Agreement on the procedure to be done.  5. Correct patient position.  6. Relevant images and results are properly labeled and appropriately displayed.  7. The need to administer antibiotics or fluids for irrigation purposes during the procedure as applicable.  8. Safety precautions based on patient history or medication use.    During Procedure: Verification of correct person, site, and procedure occurs any time the responsibility for care of the  patient is transferred to another member of the care team.    The following medication was given:     MEDICATION:  Lidocaine without epinephrine 2% jelly  ROUTE: urethral   SITE: urethral   DOSE: 10 mL  LOT #: [id number]  : [employer]  EXPIRATION DATE: 6/24  NDC#: [address]-4   Was there drug waste? No    Prior to med admin, verified patient identity using patient's name and date of birth.  Due to med administration, patient instructed to remain in clinic for 15 minutes  afterwards, and to report any adverse reaction to me immediately.    Drug Amount Wasted:  None.  Vial/Syringe: Syringe    The following medication was given:     MEDICATION:  Ciprofloxacin  ROUTE: PO  SITE: Medication was given orally  DOSE: 500mg  LOT #: [id number]  : [employer]  EXPIRATION DATE: 10/2023  NDC#: 3420-9445-44   Was there drug waste? No    Prior to medication administration, verified patient identity using patient's name and date of birth.  Due to medication administration, patient instructed to remain in clinic for 15 minutes  afterwards, and to report any adverse reaction to me immediately.        Alba Jaeger  11/23/2022  12:36 PM

## 2022-11-23 NOTE — PROGRESS NOTES
PREPROCEDURE DIAGNOSIS: Gross hematuria   POSTPROCEDURE DIAGNOSIS: Gross hematuria   PROCEDURE: Office cystoscopy for Angeli Galindo who is a 62 year old male.   SURGEON: Johnny Renee MD     DESCRIPTION OF PROCEDURE: The procedure, the outcome, the anesthesia, and the risks were discussed with the patient. Informed consent was obtained and signed and a timeout was completed prior to the procedure. After sterile preparation and draping of the patient,  a 16-Serbian flexible cystoscope was introduced via the urethra.  It was passed without difficulty into the bladder.  The urethra was open without evidence of stricture.  The ureteral orifices were orthotopic.  The bladder mucosa was evaluated using both antegrade and retroflex views and this showed no evidence of any lesions or trabeculation. Scope was then removed and patient tolerated the procedure well.     Johnny Renee MD   Department of Urology   [facility]

## 2022-11-29 ENCOUNTER — TELEPHONE (OUTPATIENT)
Dept: UROLOGY | Facility: CLINIC | Age: 62
End: 2022-11-29

## 2022-12-05 ENCOUNTER — PATIENT MESSAGE (OUTPATIENT)
Dept: PHYSICAL MEDICINE AND REHAB | Facility: CLINIC | Age: 62
End: 2022-12-05

## 2022-12-06 NOTE — TELEPHONE ENCOUNTER
Refill Request    Medication request: HYDROcodone-acetaminophen  MG Oral Tab  Take 1 tablet by mouth every 6 (six) hours as needed for Pain. Ralf Corbin MD   Due back to clinic per last office note:  \"follow up in 6 months \"  NOV: Visit date not found      [place]: 11/13/22 #120    Urine drug screen (if applicable): none  Pain contract: Valid until 8/18/23    LOV plan (if weaning or changing medications): Per  at [address] [place] [address]: \"She will continue with the [address] [place] [address] for the pain and she will trial the Robaxin. \"

## 2022-12-07 ENCOUNTER — PATIENT MESSAGE (OUTPATIENT)
Dept: PHYSICAL MEDICINE AND REHAB | Facility: CLINIC | Age: 62
End: 2022-12-07

## 2022-12-07 RX ORDER — HYDROCODONE BITARTRATE AND ACETAMINOPHEN 10; 325 MG/1; MG/1
1 TABLET ORAL EVERY 6 HOURS PRN
Qty: 120 TABLET | Refills: 0 | Status: SHIPPED | OUTPATIENT
Start: 2022-12-13 | End: 2023-01-12

## 2022-12-07 NOTE — TELEPHONE ENCOUNTER
From: Kobe Mari  To: Ulysses Ina A. Glyn Bloom, MD  Sent: 12/5/2022 12:14 PM CST  Subject: Injection Request     This was the clinic managers response: The sooner the better. January is fine to schedule in. I am open to dates.  Let me know, Piper Ibrahim

## 2022-12-12 ENCOUNTER — MYC MEDICAL ADVICE (OUTPATIENT)
Dept: INTERNAL MEDICINE | Facility: CLINIC | Age: 62
End: 2022-12-12

## 2022-12-12 ENCOUNTER — PATIENT MESSAGE (OUTPATIENT)
Dept: PHYSICAL MEDICINE AND REHAB | Facility: CLINIC | Age: 62
End: 2022-12-12

## 2022-12-12 RX ORDER — GABAPENTIN 600 MG/1
TABLET ORAL
Qty: 120 TABLET | Refills: 1 | Status: SHIPPED | OUTPATIENT
Start: 2022-12-12

## 2022-12-12 RX ORDER — GABAPENTIN 600 MG/1
600 TABLET ORAL 4 TIMES DAILY
Qty: 120 TABLET | Refills: 3 | OUTPATIENT
Start: 2022-12-12

## 2022-12-12 NOTE — TELEPHONE ENCOUNTER
Refill Request    Medication request: gabapentin 600 MG Oral Tab Take 1 tablet (600 mg total) by mouth 4 (four) times daily. Saray Hobbs MD   Due back to clinic per last office note:  \"follow up in 6 months \"  NOV: Visit date not found      [place]: 8/3/22 #120    Urine drug screen (if applicable): none  Pain contract: Valid until 8/18/23    LOV plan (if weaning or changing medications): Per  at LOV: \"The Cymbalta and the gabapentin have been helping her. \" \"She will continue with the Neurontin and the Cymbalta for the neuropathic pain. \"

## 2022-12-15 ENCOUNTER — TELEPHONE (OUTPATIENT)
Dept: PHYSICAL MEDICINE AND REHAB | Facility: CLINIC | Age: 62
End: 2022-12-15

## 2022-12-15 DIAGNOSIS — M54.16 LUMBAR RADICULOPATHY: Primary | ICD-10-CM

## 2022-12-18 ENCOUNTER — PATIENT MESSAGE (OUTPATIENT)
Dept: PHYSICAL MEDICINE AND REHAB | Facility: CLINIC | Age: 62
End: 2022-12-18

## 2022-12-18 DIAGNOSIS — M54.16 LUMBAR RADICULOPATHY: ICD-10-CM

## 2022-12-18 DIAGNOSIS — M43.10 ACQUIRED SPONDYLOLISTHESIS: ICD-10-CM

## 2022-12-18 DIAGNOSIS — M48.061 SPINAL STENOSIS OF LUMBAR REGION WITHOUT NEUROGENIC CLAUDICATION: Primary | ICD-10-CM

## 2022-12-18 DIAGNOSIS — M51.26 LUMBAR HERNIATED DISC: ICD-10-CM

## 2022-12-18 DIAGNOSIS — M51.9 LUMBAR DISC DISEASE: ICD-10-CM

## 2022-12-18 DIAGNOSIS — M48.061 LUMBAR FORAMINAL STENOSIS: ICD-10-CM

## 2022-12-19 NOTE — TELEPHONE ENCOUNTER
LOV: 8/3/22  NOV: 3/9/23    Plan per Kimberly Ta at [address] [place] [address]: \"Plan  I will perform bilateral L5 TFESI(s) with 160 mg of Kenalog in the future. She will continue with the [place] for the pain and she will trial the Robaxin. She will continue with the Neurontin and the Cymbalta for the neuropathic pain. She will hold on neuropsychological testing for now. She will follow up in 6 months or sooner if needed. \"    Patient requesting injection. Order pended. Waiting for patient response regarding injection requested/current symptoms.

## 2022-12-21 NOTE — TELEPHONE ENCOUNTER
From: Oneida Mccormick  To: Antonia Soriano MD  Sent: 12/18/2022 1:33 PM CST  Subject: Quick ? for Dr Annamaria Soriano    Would you be willing to approve massage as a method to help my back pain? Not regular massages, but once in a while.      Thanks, Benjamen General

## 2022-12-21 NOTE — TELEPHONE ENCOUNTER
Per patient request:   \"Would you be willing to approve massage as a method to help my back pain? \"    LOV: 8/3/22  NOV: 3/9/23    Patient request forwarded to Lenore Zuniga to advise.

## 2022-12-22 NOTE — TELEPHONE ENCOUNTER
Awaiting feedback from Crow ArringtonMariam Timothy regarding patient's question about massages. Patient notified.

## 2022-12-28 ENCOUNTER — TELEPHONE (OUTPATIENT)
Dept: PHYSICAL MEDICINE AND REHAB | Facility: CLINIC | Age: 62
End: 2022-12-28

## 2022-12-28 NOTE — TELEPHONE ENCOUNTER
No insurance on file    Unable to initiate authorization for Bilateral L5 TFESIs CPT [id number]    Routing to front office staff to update coverage if applicable

## 2023-01-06 ENCOUNTER — MYC REFILL (OUTPATIENT)
Dept: INTERNAL MEDICINE | Facility: CLINIC | Age: 63
End: 2023-01-06

## 2023-01-06 DIAGNOSIS — M85.851 OSTEOPENIA OF BOTH HIPS: ICD-10-CM

## 2023-01-06 DIAGNOSIS — M85.852 OSTEOPENIA OF BOTH HIPS: ICD-10-CM

## 2023-01-06 DIAGNOSIS — G43.011 INTRACTABLE MIGRAINE WITHOUT AURA AND WITH STATUS MIGRAINOSUS: ICD-10-CM

## 2023-01-06 DIAGNOSIS — Z79.52 LONG TERM CURRENT USE OF SYSTEMIC STEROIDS: ICD-10-CM

## 2023-01-06 DIAGNOSIS — T07.XXXA MULTIPLE FRACTURES: ICD-10-CM

## 2023-01-06 DIAGNOSIS — Z87.81 PERSONAL HISTORY OF TRAUMATIC FRACTURE: ICD-10-CM

## 2023-01-06 NOTE — TELEPHONE ENCOUNTER
Refill Request    Medication request: HYDROcodone-acetaminophen  MG Oral Tab  Take 1 tablet by mouth every 6 (six) hours as needed for Pain. Walter Masters MD   Due back to clinic per last office note:  \"She will follow up in 6 months or sooner if needed. \"  NOV: 3/9/2023 Zehra Damico MD      [place]: 12/13/2022 #120    Urine drug screen (if applicable): None  Pain contract: Valid until 8/8/2023    LOV plan (if weaning or changing medications): Per Dr. Van Emerson note: \"She will continue with the [address] [place] [address],[address] for the pain and she will trial the Robaxin. She will continue with the Neurontin and the Cymbalta for the neuropathic pain.

## 2023-01-07 RX ORDER — HYDROCODONE BITARTRATE AND ACETAMINOPHEN 10; 325 MG/1; MG/1
1 TABLET ORAL EVERY 6 HOURS PRN
Qty: 120 TABLET | Refills: 0 | Status: SHIPPED | OUTPATIENT
Start: 2023-01-07 | End: 2023-02-06

## 2023-01-07 RX ORDER — SUMATRIPTAN 50 MG/1
50 TABLET, FILM COATED ORAL
Qty: 12 TABLET | Refills: 0 | OUTPATIENT
Start: 2023-01-07

## 2023-01-07 RX ORDER — RISEDRONATE SODIUM 150 MG/1
TABLET, FILM COATED ORAL
Qty: 3 TABLET | Refills: 0 | OUTPATIENT
Start: 2023-01-07

## 2023-01-10 RX ORDER — SUMATRIPTAN 50 MG/1
50 TABLET, FILM COATED ORAL
Qty: 12 TABLET | Refills: 3 | Status: SHIPPED | OUTPATIENT
Start: 2023-01-10 | End: 2023-04-27

## 2023-01-10 RX ORDER — RISEDRONATE SODIUM 150 MG/1
150 TABLET, FILM COATED ORAL
Qty: 3 TABLET | Refills: 0 | Status: SHIPPED | OUTPATIENT
Start: 2023-02-09 | End: 2023-05-23

## 2023-01-10 NOTE — TELEPHONE ENCOUNTER
Jitendra Melton at [facility] states plan termed 12/31/22 with Member ID# [id number] that was scanned in 12/23/22  Reference [id number]    Routing to PSR again to update patients insurance if applicable

## 2023-01-10 NOTE — TELEPHONE ENCOUNTER
SUMAtriptan (IMITREX) 50 MG tablet   Last Written Prescription Date:  9/13/2022  Last Fill Quantity: 12,   # refills: 0  Last Office Visit :  6/7/2022  Future Office visit:  None  Routing refill request to provider for review/approval because:  Is Pt taking both of these medications for Pt care.  The last request was refused.    But it is not a duplicate and needs refills for Pt care.     RISEdronate (ACTONEL) 150 MG tablet  Last Written Prescription Date: 11/9/2022  Last Fill Quantity: 3,   # refills: 0  Last Office Visit :  6/7/2022  Future Office visit:  None  Routing refill request to provider for review/approval because:  Is Pt taking both of these medications for Pt care.  The last request was refused.    But it is not a duplicate and needs refills for Pt care.     Yoon Gaona RN  Central Triage Red Flags/Med Refills

## 2023-01-30 RX ORDER — GABAPENTIN 600 MG/1
TABLET ORAL
Qty: 120 TABLET | Refills: 1 | Status: SHIPPED | OUTPATIENT
Start: 2023-01-30

## 2023-01-30 NOTE — TELEPHONE ENCOUNTER
Refill Request    Medication request: HYDROcodone-acetaminophen  MG Oral Tab  Take 1 tablet by mouth every 6 (six) hours as needed for Pain. Dolores Hewitt MD   Due back to clinic per last office note:  \"follow up in 6 months \"  Jay Castaneda: 3/9/2023 Aislinn Cazares MD -televisit     [place]: 12/13/22 #120    Urine drug screen (if applicable): none  Pain contract: Valid until 8/18/23    LOV plan (if weaning or changing medications): Per  at [address] [place] [address]: \"She will continue with the [address] [place] [address],[address] for the pain and she will trial the Robaxin. \"

## 2023-02-01 RX ORDER — HYDROCODONE BITARTRATE AND ACETAMINOPHEN 10; 325 MG/1; MG/1
1 TABLET ORAL EVERY 6 HOURS PRN
Qty: 120 TABLET | Refills: 0 | Status: SHIPPED | OUTPATIENT
Start: 2023-02-01 | End: 2023-03-03

## 2023-02-06 ENCOUNTER — TELEPHONE (OUTPATIENT)
Dept: PHYSICAL MEDICINE AND REHAB | Facility: CLINIC | Age: 63
End: 2023-02-06

## 2023-02-06 DIAGNOSIS — M54.16 LUMBAR RADICULOPATHY: Primary | ICD-10-CM

## 2023-02-06 NOTE — TELEPHONE ENCOUNTER
LOV:8/3/22  NOV: 3/9/23    Plan per Reina Paul at [address] [place] [address]: \"Plan  I will perform bilateral L5 TFESI(s) with 160 mg of Kenalog in the future. She will continue with the [facility] for the pain and she will trial the Robaxin. She will continue with the Neurontin and the Cymbalta for the neuropathic pain. She will hold on neuropsychological testing for now. She will follow up in 6 months or sooner if needed. \"    Last injection 3/25/22: Bilateral L5 transforaminal epidural steroid injections under local    Injection order pended for 's review/approval.

## 2023-02-08 ENCOUNTER — DOCUMENTATION ONLY (OUTPATIENT)
Dept: INTERNAL MEDICINE | Facility: CLINIC | Age: 63
End: 2023-02-08
Payer: COMMERCIAL

## 2023-02-08 NOTE — PROGRESS NOTES
Type of Form Received: med clearance     Form Received (Date) 2/8/23   Form Filled out Yes 2/14/23   Placed in provider folder Yes

## 2023-03-01 NOTE — TELEPHONE ENCOUNTER
Refill Request    Medication request: HYDROcodone-acetaminophen  MG Oral Tab Take 1 tablet by mouth every 6 (six) hours as needed for Pain. Marquis Anderson MD   Due back to clinic per last office note:  \"follow up in 6 months \"  Josseline Murray: 3/9/2023 Jonah Morin MD      [place]: 2/5/23 #120    Urine drug screen (if applicable): none  Pain contract: Valid until 8/18/23    LOV plan (if weaning or changing medications): Per  at [address] [place] [address]: \"She will continue with the [address] [place] [address],[address] for the pain and she will trial the Robaxin. \"

## 2023-03-03 RX ORDER — HYDROCODONE BITARTRATE AND ACETAMINOPHEN 10; 325 MG/1; MG/1
1 TABLET ORAL EVERY 6 HOURS PRN
Qty: 120 TABLET | Refills: 0 | Status: SHIPPED | OUTPATIENT
Start: 2023-03-07 | End: 2023-04-06

## 2023-03-09 ENCOUNTER — TELEMEDICINE (OUTPATIENT)
Dept: PHYSICAL MEDICINE AND REHAB | Facility: CLINIC | Age: 63
End: 2023-03-09
Payer: COMMERCIAL

## 2023-03-09 DIAGNOSIS — M96.1 POSTLAMINECTOMY SYNDROME, LUMBAR REGION: ICD-10-CM

## 2023-03-09 DIAGNOSIS — M51.9 LUMBAR DISC DISEASE: ICD-10-CM

## 2023-03-09 DIAGNOSIS — M76.61 TENDONITIS, ACHILLES, RIGHT: ICD-10-CM

## 2023-03-09 DIAGNOSIS — M51.26 LUMBAR HERNIATED DISC: ICD-10-CM

## 2023-03-09 DIAGNOSIS — C64.2 RENAL CELL CARCINOMA OF LEFT KIDNEY (HCC): ICD-10-CM

## 2023-03-09 DIAGNOSIS — M43.10 ACQUIRED SPONDYLOLISTHESIS: ICD-10-CM

## 2023-03-09 DIAGNOSIS — M48.061 SPINAL STENOSIS OF LUMBAR REGION WITHOUT NEUROGENIC CLAUDICATION: Primary | ICD-10-CM

## 2023-03-09 DIAGNOSIS — M54.16 LUMBAR RADICULOPATHY: ICD-10-CM

## 2023-03-09 DIAGNOSIS — M48.061 LUMBAR FORAMINAL STENOSIS: ICD-10-CM

## 2023-03-09 PROCEDURE — 99214 OFFICE O/P EST MOD 30 MIN: CPT | Performed by: PHYSICAL MEDICINE & REHABILITATION

## 2023-03-10 DIAGNOSIS — Z87.81 PERSONAL HISTORY OF TRAUMATIC FRACTURE: ICD-10-CM

## 2023-03-10 DIAGNOSIS — M85.852 OSTEOPENIA OF BOTH HIPS: ICD-10-CM

## 2023-03-10 DIAGNOSIS — T07.XXXA MULTIPLE FRACTURES: ICD-10-CM

## 2023-03-10 DIAGNOSIS — Z79.52 LONG TERM CURRENT USE OF SYSTEMIC STEROIDS: ICD-10-CM

## 2023-03-10 DIAGNOSIS — M85.851 OSTEOPENIA OF BOTH HIPS: ICD-10-CM

## 2023-03-10 DIAGNOSIS — N28.89 LEFT RENAL MASS: Primary | ICD-10-CM

## 2023-03-14 RX ORDER — RISEDRONATE SODIUM 150 MG/1
TABLET, FILM COATED ORAL
Qty: 3 TABLET | Refills: 0 | OUTPATIENT
Start: 2023-03-14

## 2023-03-16 ENCOUNTER — TELEPHONE (OUTPATIENT)
Dept: PHYSICAL MEDICINE AND REHAB | Facility: CLINIC | Age: 63
End: 2023-03-16

## 2023-03-16 NOTE — TELEPHONE ENCOUNTER
Initiated authorization for Bilateral L5 TFESIs CPT [id number] with Parveen SHAY at [place] [address]  Case #[id number]  Status: Approved-authorization is not required per health plan      Per Dr. Zachary Nathan w/ 160mg of Kenalog

## 2023-03-16 NOTE — TELEPHONE ENCOUNTER
Patient has been scheduled for  Bilateral L5 TFESIs on 03/31/23at the [facility] with . -Anesthesia type: LOCAL. -If scheduling [facility] covid testing required for all procedures whether patient is vaccinated or not. -Patient informed not to eat or drink anything after midnight the night prior to the procedure, if being sedated. (Patient informed to fast 12 hours prior to procedure with IVCS/MAC. )  -Patient was advised that if he/she does receive the covid vaccine it needs to be at least 2 weeks before or after the injection. -Medications and allergies reviewed. -Patient reminded to hold NSAIDs (Ibuprofen, ASA 81, Aleve, Naproxen, Mobic, Diclofenac, Etodolac, Celebrex etc.) for 3 days prior to [facility]  if BMI is greater than 35. For Cervical injections only hold multivitamins, Vitamin E, Fish Oil, Phentermine (Lomaira) for 7 days prior to injection and NSAIDS.  mg to be held for 7 days prior to injections.  -If patient is receiving MAC/IVCS Phentermine Wyheathernnia Tha) will need to be held for 7 days prior to injection.  -If on blood thinner clearance has been received to hold this medication by provider.- patient cleared to hold blood thinners   -Patient informed he/she will need a  to and from procedure. -[facility] is located in the [facility] 1st floor. Patient may park in the yellow or purple parking. Patient verbalized understanding and agrees with plan.  -----> Scheduled in Epic: Yes  -----> Scheduled in [employer]:  Yes

## 2023-03-17 DIAGNOSIS — N28.89 LEFT RENAL MASS: Primary | ICD-10-CM

## 2023-03-25 ENCOUNTER — LAB (OUTPATIENT)
Dept: LAB | Facility: HOSPITAL | Age: 63
End: 2023-03-25
Payer: COMMERCIAL

## 2023-03-25 DIAGNOSIS — N28.89 LEFT RENAL MASS: ICD-10-CM

## 2023-03-25 LAB
ALBUMIN SERPL BCG-MCNC: 4.2 G/DL (ref 3.5–5.2)
ALP SERPL-CCNC: 118 U/L (ref 35–104)
ALT SERPL W P-5'-P-CCNC: 26 U/L (ref 10–35)
ANION GAP SERPL CALCULATED.3IONS-SCNC: 7 MMOL/L (ref 7–15)
AST SERPL W P-5'-P-CCNC: 31 U/L (ref 10–35)
BILIRUB SERPL-MCNC: 0.9 MG/DL
BUN SERPL-MCNC: 9.1 MG/DL (ref 8–23)
CALCIUM SERPL-MCNC: 8.6 MG/DL (ref 8.8–10.2)
CHLORIDE SERPL-SCNC: 105 MMOL/L (ref 98–107)
CREAT SERPL-MCNC: 0.53 MG/DL (ref 0.51–0.95)
DEPRECATED HCO3 PLAS-SCNC: 28 MMOL/L (ref 22–29)
ERYTHROCYTE [DISTWIDTH] IN BLOOD BY AUTOMATED COUNT: 11.9 % (ref 10–15)
GFR SERPL CREATININE-BSD FRML MDRD: >90 ML/MIN/1.73M2
GLUCOSE SERPL-MCNC: 91 MG/DL (ref 70–99)
HCT VFR BLD AUTO: 42.1 % (ref 35–47)
HGB BLD-MCNC: 14.2 G/DL (ref 11.7–15.7)
MCH RBC QN AUTO: 32.7 PG (ref 26.5–33)
MCHC RBC AUTO-ENTMCNC: 33.7 G/DL (ref 31.5–36.5)
MCV RBC AUTO: 97 FL (ref 78–100)
PLATELET # BLD AUTO: 336 10E3/UL (ref 150–450)
POTASSIUM SERPL-SCNC: 4.9 MMOL/L (ref 3.4–5.3)
PROT SERPL-MCNC: 7.2 G/DL (ref 6.4–8.3)
RBC # BLD AUTO: 4.34 10E6/UL (ref 3.8–5.2)
SODIUM SERPL-SCNC: 140 MMOL/L (ref 136–145)
WBC # BLD AUTO: 4.4 10E3/UL (ref 4–11)

## 2023-03-25 PROCEDURE — 36415 COLL VENOUS BLD VENIPUNCTURE: CPT

## 2023-03-25 PROCEDURE — 85027 COMPLETE CBC AUTOMATED: CPT

## 2023-03-25 PROCEDURE — 80053 COMPREHEN METABOLIC PANEL: CPT

## 2023-03-27 RX ORDER — METHOCARBAMOL 500 MG/1
TABLET, FILM COATED ORAL
Qty: 90 TABLET | Refills: 2 | Status: SHIPPED | OUTPATIENT
Start: 2023-03-27

## 2023-03-28 DIAGNOSIS — G43.011 INTRACTABLE MIGRAINE WITHOUT AURA AND WITH STATUS MIGRAINOSUS: ICD-10-CM

## 2023-03-29 RX ORDER — GABAPENTIN 600 MG/1
600 TABLET ORAL 4 TIMES DAILY
Qty: 120 TABLET | Refills: 1 | Status: SHIPPED | OUTPATIENT
Start: 2023-03-29

## 2023-03-30 RX ORDER — SUMATRIPTAN 50 MG/1
TABLET, FILM COATED ORAL
Qty: 18 TABLET | Refills: 2 | OUTPATIENT
Start: 2023-03-30

## 2023-03-30 NOTE — H&P (VIEW-ONLY)
[address] [address]  Phone: [phone]  Fax: [phone]  Primary Provider: Eloisa Fuentes  Pre-op Performing Provider: FAWAD WALLS      PREOPERATIVE EVALUATION:  Today's date: 4/4/2023    Angeli Galindo is a 63 year old female who presents for a preoperative evaluation.  No flowsheet data found.  Surgical Information:  Surgery/Procedure: ROBOT-ASSISTED, LAPAROSCOPIC LEFT PARTIAL NEPHRECTOMY   possible radical possible open,  Surgery Location: [facility]  Surgeon: Johnny Renee MD  Surgery Date: 04/14/2023  Time of Surgery: 7:30 AM  Where patient plans to recover: At home with family  Fax number for surgical facility: Note does not need to be faxed, will be available electronically in Epic.      Assessment & Plan     The proposed surgical procedure is considered INTERMEDIATE risk.    Problem List Items Addressed This Visit        Urinary    Left renal mass       Hematologic    Spherocytosis, hereditary (H)     S/p splenectomy.             Behavioral    Depression with anxiety     PHQ9 score is 1 today         Relevant Medications    gabapentin (NEURONTIN) 600 MG tablet   Other Visit Diagnoses     Preoperative examination    -  Primary    Relevant Orders    EKG 12-lead, tracing only (Completed)    Vitamin D deficiency        Relevant Orders    Vitamin D Deficiency    Elevated blood pressure reading without diagnosis of hypertension        Follow up with PCP regarding blood pressure following surgery           Medication Instructions:  - Hold supplements x 7 days  - Hold Eliquis 3 days prior to surgery     RECOMMENDATION:  APPROVAL GIVEN to proceed with proposed procedure pending review of diagnostic evaluation (EKG pending)     Addendum 4/13/23: EKG shows NSR. Proceed with proposed surgery as scheduled.       Subjective     HPI related to upcoming procedure: Angeli Galindo is a 62 y/o here for a preoperative evaluation. She had an  incidental finding of a left renal mass on imaging in April 2022. She was under active surveillance but the scan from November 2022 showed enlargement so she is now scheduled to undergo left partial nephrectomy.     PMH of Protein S deficiency with multiple DVTs and a PE anticoagulated with a DOAC, apixaban.    H/o an injury in 2010 that lead to chronic pain in particular low back pain and neuropathy. She is followed by Physical Medicine in [place] and prescribed Norco, gabapentin, and duloxetine. She also has NANDINI procedures to alleviate back pain.     Answers for HPI/ROS submitted by the patient on 4/4/2023  If you checked off any problems, how difficult have these problems made it for you to do your work, take care of things at home, or get along with other people?: Not difficult at all  PHQ9 TOTAL SCORE: 1    Preop Questions 3/28/2023   1. Have you ever had a heart attack or stroke? No   2. Have you ever had surgery on your heart or blood vessels, such as a stent placement, a coronary artery bypass, or surgery on an artery in your head, neck, heart, or legs? No   3. Do you have chest pain with activity? No   4. Do you have a history of  heart failure? No   5. Do you currently have a cold, bronchitis or symptoms of other infection? No   6. Do you have a cough, shortness of breath, or wheezing? No   7. Do you or anyone in your family have previous history of blood clots? YES - personal history of DVT, provoked and unprovoked as well we PE    8. Do you or does anyone in your family have a serious bleeding problem such as prolonged bleeding following surgeries or cuts? No   9. Have you ever had problems with anemia or been told to take iron pills? No   10. Have you had any abnormal blood loss such as black, tarry or bloody stools, or abnormal vaginal bleeding? No   11. Have you ever had a blood transfusion? No   12. Are you willing to have a blood transfusion if it is medically needed before, during, or after your  surgery? Yes   13. Have you or any of your relatives ever had problems with anesthesia? No   14. Do you have sleep apnea, excessive snoring or daytime drowsiness? No   15. Do you have any artifical heart valves or other implanted medical devices like a pacemaker, defibrillator, or continuous glucose monitor? No   16. Do you have artificial joints? No   17. Are you allergic to latex? No       Health Care Directive:  Patient does not have a Health Care Directive or Living Will: Discussed advance care planning with patient; however, patient declined at this time.    Preoperative Review of :   reviewed - controlled substances reflected in medication list.      Review of Systems  CONSTITUTIONAL: NEGATIVE for fever, chills, change in weight  INTEGUMENTARY/SKIN: NEGATIVE for worrisome rashes, moles or lesions  EYES: NEGATIVE for vision changes or irritation  ENT/MOUTH: NEGATIVE for ear, mouth and throat problems  RESP: NEGATIVE for significant cough or SOB  CV: NEGATIVE for chest pain, palpitations or peripheral edema  GI: NEGATIVE for nausea, abdominal pain, heartburn, or change in bowel habits  : NEGATIVE for frequency, dysuria, or hematuria  MUSCULOSKELETAL: NEGATIVE for significant arthralgias or myalgia  NEURO: NEGATIVE for weakness, dizziness or paresthesias  ENDOCRINE: NEGATIVE for temperature intolerance, skin/hair changes  HEME: NEGATIVE for bleeding problems  PSYCHIATRIC: NEGATIVE for changes in mood or affect      Patient Active Problem List    Diagnosis Date Noted     Spherocytosis, hereditary (H) 04/07/2023     Priority: Medium     Left renal mass 04/07/2023     Priority: Medium     Depression with anxiety 04/04/2023     Priority: Medium     Subdural hematoma (H) 04/08/2022     Priority: Medium      Past Medical History:   Diagnosis Date     Arthritis     osteo     Bilateral low back pain without sciatica      Cancer (H) 2022    Lt Kidney     Chronic deep vein thrombosis (DVT) of lower extremity (H)      protein C deficiency, refuted during hospital  with normal levels     Closed blow-out fracture of left orbital floor (H) 2022     Depressive disorder since      H/O splenectomy      Hypertension     until      Obesity due to excess calories     s/p gastric bypass, 320 lb to 170 lbs     Spherocytosis (H)      Past Surgical History:   Procedure Laterality Date     APPENDECTOMY       BACK SURGERY      L4/5 microdiskectomy      SECTION       CHOLECYSTECTOMY N/A      COLONOSCOPY       EXCISE LESION TRUNK N/A 2021    Procedure: EXCISE CYST ABDOMINAL WALL;  Surgeon: Junior Velasco MD;  Location: [facility] Main OR     GASTRIC BYPASS  2016    neto en y     GI SURGERY       GYN SURGERY      Hysterectomy      IR REMOVAL IVC FILTER       OPEN REDUCTION INTERNAL FIXATION FEMUR MIDSHAFT Right 2022    Procedure: Right hip pinning;  Surgeon: Darrel Nguyen MD;  Location: [facility] OR     ORTHOPEDIC SURGERY       Current Outpatient Medications   Medication Sig Dispense Refill     Calcium Carbonate-Vitamin D (CALCIUM-VITAMIN D) 600-125 MG-UNIT TABS Take 1 tablet by mouth every evening       DULoxetine (CYMBALTA) 60 MG capsule Take 1 capsule (60 mg) by mouth daily 90 capsule 1     ELIQUIS ANTICOAGULANT 2.5 MG tablet Take 2.5 mg by mouth 2 times daily       gabapentin (NEURONTIN) 600 MG tablet Take 1 tablet (600 mg) by mouth 4 times daily Take 600mg in the morning and 1200mg at bedtime       HYDROcodone-acetaminophen (NORCO)  MG per tablet Take 1 tablet by mouth 4 times daily as needed       Prenatal Vit-Fe Fumarate-FA (PRENATAL VITAMINS PO) Take 2 tablets by mouth every evening       RISEdronate (ACTONEL) 150 MG tablet Take 1 tablet (150 mg) by mouth every 30 days 3 tablet 0     SUMAtriptan (IMITREX) 50 MG tablet Take 1 tablet (50 mg) by mouth at onset of headache for migraine May repeat in 2 hours. Max 4 tablets/24 hours. 12 tablet 3     Melatonin 10 MG CAPS Take 1  "capsule by mouth At Bedtime (Patient not taking: Reported on 2022)         Allergies   Allergen Reactions     Shellfish-Derived Products Angioedema     Lisinopril Cough        Social History     Tobacco Use     Smoking status: Former     Packs/day: 0.50     Years: 5.00     Pack years: 2.50     Types: Cigarettes     Quit date: 2000     Years since quittin.2     Smokeless tobacco: Never   Vaping Use     Vaping status: Never Used   Substance Use Topics     Alcohol use: Yes     Comment: 5x week     Family History   Problem Relation Age of Onset     Parkinsonism Mother      Diabetes Mother         T2DM     Hypertension Mother         (dec)     Diabetes Type 2  Father      Prostate Cancer Maternal Grandfather         (dec)     Cerebrovascular Disease Maternal Grandmother         (dec)     Diabetes Sister         T2DM     Hypertension Sister      Obesity Sister      Diabetes Brother         T2DM     Hypertension Brother      Obesity Brother      Hypertension Other      Hypertension Sister      Obesity Sister      History   Drug Use Unknown         Objective     BP (!) 146/80   Pulse 63   Temp 98.4  F (36.9  C) (Oral)   Ht 1.702 m (5' 7\")   Wt 76 kg (167 lb 8 oz)   SpO2 100%   BMI 26.23 kg/m      Physical Exam    GENERAL APPEARANCE: healthy, alert and no distress     EYES: EOMI, PERRL     HENT: ear canals and TM's normal and nose and mouth without ulcers or lesions     NECK: no adenopathy, no asymmetry, masses, or scars and thyroid normal to palpation     RESP: lungs clear to auscultation - no rales, rhonchi or wheezes     CV: regular rates and rhythm, normal S1 S2, no S3 or S4 and no murmur, click or rub     ABDOMEN:  soft, nontender, no HSM or masses and bowel sounds normal     MS: extremities normal- no gross deformities noted, no evidence of inflammation in joints, FROM in all extremities.     SKIN: no suspicious lesions or rashes     NEURO: Normal strength and tone, sensory exam grossly normal, " mentation intact and speech normal     PSYCH: mentation appears normal. and affect normal/bright     LYMPHATICS: No cervical adenopathy    Recent Labs   Lab Test 03/25/23  1135 04/11/22  0650 04/09/22  0645 04/08/22  1150 04/08/22  1149 04/08/22  1148   HGB 14.2 12.0   < > 13.5  --   --     280   < > 301  --   --    INR  --   --   --  1.00  --  0.9*    143   < > 140  --   --    POTASSIUM 4.9 4.1   < > 3.7  --   --    CR 0.53 0.42*   < > 0.52   < >  --     < > = values in this interval not displayed.        Diagnostics:  Recent Results (from the past 720 hour(s))   CBC with platelets    Collection Time: 03/25/23 11:35 AM   Result Value Ref Range    WBC Count 4.4 4.0 - 11.0 10e3/uL    RBC Count 4.34 3.80 - 5.20 10e6/uL    Hemoglobin 14.2 11.7 - 15.7 g/dL    Hematocrit 42.1 35.0 - 47.0 %    MCV 97 78 - 100 fL    MCH 32.7 26.5 - 33.0 pg    MCHC 33.7 31.5 - 36.5 g/dL    RDW 11.9 10.0 - 15.0 %    Platelet Count 336 150 - 450 10e3/uL   Comprehensive metabolic panel    Collection Time: 03/25/23 11:35 AM   Result Value Ref Range    Sodium 140 136 - 145 mmol/L    Potassium 4.9 3.4 - 5.3 mmol/L    Chloride 105 98 - 107 mmol/L    Carbon Dioxide (CO2) 28 22 - 29 mmol/L    Anion Gap 7 7 - 15 mmol/L    Urea Nitrogen 9.1 8.0 - 23.0 mg/dL    Creatinine 0.53 0.51 - 0.95 mg/dL    Calcium 8.6 (L) 8.8 - 10.2 mg/dL    Glucose 91 70 - 99 mg/dL    Alkaline Phosphatase 118 (H) 35 - 104 U/L    AST 31 10 - 35 U/L    ALT 26 10 - 35 U/L    Protein Total 7.2 6.4 - 8.3 g/dL    Albumin 4.2 3.5 - 5.2 g/dL    Bilirubin Total 0.9 <=1.2 mg/dL    GFR Estimate >90 >60 mL/min/1.73m2      EKG is ordered. She does not have time to do this today so appointment scheduled for week of 4/10 to do EKG     Revised Cardiac Risk Index (RCRI):  The patient has the following serious cardiovascular risks for perioperative complications:   - No serious cardiac risks = 0 points     RCRI Interpretation: 0 points: Class I (very low risk - 0.4% complication  rate)           Signed Electronically by: Ruth Ann Mccabe NP  Copy of this evaluation report is provided to requesting physician.

## 2023-03-30 NOTE — PROGRESS NOTES
[address] [address]  Phone: [phone]  Fax: [phone]  Primary Provider: Eloisa Fuentes  Pre-op Performing Provider: FAWAD WALLS      PREOPERATIVE EVALUATION:  Today's date: 4/4/2023    Angeli Galindo is a 63 year old female who presents for a preoperative evaluation.  No flowsheet data found.  Surgical Information:  Surgery/Procedure: ROBOT-ASSISTED, LAPAROSCOPIC LEFT PARTIAL NEPHRECTOMY   possible radical possible open,  Surgery Location: [facility]  Surgeon: Johnny Renee MD  Surgery Date: 04/14/2023  Time of Surgery: 7:30 AM  Where patient plans to recover: At home with family  Fax number for surgical facility: Note does not need to be faxed, will be available electronically in Epic.      Assessment & Plan     The proposed surgical procedure is considered INTERMEDIATE risk.    Problem List Items Addressed This Visit        Urinary    Left renal mass       Hematologic    Spherocytosis, hereditary (H)     S/p splenectomy.             Behavioral    Depression with anxiety     PHQ9 score is 1 today         Relevant Medications    gabapentin (NEURONTIN) 600 MG tablet   Other Visit Diagnoses     Preoperative examination    -  Primary    Relevant Orders    EKG 12-lead, tracing only (Completed)    Vitamin D deficiency        Relevant Orders    Vitamin D Deficiency    Elevated blood pressure reading without diagnosis of hypertension        Follow up with PCP regarding blood pressure following surgery           Medication Instructions:  - Hold supplements x 7 days  - Hold Eliquis 3 days prior to surgery     RECOMMENDATION:  APPROVAL GIVEN to proceed with proposed procedure pending review of diagnostic evaluation (EKG pending)     Addendum 4/13/23: EKG shows NSR. Proceed with proposed surgery as scheduled.       Subjective     HPI related to upcoming procedure: Angeli Galindo is a 64 y/o here for a preoperative evaluation. She had an  incidental finding of a left renal mass on imaging in April 2022. She was under active surveillance but the scan from November 2022 showed enlargement so she is now scheduled to undergo left partial nephrectomy.     PMH of Protein S deficiency with multiple DVTs and a PE anticoagulated with a DOAC, apixaban.    H/o an injury in 2010 that lead to chronic pain in particular low back pain and neuropathy. She is followed by Physical Medicine in [place] and prescribed Norco, gabapentin, and duloxetine. She also has NANDINI procedures to alleviate back pain.     Answers for HPI/ROS submitted by the patient on 4/4/2023  If you checked off any problems, how difficult have these problems made it for you to do your work, take care of things at home, or get along with other people?: Not difficult at all  PHQ9 TOTAL SCORE: 1    Preop Questions 3/28/2023   1. Have you ever had a heart attack or stroke? No   2. Have you ever had surgery on your heart or blood vessels, such as a stent placement, a coronary artery bypass, or surgery on an artery in your head, neck, heart, or legs? No   3. Do you have chest pain with activity? No   4. Do you have a history of  heart failure? No   5. Do you currently have a cold, bronchitis or symptoms of other infection? No   6. Do you have a cough, shortness of breath, or wheezing? No   7. Do you or anyone in your family have previous history of blood clots? YES - personal history of DVT, provoked and unprovoked as well we PE    8. Do you or does anyone in your family have a serious bleeding problem such as prolonged bleeding following surgeries or cuts? No   9. Have you ever had problems with anemia or been told to take iron pills? No   10. Have you had any abnormal blood loss such as black, tarry or bloody stools, or abnormal vaginal bleeding? No   11. Have you ever had a blood transfusion? No   12. Are you willing to have a blood transfusion if it is medically needed before, during, or after your  surgery? Yes   13. Have you or any of your relatives ever had problems with anesthesia? No   14. Do you have sleep apnea, excessive snoring or daytime drowsiness? No   15. Do you have any artifical heart valves or other implanted medical devices like a pacemaker, defibrillator, or continuous glucose monitor? No   16. Do you have artificial joints? No   17. Are you allergic to latex? No       Health Care Directive:  Patient does not have a Health Care Directive or Living Will: Discussed advance care planning with patient; however, patient declined at this time.    Preoperative Review of :   reviewed - controlled substances reflected in medication list.      Review of Systems  CONSTITUTIONAL: NEGATIVE for fever, chills, change in weight  INTEGUMENTARY/SKIN: NEGATIVE for worrisome rashes, moles or lesions  EYES: NEGATIVE for vision changes or irritation  ENT/MOUTH: NEGATIVE for ear, mouth and throat problems  RESP: NEGATIVE for significant cough or SOB  CV: NEGATIVE for chest pain, palpitations or peripheral edema  GI: NEGATIVE for nausea, abdominal pain, heartburn, or change in bowel habits  : NEGATIVE for frequency, dysuria, or hematuria  MUSCULOSKELETAL: NEGATIVE for significant arthralgias or myalgia  NEURO: NEGATIVE for weakness, dizziness or paresthesias  ENDOCRINE: NEGATIVE for temperature intolerance, skin/hair changes  HEME: NEGATIVE for bleeding problems  PSYCHIATRIC: NEGATIVE for changes in mood or affect      Patient Active Problem List    Diagnosis Date Noted     Spherocytosis, hereditary (H) 04/07/2023     Priority: Medium     Left renal mass 04/07/2023     Priority: Medium     Depression with anxiety 04/04/2023     Priority: Medium     Subdural hematoma (H) 04/08/2022     Priority: Medium      Past Medical History:   Diagnosis Date     Arthritis     osteo     Bilateral low back pain without sciatica      Cancer (H) 2022    Lt Kidney     Chronic deep vein thrombosis (DVT) of lower extremity (H)      protein C deficiency, refuted during hospital  with normal levels     Closed blow-out fracture of left orbital floor (H) 2022     Depressive disorder since      H/O splenectomy      Hypertension     until      Obesity due to excess calories     s/p gastric bypass, 320 lb to 170 lbs     Spherocytosis (H)      Past Surgical History:   Procedure Laterality Date     APPENDECTOMY       BACK SURGERY      L4/5 microdiskectomy      SECTION       CHOLECYSTECTOMY N/A      COLONOSCOPY       EXCISE LESION TRUNK N/A 2021    Procedure: EXCISE CYST ABDOMINAL WALL;  Surgeon: Junior Velasco MD;  Location: [facility] Main OR     GASTRIC BYPASS  2016    neto en y     GI SURGERY       GYN SURGERY      Hysterectomy      IR REMOVAL IVC FILTER       OPEN REDUCTION INTERNAL FIXATION FEMUR MIDSHAFT Right 2022    Procedure: Right hip pinning;  Surgeon: Darrel Nguyen MD;  Location: [facility] OR     ORTHOPEDIC SURGERY       Current Outpatient Medications   Medication Sig Dispense Refill     Calcium Carbonate-Vitamin D (CALCIUM-VITAMIN D) 600-125 MG-UNIT TABS Take 1 tablet by mouth every evening       DULoxetine (CYMBALTA) 60 MG capsule Take 1 capsule (60 mg) by mouth daily 90 capsule 1     ELIQUIS ANTICOAGULANT 2.5 MG tablet Take 2.5 mg by mouth 2 times daily       gabapentin (NEURONTIN) 600 MG tablet Take 1 tablet (600 mg) by mouth 4 times daily Take 600mg in the morning and 1200mg at bedtime       HYDROcodone-acetaminophen (NORCO)  MG per tablet Take 1 tablet by mouth 4 times daily as needed       Prenatal Vit-Fe Fumarate-FA (PRENATAL VITAMINS PO) Take 2 tablets by mouth every evening       RISEdronate (ACTONEL) 150 MG tablet Take 1 tablet (150 mg) by mouth every 30 days 3 tablet 0     SUMAtriptan (IMITREX) 50 MG tablet Take 1 tablet (50 mg) by mouth at onset of headache for migraine May repeat in 2 hours. Max 4 tablets/24 hours. 12 tablet 3     Melatonin 10 MG CAPS Take 1  "capsule by mouth At Bedtime (Patient not taking: Reported on 2022)         Allergies   Allergen Reactions     Shellfish-Derived Products Angioedema     Lisinopril Cough        Social History     Tobacco Use     Smoking status: Former     Packs/day: 0.50     Years: 5.00     Pack years: 2.50     Types: Cigarettes     Quit date: 2000     Years since quittin.2     Smokeless tobacco: Never   Vaping Use     Vaping status: Never Used   Substance Use Topics     Alcohol use: Yes     Comment: 5x week     Family History   Problem Relation Age of Onset     Parkinsonism Mother      Diabetes Mother         T2DM     Hypertension Mother         (dec)     Diabetes Type 2  Father      Prostate Cancer Maternal Grandfather         (dec)     Cerebrovascular Disease Maternal Grandmother         (dec)     Diabetes Sister         T2DM     Hypertension Sister      Obesity Sister      Diabetes Brother         T2DM     Hypertension Brother      Obesity Brother      Hypertension Other      Hypertension Sister      Obesity Sister      History   Drug Use Unknown         Objective     BP (!) 146/80   Pulse 63   Temp 98.4  F (36.9  C) (Oral)   Ht 1.702 m (5' 7\")   Wt 76 kg (167 lb 8 oz)   SpO2 100%   BMI 26.23 kg/m      Physical Exam    GENERAL APPEARANCE: healthy, alert and no distress     EYES: EOMI, PERRL     HENT: ear canals and TM's normal and nose and mouth without ulcers or lesions     NECK: no adenopathy, no asymmetry, masses, or scars and thyroid normal to palpation     RESP: lungs clear to auscultation - no rales, rhonchi or wheezes     CV: regular rates and rhythm, normal S1 S2, no S3 or S4 and no murmur, click or rub     ABDOMEN:  soft, nontender, no HSM or masses and bowel sounds normal     MS: extremities normal- no gross deformities noted, no evidence of inflammation in joints, FROM in all extremities.     SKIN: no suspicious lesions or rashes     NEURO: Normal strength and tone, sensory exam grossly normal, " mentation intact and speech normal     PSYCH: mentation appears normal. and affect normal/bright     LYMPHATICS: No cervical adenopathy    Recent Labs   Lab Test 03/25/23  1135 04/11/22  0650 04/09/22  0645 04/08/22  1150 04/08/22  1149 04/08/22  1148   HGB 14.2 12.0   < > 13.5  --   --     280   < > 301  --   --    INR  --   --   --  1.00  --  0.9*    143   < > 140  --   --    POTASSIUM 4.9 4.1   < > 3.7  --   --    CR 0.53 0.42*   < > 0.52   < >  --     < > = values in this interval not displayed.        Diagnostics:  Recent Results (from the past 720 hour(s))   CBC with platelets    Collection Time: 03/25/23 11:35 AM   Result Value Ref Range    WBC Count 4.4 4.0 - 11.0 10e3/uL    RBC Count 4.34 3.80 - 5.20 10e6/uL    Hemoglobin 14.2 11.7 - 15.7 g/dL    Hematocrit 42.1 35.0 - 47.0 %    MCV 97 78 - 100 fL    MCH 32.7 26.5 - 33.0 pg    MCHC 33.7 31.5 - 36.5 g/dL    RDW 11.9 10.0 - 15.0 %    Platelet Count 336 150 - 450 10e3/uL   Comprehensive metabolic panel    Collection Time: 03/25/23 11:35 AM   Result Value Ref Range    Sodium 140 136 - 145 mmol/L    Potassium 4.9 3.4 - 5.3 mmol/L    Chloride 105 98 - 107 mmol/L    Carbon Dioxide (CO2) 28 22 - 29 mmol/L    Anion Gap 7 7 - 15 mmol/L    Urea Nitrogen 9.1 8.0 - 23.0 mg/dL    Creatinine 0.53 0.51 - 0.95 mg/dL    Calcium 8.6 (L) 8.8 - 10.2 mg/dL    Glucose 91 70 - 99 mg/dL    Alkaline Phosphatase 118 (H) 35 - 104 U/L    AST 31 10 - 35 U/L    ALT 26 10 - 35 U/L    Protein Total 7.2 6.4 - 8.3 g/dL    Albumin 4.2 3.5 - 5.2 g/dL    Bilirubin Total 0.9 <=1.2 mg/dL    GFR Estimate >90 >60 mL/min/1.73m2      EKG is ordered. She does not have time to do this today so appointment scheduled for week of 4/10 to do EKG     Revised Cardiac Risk Index (RCRI):  The patient has the following serious cardiovascular risks for perioperative complications:   - No serious cardiac risks = 0 points     RCRI Interpretation: 0 points: Class I (very low risk - 0.4% complication  rate)           Signed Electronically by: Ruth Ann Mccabe NP  Copy of this evaluation report is provided to requesting physician.

## 2023-03-31 ENCOUNTER — OFFICE VISIT (OUTPATIENT)
Dept: SURGERY | Facility: CLINIC | Age: 63
End: 2023-03-31
Payer: COMMERCIAL

## 2023-03-31 DIAGNOSIS — M51.26 LUMBAR HERNIATED DISC: ICD-10-CM

## 2023-03-31 DIAGNOSIS — M51.9 LUMBAR DISC DISEASE: ICD-10-CM

## 2023-03-31 DIAGNOSIS — M54.16 LUMBAR RADICULOPATHY: Primary | ICD-10-CM

## 2023-03-31 PROCEDURE — 64483 NJX AA&/STRD TFRM EPI L/S 1: CPT | Performed by: PHYSICAL MEDICINE & REHABILITATION

## 2023-03-31 NOTE — PROCEDURES
[facility]    BILATERAL LUMBAR TRANSFORAMINAL   NAME:  Maria Del Rosario Zaragoza    MR #:    [id number] :  1960     PHYSICIAN:  José Miguel Dela Cruz MD        Operative Report    DATE OF PROCEDURE: 3/31/2023   PREOPERATIVE DIAGNOSES: 1. Bilateral L5 radiculopathies     2. L5-S1 left mod foraminal, L4-5 mod diffuse, L3-4 mod diffuse, L2-3 left mod foraminal & mild-mod diffuse bulging discs    3. L5-S1 mild central herniated disc        POSTOPERATIVE DIAGNOSES:   1. Bilateral L5 radiculopathies     2. L5-S1 left mod foraminal, L4-5 mod diffuse, L3-4 mod diffuse, L2-3 left mod foraminal & mild-mod diffuse bulging discs    3. L5-S1 mild central herniated disc        PROCEDURES: Bilateral L5 transforaminal epidural steroid injections done under fluoroscopic guidance with contrast enhancement. SURGEON: José Miguel Saunders MD   ANESTHESIA: Local   INDICATIONS:      OPERATIVE PROCEDURE:  Written consent was obtained from the patient. The patient was brought into the operating room and placed in the prone position on the fluoroscopy table with pillow underneath her abdomen. The patient's skin was cleaned and draped in a normal sterile fashion. Using AP fluoroscopy, all five lumbar vertebrae were identified. When the fifth vertebra was identified, fluoroscopy was left anterior obliqued opening up the right L5-S1 intervertebral foramen. At this point in time, the patient's skin was anesthetized with 1% PF lidocaine without epinephrine. Then, a 5 inch, 22-gauge spinal needle was inserted and directed towards the right L5-S1 intervertebral foramen. When it felt to be in good position, AP fluoroscopy was used to advance the needle to the 6 o'clock position on the right L5 pedicle. At this point in time, Omnipaque-240 contrast was used to obtain a good epidurogram indicating correct needle placement. Then, aspiration was performed. No blood, fluid, or air was aspirated.   Then, the patient was injected with a 4 cc solution of 2 cc of 40 mg/cc of Kenalog and 2 cc of 1% PF lidocaine without epinephrine. After this, the needle was removed. Then  fluoroscopy was right anterior obliqued opening up the left L5-S1 intervertebral foramen. At this point in time, the patient's skin was anesthetized with 1 to 2 cc of 1% PF lidocaine without epinephrine. Then, a 5 inch, 22-gauge spinal needle was inserted and directed towards the left L5-S1 intervertebral foramen. When it felt to be in good position, AP fluoroscopy was used to advance the needle to the 6 o'clock position on the left L5 pedicle. At this point in time, Omnipaque-240 contrast was used to obtain a good epidurogram indicating correct needle placement. Then, aspiration was performed. No blood, fluid, or air was aspirated. Then, the patient was injected with a 4 cc solution of 2 cc of 40 mg/cc of Kenalog and 2 cc of 1% PF lidocaine without epinephrine. After this, the needle was removed. The patient's skin was cleaned. Band-Aids were applied. The patient was transferred to the cart and into [facility]. The patient was given discharge instructions and will follow up in the clinic as scheduled. Throughout the whole procedure, the patient's pulse oximetry and vital signs were monitored and they remained completely stable. Also, throughout the whole procedure, prior to injection of any medication, aspiration was performed. No blood, fluid, or air was aspirated at anytime.

## 2023-04-03 ENCOUNTER — PATIENT MESSAGE (OUTPATIENT)
Dept: PHYSICAL MEDICINE AND REHAB | Facility: CLINIC | Age: 63
End: 2023-04-03

## 2023-04-03 RX ORDER — HYDROCODONE BITARTRATE AND ACETAMINOPHEN 10; 325 MG/1; MG/1
1 TABLET ORAL EVERY 6 HOURS PRN
Qty: 120 TABLET | Refills: 0 | OUTPATIENT
Start: 2023-04-03 | End: 2023-05-03

## 2023-04-03 NOTE — TELEPHONE ENCOUNTER
Refill Request    Medication request: HYDROcodone-acetaminophen  MG Oral Tab    LOV:3/9/2023 Orestes Saunders MD   RTC: 3-4 months   NOV: 7/10/2023 Zehra Damico MD      [facility]: [facility]    UDS: (if applicable): N/A  Pain contract: Valid through 8/18/2023    LOV plan (if weaning or changing medications):  She will continue with the Norco

## 2023-04-03 NOTE — TELEPHONE ENCOUNTER
From: Jose Ponce  To: Nathalia Ayala MD  Sent: 4/3/2023 1:50 PM CDT  Subject: Jet Mahajan. .. My mistake. I've been awake since 2 am and did not know I remembered to do it or not.

## 2023-04-03 NOTE — TELEPHONE ENCOUNTER
Pt. had Bilateral L5 transforaminal epidural steroid injections on 03/31/23. S/w Pt. States to disregard message. S/w Jemma TRIPP  And requested refill of Norco.

## 2023-04-04 ENCOUNTER — OFFICE VISIT (OUTPATIENT)
Dept: INTERNAL MEDICINE | Facility: CLINIC | Age: 63
End: 2023-04-04
Payer: COMMERCIAL

## 2023-04-04 VITALS
OXYGEN SATURATION: 100 % | SYSTOLIC BLOOD PRESSURE: 146 MMHG | TEMPERATURE: 98.4 F | WEIGHT: 167.5 LBS | HEIGHT: 67 IN | HEART RATE: 63 BPM | DIASTOLIC BLOOD PRESSURE: 80 MMHG | BODY MASS INDEX: 26.29 KG/M2

## 2023-04-04 DIAGNOSIS — D58.0 SPHEROCYTOSIS, HEREDITARY (H): ICD-10-CM

## 2023-04-04 DIAGNOSIS — N28.89 LEFT RENAL MASS: ICD-10-CM

## 2023-04-04 DIAGNOSIS — R03.0 ELEVATED BLOOD PRESSURE READING WITHOUT DIAGNOSIS OF HYPERTENSION: ICD-10-CM

## 2023-04-04 DIAGNOSIS — E55.9 VITAMIN D DEFICIENCY: ICD-10-CM

## 2023-04-04 DIAGNOSIS — F41.8 DEPRESSION WITH ANXIETY: ICD-10-CM

## 2023-04-04 DIAGNOSIS — Z01.818 PREOPERATIVE EXAMINATION: Primary | ICD-10-CM

## 2023-04-04 PROCEDURE — 93005 ELECTROCARDIOGRAM TRACING: CPT | Performed by: NURSE PRACTITIONER

## 2023-04-04 PROCEDURE — 99214 OFFICE O/P EST MOD 30 MIN: CPT | Performed by: NURSE PRACTITIONER

## 2023-04-04 RX ORDER — HYDROCODONE BITARTRATE AND ACETAMINOPHEN 10; 325 MG/1; MG/1
1 TABLET ORAL EVERY 6 HOURS PRN
Qty: 120 TABLET | Refills: 0 | Status: SHIPPED | OUTPATIENT
Start: 2023-04-04 | End: 2023-05-04

## 2023-04-04 RX ORDER — GABAPENTIN 600 MG/1
600 TABLET ORAL 4 TIMES DAILY
Start: 2023-04-04

## 2023-04-04 ASSESSMENT — PATIENT HEALTH QUESTIONNAIRE - PHQ9
SUM OF ALL RESPONSES TO PHQ QUESTIONS 1-9: 1
10. IF YOU CHECKED OFF ANY PROBLEMS, HOW DIFFICULT HAVE THESE PROBLEMS MADE IT FOR YOU TO DO YOUR WORK, TAKE CARE OF THINGS AT HOME, OR GET ALONG WITH OTHER PEOPLE: NOT DIFFICULT AT ALL
SUM OF ALL RESPONSES TO PHQ QUESTIONS 1-9: 1

## 2023-04-04 ASSESSMENT — PAIN SCALES - GENERAL: PAINLEVEL: MODERATE PAIN (4)

## 2023-04-06 ENCOUNTER — PATIENT OUTREACH (OUTPATIENT)
Dept: UROLOGY | Facility: CLINIC | Age: 63
End: 2023-04-06
Payer: COMMERCIAL

## 2023-04-06 NOTE — TELEPHONE ENCOUNTER
Writer reached out to pt to reschedule her post op appointment.     No answer, left generic VM with call back name and number.     Will continue to follow as needed.

## 2023-04-06 NOTE — TELEPHONE ENCOUNTER
Pt returned writers call regarding post op appointment.     Pt has been rescheduled for 4/19. Pt agreeable to date and time.     Will continue to follow as needed

## 2023-04-07 PROBLEM — D58.0 SPHEROCYTOSIS, HEREDITARY (H): Status: ACTIVE | Noted: 2023-04-07

## 2023-04-07 PROBLEM — N28.89 LEFT RENAL MASS: Status: ACTIVE | Noted: 2023-04-07

## 2023-04-08 ENCOUNTER — ANCILLARY PROCEDURE (OUTPATIENT)
Dept: MAMMOGRAPHY | Facility: CLINIC | Age: 63
End: 2023-04-08
Attending: INTERNAL MEDICINE
Payer: COMMERCIAL

## 2023-04-08 DIAGNOSIS — Z12.31 VISIT FOR SCREENING MAMMOGRAM: ICD-10-CM

## 2023-04-08 PROCEDURE — 77067 SCR MAMMO BI INCL CAD: CPT

## 2023-04-10 ENCOUNTER — PATIENT OUTREACH (OUTPATIENT)
Dept: UROLOGY | Facility: CLINIC | Age: 63
End: 2023-04-10
Payer: COMMERCIAL

## 2023-04-10 NOTE — TELEPHONE ENCOUNTER
Writer reached out to pt to inform her that she has a CT scan tomorrow that will indicate the need for any further follow up.     Pt expressed understanding. Pt stated that she was just curious if there was a need for a PET scan at any point.     Writer stated that the CT scan would show any areas in question and the provider would indicate the need for a PET after surgery.     All questions answered.     Will continue to follow as needed.

## 2023-04-11 ENCOUNTER — HOSPITAL ENCOUNTER (OUTPATIENT)
Dept: CT IMAGING | Facility: HOSPITAL | Age: 63
Discharge: HOME | End: 2023-04-11
Attending: UROLOGY | Admitting: UROLOGY
Payer: COMMERCIAL

## 2023-04-11 ENCOUNTER — LAB (OUTPATIENT)
Dept: LAB | Facility: CLINIC | Age: 63
End: 2023-04-11
Payer: COMMERCIAL

## 2023-04-11 ENCOUNTER — ALLIED HEALTH/NURSE VISIT (OUTPATIENT)
Dept: FAMILY MEDICINE | Facility: CLINIC | Age: 63
End: 2023-04-11
Payer: COMMERCIAL

## 2023-04-11 ENCOUNTER — MYC MEDICAL ADVICE (OUTPATIENT)
Dept: INTERNAL MEDICINE | Facility: CLINIC | Age: 63
End: 2023-04-11

## 2023-04-11 VITALS — DIASTOLIC BLOOD PRESSURE: 88 MMHG | SYSTOLIC BLOOD PRESSURE: 150 MMHG

## 2023-04-11 DIAGNOSIS — Z01.818 PRE-OPERATIVE GENERAL PHYSICAL EXAMINATION: Primary | ICD-10-CM

## 2023-04-11 DIAGNOSIS — Z01.30 BLOOD PRESSURE CHECK: ICD-10-CM

## 2023-04-11 DIAGNOSIS — I10 ESSENTIAL HYPERTENSION: Primary | ICD-10-CM

## 2023-04-11 DIAGNOSIS — E55.9 VITAMIN D DEFICIENCY: ICD-10-CM

## 2023-04-11 PROCEDURE — 250N000011 HC RX IP 250 OP 636: Performed by: UROLOGY

## 2023-04-11 PROCEDURE — 93010 ELECTROCARDIOGRAM REPORT: CPT | Performed by: INTERNAL MEDICINE

## 2023-04-11 PROCEDURE — 93005 ELECTROCARDIOGRAM TRACING: CPT

## 2023-04-11 PROCEDURE — 36415 COLL VENOUS BLD VENIPUNCTURE: CPT

## 2023-04-11 PROCEDURE — 99207 PR NO CHARGE NURSE ONLY: CPT

## 2023-04-11 PROCEDURE — 82306 VITAMIN D 25 HYDROXY: CPT

## 2023-04-11 PROCEDURE — 74178 CT ABD&PLV WO CNTR FLWD CNTR: CPT

## 2023-04-11 RX ORDER — IOPAMIDOL 755 MG/ML
100 INJECTION, SOLUTION INTRAVASCULAR ONCE
Status: COMPLETED | OUTPATIENT
Start: 2023-04-11 | End: 2023-04-11

## 2023-04-11 RX ADMIN — IOPAMIDOL 100 ML: 755 INJECTION, SOLUTION INTRAVENOUS at 16:20

## 2023-04-11 NOTE — PROGRESS NOTES
Call pt- with two separate days with elevated blood pressure as well as elevation back in June 2022, I recommend starting a blood pressure medication. I would recommend amlodipine 5 mg daily. Let me know if she is okay with this and then I would want her to follow up with her PCP in about a month.

## 2023-04-12 DIAGNOSIS — E55.9 VITAMIN D DEFICIENCY: Primary | ICD-10-CM

## 2023-04-12 LAB
ATRIAL RATE - MUSE: 73 BPM
DEPRECATED CALCIDIOL+CALCIFEROL SERPL-MC: 15 UG/L (ref 20–75)
DIASTOLIC BLOOD PRESSURE - MUSE: NORMAL MMHG
INTERPRETATION ECG - MUSE: NORMAL
P AXIS - MUSE: 25 DEGREES
PR INTERVAL - MUSE: 128 MS
QRS DURATION - MUSE: 78 MS
QT - MUSE: 378 MS
QTC - MUSE: 416 MS
R AXIS - MUSE: -7 DEGREES
SYSTOLIC BLOOD PRESSURE - MUSE: NORMAL MMHG
T AXIS - MUSE: 10 DEGREES
VENTRICULAR RATE- MUSE: 73 BPM

## 2023-04-12 RX ORDER — AMLODIPINE BESYLATE 5 MG/1
5 TABLET ORAL DAILY
Qty: 90 TABLET | Refills: 0 | Status: SHIPPED | OUTPATIENT
Start: 2023-04-12 | End: 2023-10-02

## 2023-04-12 RX ORDER — ERGOCALCIFEROL 1.25 MG/1
50000 CAPSULE, LIQUID FILLED ORAL WEEKLY
Qty: 12 CAPSULE | Refills: 0 | Status: SHIPPED | OUTPATIENT
Start: 2023-04-12 | End: 2023-06-29

## 2023-04-12 NOTE — PROGRESS NOTES
Replied to patient's [facility] message before I saw this note. Since she was okay with the amlodipine, I'll plan to stick with this.

## 2023-04-13 ENCOUNTER — ANESTHESIA EVENT (OUTPATIENT)
Dept: SURGERY | Facility: CLINIC | Age: 63
End: 2023-04-13
Payer: COMMERCIAL

## 2023-04-13 ENCOUNTER — TELEPHONE (OUTPATIENT)
Dept: INTERNAL MEDICINE | Facility: CLINIC | Age: 63
End: 2023-04-13

## 2023-04-13 RX ORDER — GABAPENTIN 600 MG/1
2 TABLET ORAL EVERY EVENING
COMMUNITY
End: 2023-05-02

## 2023-04-13 RX ORDER — METHOCARBAMOL 500 MG/1
500 TABLET, FILM COATED ORAL 4 TIMES DAILY PRN
COMMUNITY

## 2023-04-13 ASSESSMENT — LIFESTYLE VARIABLES: TOBACCO_USE: 1

## 2023-04-13 NOTE — TELEPHONE ENCOUNTER
FYI - Status Update    Who is Calling: nurse, [facility]    Update: Patient completed pre op with Ruth Ann Mccabe NP on 04/04/23.  Patient has not been cleared for surgery, Ruth Ann Mccabe's notes indicate pending EKG.  Need Epic updated to reflect if patient is cleared for surgery or not.    Eloisa will monitor Epic for update.

## 2023-04-13 NOTE — ANESTHESIA PREPROCEDURE EVALUATION
Anesthesia Pre-Procedure Evaluation    Patient: Angeli Galindo   MRN: [id number] : 1960        Procedure : Procedure(s):  ROBOT-ASSISTED, LAPAROSCOPIC LEFT PARTIAL NEPHRECTOMY  possible radical possible open          Past Medical History:   Diagnosis Date     Arthritis     osteo     Bilateral low back pain without sciatica      Cancer (H)     Lt Kidney     Chronic deep vein thrombosis (DVT) of lower extremity (H)     protein C deficiency, refuted during hospital  with normal levels     Closed blow-out fracture of left orbital floor (H) 2022     Depressive disorder since      H/O splenectomy      Hypertension     until      Obesity due to excess calories     s/p gastric bypass, 320 lb to 170 lbs     Spherocytosis (H)       Past Surgical History:   Procedure Laterality Date     APPENDECTOMY       BACK SURGERY      L4/5 microdiskectomy      SECTION       CHOLECYSTECTOMY N/A      COLONOSCOPY       EXCISE LESION TRUNK N/A 2021    Procedure: EXCISE CYST ABDOMINAL WALL;  Surgeon: Junior Velasco MD;  Location: [facility] Main OR     GASTRIC BYPASS  2016    neto en y     GI SURGERY       GYN SURGERY      Hysterectomy 1992     IR REMOVAL IVC FILTER       OPEN REDUCTION INTERNAL FIXATION FEMUR MIDSHAFT Right 2022    Procedure: Right hip pinning;  Surgeon: Darrel Nguyen MD;  Location: [facility] OR     ORTHOPEDIC SURGERY        Allergies   Allergen Reactions     Shellfish-Derived Products Angioedema     Lisinopril Cough      Social History     Tobacco Use     Smoking status: Former     Packs/day: 0.50     Years: 5.00     Pack years: 2.50     Types: Cigarettes     Quit date: 2000     Years since quittin.2     Smokeless tobacco: Never   Vaping Use     Vaping status: Never Used   Substance Use Topics     Alcohol use: Yes     Comment: 5x week      Wt Readings from Last 1 Encounters:   23 76 kg (167 lb 8 oz)        Anesthesia Evaluation   Pt has had  prior anesthetic. Type: General.    No history of anesthetic complications       ROS/MED HX  ENT/Pulmonary:     (+) tobacco use (Quit in '00), Past use,     Neurologic: Comment: H/o SDH      Cardiovascular:     (+) Dyslipidemia hypertension-----Taking blood thinners Instructions Given to patient: Tangela.     METS/Exercise Tolerance:     Hematologic: Comments: Hereditary spherocytosis    (+) History of blood clots (Protein S deficiency),     Musculoskeletal:   (+) arthritis,     GI/Hepatic: Comment: S/p gastric bypass      Renal/Genitourinary:     (+) renal disease,     Endo:       Psychiatric/Substance Use:     (+) psychiatric history depression and anxiety H/O chronic opiod use  (Norco 10's).     Infectious Disease: Comment: S/p splenectomy      Malignancy:   (+) Malignancy, History of Other.Other CA Renal Active status post.    Other:      (+) , H/O Chronic Pain (Lumbago),        Physical Exam    Airway        Mallampati: II   TM distance: > 3 FB   Neck ROM: full   Mouth opening: > 3 cm    Respiratory Devices and Support         Dental       (+) Minor Abnormalities - some fillings, tiny chips      Cardiovascular          Rhythm and rate: regular and normal     Pulmonary           breath sounds clear to auscultation           OUTSIDE LABS:  CBC:   Lab Results   Component Value Date    WBC 4.4 03/25/2023    WBC 8.0 04/11/2022    HGB 14.2 03/25/2023    HGB 12.0 04/11/2022    HCT 42.1 03/25/2023    HCT 37.6 04/11/2022     03/25/2023     04/11/2022     BMP:   Lab Results   Component Value Date     03/25/2023     04/11/2022    POTASSIUM 4.9 03/25/2023    POTASSIUM 4.1 04/11/2022    CHLORIDE 105 03/25/2023    CHLORIDE 112 (H) 04/11/2022    CO2 28 03/25/2023    CO2 25 04/11/2022    BUN 9.1 03/25/2023    BUN 9 04/11/2022    CR 0.53 03/25/2023    CR 0.42 (L) 04/11/2022    GLC 91 03/25/2023    GLC 91 04/11/2022     COAGS:   Lab Results   Component Value Date    PTT 25 04/08/2022    INR 1.00  04/08/2022     POC: No results found for: BGM, HCG, HCGS  HEPATIC:   Lab Results   Component Value Date    ALBUMIN 4.2 03/25/2023    PROTTOTAL 7.2 03/25/2023    ALT 26 03/25/2023    AST 31 03/25/2023    ALKPHOS 118 (H) 03/25/2023    BILITOTAL 0.9 03/25/2023     OTHER:   Lab Results   Component Value Date    ALYSSIA 8.6 (L) 03/25/2023    PHOS 3.0 04/11/2022    MAG 2.4 (H) 04/11/2022       Anesthesia Plan    ASA Status:  2   NPO Status:  NPO Appropriate    Anesthesia Type: General.     - Airway: ETT   Induction: Intravenous.   Maintenance: Balanced.   Techniques and Equipment:     - Lines/Monitors: 2nd IV     Consents    Anesthesia Plan(s) and associated risks, benefits, and realistic alternatives discussed. Questions answered and patient/representative(s) expressed understanding.    - Discussed:     - Discussed with:  Patient         Postoperative Care    Pain management: IV analgesics, Oral pain medications, Multi-modal analgesia (Discussed and consented for TAP blocks if converting to open vs lap).   PONV prophylaxis: Ondansetron (or other 5HT-3), Dexamethasone or Solumedrol, Background Propofol Infusion     Comments:                Cruz Holloway MD

## 2023-04-13 NOTE — PROGRESS NOTES
PTA medications updated by Medication Scribe prior to surgery via phone call with patient (last doses completed by Nurse)     Medication history sources: Patient, Surescripts and H&P  In the past week, patient estimated taking medication this percent of the time: Greater than 90%  Adherence assessment: N/A Not Observed    Significant changes made to the medication list:  None      Additional medication history information:   None    Medication reconciliation completed by provider prior to medication history? No    Time spent in this activity: 30 minutes    The information provided in this note is only as accurate as the sources available at the time of update(s)    Prior to Admission medications    Medication Sig Last Dose Taking? Auth Provider Long Term End Date   Calcium Carbonate-Vitamin D (CALCIUM-VITAMIN D) 600-125 MG-UNIT TABS Take 1 tablet by mouth every evening 4/13/2023 at pm Yes Reported, Patient     DULoxetine (CYMBALTA) 60 MG capsule Take 1 capsule (60 mg) by mouth daily 4/13/2023 at pm Yes Eloisa Fuentes MD Yes    ELIQUIS ANTICOAGULANT 2.5 MG tablet Take 2.5 mg by mouth 2 times daily 4/8/2023 at pm Yes Reported, Patient     gabapentin (NEURONTIN) 600 MG tablet Take 2 tablets by mouth every evening (2 x 600 mg = 1,200 mg) 4/13/2023 at pm Yes Reported, Patient Yes    gabapentin (NEURONTIN) 600 MG tablet Take 1 tablet (600 mg) by mouth 4 times daily Take 600mg in the morning and 1200mg at bedtime  Patient taking differently: Take 600 mg by mouth 2 times daily (In addition to the night time dose) 4/13/2023 at pm Yes Ruth Ann Mccabe NP Yes    HYDROcodone-acetaminophen (NORCO)  MG per tablet Take 1 tablet by mouth 4 times daily as needed 4/13/2023 at prn Yes Reported, Patient     Melatonin 10 MG CAPS Take 1 capsule by mouth nightly as needed (for sleep) Unknown at prn Yes Reported, Patient     methocarbamol (ROBAXIN) 500 MG tablet Take 500 mg by mouth 4 times daily as needed for muscle spasms Unknown at  prn Yes Reported, Patient     Prenatal Vit-Fe Fumarate-FA (PRENATAL VITAMINS PO) Take 2 tablets by mouth every evening 4/13/2023 at pm Yes Unknown, Entered By History     RISEdronate (ACTONEL) 150 MG tablet Take 1 tablet (150 mg) by mouth every 30 days 4/1/2023 at am Yes Eloisa Fuentes MD Yes    SUMAtriptan (IMITREX) 50 MG tablet Take 1 tablet (50 mg) by mouth at onset of headache for migraine May repeat in 2 hours. Max 4 tablets/24 hours. Unknown at prn Yes Eloisa Fuentes MD     amLODIPine (NORVASC) 5 MG tablet Take 1 tablet (5 mg) by mouth daily  at Unknown  Ruth Ann Mccabe NP Yes    vitamin D2 (ERGOCALCIFEROL) 88182 units (1250 mcg) capsule Take 1 capsule (50,000 Units) by mouth once a week for 12 doses  at Unknown  Ruth Ann Mccabe NP  6/29/23     Medication history completed by:    Alfa Gonzalez CPhT  Medication [facility]

## 2023-04-14 ENCOUNTER — HOSPITAL ENCOUNTER (OUTPATIENT)
Facility: CLINIC | Age: 63
Discharge: HOME | End: 2023-04-15
Attending: UROLOGY | Admitting: UROLOGY
Payer: COMMERCIAL

## 2023-04-14 ENCOUNTER — ANESTHESIA (OUTPATIENT)
Dept: SURGERY | Facility: CLINIC | Age: 63
End: 2023-04-14
Payer: COMMERCIAL

## 2023-04-14 DIAGNOSIS — N28.89 LEFT RENAL MASS: Primary | ICD-10-CM

## 2023-04-14 LAB
ABO/RH(D): NORMAL
ANTIBODY SCREEN: NEGATIVE
GLUCOSE SERPL-MCNC: 99 MG/DL (ref 70–99)
SPECIMEN EXPIRATION DATE: NORMAL

## 2023-04-14 PROCEDURE — 272N000002 HC OR SUPPLY OTHER OPNP: Performed by: UROLOGY

## 2023-04-14 PROCEDURE — 82947 ASSAY GLUCOSE BLOOD QUANT: CPT | Performed by: ANESTHESIOLOGY

## 2023-04-14 PROCEDURE — 36415 COLL VENOUS BLD VENIPUNCTURE: CPT | Performed by: ANESTHESIOLOGY

## 2023-04-14 PROCEDURE — 250N000011 HC RX IP 250 OP 636: Performed by: STUDENT IN AN ORGANIZED HEALTH CARE EDUCATION/TRAINING PROGRAM

## 2023-04-14 PROCEDURE — 250N000011 HC RX IP 250 OP 636: Performed by: UROLOGY

## 2023-04-14 PROCEDURE — 76998 US GUIDE INTRAOP: CPT | Mod: 26 | Performed by: UROLOGY

## 2023-04-14 PROCEDURE — 88304 TISSUE EXAM BY PATHOLOGIST: CPT | Mod: 26 | Performed by: PATHOLOGY

## 2023-04-14 PROCEDURE — 88307 TISSUE EXAM BY PATHOLOGIST: CPT | Mod: TC | Performed by: UROLOGY

## 2023-04-14 PROCEDURE — 258N000003 HC RX IP 258 OP 636: Performed by: ANESTHESIOLOGY

## 2023-04-14 PROCEDURE — 250N000013 HC RX MED GY IP 250 OP 250 PS 637: Performed by: UROLOGY

## 2023-04-14 PROCEDURE — 360N000080 HC SURGERY LEVEL 7, PER MIN: Performed by: UROLOGY

## 2023-04-14 PROCEDURE — 88307 TISSUE EXAM BY PATHOLOGIST: CPT | Mod: 26 | Performed by: PATHOLOGY

## 2023-04-14 PROCEDURE — 50543 LAPARO PARTIAL NEPHRECTOMY: CPT | Mod: LT | Performed by: UROLOGY

## 2023-04-14 PROCEDURE — 370N000017 HC ANESTHESIA TECHNICAL FEE, PER MIN: Performed by: UROLOGY

## 2023-04-14 PROCEDURE — 250N000009 HC RX 250: Performed by: NURSE ANESTHETIST, CERTIFIED REGISTERED

## 2023-04-14 PROCEDURE — 258N000001 HC RX 258: Performed by: UROLOGY

## 2023-04-14 PROCEDURE — 250N000011 HC RX IP 250 OP 636: Performed by: NURSE ANESTHETIST, CERTIFIED REGISTERED

## 2023-04-14 PROCEDURE — 710N000009 HC RECOVERY PHASE 1, LEVEL 1, PER MIN: Performed by: UROLOGY

## 2023-04-14 PROCEDURE — 272N000001 HC OR GENERAL SUPPLY STERILE: Performed by: UROLOGY

## 2023-04-14 PROCEDURE — 250N000013 HC RX MED GY IP 250 OP 250 PS 637: Performed by: STUDENT IN AN ORGANIZED HEALTH CARE EDUCATION/TRAINING PROGRAM

## 2023-04-14 PROCEDURE — 250N000011 HC RX IP 250 OP 636: Performed by: ANESTHESIOLOGY

## 2023-04-14 PROCEDURE — 258N000003 HC RX IP 258 OP 636: Performed by: STUDENT IN AN ORGANIZED HEALTH CARE EDUCATION/TRAINING PROGRAM

## 2023-04-14 PROCEDURE — 250N000025 HC SEVOFLURANE, PER MIN: Performed by: UROLOGY

## 2023-04-14 PROCEDURE — 86850 RBC ANTIBODY SCREEN: CPT | Performed by: ANESTHESIOLOGY

## 2023-04-14 PROCEDURE — 999N000141 HC STATISTIC PRE-PROCEDURE NURSING ASSESSMENT: Performed by: UROLOGY

## 2023-04-14 RX ORDER — ACETAMINOPHEN 325 MG/1
650 TABLET ORAL EVERY 6 HOURS
Status: DISCONTINUED | OUTPATIENT
Start: 2023-04-14 | End: 2023-04-15 | Stop reason: HOSPADM

## 2023-04-14 RX ORDER — HYDROMORPHONE HCL IN WATER/PF 6 MG/30 ML
0.2 PATIENT CONTROLLED ANALGESIA SYRINGE INTRAVENOUS
Status: DISCONTINUED | OUTPATIENT
Start: 2023-04-14 | End: 2023-04-15 | Stop reason: HOSPADM

## 2023-04-14 RX ORDER — ONDANSETRON 2 MG/ML
INJECTION INTRAMUSCULAR; INTRAVENOUS PRN
Status: DISCONTINUED | OUTPATIENT
Start: 2023-04-14 | End: 2023-04-14

## 2023-04-14 RX ORDER — DIMENHYDRINATE 50 MG/ML
25 INJECTION, SOLUTION INTRAMUSCULAR; INTRAVENOUS
Status: DISCONTINUED | OUTPATIENT
Start: 2023-04-14 | End: 2023-04-14 | Stop reason: HOSPADM

## 2023-04-14 RX ORDER — OXYCODONE HYDROCHLORIDE 5 MG/1
10 TABLET ORAL EVERY 4 HOURS PRN
Status: DISCONTINUED | OUTPATIENT
Start: 2023-04-14 | End: 2023-04-15 | Stop reason: HOSPADM

## 2023-04-14 RX ORDER — SODIUM CHLORIDE, SODIUM LACTATE, POTASSIUM CHLORIDE, CALCIUM CHLORIDE 600; 310; 30; 20 MG/100ML; MG/100ML; MG/100ML; MG/100ML
INJECTION, SOLUTION INTRAVENOUS CONTINUOUS
Status: DISCONTINUED | OUTPATIENT
Start: 2023-04-14 | End: 2023-04-14 | Stop reason: HOSPADM

## 2023-04-14 RX ORDER — NALOXONE HYDROCHLORIDE 0.4 MG/ML
0.4 INJECTION, SOLUTION INTRAMUSCULAR; INTRAVENOUS; SUBCUTANEOUS
Status: DISCONTINUED | OUTPATIENT
Start: 2023-04-14 | End: 2023-04-15 | Stop reason: HOSPADM

## 2023-04-14 RX ORDER — AMLODIPINE BESYLATE 5 MG/1
5 TABLET ORAL DAILY
Status: DISCONTINUED | OUTPATIENT
Start: 2023-04-15 | End: 2023-04-15 | Stop reason: HOSPADM

## 2023-04-14 RX ORDER — NALOXONE HYDROCHLORIDE 0.4 MG/ML
0.2 INJECTION, SOLUTION INTRAMUSCULAR; INTRAVENOUS; SUBCUTANEOUS
Status: DISCONTINUED | OUTPATIENT
Start: 2023-04-14 | End: 2023-04-15 | Stop reason: HOSPADM

## 2023-04-14 RX ORDER — METHOCARBAMOL 500 MG/1
500 TABLET, FILM COATED ORAL 4 TIMES DAILY PRN
Status: DISCONTINUED | OUTPATIENT
Start: 2023-04-14 | End: 2023-04-15 | Stop reason: HOSPADM

## 2023-04-14 RX ORDER — LABETALOL HYDROCHLORIDE 5 MG/ML
10 INJECTION, SOLUTION INTRAVENOUS
Status: DISCONTINUED | OUTPATIENT
Start: 2023-04-14 | End: 2023-04-14 | Stop reason: HOSPADM

## 2023-04-14 RX ORDER — ONDANSETRON 4 MG/1
4 TABLET, ORALLY DISINTEGRATING ORAL EVERY 6 HOURS PRN
Status: DISCONTINUED | OUTPATIENT
Start: 2023-04-14 | End: 2023-04-15 | Stop reason: HOSPADM

## 2023-04-14 RX ORDER — GABAPENTIN 300 MG/1
300 CAPSULE ORAL
Status: COMPLETED | OUTPATIENT
Start: 2023-04-14 | End: 2023-04-14

## 2023-04-14 RX ORDER — HYDROMORPHONE HCL IN WATER/PF 6 MG/30 ML
0.4 PATIENT CONTROLLED ANALGESIA SYRINGE INTRAVENOUS
Status: DISCONTINUED | OUTPATIENT
Start: 2023-04-14 | End: 2023-04-15 | Stop reason: HOSPADM

## 2023-04-14 RX ORDER — ONDANSETRON 2 MG/ML
4 INJECTION INTRAMUSCULAR; INTRAVENOUS EVERY 6 HOURS PRN
Status: DISCONTINUED | OUTPATIENT
Start: 2023-04-14 | End: 2023-04-15 | Stop reason: HOSPADM

## 2023-04-14 RX ORDER — ONDANSETRON 2 MG/ML
4 INJECTION INTRAMUSCULAR; INTRAVENOUS EVERY 30 MIN PRN
Status: DISCONTINUED | OUTPATIENT
Start: 2023-04-14 | End: 2023-04-14 | Stop reason: HOSPADM

## 2023-04-14 RX ORDER — FENTANYL CITRATE 50 UG/ML
25 INJECTION, SOLUTION INTRAMUSCULAR; INTRAVENOUS EVERY 5 MIN PRN
Status: DISCONTINUED | OUTPATIENT
Start: 2023-04-14 | End: 2023-04-14 | Stop reason: HOSPADM

## 2023-04-14 RX ORDER — VECURONIUM BROMIDE 1 MG/ML
INJECTION, POWDER, LYOPHILIZED, FOR SOLUTION INTRAVENOUS PRN
Status: DISCONTINUED | OUTPATIENT
Start: 2023-04-14 | End: 2023-04-14

## 2023-04-14 RX ORDER — FENTANYL CITRATE 50 UG/ML
INJECTION, SOLUTION INTRAMUSCULAR; INTRAVENOUS PRN
Status: DISCONTINUED | OUTPATIENT
Start: 2023-04-14 | End: 2023-04-14

## 2023-04-14 RX ORDER — CEFAZOLIN SODIUM/WATER 2 G/20 ML
2 SYRINGE (ML) INTRAVENOUS
Status: COMPLETED | OUTPATIENT
Start: 2023-04-14 | End: 2023-04-14

## 2023-04-14 RX ORDER — LIDOCAINE 40 MG/G
CREAM TOPICAL
Status: DISCONTINUED | OUTPATIENT
Start: 2023-04-14 | End: 2023-04-15 | Stop reason: HOSPADM

## 2023-04-14 RX ORDER — SODIUM CHLORIDE 9 MG/ML
INJECTION, SOLUTION INTRAVENOUS CONTINUOUS
Status: DISCONTINUED | OUTPATIENT
Start: 2023-04-14 | End: 2023-04-15 | Stop reason: HOSPADM

## 2023-04-14 RX ORDER — FENTANYL CITRATE 50 UG/ML
50 INJECTION, SOLUTION INTRAMUSCULAR; INTRAVENOUS EVERY 5 MIN PRN
Status: DISCONTINUED | OUTPATIENT
Start: 2023-04-14 | End: 2023-04-14 | Stop reason: HOSPADM

## 2023-04-14 RX ORDER — ACETAMINOPHEN 325 MG/1
975 TABLET ORAL ONCE
Status: DISCONTINUED | OUTPATIENT
Start: 2023-04-14 | End: 2023-04-14 | Stop reason: HOSPADM

## 2023-04-14 RX ORDER — DULOXETIN HYDROCHLORIDE 60 MG/1
60 CAPSULE, DELAYED RELEASE ORAL DAILY
Status: DISCONTINUED | OUTPATIENT
Start: 2023-04-14 | End: 2023-04-15 | Stop reason: HOSPADM

## 2023-04-14 RX ORDER — BUPIVACAINE HYDROCHLORIDE 2.5 MG/ML
INJECTION, SOLUTION INFILTRATION; PERINEURAL PRN
Status: DISCONTINUED | OUTPATIENT
Start: 2023-04-14 | End: 2023-04-14 | Stop reason: HOSPADM

## 2023-04-14 RX ORDER — CEFAZOLIN SODIUM/WATER 2 G/20 ML
2 SYRINGE (ML) INTRAVENOUS SEE ADMIN INSTRUCTIONS
Status: DISCONTINUED | OUTPATIENT
Start: 2023-04-14 | End: 2023-04-14 | Stop reason: HOSPADM

## 2023-04-14 RX ORDER — LIDOCAINE HYDROCHLORIDE 20 MG/ML
INJECTION, SOLUTION INFILTRATION; PERINEURAL PRN
Status: DISCONTINUED | OUTPATIENT
Start: 2023-04-14 | End: 2023-04-14

## 2023-04-14 RX ORDER — ACETAMINOPHEN 325 MG/1
975 TABLET ORAL ONCE
Status: COMPLETED | OUTPATIENT
Start: 2023-04-14 | End: 2023-04-14

## 2023-04-14 RX ORDER — ONDANSETRON 4 MG/1
4 TABLET, ORALLY DISINTEGRATING ORAL EVERY 30 MIN PRN
Status: DISCONTINUED | OUTPATIENT
Start: 2023-04-14 | End: 2023-04-14 | Stop reason: HOSPADM

## 2023-04-14 RX ORDER — HYDRALAZINE HYDROCHLORIDE 20 MG/ML
2.5-5 INJECTION INTRAMUSCULAR; INTRAVENOUS EVERY 10 MIN PRN
Status: DISCONTINUED | OUTPATIENT
Start: 2023-04-14 | End: 2023-04-14 | Stop reason: HOSPADM

## 2023-04-14 RX ORDER — HYDROMORPHONE HCL IN WATER/PF 6 MG/30 ML
0.2 PATIENT CONTROLLED ANALGESIA SYRINGE INTRAVENOUS EVERY 5 MIN PRN
Status: DISCONTINUED | OUTPATIENT
Start: 2023-04-14 | End: 2023-04-14 | Stop reason: HOSPADM

## 2023-04-14 RX ORDER — GABAPENTIN 600 MG/1
600 TABLET ORAL 2 TIMES DAILY
Status: DISCONTINUED | OUTPATIENT
Start: 2023-04-14 | End: 2023-04-15 | Stop reason: HOSPADM

## 2023-04-14 RX ORDER — PROCHLORPERAZINE MALEATE 10 MG
10 TABLET ORAL EVERY 6 HOURS PRN
Status: DISCONTINUED | OUTPATIENT
Start: 2023-04-14 | End: 2023-04-15 | Stop reason: HOSPADM

## 2023-04-14 RX ORDER — PROPOFOL 10 MG/ML
INJECTION, EMULSION INTRAVENOUS PRN
Status: DISCONTINUED | OUTPATIENT
Start: 2023-04-14 | End: 2023-04-14

## 2023-04-14 RX ORDER — HYDROXYZINE HYDROCHLORIDE 25 MG/1
25 TABLET, FILM COATED ORAL EVERY 6 HOURS PRN
Status: DISCONTINUED | OUTPATIENT
Start: 2023-04-14 | End: 2023-04-14 | Stop reason: HOSPADM

## 2023-04-14 RX ORDER — DEXAMETHASONE SODIUM PHOSPHATE 4 MG/ML
INJECTION, SOLUTION INTRA-ARTICULAR; INTRALESIONAL; INTRAMUSCULAR; INTRAVENOUS; SOFT TISSUE PRN
Status: DISCONTINUED | OUTPATIENT
Start: 2023-04-14 | End: 2023-04-14

## 2023-04-14 RX ORDER — PROPOFOL 10 MG/ML
INJECTION, EMULSION INTRAVENOUS CONTINUOUS PRN
Status: DISCONTINUED | OUTPATIENT
Start: 2023-04-14 | End: 2023-04-14

## 2023-04-14 RX ORDER — DEXMEDETOMIDINE HYDROCHLORIDE 4 UG/ML
INJECTION, SOLUTION INTRAVENOUS PRN
Status: DISCONTINUED | OUTPATIENT
Start: 2023-04-14 | End: 2023-04-14

## 2023-04-14 RX ORDER — OXYCODONE HYDROCHLORIDE 5 MG/1
5 TABLET ORAL EVERY 4 HOURS PRN
Status: DISCONTINUED | OUTPATIENT
Start: 2023-04-14 | End: 2023-04-15 | Stop reason: HOSPADM

## 2023-04-14 RX ORDER — DOCUSATE SODIUM 100 MG/1
100 CAPSULE, LIQUID FILLED ORAL 2 TIMES DAILY
Qty: 20 CAPSULE | Refills: 0 | Status: SHIPPED | OUTPATIENT
Start: 2023-04-14 | End: 2023-05-02

## 2023-04-14 RX ORDER — GABAPENTIN 600 MG/1
1200 TABLET ORAL EVERY EVENING
Status: DISCONTINUED | OUTPATIENT
Start: 2023-04-14 | End: 2023-04-15 | Stop reason: HOSPADM

## 2023-04-14 RX ORDER — HYDROMORPHONE HCL IN WATER/PF 6 MG/30 ML
0.4 PATIENT CONTROLLED ANALGESIA SYRINGE INTRAVENOUS EVERY 5 MIN PRN
Status: DISCONTINUED | OUTPATIENT
Start: 2023-04-14 | End: 2023-04-14 | Stop reason: HOSPADM

## 2023-04-14 RX ORDER — OXYCODONE HYDROCHLORIDE 5 MG/1
5 TABLET ORAL EVERY 6 HOURS PRN
Qty: 12 TABLET | Refills: 0 | Status: SHIPPED | OUTPATIENT
Start: 2023-04-14 | End: 2023-04-17

## 2023-04-14 RX ADMIN — ACETAMINOPHEN 975 MG: 325 TABLET ORAL at 06:47

## 2023-04-14 RX ADMIN — DULOXETINE HYDROCHLORIDE 60 MG: 60 CAPSULE, DELAYED RELEASE ORAL at 20:49

## 2023-04-14 RX ADMIN — GABAPENTIN 1200 MG: 600 TABLET, FILM COATED ORAL at 20:48

## 2023-04-14 RX ADMIN — DEXMEDETOMIDINE HYDROCHLORIDE 8 MCG: 200 INJECTION INTRAVENOUS at 07:46

## 2023-04-14 RX ADMIN — VECURONIUM BROMIDE 1 MG: 1 INJECTION, POWDER, LYOPHILIZED, FOR SOLUTION INTRAVENOUS at 09:51

## 2023-04-14 RX ADMIN — MIDAZOLAM 1 MG: 1 INJECTION INTRAMUSCULAR; INTRAVENOUS at 07:30

## 2023-04-14 RX ADMIN — LIDOCAINE HYDROCHLORIDE 100 MG: 20 INJECTION, SOLUTION INFILTRATION; PERINEURAL at 07:40

## 2023-04-14 RX ADMIN — VECURONIUM BROMIDE 4 MG: 1 INJECTION, POWDER, LYOPHILIZED, FOR SOLUTION INTRAVENOUS at 08:20

## 2023-04-14 RX ADMIN — GABAPENTIN 300 MG: 300 CAPSULE ORAL at 06:48

## 2023-04-14 RX ADMIN — FENTANYL CITRATE 50 MCG: 50 INJECTION, SOLUTION INTRAMUSCULAR; INTRAVENOUS at 08:26

## 2023-04-14 RX ADMIN — SODIUM CHLORIDE, POTASSIUM CHLORIDE, SODIUM LACTATE AND CALCIUM CHLORIDE: 600; 310; 30; 20 INJECTION, SOLUTION INTRAVENOUS at 10:00

## 2023-04-14 RX ADMIN — ACETAMINOPHEN 650 MG: 325 TABLET ORAL at 20:48

## 2023-04-14 RX ADMIN — HYDROMORPHONE HYDROCHLORIDE 0.2 MG: 0.2 INJECTION, SOLUTION INTRAMUSCULAR; INTRAVENOUS; SUBCUTANEOUS at 11:23

## 2023-04-14 RX ADMIN — HYDROMORPHONE HYDROCHLORIDE 0.4 MG: 0.2 INJECTION, SOLUTION INTRAMUSCULAR; INTRAVENOUS; SUBCUTANEOUS at 16:52

## 2023-04-14 RX ADMIN — PROPOFOL 25 MCG/KG/MIN: 10 INJECTION, EMULSION INTRAVENOUS at 07:50

## 2023-04-14 RX ADMIN — FENTANYL CITRATE 25 MCG: 50 INJECTION, SOLUTION INTRAMUSCULAR; INTRAVENOUS at 11:01

## 2023-04-14 RX ADMIN — DEXMEDETOMIDINE HYDROCHLORIDE 4 MCG: 200 INJECTION INTRAVENOUS at 07:38

## 2023-04-14 RX ADMIN — ONDANSETRON 4 MG: 2 INJECTION INTRAMUSCULAR; INTRAVENOUS at 10:08

## 2023-04-14 RX ADMIN — FENTANYL CITRATE 50 MCG: 50 INJECTION, SOLUTION INTRAMUSCULAR; INTRAVENOUS at 07:36

## 2023-04-14 RX ADMIN — Medication 2 G: at 07:36

## 2023-04-14 RX ADMIN — GABAPENTIN 600 MG: 600 TABLET, FILM COATED ORAL at 15:04

## 2023-04-14 RX ADMIN — HYDROMORPHONE HYDROCHLORIDE 0.5 MG: 1 INJECTION, SOLUTION INTRAMUSCULAR; INTRAVENOUS; SUBCUTANEOUS at 08:56

## 2023-04-14 RX ADMIN — SODIUM CHLORIDE: 9 INJECTION, SOLUTION INTRAVENOUS at 21:57

## 2023-04-14 RX ADMIN — PROPOFOL 100 MG: 10 INJECTION, EMULSION INTRAVENOUS at 07:46

## 2023-04-14 RX ADMIN — MIDAZOLAM 1 MG: 1 INJECTION INTRAMUSCULAR; INTRAVENOUS at 07:27

## 2023-04-14 RX ADMIN — FENTANYL CITRATE 25 MCG: 50 INJECTION, SOLUTION INTRAMUSCULAR; INTRAVENOUS at 10:56

## 2023-04-14 RX ADMIN — OXYCODONE HYDROCHLORIDE 10 MG: 5 TABLET ORAL at 18:50

## 2023-04-14 RX ADMIN — DEXMEDETOMIDINE HYDROCHLORIDE 8 MCG: 200 INJECTION INTRAVENOUS at 07:36

## 2023-04-14 RX ADMIN — DEXAMETHASONE SODIUM PHOSPHATE 4 MG: 4 INJECTION, SOLUTION INTRA-ARTICULAR; INTRALESIONAL; INTRAMUSCULAR; INTRAVENOUS; SOFT TISSUE at 08:06

## 2023-04-14 RX ADMIN — SODIUM CHLORIDE: 9 INJECTION, SOLUTION INTRAVENOUS at 14:08

## 2023-04-14 RX ADMIN — PROPOFOL 200 MG: 10 INJECTION, EMULSION INTRAVENOUS at 07:40

## 2023-04-14 RX ADMIN — SODIUM CHLORIDE, POTASSIUM CHLORIDE, SODIUM LACTATE AND CALCIUM CHLORIDE: 600; 310; 30; 20 INJECTION, SOLUTION INTRAVENOUS at 06:45

## 2023-04-14 RX ADMIN — ACETAMINOPHEN 650 MG: 325 TABLET ORAL at 15:04

## 2023-04-14 RX ADMIN — SUGAMMADEX 200 MG: 100 INJECTION, SOLUTION INTRAVENOUS at 10:26

## 2023-04-14 RX ADMIN — OXYCODONE HYDROCHLORIDE 10 MG: 5 TABLET ORAL at 22:58

## 2023-04-14 RX ADMIN — ROCURONIUM BROMIDE 50 MG: 50 INJECTION, SOLUTION INTRAVENOUS at 07:41

## 2023-04-14 RX ADMIN — HYDROMORPHONE HYDROCHLORIDE 0.4 MG: 0.2 INJECTION, SOLUTION INTRAMUSCULAR; INTRAVENOUS; SUBCUTANEOUS at 12:36

## 2023-04-14 RX ADMIN — VECURONIUM BROMIDE 2 MG: 1 INJECTION, POWDER, LYOPHILIZED, FOR SOLUTION INTRAVENOUS at 08:51

## 2023-04-14 ASSESSMENT — ACTIVITIES OF DAILY LIVING (ADL)
ADLS_ACUITY_SCORE: 20

## 2023-04-14 NOTE — PLAN OF CARE
Goal Outcome Evaluation:      Plan of Care Reviewed With: patient    Overall Patient Progress: improvingOverall Patient Progress: improving    Pt to room 2225 via cart from PACU at approximately 1230.  A&OX4, VSS, pain controlled with prn pain meds.  Abd lap sites CD&I with liquid bandaid.  Hypoactive BS, no flatus, no nausea.  Tolerated diet slowly.  STEFANO with minimal dark bloody drainage.  Sifuentes patent with yellow/orange urine.  Will get OOB this evening to walk.

## 2023-04-14 NOTE — INTERVAL H&P NOTE
"I have reviewed the surgical (or preoperative) H&P that is linked to this encounter, and examined the patient. There are no significant changes    Clinical Conditions Present on Arrival:  Clinically Significant Risk Factors Present on Admission                # Drug Induced Coagulation Defect: home medication list includes an anticoagulant medication   # Overweight: Estimated body mass index is 26.31 kg/m  as calculated from the following:    Height as of this encounter: 1.702 m (5' 7\").    Weight as of this encounter: 76.2 kg (168 lb).       "

## 2023-04-14 NOTE — OP NOTE
DATE OF SURGERY: 04/14/23  PREOPERATIVE DIAGNOSIS:  Left renal mass.   POSTOPERATIVE DIAGNOSIS: Left renal mass.   PROCEDURE PERFORMED:   1) Left robotic-assisted laparoscopic partial nephrectomy;   2) Laparoscopic ultrasound of renal mass with intraoperative interpretation of imaging      STAFF SURGEON: Johnny Renee MD, present and scrubbed for all critical portions of the procedure, including the entire radiographic portion.  RESIDENT SURGEON: Paulette Ozuna-PGY-5  ANESTHESIA: General.   ESTIMATED BLOOD LOSS: 50 mL.   DRAINS AND TUBES: Sifuentes catheter; LLQ STEFANO drain  COMPLICATIONS: None.   DISPOSITION: PACU.   SPECIMENS OBTAINED:   ID Type Source Tests Collected by Time Destination   1 : perirenal fat Tissue Other SURGICAL PATHOLOGY EXAM Johnny Renee MD 4/14/2023  9:24 AM    2 : Left Renal Mass Tissue Kidney, Left SURGICAL PATHOLOGY EXAM Johnny Renee MD 4/14/2023 10:10 AM      SIGNIFICANT FINDINGS: Tumor was resected with visually negative margins.    HISTORY OF PRESENT ILLNESS: Angeli Galindo is a 63 year old female with a history of a left-sided lower pole renal mass. After a discussion of all risks, benefits, and alternatives, the patient elected to undergo definitive management with a partial nephrectomy.  OPERATION PERFORMED:   Informed consent was obtained and the patient was brought to the operating room where general anesthesia was induced. She was given appropriate preoperative antibiotics and positioned in the full flank position where all pressure points were carefully padded and secured. She was then prepped and draped in the usual sterile fashion. We then performed a timeout, verifying the correct patient's site and procedure to be performed.    Incision was made superior and lateral to the umbilicus a the lateral edge of the rectus. Given her multiple prior abdominal surgeries, we opted to enter her abdomen via Horn technique. We grasped the fascia with kocher clamps and entered the abdomen sharply  with a scalpel. The 8 mm robot trochar was placed under vision, confirming no adhesions in the vicinity of our fascial opening. We insufflated the abdomen and proceeded with placement of three additional 8 mm trochars and a 12 mm assist port. There were some minor adhesions surrounding the camera port which were taken down using laparoscopic instruments. We pre-placed 0-vicryl fascial sutures at our 12 mm air seal port with a [employer] ITMckay device. The robot was docked. The colon was reflected medially to expose the anterior surface of Gerota's fascia. The medial aspect of the kidney was exposed and the ureter and gonadal vein were identified. Kidney was lifted off the psoas muscle and dissection was carried posteriorly until the renal artery and vein were identified. The hilar vessels were carefully skeletonized. Once this was exposed, Gerota's was opened and the kidney was completely mobilized. The mass location was noted to be on the anterior lower pole and quite exophytic. The ultrasound probe was then used to verify location of mass and outline the borders for dissection.  Artery was then clamped and excised the tumor using sharp dissection. Once the tumor was free, the base of the resection bed using was oversewn using 3-0 V-lock suture. At this point we unclamped the hilum; total clamp time was 11 minutes. The defect in the parenchyma was then closed using the interrupred 0-Vicryl sutures for capsular closure with sliding weck technique. A second weck placed for security on each stitch. Theses were placed over Surgicel and Floseal. Hemostasis appeared excellent.   Specimen was placed in a 10 mm EndoCatch bag.  STEFANO drain was inserted. Ports were removed. The specimen was removed and sent to pathology. We closed the fascia from the extraction site with our pre-placed 0-Vicryl stitch in an interrupted fashion. We also closed the fascia from our initial camera port placement with 0-vicryl suture. Skin was re  approximated using 4-0 Monocryl. 30 mL 0.25% marcaine was used for local anesthesia. The wounds were dressed with skin glue. The patient was then awakened and taken to the PACU in stable condition.    DISPO:  - PACU then floor  - Patient may restart Eliquis in 5 days if Hb remains stable  - Sifuentes removal POD#1  - Likely discharge POD#1

## 2023-04-14 NOTE — ANESTHESIA PROCEDURE NOTES
Airway       Patient location during procedure: OR       Procedure Start/Stop Times: 4/14/2023 7:44 AM  Staff -        Anesthesiologist:  Cruz Holloway MD       CRNA: Diana Avelar APRN CRNA       Performed By: CRNA  Consent for Airway        Urgency: elective  Indications and Patient Condition       Indications for airway management: imelda-procedural       Induction type:intravenous       Mask difficulty assessment: 1 - vent by mask    Final Airway Details       Final airway type: endotracheal airway       Successful airway: ETT - single and Oral  Endotracheal Airway Details        ETT size (mm): 7.0       Cuffed: yes       Successful intubation technique: direct laryngoscopy       DL Blade Type: Sebastian 2       Grade View of Cords: 2       Adjucts: stylet       Position: Right       Measured from: gums/teeth       Secured at (cm): 22       Bite block used: None    Post intubation assessment        Placement verified by: capnometry, equal breath sounds and chest rise        Number of attempts at approach: 1       Number of other approaches attempted: 0       Secured with: pink tape       Ease of procedure: easy       Dentition: Intact and Unchanged    Medication(s) Administered   Medication Administration Time: 4/14/2023 7:44 AM

## 2023-04-14 NOTE — ANESTHESIA CARE TRANSFER NOTE
Patient: Angeli Galindo    Procedure: Procedure(s):  ROBOT-ASSISTED, LAPAROSCOPIC LEFT PARTIAL NEPHRECTOMY       Diagnosis: Left renal mass [N28.89]  Diagnosis Additional Information: No value filed.    Anesthesia Type:   General     Note:    Oropharynx: oropharynx clear of all foreign objects and spontaneously breathing  Level of Consciousness: awake  Oxygen Supplementation: face mask  Level of Supplemental Oxygen (L/min / FiO2): 6  Independent Airway: airway patency satisfactory and stable  Dentition: dentition unchanged  Vital Signs Stable: post-procedure vital signs reviewed and stable  Report to RN Given: handoff report given  Patient transferred to: PACU    Handoff Report: Identifed the Patient, Identified the Reponsible Provider, Reviewed the pertinent medical history, Discussed the surgical course, Reviewed Intra-OP anesthesia mangement and issues during anesthesia, Set expectations for post-procedure period and Allowed opportunity for questions and acknowledgement of understanding      Vitals:  Vitals Value Taken Time   BP     Temp     Pulse 97 04/14/23 1043   Resp 13 04/14/23 1043   SpO2 100 % 04/14/23 1043   Vitals shown include unvalidated device data.    Electronically Signed By: YAHIR Blanchard CRNA  April 14, 2023  10:45 AM

## 2023-04-14 NOTE — ANESTHESIA POSTPROCEDURE EVALUATION
Patient: Angeli Galindo    Procedure: Procedure(s):  ROBOT-ASSISTED, LAPAROSCOPIC LEFT PARTIAL NEPHRECTOMY       Anesthesia Type:  General    Note:     Postop Pain Control: Uneventful            Sign Out: Well controlled pain   PONV: No   Neuro/Psych: Uneventful            Sign Out: Acceptable/Baseline neuro status   Airway/Respiratory: Uneventful            Sign Out: Acceptable/Baseline resp. status   CV/Hemodynamics: Uneventful            Sign Out: Acceptable CV status   Other NRE: NONE   DID A NON-ROUTINE EVENT OCCUR?            Last vitals:  Vitals Value Taken Time   /85 04/14/23 1200   Temp 36.9  C (98.4  F) 04/14/23 1115   Pulse 89 04/14/23 1201   Resp 19 04/14/23 1201   SpO2 96 % 04/14/23 1201   Vitals shown include unvalidated device data.    Electronically Signed By: Cruz Holloway MD  April 14, 2023  2:01 PM

## 2023-04-15 VITALS
SYSTOLIC BLOOD PRESSURE: 117 MMHG | TEMPERATURE: 98.6 F | RESPIRATION RATE: 20 BRPM | HEIGHT: 67 IN | HEART RATE: 75 BPM | OXYGEN SATURATION: 97 % | WEIGHT: 168 LBS | DIASTOLIC BLOOD PRESSURE: 75 MMHG | BODY MASS INDEX: 26.37 KG/M2

## 2023-04-15 LAB
ANION GAP SERPL CALCULATED.3IONS-SCNC: 5 MMOL/L (ref 7–15)
BUN SERPL-MCNC: 13.6 MG/DL (ref 8–23)
CALCIUM SERPL-MCNC: 8 MG/DL (ref 8.8–10.2)
CHLORIDE SERPL-SCNC: 108 MMOL/L (ref 98–107)
CREAT SERPL-MCNC: 0.54 MG/DL (ref 0.51–0.95)
DEPRECATED HCO3 PLAS-SCNC: 27 MMOL/L (ref 22–29)
ERYTHROCYTE [DISTWIDTH] IN BLOOD BY AUTOMATED COUNT: 13.2 % (ref 10–15)
GFR SERPL CREATININE-BSD FRML MDRD: >90 ML/MIN/1.73M2
GLUCOSE BLDC GLUCOMTR-MCNC: 111 MG/DL (ref 70–99)
GLUCOSE SERPL-MCNC: 101 MG/DL (ref 70–99)
HCT VFR BLD AUTO: 38.5 % (ref 35–47)
HGB BLD-MCNC: 12.7 G/DL (ref 11.7–15.7)
MCH RBC QN AUTO: 33.9 PG (ref 26.5–33)
MCHC RBC AUTO-ENTMCNC: 33 G/DL (ref 31.5–36.5)
MCV RBC AUTO: 103 FL (ref 78–100)
PLATELET # BLD AUTO: 267 10E3/UL (ref 150–450)
POTASSIUM SERPL-SCNC: 4.9 MMOL/L (ref 3.4–5.3)
RBC # BLD AUTO: 3.75 10E6/UL (ref 3.8–5.2)
SODIUM SERPL-SCNC: 140 MMOL/L (ref 136–145)
WBC # BLD AUTO: 11.3 10E3/UL (ref 4–11)

## 2023-04-15 PROCEDURE — 36415 COLL VENOUS BLD VENIPUNCTURE: CPT | Performed by: STUDENT IN AN ORGANIZED HEALTH CARE EDUCATION/TRAINING PROGRAM

## 2023-04-15 PROCEDURE — 250N000013 HC RX MED GY IP 250 OP 250 PS 637: Performed by: STUDENT IN AN ORGANIZED HEALTH CARE EDUCATION/TRAINING PROGRAM

## 2023-04-15 PROCEDURE — 82962 GLUCOSE BLOOD TEST: CPT

## 2023-04-15 PROCEDURE — 80048 BASIC METABOLIC PNL TOTAL CA: CPT | Performed by: STUDENT IN AN ORGANIZED HEALTH CARE EDUCATION/TRAINING PROGRAM

## 2023-04-15 PROCEDURE — 85027 COMPLETE CBC AUTOMATED: CPT | Performed by: STUDENT IN AN ORGANIZED HEALTH CARE EDUCATION/TRAINING PROGRAM

## 2023-04-15 RX ADMIN — ACETAMINOPHEN 650 MG: 325 TABLET ORAL at 08:02

## 2023-04-15 RX ADMIN — OXYCODONE HYDROCHLORIDE 10 MG: 5 TABLET ORAL at 09:55

## 2023-04-15 RX ADMIN — AMLODIPINE BESYLATE 5 MG: 5 TABLET ORAL at 08:02

## 2023-04-15 RX ADMIN — OXYCODONE HYDROCHLORIDE 10 MG: 5 TABLET ORAL at 06:16

## 2023-04-15 RX ADMIN — ACETAMINOPHEN 650 MG: 325 TABLET ORAL at 03:11

## 2023-04-15 RX ADMIN — GABAPENTIN 600 MG: 600 TABLET, FILM COATED ORAL at 08:02

## 2023-04-15 ASSESSMENT — ACTIVITIES OF DAILY LIVING (ADL)
ADLS_ACUITY_SCORE: 20

## 2023-04-15 NOTE — PROGRESS NOTES
Overnight    Pt vital signs stable on room air. Able to sleep between cares. Pain well controlled utilizing oxycodone and scheduled tylenol. Alert and oriented x4. Independent in the room. IVF infusing. Sifuentes patent with yellow urine. Abd lap sites CDI. STEFANO drain with dark bloody drainage.

## 2023-04-15 NOTE — PROGRESS NOTES
"[facility] Post-Op / Progress Note         Assessment and Plan:    Assessment:   Post-operative day #1  1) Left robotic-assisted laparoscopic partial nephrectomy;   2) Laparoscopic ultrasound of renal mass with intraoperative interpretation of imaging      Doing well. Abdominal \"puffiness\" is not distension but is subcutaneous CO2 related to insufflatin, will resolve spontaneously over the next few days/weeks      Plan:   Drain out  Discharge to home    Nima Yi MD   [facility]  [phone] clinic phone             Interval History:   No acute events  Complaining of \"puffiness\" in abdomen          Significant Problems:      Past Medical History:   Diagnosis Date     Arthritis     osteo     Bilateral low back pain without sciatica      Cancer (H) 2022    Lt Kidney     Chronic deep vein thrombosis (DVT) of lower extremity (H)     protein C deficiency, refuted during hospital 4/22 with normal levels     Closed blow-out fracture of left orbital floor (H) 04/08/2022     Depressive disorder since 2000     H/O splenectomy      Hypertension 2000    until 2016     Obesity due to excess calories     s/p gastric bypass, 320 lb to 170 lbs     Spherocytosis (H)              Review of Systems:    The patient denies any chest pain, shortness of breath, excessive pain, fever, chills, purulent drainage from the wound, nausea or vomiting.          Medications:     All medications related to the patient's surgery have been reviewed  Current Facility-Administered Medications   Medication     acetaminophen (TYLENOL) tablet 650 mg     amLODIPine (NORVASC) tablet 5 mg     DULoxetine (CYMBALTA) DR capsule 60 mg     gabapentin (NEURONTIN) tablet 1,200 mg     gabapentin (NEURONTIN) tablet 600 mg     HYDROmorphone (DILAUDID) injection 0.2 mg    Or     HYDROmorphone (DILAUDID) injection 0.4 mg     lidocaine (LMX4) cream     lidocaine 1 % 0.1-1 mL     methocarbamol (ROBAXIN) tablet 500 mg     naloxone (NARCAN) " injection 0.2 mg    Or     naloxone (NARCAN) injection 0.4 mg    Or     naloxone (NARCAN) injection 0.2 mg    Or     naloxone (NARCAN) injection 0.4 mg     ondansetron (ZOFRAN ODT) ODT tab 4 mg    Or     ondansetron (ZOFRAN) injection 4 mg     oxyCODONE (ROXICODONE) tablet 5 mg    Or     oxyCODONE (ROXICODONE) tablet 10 mg     prochlorperazine (COMPAZINE) injection 10 mg    Or     prochlorperazine (COMPAZINE) tablet 10 mg     sodium chloride (PF) 0.9% PF flush 3 mL     sodium chloride (PF) 0.9% PF flush 3 mL     sodium chloride 0.9% infusion             Physical Exam:   All vitals stable  Temp: 98.6  F (37  C) Temp src: Oral BP: 117/75 Pulse: 75   Resp: 20 SpO2: 97 % O2 Device: None (Room air) Oxygen Delivery: 2 LPM  Incisions c/d/i  abd nondistended  Crepitus palpable in abdominal subcutaneous tissue  Drain serosang          Data:   All laboratory data related to this surgery reviewed  Results for orders placed or performed during the hospital encounter of 04/14/23 (from the past 24 hour(s))   Glucose by meter   Result Value Ref Range    GLUCOSE BY METER POCT 111 (H) 70 - 99 mg/dL   Basic metabolic panel   Result Value Ref Range    Sodium 140 136 - 145 mmol/L    Potassium 4.9 3.4 - 5.3 mmol/L    Chloride 108 (H) 98 - 107 mmol/L    Carbon Dioxide (CO2) 27 22 - 29 mmol/L    Anion Gap 5 (L) 7 - 15 mmol/L    Urea Nitrogen 13.6 8.0 - 23.0 mg/dL    Creatinine 0.54 0.51 - 0.95 mg/dL    Calcium 8.0 (L) 8.8 - 10.2 mg/dL    Glucose 101 (H) 70 - 99 mg/dL    GFR Estimate >90 >60 mL/min/1.73m2   CBC with platelets   Result Value Ref Range    WBC Count 11.3 (H) 4.0 - 11.0 10e3/uL    RBC Count 3.75 (L) 3.80 - 5.20 10e6/uL    Hemoglobin 12.7 11.7 - 15.7 g/dL    Hematocrit 38.5 35.0 - 47.0 %     (H) 78 - 100 fL    MCH 33.9 (H) 26.5 - 33.0 pg    MCHC 33.0 31.5 - 36.5 g/dL    RDW 13.2 10.0 - 15.0 %    Platelet Count 267 150 - 450 10e3/uL     No imaging studies have been ordered    Nima Yi MD

## 2023-04-15 NOTE — PLAN OF CARE
Goal Outcome Evaluation:      Plan of Care Reviewed With: patient    Overall Patient Progress: improvingOverall Patient Progress: improving    A&OX4, VSS, pain controlled with po pain meds.  Positive BS, passing flatus.  Tolerating regular diet without nausea. Incision CD&I.  STEFANO with dark blood drainage (discontinued at 1330 before discharge).  Up IND.  Discharge instructions reviewed and well received by patient.  Discharge to home.

## 2023-04-18 ENCOUNTER — PRE VISIT (OUTPATIENT)
Dept: UROLOGY | Facility: CLINIC | Age: 63
End: 2023-04-18
Payer: COMMERCIAL

## 2023-04-18 NOTE — CONFIDENTIAL NOTE
Reason for visit: post-op partal nephrectomy     Relevant information: left renal mass    Records/imaging/labs/orders: BMP scheduled same day prior to visit    At Rooming: eyal Arana  4/18/2023  9:55 AM

## 2023-04-19 ENCOUNTER — OFFICE VISIT (OUTPATIENT)
Dept: UROLOGY | Facility: CLINIC | Age: 63
End: 2023-04-19
Payer: COMMERCIAL

## 2023-04-19 ENCOUNTER — APPOINTMENT (OUTPATIENT)
Dept: LAB | Facility: CLINIC | Age: 63
End: 2023-04-19
Payer: COMMERCIAL

## 2023-04-19 VITALS — SYSTOLIC BLOOD PRESSURE: 152 MMHG | DIASTOLIC BLOOD PRESSURE: 92 MMHG | HEART RATE: 79 BPM

## 2023-04-19 DIAGNOSIS — D30.02 RENAL ONCOCYTOMA OF LEFT KIDNEY: Primary | ICD-10-CM

## 2023-04-19 LAB
ALBUMIN SERPL BCG-MCNC: 4 G/DL (ref 3.5–5.2)
ALP SERPL-CCNC: 169 U/L (ref 35–104)
ALT SERPL W P-5'-P-CCNC: 115 U/L (ref 10–35)
ANION GAP SERPL CALCULATED.3IONS-SCNC: 7 MMOL/L (ref 7–15)
AST SERPL W P-5'-P-CCNC: 58 U/L (ref 10–35)
BASOPHILS # BLD AUTO: 0.1 10E3/UL (ref 0–0.2)
BASOPHILS NFR BLD AUTO: 1 %
BILIRUB SERPL-MCNC: 0.7 MG/DL
BUN SERPL-MCNC: 11.8 MG/DL (ref 8–23)
CALCIUM SERPL-MCNC: 9.7 MG/DL (ref 8.8–10.2)
CHLORIDE SERPL-SCNC: 103 MMOL/L (ref 98–107)
CREAT SERPL-MCNC: 0.57 MG/DL (ref 0.51–0.95)
DEPRECATED HCO3 PLAS-SCNC: 31 MMOL/L (ref 22–29)
EOSINOPHIL # BLD AUTO: 0.4 10E3/UL (ref 0–0.7)
EOSINOPHIL NFR BLD AUTO: 5 %
ERYTHROCYTE [DISTWIDTH] IN BLOOD BY AUTOMATED COUNT: 13 % (ref 10–15)
GFR SERPL CREATININE-BSD FRML MDRD: >90 ML/MIN/1.73M2
GLUCOSE SERPL-MCNC: 127 MG/DL (ref 70–99)
HCT VFR BLD AUTO: 42.5 % (ref 35–47)
HGB BLD-MCNC: 14.3 G/DL (ref 11.7–15.7)
IMM GRANULOCYTES # BLD: 0.1 10E3/UL
IMM GRANULOCYTES NFR BLD: 1 %
LYMPHOCYTES # BLD AUTO: 0.8 10E3/UL (ref 0.8–5.3)
LYMPHOCYTES NFR BLD AUTO: 9 %
MCH RBC QN AUTO: 33.2 PG (ref 26.5–33)
MCHC RBC AUTO-ENTMCNC: 33.6 G/DL (ref 31.5–36.5)
MCV RBC AUTO: 99 FL (ref 78–100)
MONOCYTES # BLD AUTO: 0.8 10E3/UL (ref 0–1.3)
MONOCYTES NFR BLD AUTO: 9 %
NEUTROPHILS # BLD AUTO: 6.3 10E3/UL (ref 1.6–8.3)
NEUTROPHILS NFR BLD AUTO: 75 %
NRBC # BLD AUTO: 0 10E3/UL
NRBC BLD AUTO-RTO: 0 /100
PLATELET # BLD AUTO: 376 10E3/UL (ref 150–450)
POTASSIUM SERPL-SCNC: 5.3 MMOL/L (ref 3.4–5.3)
PROT SERPL-MCNC: 7.2 G/DL (ref 6.4–8.3)
RBC # BLD AUTO: 4.31 10E6/UL (ref 3.8–5.2)
SODIUM SERPL-SCNC: 141 MMOL/L (ref 136–145)
WBC # BLD AUTO: 8.3 10E3/UL (ref 4–11)

## 2023-04-19 PROCEDURE — 85025 COMPLETE CBC W/AUTO DIFF WBC: CPT | Performed by: PATHOLOGY

## 2023-04-19 PROCEDURE — 80053 COMPREHEN METABOLIC PANEL: CPT | Performed by: PATHOLOGY

## 2023-04-19 PROCEDURE — 36415 COLL VENOUS BLD VENIPUNCTURE: CPT | Performed by: PATHOLOGY

## 2023-04-19 PROCEDURE — 99024 POSTOP FOLLOW-UP VISIT: CPT | Performed by: UROLOGY

## 2023-04-19 ASSESSMENT — PAIN SCALES - GENERAL: PAINLEVEL: MODERATE PAIN (5)

## 2023-04-19 NOTE — NURSING NOTE
Chief Complaint   Patient presents with     Follow Up     Post-op       Blood pressure (!) 152/92, pulse 79. There is no height or weight on file to calculate BMI.    Patient Active Problem List   Diagnosis     Subdural hematoma (H)     Depression with anxiety     Spherocytosis, hereditary (H)     Left renal mass       Allergies   Allergen Reactions     Shellfish-Derived Products Angioedema     Lisinopril Cough     Morphine Nausea     Profound       Current Outpatient Medications   Medication Sig Dispense Refill     amLODIPine (NORVASC) 5 MG tablet Take 1 tablet (5 mg) by mouth daily 90 tablet 0     Calcium Carbonate-Vitamin D (CALCIUM-VITAMIN D) 600-125 MG-UNIT TABS Take 1 tablet by mouth every evening       docusate sodium (COLACE) 100 MG capsule Take 1 capsule (100 mg) by mouth 2 times daily 20 capsule 0     DULoxetine (CYMBALTA) 60 MG capsule Take 1 capsule (60 mg) by mouth daily 90 capsule 1     ELIQUIS ANTICOAGULANT 2.5 MG tablet Take 2.5 mg by mouth 2 times daily       gabapentin (NEURONTIN) 600 MG tablet Take 2 tablets by mouth every evening (2 x 600 mg = 1,200 mg)       gabapentin (NEURONTIN) 600 MG tablet Take 1 tablet (600 mg) by mouth 4 times daily Take 600mg in the morning and 1200mg at bedtime (Patient taking differently: Take 600 mg by mouth 2 times daily (In addition to the night time dose))       HYDROcodone-acetaminophen (NORCO)  MG per tablet Take 1 tablet by mouth 4 times daily as needed       Melatonin 10 MG CAPS Take 1 capsule by mouth nightly as needed (for sleep)       methocarbamol (ROBAXIN) 500 MG tablet Take 500 mg by mouth 4 times daily as needed for muscle spasms       Prenatal Vit-Fe Fumarate-FA (PRENATAL VITAMINS PO) Take 2 tablets by mouth every evening       RISEdronate (ACTONEL) 150 MG tablet Take 1 tablet (150 mg) by mouth every 30 days 3 tablet 0     SUMAtriptan (IMITREX) 50 MG tablet Take 1 tablet (50 mg) by mouth at onset of headache for migraine May repeat in 2 hours. Max  4 tablets/24 hours. 12 tablet 3     vitamin D2 (ERGOCALCIFEROL) 23262 units (1250 mcg) capsule Take 1 capsule (50,000 Units) by mouth once a week for 12 doses 12 capsule 0       Social History     Tobacco Use     Smoking status: Former     Packs/day: 0.50     Years: 5.00     Pack years: 2.50     Types: Cigarettes     Quit date: 2000     Years since quittin.3     Smokeless tobacco: Never   Vaping Use     Vaping status: Never Used   Substance Use Topics     Alcohol use: Yes     Comment: 5x week     Drug use: Never       Laurenmulugeta Arana  2023  4:33 PM

## 2023-04-22 NOTE — PROGRESS NOTES
Urology Clinic     HPI  Angeli Galindo is a 63 year old female with a left kidney lesion, here for follow-up after the partial nephrectomy.      The patient denies any major issues after the surgery.     No changes to health, hospitalizations or new diagnoses in the interim    PHYSICAL EXAM  BP (!) 152/92   Pulse 79   Gen NAD Alert   Card RRR  Resp symmetric chest movement, unlabored breathing   Abdomen incisions are CDI, non tender non distended   Ext WWP       Labs  Lab Results   Component Value Date    WBC 8.3 04/19/2023     Lab Results   Component Value Date    RBC 4.31 04/19/2023     Lab Results   Component Value Date    HGB 14.3 04/19/2023     Lab Results   Component Value Date    HCT 42.5 04/19/2023     No components found for: MCT  Lab Results   Component Value Date    MCV 99 04/19/2023     Lab Results   Component Value Date    MCH 33.2 04/19/2023     Lab Results   Component Value Date    MCHC 33.6 04/19/2023     Lab Results   Component Value Date    RDW 13.0 04/19/2023     Lab Results   Component Value Date     04/19/2023        Last Comprehensive Metabolic Panel:  Sodium   Date Value Ref Range Status   04/19/2023 141 136 - 145 mmol/L Final     Potassium   Date Value Ref Range Status   04/19/2023 5.3 3.4 - 5.3 mmol/L Final   04/11/2022 4.1 3.4 - 5.3 mmol/L Final     Chloride   Date Value Ref Range Status   04/19/2023 103 98 - 107 mmol/L Final   04/11/2022 112 (H) 94 - 109 mmol/L Final     Carbon Dioxide (CO2)   Date Value Ref Range Status   04/19/2023 31 (H) 22 - 29 mmol/L Final   04/11/2022 25 20 - 32 mmol/L Final     Anion Gap   Date Value Ref Range Status   04/19/2023 7 7 - 15 mmol/L Final   04/11/2022 6 3 - 14 mmol/L Final     Glucose   Date Value Ref Range Status   04/19/2023 127 (H) 70 - 99 mg/dL Final   04/11/2022 91 70 - 99 mg/dL Final     GLUCOSE BY METER POCT   Date Value Ref Range Status   04/15/2023 111 (H) 70 - 99 mg/dL Final     Urea Nitrogen   Date Value Ref Range Status    04/19/2023 11.8 8.0 - 23.0 mg/dL Final   04/11/2022 9 7 - 30 mg/dL Final     Creatinine   Date Value Ref Range Status   04/19/2023 0.57 0.51 - 0.95 mg/dL Final     GFR Estimate   Date Value Ref Range Status   04/19/2023 >90 >60 mL/min/1.73m2 Final     Comment:     eGFR calculated using 2021 CKD-EPI equation.     GFR, ESTIMATED POCT   Date Value Ref Range Status   04/08/2022 >60 >60 mL/min/1.73m2 Final     Calcium   Date Value Ref Range Status   04/19/2023 9.7 8.8 - 10.2 mg/dL Final     Bilirubin Total   Date Value Ref Range Status   04/19/2023 0.7 <=1.2 mg/dL Final     Alkaline Phosphatase   Date Value Ref Range Status   04/19/2023 169 (H) 35 - 104 U/L Final     ALT   Date Value Ref Range Status   04/19/2023 115 (H) 10 - 35 U/L Final     AST   Date Value Ref Range Status   04/19/2023 58 (H) 10 - 35 U/L Final     Surgical pathology  Final Diagnosis   A.  Perirenal fat, excision:  --Benign adipose tissue.     B.  Left kidney, partial nephrectomy:  --Renal oncocytoma, 3.5 cm, abutting renal capsular margin.       ASSESSMENT AND PLAN  63 year old female with renal oncocytoma status post left partial nephrectomy     This is indeed great news. I told Angeli that the pathology is benign and no surveillance imaging is needed     Plan   RTC as needed    30 total minutes spent on the date of the encounter including direct interaction with the patient, performing chart review, documentation and further activities as noted above    Johnny Renee MD   Department of Urology   [facility]

## 2023-04-24 DIAGNOSIS — G43.011 INTRACTABLE MIGRAINE WITHOUT AURA AND WITH STATUS MIGRAINOSUS: ICD-10-CM

## 2023-04-26 NOTE — TELEPHONE ENCOUNTER
Refill Request    Medication request: HYDROcodone-acetaminophen  MG Oral Tab  HYDROcodone-acetaminophen  MG Oral Tab    LOV:3/9/2023 Telemedicine Malik Saunders MD   Due back to clinic per last office note: Follow-up:  3 months to 4 months    NOV: 7/10/2023 Maribell Lagunas MD      [place]: 04/04/23 #120    Urine drug screen (if applicable): na  Pain contract: 08/18/2022    LOV plan (if weaning or changing medications): Per Dr. Nicolás Mcclain will continue with the Norco and the Robaxin which are helping her. \"    Please review and sign if appropriate.

## 2023-04-27 RX ORDER — HYDROCODONE BITARTRATE AND ACETAMINOPHEN 10; 325 MG/1; MG/1
1 TABLET ORAL EVERY 6 HOURS PRN
Qty: 120 TABLET | Refills: 0 | Status: SHIPPED | OUTPATIENT
Start: 2023-04-27 | End: 2023-05-27

## 2023-04-27 RX ORDER — SUMATRIPTAN 50 MG/1
TABLET, FILM COATED ORAL
Qty: 18 TABLET | Refills: 0 | Status: SHIPPED | OUTPATIENT
Start: 2023-04-27 | End: 2023-05-02

## 2023-04-27 ASSESSMENT — ENCOUNTER SYMPTOMS
NAUSEA: 0
EYE PAIN: 0
SORE THROAT: 0
MYALGIAS: 0
CONSTIPATION: 0
CHILLS: 0
PARESTHESIAS: 0
WEAKNESS: 0
PALPITATIONS: 0
HEMATOCHEZIA: 0
HEMATURIA: 0
DIZZINESS: 0
HEADACHES: 1
HEARTBURN: 0
SHORTNESS OF BREATH: 0
ABDOMINAL PAIN: 0
NERVOUS/ANXIOUS: 1
ARTHRALGIAS: 0
DYSURIA: 0
COUGH: 0
DIARRHEA: 0
FREQUENCY: 0
JOINT SWELLING: 0
FEVER: 0
BREAST MASS: 0

## 2023-04-27 NOTE — TELEPHONE ENCOUNTER
SUMATRIPTAN SUCC 50 MG TABLET      Last Written Prescription Date:  1/10/23  Last Fill Quantity: 12,   # refills: 3  Last Office Visit : 6/7/22  Future Office visit:  5/23/23    Routing refill request to provider for review/approval because:  Blood pressure out of range     Last order on 1/10/23 was for 12 tabs + 3 refills for a total of 48 tabs. Is patient needing more than 8 treatments per month? Routing to provider for follow up.

## 2023-04-27 NOTE — TELEPHONE ENCOUNTER
Pt was last seen in clinic on 6/27/22. Pt last had SUMAtriptan (IMITREX) 50 MG tablet refilled on 3/30/23 for 20 day supply by PCP. Due for refill 4/19/23. Medication refill sent to pharmacy. Pt has a medication appt with Dr. Fuentes next month (5/23).     JUAN R RUDOLPH RN on 4/27/2023 at 1:08 PM

## 2023-05-02 ENCOUNTER — OFFICE VISIT (OUTPATIENT)
Dept: FAMILY MEDICINE | Facility: CLINIC | Age: 63
End: 2023-05-02
Payer: COMMERCIAL

## 2023-05-02 VITALS
WEIGHT: 165 LBS | SYSTOLIC BLOOD PRESSURE: 138 MMHG | BODY MASS INDEX: 25.9 KG/M2 | HEIGHT: 67 IN | DIASTOLIC BLOOD PRESSURE: 76 MMHG | HEART RATE: 86 BPM | TEMPERATURE: 98.2 F | OXYGEN SATURATION: 98 %

## 2023-05-02 DIAGNOSIS — Z00.00 ROUTINE GENERAL MEDICAL EXAMINATION AT A HEALTH CARE FACILITY: Primary | ICD-10-CM

## 2023-05-02 DIAGNOSIS — I82.4Z3 DEEP VEIN THROMBOSIS (DVT) OF DISTAL VEIN OF BOTH LOWER EXTREMITIES, UNSPECIFIED CHRONICITY (H): ICD-10-CM

## 2023-05-02 DIAGNOSIS — G43.011 INTRACTABLE MIGRAINE WITHOUT AURA AND WITH STATUS MIGRAINOSUS: ICD-10-CM

## 2023-05-02 DIAGNOSIS — Z12.4 CERVICAL CANCER SCREENING: ICD-10-CM

## 2023-05-02 DIAGNOSIS — Z86.39 H/O THYROID NODULE: ICD-10-CM

## 2023-05-02 DIAGNOSIS — S06.5XAA SUBDURAL HEMATOMA (H): ICD-10-CM

## 2023-05-02 DIAGNOSIS — Z12.11 SCREEN FOR COLON CANCER: ICD-10-CM

## 2023-05-02 DIAGNOSIS — Z11.59 NEED FOR HEPATITIS C SCREENING TEST: ICD-10-CM

## 2023-05-02 LAB — TSH SERPL DL<=0.005 MIU/L-ACNC: 2.38 UIU/ML (ref 0.3–4.2)

## 2023-05-02 PROCEDURE — 84443 ASSAY THYROID STIM HORMONE: CPT | Performed by: PHYSICIAN ASSISTANT

## 2023-05-02 PROCEDURE — 99396 PREV VISIT EST AGE 40-64: CPT | Performed by: PHYSICIAN ASSISTANT

## 2023-05-02 PROCEDURE — 36415 COLL VENOUS BLD VENIPUNCTURE: CPT | Performed by: PHYSICIAN ASSISTANT

## 2023-05-02 PROCEDURE — 99214 OFFICE O/P EST MOD 30 MIN: CPT | Mod: 25 | Performed by: PHYSICIAN ASSISTANT

## 2023-05-02 RX ORDER — SUMATRIPTAN 100 MG/1
100 TABLET, FILM COATED ORAL
Qty: 18 TABLET | Refills: 3 | Status: SHIPPED | OUTPATIENT
Start: 2023-05-02

## 2023-05-02 RX ORDER — TOPIRAMATE 25 MG/1
TABLET, FILM COATED ORAL
Qty: 180 TABLET | Refills: 1 | Status: SHIPPED | OUTPATIENT
Start: 2023-05-02 | End: 2023-12-12

## 2023-05-02 NOTE — PROGRESS NOTES
SUBJECTIVE:   CC: nAgeli is an 63 year old who presents for preventive health visit.       2023    11:25 AM   Additional Questions   Roomed by Krys   Patient has been advised of split billing requirements and indicates understanding: Yes  Healthy Habits:     Getting at least 3 servings of Calcium per day:  NO    Bi-annual eye exam:  Yes    Dental care twice a year:  Yes    Sleep apnea or symptoms of sleep apnea:  None    Diet:  Low salt and Carbohydrate counting    Frequency of exercise:  4-5 days/week    Duration of exercise:  15-30 minutes    Taking medications regularly:  Yes    Medication side effects:  Not applicable    PHQ-2 Total Score: 0    Additional concerns today:  Yes    -She would like to discuss her high blood pressure.     -She also would like to talk about her migraines.    - bariatric surgery - sleeve followed by [facility] escobar, lost 185lbs     Moved From [place] 2 years ago    Colonoscopy in  - negative  First degree relatives with colon cancer    Has no uterus - hysterectomy  due to adenomyosis and endometriisosi    Head injury in April, migraines started in   imitrex helps 90% of the time      Today's PHQ-2 Score:       2023     6:11 AM   PHQ-2 (  [employer])   Q1: Little interest or pleasure in doing things 0   Q2: Feeling down, depressed or hopeless 0   PHQ-2 Score 0   Q1: Little interest or pleasure in doing things Not at all   Q2: Feeling down, depressed or hopeless Not at all   PHQ-2 Score 0       Have you ever done Advance Care Planning? (For example, a Health Directive, POLST, or a discussion with a medical provider or your loved ones about your wishes):     Social History     Tobacco Use     Smoking status: Former     Packs/day: 0.50     Years: 5.00     Pack years: 2.50     Types: Cigarettes     Quit date: 2000     Years since quittin.3     Smokeless tobacco: Never   Vaping Use     Vaping status: Never Used   Substance Use Topics     Alcohol use: Yes      Comment: 5x week             4/27/2023     6:11 AM   Alcohol Use   Prescreen: >3 drinks/day or >7 drinks/week? No     Reviewed orders with patient.  Reviewed health maintenance and updated orders accordingly - Yes  BP Readings from Last 3 Encounters:   05/02/23 138/76   04/19/23 (!) 152/92   04/15/23 117/75    Wt Readings from Last 3 Encounters:   05/02/23 74.8 kg (165 lb)   04/14/23 76.2 kg (168 lb)   04/04/23 76 kg (167 lb 8 oz)                    Breast Cancer Screening:    FHS-7:       4/8/2023    11:17 AM   Breast CA Risk Assessment (FHS-7)   Did any of your first-degree relatives have breast or ovarian cancer? No   Did any of your relatives have bilateral breast cancer? No   Did any man in your family have breast cancer? No   Did any woman in your family have breast and ovarian cancer? No   Did any woman in your family have breast cancer before age 50 y? No   Do you have 2 or more relatives with breast and/or ovarian cancer? No   Do you have 2 or more relatives with breast and/or bowel cancer? No         Pertinent mammograms are reviewed under the imaging tab.    History of abnormal Pap smear: Status post benign hysterectomy. Health Maintenance and Surgical History updated.     Reviewed and updated as needed this visit by clinical staff    Allergies  Meds              Reviewed and updated as needed this visit by Provider                     Review of Systems  CONSTITUTIONAL: NEGATIVE for fever, chills, change in weight  INTEGUMENTARY/SKIN: NEGATIVE for worrisome rashes, moles or lesions  EYES: NEGATIVE for vision changes or irritation  ENT: NEGATIVE for ear, mouth and throat problems  RESP: NEGATIVE for significant cough or SOB  BREAST: NEGATIVE for masses, tenderness or discharge  CV: NEGATIVE for chest pain, palpitations or peripheral edema  GI: NEGATIVE for nausea, abdominal pain, heartburn, or change in bowel habits  : NEGATIVE for unusual urinary or vaginal symptoms. No vaginal  "bleeding.  MUSCULOSKELETAL: NEGATIVE for significant arthralgias or myalgia  NEURO: NEGATIVE for weakness, dizziness or paresthesias  PSYCHIATRIC: NEGATIVE for changes in mood or affect      OBJECTIVE:   /76   Pulse 86   Temp 98.2  F (36.8  C) (Tympanic)   Ht 1.702 m (5' 7\")   Wt 74.8 kg (165 lb)   SpO2 98%   BMI 25.84 kg/m    Physical Exam  GENERAL APPEARANCE: healthy, alert and no distress  EYES: Eyes grossly normal to inspection, PERRL and conjunctivae and sclerae normal  HENT: ear canals and TM's normal, nose and mouth without ulcers or lesions, oropharynx clear and oral mucous membranes moist  NECK: no adenopathy, no asymmetry, masses, or scars and thyroid normal to palpation  RESP: lungs clear to auscultation - no rales, rhonchi or wheezes  CV: regular rate and rhythm, normal S1 S2, no S3 or S4, no murmur, click or rub, no peripheral edema and peripheral pulses strong  ABDOMEN: soft, nontender, no hepatosplenomegaly, no masses and bowel sounds normal  MS: no musculoskeletal defects are noted and gait is age appropriate without ataxia  SKIN: no suspicious lesions or rashes  NEURO: Normal strength and tone, sensory exam grossly normal, mentation intact and speech normal  PSYCH: mentation appears normal and affect normal/bright    Diagnostic Test Results:  pending    ASSESSMENT/PLAN:   (Z00.00) Routine general medical examination at a health care facility  (primary encounter diagnosis)  Comment:   Plan:     (Z12.11) Screen for colon cancer  Comment:   Plan: had colonoscopy in 2021 down near [place], [place]      (Z12.4) Cervical cancer screening  Comment:   Plan: S/p hysterectomy for benign reasons, no longer PAP indicated    (G43.011) Intractable migraine without aura and with status migrainosus  Comment: refilled imitrex. Trial of topamax  Plan: SUMAtriptan (IMITREX) 100 MG tablet, topiramate        (TOPAMAX) 25 MG tablet            (Z86.39) H/O thyroid nodule  Comment:   Plan: TSH with free T4 " "reflex            (I82.4Z3) Deep vein thrombosis (DVT) of distal vein of both lower extremities, unspecified chronicity (H)  Comment: x2 in 2010 and 2015  Plan: met with hematology 6/2022 - on apixaban 2.5mg BID    (S06.5XAA) Subdural hematoma (H)  Comment:   Plan: resolved. No residual issues            Patient has been advised of split billing requirements and indicates understanding: Yes      COUNSELING:  Reviewed preventive health counseling, as reflected in patient instructions      BMI:   Estimated body mass index is 25.84 kg/m  as calculated from the following:    Height as of this encounter: 1.702 m (5' 7\").    Weight as of this encounter: 74.8 kg (165 lb).         She reports that she quit smoking about 23 years ago. Her smoking use included cigarettes. She has a 2.50 pack-year smoking history. She has never used smokeless tobacco.      Ema Sanders PA-C  [facility]  "

## 2023-05-02 NOTE — Clinical Note
Please abstract the following data from this visit with this patient into the appropriate field in Epic:  Tests that can be patient reported without a hard copy:  Colonoscopy done on this date: 9/2021 (approximately), by this group: [facility], results were negative.

## 2023-05-07 ENCOUNTER — HEALTH MAINTENANCE LETTER (OUTPATIENT)
Age: 63
End: 2023-05-07

## 2023-05-08 PROBLEM — I82.4Z3: Status: ACTIVE | Noted: 2023-05-08

## 2023-05-19 ENCOUNTER — TELEPHONE (OUTPATIENT)
Dept: HEMATOLOGY | Facility: CLINIC | Age: 63
End: 2023-05-19
Payer: COMMERCIAL

## 2023-05-22 ENCOUNTER — TELEPHONE (OUTPATIENT)
Dept: HEMATOLOGY | Facility: CLINIC | Age: 63
End: 2023-05-22
Payer: COMMERCIAL

## 2023-05-23 ENCOUNTER — VIRTUAL VISIT (OUTPATIENT)
Dept: INTERNAL MEDICINE | Facility: CLINIC | Age: 63
End: 2023-05-23
Payer: COMMERCIAL

## 2023-05-23 DIAGNOSIS — Z87.81 PERSONAL HISTORY OF TRAUMATIC FRACTURE: ICD-10-CM

## 2023-05-23 DIAGNOSIS — T07.XXXA MULTIPLE FRACTURES: ICD-10-CM

## 2023-05-23 DIAGNOSIS — Z79.52 LONG TERM CURRENT USE OF SYSTEMIC STEROIDS: ICD-10-CM

## 2023-05-23 DIAGNOSIS — M85.851 OSTEOPENIA OF BOTH HIPS: ICD-10-CM

## 2023-05-23 DIAGNOSIS — M85.852 OSTEOPENIA OF BOTH HIPS: ICD-10-CM

## 2023-05-23 DIAGNOSIS — I10 ESSENTIAL HYPERTENSION: ICD-10-CM

## 2023-05-23 DIAGNOSIS — Z98.84 H/O GASTRIC BYPASS: Primary | ICD-10-CM

## 2023-05-23 PROCEDURE — 99214 OFFICE O/P EST MOD 30 MIN: CPT | Mod: VID | Performed by: INTERNAL MEDICINE

## 2023-05-23 RX ORDER — RISEDRONATE SODIUM 150 MG/1
150 TABLET, FILM COATED ORAL
Qty: 3 TABLET | Refills: 0 | Status: SHIPPED | OUTPATIENT
Start: 2023-05-23 | End: 2023-09-21

## 2023-05-23 NOTE — NURSING NOTE
Is the patient currently in the state of [place]? YES    Visit mode:VIDEO    If the visit is dropped, the patient can be reconnected by: TELEPHONE VISIT: Phone number: [phone]    Will anyone else be joining the visit? NO      How would you like to obtain your AVS? MyChart    Are changes needed to the allergy or medication list? NO    Reason for visit: Video Visit (Medication management)

## 2023-05-23 NOTE — PROGRESS NOTES
"  Angeli Galnido is a 63 year old female who is being evaluated via a billable video visit.      The patient has consented to a video visit and informed that video and telephone visits are being performed during the COVID-19 pandemic in order to mitigate the risk of an in office visit for appropriate candidates/issues. If during the course of the call the physician/provider feels a video visit is not appropriate, you will not be charged for this service. If the provider feels that they are unable to assess your concerns without an in person visit, you will be advised of this limitation and depending on the nature of the concern, advised to seek in person care if your provider feels you need urgent evaluation.      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 No follow-up needed.  She is wondering if she needs to be on amlodipine for BP, she is less stressed.  She is on topamax for migraines now.   She saw Dr. Robertson in [facility], on 2.5 eliquis.    Future:  Needs post-splenectomy vaccines:   pcv 23, prevnar, menB, menactra, ?HIB  Tdap future    Post RYGB vitamins:   Ca/d  Iron  mvit  bcomplex      Grandchildren.  Gisell Rico, \"Birdy\" born today    Routine Health Maintenence:  Lung Ca Screening (>20 pk age 50-80):  Colonoscopy (45-75 yrs):  9/21  Dexa (>65W or 70M yrs): not indicated  Mammogram (40-75 yrs): 4/23  Pap (21-65 yrs): due?    Works in sleep medicine      Review of external notes as documented above " "                        Review of Systems   A detailed ROS was performed and was negative unless indicated in the HPI above.        Physical Exam   There were no vitals taken for this visit.  Wt Readings from Last 2 Encounters:   05/02/23 74.8 kg (165 lb)   04/14/23 76.2 kg (168 lb)      Ht Readings from Last 2 Encounters:   05/02/23 1.702 m (5' 7\")   04/14/23 1.702 m (5' 7\")     GENERAL: healthy, alert and no distress  HEAD: Normocephalic, atraumatic  EYES: Eyes grossly normal to inspection, EOMI and conjunctivae and sclerae normal  RESP: Speaking in full sentences, unlabored, no audible wheezes or cough  SKIN: no suspicious lesions or rashes, no jaundice  NEURO: oriented, and speech normal  PSYCH: mentation appears normal, affect normal/bright          Diagnostic Test Results:  Labs reviewed in Epic            Assessment and Plan:  Angeli was seen today for video visit.    Diagnoses and all orders for this visit:    H/O gastric bypass  Due for gastric bypass labs and post splenectomy vaccines. Suggested in person visit this summer to complete these, and consider pap if needed.  -     RISEdronate (ACTONEL) 150 MG tablet; Take 1 tablet (150 mg) by mouth every 30 days  -     **Comprehensive metabolic panel FUTURE 2mo; Future  -     **Vitamin B12 FUTURE 2mo; Future  -     Vitamin D Deficiency; Future  -     **TSH with free T4 reflex FUTURE 2mo; Future  -     **Ferritin FUTURE 2mo; Future  -     Zinc; Future  -     Parathyroid Hormone Intact; Future  -     **CBC with platelets differential FUTURE 2mo; Future  -     Lipid panel reflex to direct LDL Fasting; Future    Osteopenia of both hips  -     RISEdronate (ACTONEL) 150 MG tablet; Take 1 tablet (150 mg) by mouth every 30 days  -     **TSH with free T4 reflex FUTURE 2mo; Future  -     Parathyroid Hormone Intact; Future    Personal history of traumatic fracture  Multiple fractures  Long term current use of systemic steroids  Will refill actonel but prudent to check new " dexa since starting given gastric bypass and potential for malasorption. Started 10/21.  -     RISEdronate (ACTONEL) 150 MG tablet; Take 1 tablet (150 mg) by mouth every 30 days    Essential hypertension  -     Lipid panel reflex to direct LDL Fasting; Future          Video-Visit Details    Type of service:  Video Visit    Video Start/End Time: 2:07 PM 2:32 PM    Originating Location (pt. Location): Home    Distant Location (provider location):  [facility]     Mode of Communication:  Video Conference via  [employer] or  [employer]        Eloisa Fuentes MD  Internal Medicine      >30 minutes spent today performing chart review, history and exam, documentation and further activities as noted above.

## 2023-05-25 ENCOUNTER — MYC REFILL (OUTPATIENT)
Dept: INTERNAL MEDICINE | Facility: CLINIC | Age: 63
End: 2023-05-25
Payer: COMMERCIAL

## 2023-05-25 RX ORDER — HYDROCODONE BITARTRATE AND ACETAMINOPHEN 10; 325 MG/1; MG/1
1 TABLET ORAL 4 TIMES DAILY PRN
Status: CANCELLED | OUTPATIENT
Start: 2023-05-25

## 2023-05-25 NOTE — TELEPHONE ENCOUNTER
Pain medications managed by Dr. Winchester per .   Pt encouraged to reach out to PMR provider to continue to manage pain.     JUAN R RUDOLPH RN on 5/25/2023 at 10:18 AM

## 2023-05-26 RX ORDER — HYDROCODONE BITARTRATE AND ACETAMINOPHEN 10; 325 MG/1; MG/1
1 TABLET ORAL EVERY 6 HOURS PRN
Qty: 120 TABLET | Refills: 0 | Status: SHIPPED | OUTPATIENT
Start: 2023-05-31 | End: 2023-06-30

## 2023-05-26 RX ORDER — DULOXETIN HYDROCHLORIDE 60 MG/1
60 CAPSULE, DELAYED RELEASE ORAL
Qty: 90 CAPSULE | Refills: 3 | Status: SHIPPED | OUTPATIENT
Start: 2023-05-26

## 2023-05-31 RX ORDER — GABAPENTIN 600 MG/1
TABLET ORAL
Qty: 120 TABLET | Refills: 2 | Status: SHIPPED | OUTPATIENT
Start: 2023-05-31

## 2023-05-31 RX ORDER — GABAPENTIN 600 MG/1
600 TABLET ORAL 4 TIMES DAILY
Qty: 120 TABLET | Refills: 1 | Status: CANCELLED | OUTPATIENT
Start: 2023-05-31

## 2023-06-21 ENCOUNTER — TELEPHONE (OUTPATIENT)
Facility: CLINIC | Age: 63
End: 2023-06-21
Payer: COMMERCIAL

## 2023-06-21 ENCOUNTER — PATIENT MESSAGE (OUTPATIENT)
Dept: PHYSICAL MEDICINE AND REHAB | Facility: CLINIC | Age: 63
End: 2023-06-21

## 2023-06-21 RX ORDER — OXYCODONE HCL 20 MG/1
20 TABLET, FILM COATED, EXTENDED RELEASE ORAL EVERY 12 HOURS
Qty: 60 TABLET | Refills: 0 | Status: SHIPPED | OUTPATIENT
Start: 2023-06-21

## 2023-06-21 RX ORDER — METHOCARBAMOL 500 MG/1
TABLET, FILM COATED ORAL
Qty: 90 TABLET | Refills: 2 | Status: SHIPPED | OUTPATIENT
Start: 2023-06-21

## 2023-06-21 NOTE — TELEPHONE ENCOUNTER
Patient currently on Norco  mg 1 tab q6h prn, methocarbamol 500 mg 1 tab TID, Gabapentin 600 mg- 1 tab 4xday. NOV: 7/10/23-video visit    Patient updated relayed to [address] Awaiting recommendations.

## 2023-06-21 NOTE — TELEPHONE ENCOUNTER
Per : \"She can try Oxycontin 20 mg BID. see if she is ok withg this? \"    Spoke with patient who does want to try the above. Rx set up and sent to [address] for approval.  Patient's pharmacy was also verified.

## 2023-06-21 NOTE — TELEPHONE ENCOUNTER
Refill Request    Medication request: METHOCARBAMOL 500 MG Oral Tab TAKE 1 TABLET BY MOUTH THREE TIMES A DAY    LOV:3/9/23-televisit   Due back to clinic per last office note:  \"Follow-up:  3 months to 4 months \"  Carmella Boas: 7/10/2023 Saravanan Aldana MD      [place]: 5/21/23 #90    Urine drug screen (if applicable): n/a  Pain contract: Valid until 8/18/23    LOV plan (if weaning or changing medications): Per  at [address] [place] [address]: \"She will continue with the Norco and the Robaxin which are helping her. \"

## 2023-06-21 NOTE — TELEPHONE ENCOUNTER
From: [facility]  To: Jocelyn Ericka Melton MD  Sent: 6/21/2023 7:57 AM CDT  Subject: quick question    I fell (again) one week ago today on to the ground landing on my left hip and lumbar back. of course. The hip is sore and pretty bruised, but ok. But I have a presumptive dx of a compression fx of L5/S1. Needless to say, I am in quite a bit of pain. Waiting for a brace. I know that will provide some relief. But the Norco s not cutting it. Is there anything I can have , even on a temporary basis, to get me through this? I am still working. ..

## 2023-06-29 RX ORDER — HYDROCODONE BITARTRATE AND ACETAMINOPHEN 10; 325 MG/1; MG/1
1 TABLET ORAL EVERY 6 HOURS PRN
Qty: 120 TABLET | Refills: 0 | Status: CANCELLED | OUTPATIENT
Start: 2023-06-29 | End: 2023-07-29

## 2023-06-30 RX ORDER — HYDROCODONE BITARTRATE AND ACETAMINOPHEN 10; 325 MG/1; MG/1
1 TABLET ORAL EVERY 6 HOURS PRN
Qty: 120 TABLET | Refills: 0 | Status: SHIPPED | OUTPATIENT
Start: 2023-06-30 | End: 2023-07-30

## 2023-07-10 ENCOUNTER — TELEMEDICINE (OUTPATIENT)
Dept: PHYSICAL MEDICINE AND REHAB | Facility: CLINIC | Age: 63
End: 2023-07-10
Payer: COMMERCIAL

## 2023-07-10 DIAGNOSIS — M48.061 SPINAL STENOSIS OF LUMBAR REGION WITHOUT NEUROGENIC CLAUDICATION: ICD-10-CM

## 2023-07-10 DIAGNOSIS — M51.26 LUMBAR HERNIATED DISC: ICD-10-CM

## 2023-07-10 DIAGNOSIS — C64.2 RENAL CELL CARCINOMA OF LEFT KIDNEY (HCC): ICD-10-CM

## 2023-07-10 DIAGNOSIS — M47.816 LUMBAR FACET ARTHROPATHY: ICD-10-CM

## 2023-07-10 DIAGNOSIS — M51.9 LUMBAR DISC DISEASE: ICD-10-CM

## 2023-07-10 DIAGNOSIS — M43.10 ACQUIRED SPONDYLOLISTHESIS: ICD-10-CM

## 2023-07-10 DIAGNOSIS — M48.061 LUMBAR FORAMINAL STENOSIS: ICD-10-CM

## 2023-07-10 DIAGNOSIS — M54.16 LUMBAR RADICULOPATHY: Primary | ICD-10-CM

## 2023-07-10 DIAGNOSIS — G60.9 IDIOPATHIC PERIPHERAL NEUROPATHY: ICD-10-CM

## 2023-07-10 DIAGNOSIS — M96.1 POSTLAMINECTOMY SYNDROME, LUMBAR REGION: ICD-10-CM

## 2023-07-11 ENCOUNTER — TELEPHONE (OUTPATIENT)
Dept: PHYSICAL MEDICINE AND REHAB | Facility: CLINIC | Age: 63
End: 2023-07-11

## 2023-07-11 NOTE — TELEPHONE ENCOUNTER
Initiated authorization for Bilateral L5 TFESIs CPT [id number] dx:M54.16 to be done at [address] with [facility]  Status: closed-will try again

## 2023-07-13 NOTE — TELEPHONE ENCOUNTER
Prior authorization request completed for: Bilateral L5 TFESI    Authorization # NO PRIOR AUTHORIZATION / PREDETERMINATION REQUIRED / VALID AND BILLABLE  Authorization dates: N/A  CPT codes approved: 30374 - 50   Number of visits/dates of service approved: 1  Physician: Dr. Harrison Draper   Location: [employer]    Call Ref#: [id number]  Representative Name: Rebecca Farfan

## 2023-07-17 NOTE — TELEPHONE ENCOUNTER
S/w patient who stated she discontinued Eliquis on her own without her prescribing physicians advice. States she fell and had a lot of bleeding and decided to stop taking Eliquis on her own. Patient educated on the importance of blood thinners, to which she stated, \"I know. \" Patient stated she will make an appointment with Dr. Dany Cheney to discuss Eliquis and will ask Dr. Kirill Ladd for clearance for Bilateral L5 TFESI. Clearance form sent to Dr. Kirill Ladd. Letter also mentions the above.

## 2023-07-19 ENCOUNTER — DOCUMENTATION ONLY (OUTPATIENT)
Dept: INTERNAL MEDICINE | Facility: CLINIC | Age: 63
End: 2023-07-19
Payer: COMMERCIAL

## 2023-07-19 RX ORDER — OXYCODONE HCL 20 MG/1
20 TABLET, FILM COATED, EXTENDED RELEASE ORAL EVERY 12 HOURS
Qty: 60 TABLET | Refills: 0 | Status: SHIPPED | OUTPATIENT
Start: 2023-07-22

## 2023-07-19 NOTE — PROGRESS NOTES
Type of Form Received:     Form Received (Date) 07/18/23   Form Filled out Yes, 7/27/23   Placed in provider folder Yes

## 2023-07-19 NOTE — TELEPHONE ENCOUNTER
Refill Request    Medication request: oxyCODONE ER 20 MG Oral Tablet Extended Release 12 hour Abuse-Deterrent  Take 1 tablet (20 mg total) by mouth Q12H.     LOV:7/10/23  Due back to clinic per last office note:  \"Follow-up:  3 months \"  NOV: Visit date not found      [place]: 6/22/23 #60    Urine drug screen (if applicable): none  Pain contract: Valid until 8/18/23    LOV plan (if weaning or changing medications): Per  at [address] [place] [address]: \"The patient will continue with their current pain medications. \"         Message sent to patient via [employer] reminding her to schedule a follow up appointment.

## 2023-07-26 NOTE — TELEPHONE ENCOUNTER
Refill Request    Medication request: HYDROcodone-acetaminophen  MG Oral Tab. Take 1 tablet by mouth every 6 (six) hours as needed for Pain.       Ledbetter Yecenia (07/10/2023-Televisit) Zehra Damico MD   Due back to clinic per last office note: Per Dr Robinson Suárez \" Follow-up:  3 months \"  NOV: 10/12/2023 Zehra Damico MD      [place]: 06/30/2023 #120 (30 days)    Urine drug screen (if applicable): none  Pain contract: Expires on 08/18/2023    LOV plan (if weaning or changing medications): Per Dr Marie Terry patient will continue with their current pain medications\"

## 2023-07-27 ENCOUNTER — MEDICAL CORRESPONDENCE (OUTPATIENT)
Dept: HEALTH INFORMATION MANAGEMENT | Facility: CLINIC | Age: 63
End: 2023-07-27
Payer: COMMERCIAL

## 2023-07-27 RX ORDER — HYDROCODONE BITARTRATE AND ACETAMINOPHEN 10; 325 MG/1; MG/1
1 TABLET ORAL EVERY 6 HOURS PRN
Qty: 120 TABLET | Refills: 0 | Status: SHIPPED | OUTPATIENT
Start: 2023-07-27 | End: 2023-08-26

## 2023-08-21 NOTE — TELEPHONE ENCOUNTER
Refill Request    Medication request: oxyCODONE ER 20 MG Oral Tablet Extended Release 12 hour Abuse-Deterrent Take 1 tablet (20 mg total) by mouth Q12H. LOV: 7/10/23- televisit  Due back to clinic per last office note:  \"Follow-up:  3 months \"  NOV: 10/12/2023 Rafiq Johnson MD      [place] 23 #60    Urine drug screen (if applicable): none  Pain contract:  on 23    LOV plan (if weaning or changing medications): Per  at [address] [place] [address]: \"The patient will continue with their current pain medications.  \"

## 2023-08-23 RX ORDER — HYDROCODONE BITARTRATE AND ACETAMINOPHEN 10; 325 MG/1; MG/1
1 TABLET ORAL EVERY 6 HOURS PRN
Qty: 120 TABLET | Refills: 0 | Status: SHIPPED | OUTPATIENT
Start: 2023-08-23 | End: 2023-09-22

## 2023-08-23 RX ORDER — OXYCODONE HCL 20 MG/1
20 TABLET, FILM COATED, EXTENDED RELEASE ORAL EVERY 12 HOURS
Qty: 60 TABLET | Refills: 0 | Status: SHIPPED | OUTPATIENT
Start: 2023-08-23

## 2023-08-23 NOTE — TELEPHONE ENCOUNTER
Refill Request    Medication request:   HYDROcodone-acetaminophen  MG Oral Tab         LOV: 8/3/2022 Maribell Lagunas MD   RTC: 3 months  NOV: 10/12/2023 Maribell Lagunas MD      [place]: 2023 #30 days    UDS: (if applicable): N/A  Pain contract:  2023    LOV plan (if weaning or changing medications): She will continue with the [place] for the pain and she will trial the Robaxin.

## 2023-09-16 DIAGNOSIS — Z98.84 H/O GASTRIC BYPASS: ICD-10-CM

## 2023-09-16 DIAGNOSIS — T07.XXXA MULTIPLE FRACTURES: ICD-10-CM

## 2023-09-16 DIAGNOSIS — Z87.81 PERSONAL HISTORY OF TRAUMATIC FRACTURE: ICD-10-CM

## 2023-09-16 DIAGNOSIS — M85.852 OSTEOPENIA OF BOTH HIPS: ICD-10-CM

## 2023-09-16 DIAGNOSIS — Z79.52 LONG TERM CURRENT USE OF SYSTEMIC STEROIDS: ICD-10-CM

## 2023-09-16 DIAGNOSIS — M85.851 OSTEOPENIA OF BOTH HIPS: ICD-10-CM

## 2023-09-18 NOTE — TELEPHONE ENCOUNTER
Refill Request    Medication request: GABAPENTIN 600 MG Oral Tab TAKE 1 TABLET BY MOUTH 4 TIMES DAILY. LOV: 7/10/23-televisit  Due back to clinic per last office note:  \"Follow-up:  3 months \"  NOV: 10/12/2023 Estee Rosa MD      [place]: 8/10/23 #120    Urine drug screen (if applicable): n/a  Pain contract:  on 23    LOV plan (if weaning or changing medications): Per  at [address] [place] [address]: \"The patient will continue with their current pain medications. \"    No mention of Gabapentin or methocarbamol at LOV.            Medication request: METHOCARBAMOL 500 MG Oral Tab   TAKE 1 TABLET BY MOUTH THREE TIMES A DAY      [place] 23 #90

## 2023-09-19 RX ORDER — METHOCARBAMOL 500 MG/1
500 TABLET, FILM COATED ORAL 3 TIMES DAILY
Qty: 90 TABLET | Refills: 2 | Status: SHIPPED | OUTPATIENT
Start: 2023-09-19

## 2023-09-19 RX ORDER — OXYCODONE HCL 20 MG/1
20 TABLET, FILM COATED, EXTENDED RELEASE ORAL EVERY 12 HOURS
Qty: 60 TABLET | Refills: 0 | Status: SHIPPED | OUTPATIENT
Start: 2023-09-19

## 2023-09-19 RX ORDER — GABAPENTIN 600 MG/1
600 TABLET ORAL 4 TIMES DAILY
Qty: 120 TABLET | Refills: 2 | Status: SHIPPED | OUTPATIENT
Start: 2023-09-19

## 2023-09-19 NOTE — TELEPHONE ENCOUNTER
Refill Request    Medication request: oxyCODONE ER 20 MG Oral Tablet Extended Release 12 hour Abuse-Deterrent. Take 1 tablet (20 mg total) by mouth Q12H. LOV: 07/10/2023 (TELEVISIT) with Grzegorz Wise M.D. Due back to clinic per last office note: Per Dr. Rodolfo Michelle: \"Follow-up:  3 months. \"  NOV: 10/12/2023 Vj Ayala MD      [employer]/Last refill: 2023 #60    Urine drug screen (if applicable): NONE  Pain contract:  on 2023.  - Note added to NOV that PC and UDS are needed. LOV plan (if weaning or changing medications): Per Dr. Rodolfo Michelle: \" The patient will continue with their current pain medications. \"

## 2023-09-20 NOTE — TELEPHONE ENCOUNTER
RISEDRONATE SODIUM 150 MG TAB       Last Written Prescription Date:  5-23-23  Last Fill Quantity: 3,   # refills: 0  Last Office Visit : 5-23-23  Future Office visit:  none      Last clinic note: julia term current use of systemic steroids  Will refill actonel but prudent to check new dexa since starting given gastric bypass and potential for malasorption. Started 10/21.  -     RISEdronate (ACTONEL) 150 MG tablet; Take 1 tablet (150 mg) by mouth every 30 days    Routing refill request to provider for review/approval because:  Overdue dexa scan: unable to find

## 2023-09-21 RX ORDER — RISEDRONATE SODIUM 150 MG/1
150 TABLET, FILM COATED ORAL
Qty: 3 TABLET | Refills: 0 | Status: SHIPPED | OUTPATIENT
Start: 2023-09-21 | End: 2023-11-29

## 2023-09-21 NOTE — TELEPHONE ENCOUNTER
Pt was last seen in clinic on 5/23/23. Pt last had RISEdronate (ACTONEL) 150 MG tablet  refilled on 5/23/23 for a 90 day supply by PCP. Due for refill. Medication refill sent to pharmacy.     Per Dr. Fuentes note 5/23:  Long term current use of systemic steroids  Will refill actonel but prudent to check new dexa since starting given gastric bypass and potential for malasorption. Started 10/21.  -     RISEdronate (ACTONEL) 150 MG tablet; Take 1 tablet (150 mg) by mouth every 30 days    Dexa Scan pended to Dr. Fuentes to approve.     JUAN R RUDOLPH RN on 9/21/2023 at 10:25 AM

## 2023-09-24 ENCOUNTER — PATIENT MESSAGE (OUTPATIENT)
Dept: PHYSICAL MEDICINE AND REHAB | Facility: CLINIC | Age: 63
End: 2023-09-24

## 2023-09-25 NOTE — TELEPHONE ENCOUNTER
Refill Request    Medication request:   HYDROcodone-acetaminophen  MG Oral Tab         LOV: Visit date not found   RTC: 8/3/2022  NOV: 10/12/2023 Bo Mendez MD      [facility]: [facility] #30 days    UDS: (if applicable): N/A   Pain contract:  2023    LOV plan (if weaning or changing medications):  She will continue with the Norco for the pain

## 2023-09-25 NOTE — TELEPHONE ENCOUNTER
From: Y[facility] Tatum  To: Kasey Saunders  Sent: 9/24/2023 8:17 PM CDT  Subject: Norco Refill    I went to request a refill of the Norco 10's q 6 hrs and it is not in [facility]. For some reason it is listed in [facility]. I would like a refill, but am asking you . rather than my primary. Call if any questions. ...  Nay Torres [phone]

## 2023-09-29 RX ORDER — HYDROCODONE BITARTRATE AND ACETAMINOPHEN 10; 325 MG/1; MG/1
1 TABLET ORAL EVERY 6 HOURS PRN
Qty: 120 TABLET | Refills: 0 | Status: SHIPPED | OUTPATIENT
Start: 2023-09-29 | End: 2023-09-29 | Stop reason: CLARIF

## 2023-09-29 RX ORDER — HYDROCODONE BITARTRATE AND ACETAMINOPHEN 10; 325 MG/1; MG/1
1 TABLET ORAL EVERY 6 HOURS PRN
Qty: 120 TABLET | Refills: 0 | Status: SHIPPED | OUTPATIENT
Start: 2023-09-29 | End: 2023-10-29

## 2023-10-02 ENCOUNTER — TELEPHONE (OUTPATIENT)
Dept: INTERNAL MEDICINE | Facility: CLINIC | Age: 63
End: 2023-10-02

## 2023-10-02 ENCOUNTER — LAB (OUTPATIENT)
Dept: LAB | Facility: CLINIC | Age: 63
End: 2023-10-02
Payer: COMMERCIAL

## 2023-10-02 ENCOUNTER — OFFICE VISIT (OUTPATIENT)
Dept: INTERNAL MEDICINE | Facility: CLINIC | Age: 63
End: 2023-10-02
Payer: COMMERCIAL

## 2023-10-02 VITALS
DIASTOLIC BLOOD PRESSURE: 89 MMHG | WEIGHT: 162.1 LBS | BODY MASS INDEX: 25.39 KG/M2 | HEART RATE: 70 BPM | OXYGEN SATURATION: 99 % | SYSTOLIC BLOOD PRESSURE: 147 MMHG

## 2023-10-02 DIAGNOSIS — M21.331 WRIST DROP, ACQUIRED, RIGHT: Primary | ICD-10-CM

## 2023-10-02 DIAGNOSIS — Z11.59 NEED FOR HEPATITIS C SCREENING TEST: Primary | ICD-10-CM

## 2023-10-02 DIAGNOSIS — I10 ESSENTIAL HYPERTENSION: ICD-10-CM

## 2023-10-02 DIAGNOSIS — Z23 NEED FOR PNEUMOCOCCAL VACCINATION: ICD-10-CM

## 2023-10-02 DIAGNOSIS — M85.852 OSTEOPENIA OF BOTH HIPS: ICD-10-CM

## 2023-10-02 DIAGNOSIS — E55.9 VITAMIN D DEFICIENCY: ICD-10-CM

## 2023-10-02 DIAGNOSIS — M85.851 OSTEOPENIA OF BOTH HIPS: ICD-10-CM

## 2023-10-02 DIAGNOSIS — Z98.84 H/O GASTRIC BYPASS: ICD-10-CM

## 2023-10-02 DIAGNOSIS — T07.XXXA MULTIPLE FRACTURES: ICD-10-CM

## 2023-10-02 LAB
PTH-INTACT SERPL-MCNC: 129 PG/ML (ref 15–65)
VIT D+METAB SERPL-MCNC: 32 NG/ML (ref 20–50)

## 2023-10-02 PROCEDURE — 86803 HEPATITIS C AB TEST: CPT | Performed by: PHYSICIAN ASSISTANT

## 2023-10-02 PROCEDURE — 99000 SPECIMEN HANDLING OFFICE-LAB: CPT | Performed by: PATHOLOGY

## 2023-10-02 PROCEDURE — 90677 PCV20 VACCINE IM: CPT | Performed by: INTERNAL MEDICINE

## 2023-10-02 PROCEDURE — 90471 IMMUNIZATION ADMIN: CPT | Performed by: INTERNAL MEDICINE

## 2023-10-02 PROCEDURE — 82306 VITAMIN D 25 HYDROXY: CPT | Performed by: INTERNAL MEDICINE

## 2023-10-02 PROCEDURE — 36415 COLL VENOUS BLD VENIPUNCTURE: CPT | Performed by: PATHOLOGY

## 2023-10-02 PROCEDURE — 99215 OFFICE O/P EST HI 40 MIN: CPT | Mod: 25 | Performed by: INTERNAL MEDICINE

## 2023-10-02 PROCEDURE — 83970 ASSAY OF PARATHORMONE: CPT | Performed by: PATHOLOGY

## 2023-10-02 PROCEDURE — 84630 ASSAY OF ZINC: CPT | Mod: 90 | Performed by: PATHOLOGY

## 2023-10-02 RX ORDER — PREDNISONE 20 MG/1
40 TABLET ORAL DAILY
Qty: 10 TABLET | Refills: 0 | Status: SHIPPED | OUTPATIENT
Start: 2023-10-02 | End: 2023-10-07

## 2023-10-02 RX ORDER — AMLODIPINE BESYLATE 5 MG/1
5 TABLET ORAL DAILY
Qty: 90 TABLET | Refills: 3 | Status: SHIPPED | OUTPATIENT
Start: 2023-10-02 | End: 2024-03-12

## 2023-10-02 NOTE — CONFIDENTIAL NOTE
Please call patient:    Unfortunately, not all of her lab orders were drawn today. Some orders had apparently .  I have reordered the labs.    Is she able to get to a [place] lab to complete fasting labs sometime in the next week or so?  I apologize for the inconvenience.    Thanks,  Eloisa Fuentes MD  Internal Medicine

## 2023-10-02 NOTE — NURSING NOTE
Angeli Galindo is a 63 year old female patient that presents today in clinic for the following:    Chief Complaint   Patient presents with    Neurologic Problem     Pt reports sudden onset of right hand weakness/paralysis, happened yesterday around 11pm, pt got up from bed and wasn't able to move or feel hand, still feels numb from thumb and down forearm on thumb side, on top of hand. No associated pain or injury.     Refill Request     The patient's allergies and medications were reviewed as noted. A set of vitals were recorded as noted without incident: BP (!) 147/89 (BP Location: Right arm, Patient Position: Sitting, Cuff Size: Adult Regular)   Pulse 70   Wt 73.5 kg (162 lb 1.6 oz)   SpO2 99%   BMI 25.39 kg/m  . The patient does not have any other questions for the provider.    Osito Vo, EMT 12:34 PM on 10/2/2023

## 2023-10-02 NOTE — PROGRESS NOTES
Angeli Galindo is a 63 year old female who presents to clinic today for the following health issues:    Chief Complaint   Patient presents with    Neurologic Problem     Pt reports sudden onset of right hand weakness/paralysis, happened yesterday around 11pm, pt got up from bed and wasn't able to move or feel hand, still feels numb from thumb and down forearm on thumb side, on top of hand. No associated pain or injury.     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  History of hereditary spherocytosis, s/p splenectomy age 5, chronic LBP on narcotics, gastric bypass surgery, DVT/PE, acquired protein C deficiency on long term AC, multiple fractures and subdural hematoma after fall, L renal mass (oncocytoma)    Angeli reports feeling strange in middle of night, side sleeper woke up, couldn't use hand  Rest of body fine, denies vision changes, dysarthria, HA, other limb involvement, balance/walking problems  States hand/fingers numb and difficulty using  hand, she denies any trauma, falls, has been off AC for spinal injection which she had 3 days ago    She self discontinued BP med      She is on actonel given hx of fractures but prudent to check new dexa since starting given gastric bypass and potential for malasorption. Started 10/21.  11/20 outside dexa:  Results:   Region     Measured    Age   BMD          T-score  Z-score   Neck Left   11/23/2020  60.8  0.775 g/cm2  -1.9     -0.9     Neck Right  11/23/2020  60.8  0.758 g/cm2  -2.0     -1.0     Total Left  11/23/2020  60.8  0.801 g/cm2  -1.6     -1.0     Total Right 11/23/2020  60.8  0.806 g/cm2  -1.6     -0.9     ASSESSMENT:   The diagnosis in pre-menopausal women and men can be based on low bone   mass or evidence of skeletal fragility in the appropriate clinical setting.     Future:  Needs post-splenectomy vaccines:   Pcv, menB, menactra, ?HIB  Tdap future    Post RYGB vitamins:  "  Ca/d  Iron  mvit  bcomplex      Grandchildren.  Gisell Rico, \"Birdy\" 3 months    Routine Health Maintenence:  Lung Ca Screening (>20 pk age 50-80):  Colonoscopy (45-75 yrs):  9/21  Dexa (>65W or 70M yrs): see above, pending  Mammogram (40-75 yrs): 4/23  Pap (21-65 yrs): s/p hysterectomy    Works in sleep medicine                        A detailed Review of Systems was performed, verified and is negative except as documented in the HPI.  All health questionnaires were reviewed, verified and relevant information documented above.      Past Medical History:  Past Medical History:   Diagnosis Date    Arthritis     osteo    Bilateral low back pain without sciatica     Cancer (H) 2022    Lt Kidney    Chronic deep vein thrombosis (DVT) of lower extremity (H)     protein C deficiency, refuted during hospital 4/22 with normal levels    Closed blow-out fracture of left orbital floor (H) 04/08/2022    Depressive disorder since 2000    H/O splenectomy     Hypertension 2000    until 2016    Obesity due to excess calories     s/p gastric bypass, 320 lb to 170 lbs    Spherocytosis (H)        Active Meds:  Current Outpatient Medications   Medication    amLODIPine (NORVASC) 5 MG tablet    predniSONE (DELTASONE) 20 MG tablet    Calcium Carbonate-Vitamin D (CALCIUM-VITAMIN D) 600-125 MG-UNIT TABS    DULoxetine (CYMBALTA) 60 MG capsule    ELIQUIS ANTICOAGULANT 2.5 MG tablet    gabapentin (NEURONTIN) 600 MG tablet    HYDROcodone-acetaminophen (NORCO)  MG per tablet    Melatonin 10 MG CAPS    methocarbamol (ROBAXIN) 500 MG tablet    Prenatal Vit-Fe Fumarate-FA (PRENATAL VITAMINS PO)    RISEdronate (ACTONEL) 150 MG tablet    SUMAtriptan (IMITREX) 100 MG tablet    topiramate (TOPAMAX) 25 MG tablet     No current facility-administered medications for this visit.        Allergies:  Reviewed, refer to EMR    Relevant Social History:  Social History     Tobacco Use    Smoking status: Former     Packs/day: 0.50     Years: 5.00 "     Pack years: 2.50     Types: Cigarettes     Quit date: 2000     Years since quittin.7    Smokeless tobacco: Never   Vaping Use    Vaping Use: Never used   Substance Use Topics    Alcohol use: Yes     Comment: 5x week    Drug use: Never        Physical Exam:  Vitals: BP (!) 147/89 (BP Location: Right arm, Patient Position: Sitting, Cuff Size: Adult Regular)   Pulse 70   Wt 73.5 kg (162 lb 1.6 oz)   SpO2 99%   BMI 25.39 kg/m    Constitutional: Alert, oriented, pleasant, no acute distress  Head: Normocephalic, atraumatic  Eyes: Extra-ocular movements intact, pupils equally round bilaterally, no scleral icterus  ENT: Oropharynx clear, moist mucus membranes  Neck: Supple, no lymphadenopathy  Cardiovascular: Regular rate and rhythm, no murmurs, rubs or gallops, peripheral pulses full/symmetric  Respiratory: Good air movement bilaterally, lungs clear, no wheezes/rales/rhonchi  Musculoskeletal: No edema, normal muscle tone, normal gait  Neurologic: Alert and oriented, cranial nerves 2-12 intact, speech/gait intact, R arm notable for wrist drop, inability to dorsiflex wrist, extend fingers, triceps/deltoids fully intact, loss of sensation over dorsal R hand but palmar sensation intact, L arm and bilat legs sensation and strength intact  Skin: No rashes/lesions  Psychiatric: normal mentation, affect and mood      Diagnostics:  Labs reviewed in Epic          Assessment and Plan:  Angeli was seen today for neurologic problem and refill request.    Diagnoses and all orders for this visit:    Wrist drop, acquired, right  Suspect acute radial neuropathy, no s/s of stroke or central involvement. Would recommend EMG to clarify as she doesn't recall a specific injury, though nocturnal compression seems likely. Placed urgent Neuro and EMG referrals and OT referral. Discussed no evidence to support pred use but she would like to try and is aware of potential risks.  -     EMG; Future  -     Occupational Therapy Referral;  Future  -     Adult Neurology  Referral; Future  -     predniSONE (DELTASONE) 20 MG tablet; Take 2 tablets (40 mg) by mouth daily for 5 days Take with food.    Multiple fractures  Continue actonel until dexa, low threshold for IV or subcut given gastric bypass if no improvement    Essential hypertension  -     amLODIPine (NORVASC) 5 MG tablet; Take 1 tablet (5 mg) by mouth daily    Need for pneumococcal vaccination  -     PNEUMOCOCCAL 20 VALENT CONJUGATE (PREVNAR 20)    Future vaccines to include Td, flu, covid, hib, meningitis b/quad        Eloisa Fuentes MD  Internal Medicine    >40 minutes spent today performing chart review, history and exam, counseling, care coordination, documentation and further activities as noted above exclusive of any procedures or EKG interpretation

## 2023-10-03 LAB — HCV AB SERPL QL IA: NONREACTIVE

## 2023-10-03 NOTE — TELEPHONE ENCOUNTER
Please call patient:     Unfortunately, not all of her lab orders were drawn today.   Some orders had apparently .  I have reordered the labs.     Is she able to get to a [place] lab to complete fasting labs sometime in the next week or so?  I apologize for the inconvenience.     Thanks,  Eloisa Fuentes MD  Internal Medicine        RN called pt and LVM with the above information from Dr. Fuentes. Encouraged to call with any questions. Call back number left for clinic.     JUAN R RUDOLPH RN on 10/3/2023 at 10:53 AM

## 2023-10-04 ENCOUNTER — TELEPHONE (OUTPATIENT)
Dept: NEUROLOGY | Facility: CLINIC | Age: 63
End: 2023-10-04

## 2023-10-04 ENCOUNTER — THERAPY VISIT (OUTPATIENT)
Dept: OCCUPATIONAL THERAPY | Facility: CLINIC | Age: 63
End: 2023-10-04
Attending: INTERNAL MEDICINE
Payer: COMMERCIAL

## 2023-10-04 DIAGNOSIS — R20.2 NUMBNESS AND TINGLING IN RIGHT HAND: ICD-10-CM

## 2023-10-04 DIAGNOSIS — M21.331 WRIST DROP, ACQUIRED, RIGHT: ICD-10-CM

## 2023-10-04 DIAGNOSIS — R20.0 NUMBNESS AND TINGLING IN RIGHT HAND: ICD-10-CM

## 2023-10-04 DIAGNOSIS — R29.898 RIGHT HAND WEAKNESS: Primary | ICD-10-CM

## 2023-10-04 LAB — ZINC SERPL-MCNC: 64.5 UG/DL

## 2023-10-04 PROCEDURE — 97165 OT EVAL LOW COMPLEX 30 MIN: CPT | Mod: GO | Performed by: OCCUPATIONAL THERAPIST

## 2023-10-04 PROCEDURE — 97110 THERAPEUTIC EXERCISES: CPT | Mod: GO | Performed by: OCCUPATIONAL THERAPIST

## 2023-10-04 NOTE — TELEPHONE ENCOUNTER
I spoke with this pt regarding the appt date and time that Dr. 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    10/4/23 BD

## 2023-10-04 NOTE — PROGRESS NOTES
OCCUPATIONAL THERAPY EVALUATION  Type of Visit: Evaluation    See electronic medical record for Abuse and Falls Screening details.    Subjective   Presenting condition or subjective complaint:  Right wrist drop  Date of onset: 10/01/2023    Relevant medical history:  Past Medical History:   Diagnosis Date    Arthritis     osteo    Bilateral low back pain without sciatica     Cancer (H)     Lt Kidney    Chronic deep vein thrombosis (DVT) of lower extremity (H)     protein C deficiency, refuted during hospital  with normal levels    Closed blow-out fracture of left orbital floor (H) 2022    Depressive disorder since     H/O splenectomy     Hypertension     until     Obesity due to excess calories     s/p gastric bypass, 320 lb to 170 lbs    Spherocytosis (H)      Dates & types of surgery:  Past Surgical History:   Procedure Laterality Date    APPENDECTOMY      BACK SURGERY      L4/5 microdiskectomy     SECTION      CHOLECYSTECTOMY N/A     COLONOSCOPY      DAVINCI NEPHRECTOMY PARTIAL Left 2023    Procedure: ROBOT-ASSISTED, LAPAROSCOPIC LEFT PARTIAL NEPHRECTOMY;  Surgeon: Johnny Renee MD;  Location: [facility] OR    EXCISE LESION TRUNK N/A 2021    Procedure: EXCISE CYST ABDOMINAL WALL;  Surgeon: Junior Velasco MD;  Location: [facility] Main OR    GASTRIC BYPASS  2016    neto en y    GI SURGERY      GYN SURGERY      Hysterectomy     HYSTERECTOMY, PAP NO LONGER INDICATED      IR REMOVAL IVC FILTER      OPEN REDUCTION INTERNAL FIXATION FEMUR MIDSHAFT Right 2022    Procedure: Right hip pinning;  Surgeon: Darrel Nguyen MD;  Location: [facility] OR    ORTHOPEDIC SURGERY       Prior diagnostic imaging/testing results: EMG scheduled for 23  Prior therapy history for the same diagnosis, illness or injury: None    Prior level of function:  Transfers: Independent  Ambulation: Independent  ADL: Independent  IADL: Independent    Living environment: Patient is  independent at home and there are no concerns about accessibility and mobility in the home.    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    Objective   Right hand dominant  Patient reports symptoms of stiffness/loss of motion, weakness/loss of strength, and numbness    Pain Level (Scale 0-10)   10/4/2023   At Rest 0/10   With Use 0/10     Edema  None noted on exam.    Sensation   Patient reports numbness within the superficial branch of the radial nerve. Of note, patient reports reduced sensation in this area following a previous wrist fracture with operative management. Sensation within the median and ulnar nerve distributions is within normal limits.    ROM   Unable to actively extend right wrist against gravity. In a gravity eliminated plane, patient able to extend wrist from a flexed position to neutral. Unable to actively extend fingers. Unable to actively extend the thumb. Wrist has approximately 30 degrees of passive extension. Of note, patient reports a history of a right wrist fracture with operative management which affected wrist extension ROM.    Strength  Deferred testing at this time.    MMT Radial Nerve  Scale of 0-5/5   10/4/2023   Triceps 5/5   Brachioradialis 5/5   ECRL/ECRB 2-/5   Supinator 4/5   EDC 0/5   APL 3/5   EIP 0/5   EPL 0/5     Assessment & Plan   CLINICAL IMPRESSIONS  Medical Diagnosis: Right wrist drop    Treatment Diagnosis: Right hand weakness; right hand tingling/numbness    Impression/Assessment: Pt is a 63 year old female presenting to Occupational Therapy due to the above condition.  The following significant findings have been identified: Impaired activity tolerance, Impaired ROM, Impaired sensation, and Impaired strength.  These identified deficits interfere with their ability to perform self care tasks, work tasks, recreational activities, household chores, driving ,  and meal planning and preparation as compared to previous level of function.   Patient's limitations or Problem List includes: Decreased ROM/motion, Weakness, and Sensory disturbance of the right wrist, hand, and thumb which interferes with the patient's ability to perform Self Care Tasks (dressing, eating, bathing, hygiene/toileting), Work Tasks, Recreational Activities, Household Chores, and Driving  as compared to previous level of function.    Clinical Decision Making (Complexity):  Assessment of Occupational Performance: 5 or more Performance Deficits  Occupational Performance Limitations: bathing/showering, toileting, dressing, feeding, hygiene and grooming, driving and community mobility, home establishment and management, meal preparation and cleanup, shopping, work, and leisure activities  Clinical Decision Making (Complexity): Low complexity    PLAN OF CARE  Treatment Interventions:  Therapeutic Exercise:  AROM, AAROM, PROM, Tendon Gliding, Isotonics, and Isometrics  Neuromuscular re-education:  Nerve Gliding and Kinesiotaping  Manual Techniques:  Myofascial release  Orthotic Fabrication:  Wrist cock-up orthosis, dynamic radial nerve orthosis    Long Term Goals   OT Goal 1  Goal Identifier: ADL  Goal Description: Patient able to hold wash cloth and wash face using right hand.  Rationale: In order to maximize safety and independence with performance of self-care activities  Target Date: 11/04/23  OT Goal 2  Goal Identifier: Work  Goal Description: Patient able to type and mouse using right hand for 60 minutes.  Rationale: In order to maximize safety and independence with performance of self-care activities  Target Date: 12/04/23      Frequency of Treatment: 2x/month  Duration of Treatment: 3 months     Recommended Referrals to Other Professionals:  N/A  Education Assessment: Learner/Method: Patient;No Barriers to Learning     Risks and benefits of evaluation/treatment have been explained.    Patient/Family/caregiver agrees with Plan of Care.     Evaluation Time:    OT Eval, Low Complexity Minutes (66487): 25    Signing Clinician: Rea Ramirez OT

## 2023-10-05 ENCOUNTER — MYC MEDICAL ADVICE (OUTPATIENT)
Dept: INTERNAL MEDICINE | Facility: CLINIC | Age: 63
End: 2023-10-05
Payer: COMMERCIAL

## 2023-10-05 DIAGNOSIS — M21.331 WRIST DROP, ACQUIRED, RIGHT: Primary | ICD-10-CM

## 2023-10-12 ENCOUNTER — TELEMEDICINE (OUTPATIENT)
Dept: PHYSICAL MEDICINE AND REHAB | Facility: CLINIC | Age: 63
End: 2023-10-12
Payer: COMMERCIAL

## 2023-10-12 DIAGNOSIS — C64.2 RENAL CELL CARCINOMA OF LEFT KIDNEY (HCC): ICD-10-CM

## 2023-10-12 DIAGNOSIS — M48.061 LUMBAR FORAMINAL STENOSIS: ICD-10-CM

## 2023-10-12 DIAGNOSIS — G60.9 IDIOPATHIC PERIPHERAL NEUROPATHY: ICD-10-CM

## 2023-10-12 DIAGNOSIS — G56.31 NEUROPATHY OF RIGHT RADIAL NERVE: Primary | ICD-10-CM

## 2023-10-12 DIAGNOSIS — M51.9 LUMBAR DISC DISEASE: ICD-10-CM

## 2023-10-12 DIAGNOSIS — M43.10 ACQUIRED SPONDYLOLISTHESIS: ICD-10-CM

## 2023-10-12 DIAGNOSIS — M96.1 POSTLAMINECTOMY SYNDROME, LUMBAR REGION: ICD-10-CM

## 2023-10-12 DIAGNOSIS — M48.061 SPINAL STENOSIS OF LUMBAR REGION WITHOUT NEUROGENIC CLAUDICATION: ICD-10-CM

## 2023-10-12 DIAGNOSIS — M51.26 LUMBAR HERNIATED DISC: ICD-10-CM

## 2023-10-12 DIAGNOSIS — M54.16 LUMBAR RADICULOPATHY: ICD-10-CM

## 2023-10-12 DIAGNOSIS — M47.816 LUMBAR FACET ARTHROPATHY: ICD-10-CM

## 2023-10-12 DIAGNOSIS — M76.61 TENDONITIS, ACHILLES, RIGHT: ICD-10-CM

## 2023-10-12 NOTE — PROGRESS NOTES
[facility]  [address]    Telemedicine Visit - Evaluation    Min Moore verbally consents to a Telemedicine Visit on 10/12/23. This visit is conducted using Telemedicine with live, interactive audio and video. Patient understands and accepts financial responsibility for any deductible, co-insurance and/or co-pays associated with this service. Chief Complaint: right hand weakness    HISTORY OF PRESENT ILLNESS:   The patient is a 61year old female who was last seen on 7/10/2023. She feels that her back is doing better since having the bilateral L5 TFESI's on 2023. She is about 75% better now. She states that 3 days later, she awoke with right wrist drop with no ability to move extend the fingers. She also had numbness in the thumb that radiated up the radial aspect of the right forearm. She denies any other weakness. She has seen her PCP and was given a cock up wrist splint. She has start OT for the right hand. PAST MEDICAL HISTORY:     Past Medical History:   Diagnosis Date    Depression     PE (pulmonary embolism)          PAST SURGICAL HISTORY:     Past Surgical History:   Procedure Laterality Date    [facility] SURGERY            KIDNEY SURGERY  2023    Tumor removed    LAMINECTOMY,LUMBAR  2009    REMOVAL SPLEEN, TOTAL           CURRENT MEDICATIONS:     Current Outpatient Medications   Medication Sig Dispense Refill    HYDROcodone-acetaminophen  MG Oral Tab Take 1 tablet by mouth every 6 (six) hours as needed for Pain. 120 tablet 0    Risedronate Sodium 5 MG Oral Tab Take 5 mg by mouth every morning before breakfast. MONTHLY      methocarbamol 500 MG Oral Tab Take 1 tablet (500 mg total) by mouth 3 (three) times daily. 90 tablet 2    gabapentin 600 MG Oral Tab Take 1 tablet (600 mg total) by mouth 4 (four) times daily.  120 tablet 2    oxyCODONE ER 20 MG Oral Tablet Extended Release 12 hour Abuse-Deterrent Take 1 tablet (20 mg total) by mouth Q12H. 60 tablet 0    DULoxetine 60 MG Oral Cap DR Particles Take 1 capsule (60 mg total) by mouth once daily. 90 capsule 3    apixaban (ELIQUIS) 2.5 MG Oral Tab Take 1 tablet (2.5 mg total) by mouth 2 (two) times daily. 60 tablet 0    acetaminophen 500 MG Oral Tab Take 500 mg by mouth as needed. vitamin B-12 50 MCG Oral Tab Take 500 mcg by mouth as needed. Triamterene-HCTZ 37.5-25 MG Oral Tab Take 1 tablet by mouth as needed. Naloxone HCl 4 MG/0.1ML Nasal Liquid 4 mg by Nasal route as needed. If patient remains unresponsive, repeat dose in other nostril 2-5 minutes after first dose. 1 kit 0    Calcium Carbonate-Vitamin D (CALCIUM + D OR) Take 1 tablet by mouth 2 (two) times daily. three times per week            ALLERGIES:     Shellfish-Derived P*    ANGIOEDEMA  Lisinopril              Coughing  Ace Inhibitors          Coughing, OTHER (SEE COMMENTS)      FAMILY HISTORY:     Family History   Problem Relation Age of Onset    Cancer Father     Diabetes Mother     Other (Other) Mother         Parkinson          SOCIAL HISTORY:     Social History     Socioeconomic History    Marital status:    Tobacco Use    Smoking status: Former     Types: Cigarettes     Quit date: 2000     Years since quittin.9    Smokeless tobacco: Never   Vaping Use    Vaping Use: Never used   Substance and Sexual Activity    Alcohol use: Yes     Alcohol/week: 0.0 standard drinks of alcohol     Comment: socially    Drug use: No   Other Topics Concern    Caffeine Concern Yes     Comment: 2 cups coffee daily    Exercise Yes     Comment: walk 1-3 miles per day. Social History Narrative    The patient does not use an assistive device. The patient does live in a home with stairs.           REVIEW OF SYSTEMS:   As per above HPI    PHYSICAL EXAM:   General: No immediate distress  Head: Normocephalic/ Atraumatic  Eyes: Extra-occular movements intact  Ears/Nose/Throat:  External appearance identifies normal appearance without obvious deformity  Cardiovascular: No cyanosis, clubbing or edema  Respiratory: Non-labored respirations  Skin: No lesions noted   Neurological: alert & oriented x 3, attentive, able to follow commands, comprehention intact, spontaneous speech intact  Psychiatric: Mood and affect appropriate    Musculoskeletal Exam:  Right Wrist and Hand:  she has no finger or wrist extension. The left side is normal.    LABS:   No results found for: \"EAG\", \"A1C\"  No results found for: \"WBC\", \"RBC\", \"HGB\", \"HCT\", \"MCV\", \"MCH\", \"MCHC\", \"RDW\", \"PLT\", \"MPV\"  No results found for: \"GLU\", \"BUN\", \"BUNCREA\", \"CREATSERUM\", \"ANIONGAP\", \"GFR\", \"GFRNAA\", \"GFRAA\", \"CA\", \"OSMOCALC\", \"ALKPHO\", \"AST\", \"ALT\", \"ALKPHOS\", \"BILT\", \"TP\", \"ALB\", \"GLOBULIN\", \"AGRATIO\", \"NA\", \"K\", \"CL\", \"CO2\"  No results found for: \"PTP\", \"PT\", \"INR\"  No results found for: \"VITD\", \"QVITD\", \"[id number]\"    IMAGING:     ASSESSMENT:     1. Neuropathy of right radial nerve    2. Bilateral L5 radiculopathies     3. Idiopathic peripheral neuropathy    4. L3-4 mod central stenosis    5. L5-S1 left mild-mod, L4-5 bilateral mild, L3-4 left mild, L2-3 left mod foraminal stenosis    6. L5-S1 left mod foraminal, L4-5 mod diffuse, L3-4 mod diffuse, L2-3 left mod foraminal & mild-mod diffuse bulging discs    7. L5-S1 mild central herniated disc    8. Lumbar facet arthropathy    9. L4-5 grade 1-2 unstable spondylolisthesis    10. s/p left L4-5 laminectomy    11. right Achilles tendonitis at the insertion on the calcaneus    12. Renal cell carcinoma of left kidney (HCC)      PLAN:   She will continue with the OT for the right wrist and hand. She will use a neutral wrist and finger splint at night and a dynamic wrist and finger extension splint during the day. She will continue with the home exercise program for the lumbar spine. She will follow up in 4-6 weeks with the right radial nerve issue.     We discussed that a telemedicine visit is in place of an office visit; however, this limits the ability to perform a thorough physical examination which may affect objective findings related to a specific condition and can affect treatment. The patient verbalized understanding with this plan and was in agreement. There are no barriers to learning. All questions were answered. Malinda Claude A. Bunnie Pac, M.D. Physical Medicine and Rehabilitation   10/12/2023    Telehealth outside of [address] Verbal Consent   I conducted a telehealth visit with Demetrius frye, 10/12/23, which was completed using two-way, real-time interactive audio and video communication. This has been done in good chleo to provide continuity of care in the best interest of the provider-patient relationship, due to the COVID -19 public health crisis/national emergency where restrictions of face-to-face office visits are ongoing. Every conscious effort was taken to allow for sufficient and adequate time to complete the visit. The patient was made aware of the limitations of the telehealth visit, including treatment limitations as no physical exam could be performed. The patient was advised to call 911 or to go to the ER in case there was an emergency. The patient was also advised of the potential privacy & security concerns related to the telehealth platform. The patient was made aware of where to find [facility] notice of privacy practices, telehealth consent form and other related consent forms and documents. which are located on the [facility] website. The patient verbally agreed to telehealth consent form, related consents and the risks discussed. Lastly, the patient confirmed that [place] were in [place]. Included in this visit, time may have been spent reviewing labs, medications, radiology tests and decision making. Appropriate medical decision-making and tests are ordered as detailed in the plan of care above.   Coding/billing information is submitted for this visit based on complexity of care and/or time spent for the visit.

## 2023-10-17 ENCOUNTER — THERAPY VISIT (OUTPATIENT)
Dept: OCCUPATIONAL THERAPY | Facility: CLINIC | Age: 63
End: 2023-10-17
Payer: COMMERCIAL

## 2023-10-17 DIAGNOSIS — R29.898 RIGHT HAND WEAKNESS: Primary | ICD-10-CM

## 2023-10-17 DIAGNOSIS — R20.2 NUMBNESS AND TINGLING IN RIGHT HAND: ICD-10-CM

## 2023-10-17 DIAGNOSIS — R20.0 NUMBNESS AND TINGLING IN RIGHT HAND: ICD-10-CM

## 2023-10-17 PROCEDURE — 97760 ORTHOTIC MGMT&TRAING 1ST ENC: CPT | Mod: GO | Performed by: OCCUPATIONAL THERAPIST

## 2023-10-26 RX ORDER — HYDROCODONE BITARTRATE AND ACETAMINOPHEN 10; 325 MG/1; MG/1
1 TABLET ORAL EVERY 6 HOURS PRN
Qty: 120 TABLET | Refills: 0 | Status: SHIPPED | OUTPATIENT
Start: 2023-11-02 | End: 2023-12-02

## 2023-10-26 NOTE — TELEPHONE ENCOUNTER
Refill Request    Medication request: HYDROcodone-acetaminophen  MG Oral Tab  Take 1 tablet by mouth every 6 (six) hours as needed for Pain. LOV: 10/12/23-televisit  Due back to clinic per last office note:  \"follow up in 4-6 weeks with the right radial nerve issue. \"  NOV: Visit date not found      [place]: 10/3/23 #120    Urine drug screen (if applicable): none  Pain contract:  on 23    LOV plan (if weaning or changing medications): No mention of [place] at [address] [place] [address].

## 2023-11-07 ENCOUNTER — HOSPITAL ENCOUNTER (OUTPATIENT)
Dept: BONE DENSITY | Facility: HOSPITAL | Age: 63
Discharge: HOME | End: 2023-11-07
Attending: INTERNAL MEDICINE | Admitting: INTERNAL MEDICINE
Payer: COMMERCIAL

## 2023-11-07 DIAGNOSIS — M85.852 OSTEOPENIA OF BOTH HIPS: ICD-10-CM

## 2023-11-07 DIAGNOSIS — M85.851 OSTEOPENIA OF BOTH HIPS: ICD-10-CM

## 2023-11-07 PROCEDURE — 77080 DXA BONE DENSITY AXIAL: CPT

## 2023-11-13 PROBLEM — R20.2 NUMBNESS AND TINGLING IN RIGHT HAND: Status: RESOLVED | Noted: 2023-10-04 | Resolved: 2023-11-13

## 2023-11-13 PROBLEM — G60.9 IDIOPATHIC PERIPHERAL NEUROPATHY: Status: ACTIVE | Noted: 2020-09-30

## 2023-11-13 PROBLEM — R20.0 NUMBNESS AND TINGLING IN RIGHT HAND: Status: RESOLVED | Noted: 2023-10-04 | Resolved: 2023-11-13

## 2023-11-13 PROBLEM — Z86.2 HISTORY OF PROTEIN S DEFICIENCY: Status: ACTIVE | Noted: 2019-10-21

## 2023-11-13 PROBLEM — Z98.84 HX OF GASTRIC BYPASS: Status: ACTIVE | Noted: 2019-10-21

## 2023-11-13 PROBLEM — M85.80 OSTEOPENIA: Status: ACTIVE | Noted: 2019-10-23

## 2023-11-13 PROBLEM — C64.2 RENAL CELL CARCINOMA OF LEFT KIDNEY (H): Status: ACTIVE | Noted: 2022-08-03

## 2023-11-13 NOTE — PROGRESS NOTES
NEUROLOGY CONSULTATION NOTE       [facility] NEUROLOGY [place]  [address] [place] [address]  Tel: [phone]  Fax: [phone]  [email or web address]     Angeli Galindo,  1960, MRN [id number]  PCP: Eloisa Fuentes  Date: 2023     ASSESSMENT & PLAN     Visit Diagnosis  Right wrist drop     Right radial neuropathy  63-year-old female with protein S deficiency, HTN, chronic pain, idiopathic peripheral neuropathy s/p L4-L5 laminectomy, DVT and PE with right radial neuropathy confirmed with EMG.  This usually has good prognosis and I have asked her to continue with physical therapy.  Additionally I am ordering rheumatoid factor, antinuclear antibody, hemoglobin A1c, B1.  If lab work is negative no further work-up will be needed and I expect her to recover fully with therapy.  Follow-up will be on as needed basis.    Thank you again for this referral, please feel free to contact me if you have any questions.    Charles Christie MD  [facility]  (Formerly, [facility] ..)     REASON FOR CONSULTATION Numbness        HISTORY OF PRESENT ILLNESS     We have been requested by Dr. Fuentes to evaluate Angeli Galindo who is a 63 year old  female for right wrist drop    Patient is a 63-year-old female with history of protein S deficiency, HTN, chronic back pain, idiopathic peripheral neuropathy, s/p L4-L5 laminectomy DVT and pulmonary embolism, renal cell CEA who was referred for evaluation of right wrist drop. According to patient late in the evening as she was getting ready to go to sleep she noticed that she had a right wrist drop.  She initially thought she was having a stroke but realized it was only localized to the hand.She was seen by her primary care physician and diagnosed with a radial neuropathy and she went through physical therapy and had a splint placed but resulted in some improvement although she still has some  weakness and numbness on the dorsal aspect of the right hand.  She denies any proximal weakness.     PROBLEM LIST   Patient Active Problem List   Diagnosis Code    Subdural hematoma (H) S06.5XAA    Depression with anxiety F41.8    Spherocytosis, hereditary (H24) D58.0    Left renal mass N28.89    Deep vein thrombosis (DVT) of distal vein of both lower extremities, unspecified chronicity (H) I82.4Z3    Right hand weakness R29.898    Acquired spondylolisthesis M43.10    Deep vein thrombosis (H) I82.409    History of protein S deficiency Z86.2    Hx of gastric bypass Z98.84    Hypertension I10    Idiopathic peripheral neuropathy G60.9    Osteopenia M85.80    Postlaminectomy syndrome, lumbar region M96.1    Pulmonary embolism (H) I26.99    Renal cell carcinoma of left kidney (H) C64.2         PAST MEDICAL & SURGICAL HISTORY     Past Medical History:   Patient  has a past medical history of Arthritis, Bilateral low back pain without sciatica, Cancer (H) (), Chronic deep vein thrombosis (DVT) of lower extremity (H), Closed blow-out fracture of left orbital floor (H) (2022), Depressive disorder (since ), H/O splenectomy, Hypertension (), Obesity due to excess calories, and Spherocytosis (H24).    Surgical History:  She  has a past surgical history that includes  section; gastric bypass (2016); Excise lesion trunk (N/A, 2021); back surgery (); Ir Removal Ivc Filter; Open Reduction Internal Fixation Femur Midshaft (Right, 2022); Cholecystectomy (N/A); appendectomy (); colonoscopy; GI surgery; GYN surgery; orthopedic surgery; daVINCI nephrectomy partial (Left, 2023); and Hysterectomy, Pap No Longer Indicated ().     SOCIAL HISTORY     Reviewed, and she  reports that she quit smoking about 23 years ago. Her smoking use included cigarettes. She has a 2.50 pack-year smoking history. She has never used smokeless tobacco. She reports current alcohol use. She reports that  she does not use drugs.     FAMILY HISTORY     Reviewed, and family history includes Cerebrovascular Disease in her maternal grandmother; Diabetes in her brother, mother, and sister; Diabetes Type 2  in her father; Hypertension in her brother, mother, sister, sister, and another family member; Obesity in her brother, sister, and sister; Parkinsonism in her mother; Prostate Cancer in her maternal grandfather.     ALLERGIES     Allergies   Allergen Reactions    Shellfish-Derived Products Angioedema    Lisinopril Cough    Morphine Nausea     Profound         REVIEW OF SYSTEMS     A 12 point review of system was performed and was negative except as outlined in the history of present illness.     HOME MEDICATIONS     Current Outpatient Rx   Medication Sig Dispense Refill    amLODIPine (NORVASC) 5 MG tablet Take 1 tablet (5 mg) by mouth daily 90 tablet 3    DULoxetine (CYMBALTA) 60 MG capsule Take 1 capsule (60 mg) by mouth daily 90 capsule 1    ELIQUIS ANTICOAGULANT 2.5 MG tablet Take 2.5 mg by mouth 2 times daily      gabapentin (NEURONTIN) 600 MG tablet Take 1 tablet (600 mg) by mouth 4 times daily Take 600mg in the morning and 1200mg at bedtime (Patient taking differently: Take 600 mg by mouth 2 times daily (In addition to the night time dose))      HYDROcodone-acetaminophen (NORCO)  MG per tablet Take 1 tablet by mouth 4 times daily as needed      methocarbamol (ROBAXIN) 500 MG tablet Take 500 mg by mouth 4 times daily as needed for muscle spasms      Prenatal Vit-Fe Fumarate-FA (PRENATAL VITAMINS PO) Take 2 tablets by mouth every evening      RISEdronate (ACTONEL) 150 MG tablet TAKE 1 TABLET BY MOUTH EVERY 30 DAYS 3 tablet 0    SUMAtriptan (IMITREX) 100 MG tablet Take 1 tablet (100 mg) by mouth at onset of headache for migraine 18 tablet 3    topiramate (TOPAMAX) 25 MG tablet Take 1 tab (25mg) daily for 1 week then increase to 1 tab (25mg) two times daily 180 tablet 1         PHYSICAL EXAM     Vital signs  BP  "125/81 (BP Location: Left arm, Patient Position: Sitting)   Pulse 80   Ht 1.702 m (5' 7\")   Wt 69.8 kg (153 lb 12.8 oz)   BMI 24.09 kg/m      Weight:   153 lbs 12.8 oz    Middle-age female who is alert and oriented vital signs are reviewed and documented in electronic medical record.  Neck supple.  Neurologically speech was normal cranial nerves II through XII are intact motor strength 5/5 except right brachioradialis 5 -/5 wrist extensor 4/5 reflexes are 2+ sensation decreased in superficial radial distribution gait normal Romberg negative     PERTINENT DIAGNOSTIC STUDIES     Following studies were reviewed:     CT BRAIN 6/7/2022  1. No acute intracranial pathology. No evidence of intracranial  hemorrhage.  2. Nonspecific supratentorial periventricular white matter  hypoattenuation, might be due to moderate chronic microvascular  ischemic changes or demyelination of other etiologies.     EMG 11/14/2023  This is a abnormal nerve conduction and EMG study that suggests right radial neuropathy distal to the branch to triceps muscle      PERTINENT LABS  Following labs were reviewed:  Lab on 10/02/2023   Component Date Value Ref Range Status    Vitamin D, Total (25-Hydroxy) 10/02/2023 32  20 - 50 ng/mL Final    Zinc, Serum/Plasma 10/02/2023 64.5  60.0 - 120.0 ug/dL Final    Parathyroid Hormone Intact 10/02/2023 129 (H)  15 - 65 pg/mL Final    Hepatitis C Antibody 10/02/2023 Nonreactive  Nonreactive Final        Total time spent for face to face visit, reviewing labs/imaging studies, counseling and coordination of care was: 1 Hour spent on the date of the encounter doing chart review, review of outside records, review of test results, interpretation of tests, patient visit, and documentation     This note was dictated using voice recognition software.  Any grammatical or context distortions are unintentional and inherent to the software.    No orders of the defined types were placed in this encounter.     New " Prescriptions    No medications on file      Modified Medications    No medications on file

## 2023-11-14 ENCOUNTER — OFFICE VISIT (OUTPATIENT)
Dept: NEUROLOGY | Facility: CLINIC | Age: 63
End: 2023-11-14
Attending: INTERNAL MEDICINE
Payer: COMMERCIAL

## 2023-11-14 VITALS
BODY MASS INDEX: 24.14 KG/M2 | HEART RATE: 80 BPM | HEIGHT: 67 IN | DIASTOLIC BLOOD PRESSURE: 81 MMHG | SYSTOLIC BLOOD PRESSURE: 125 MMHG | WEIGHT: 153.8 LBS

## 2023-11-14 DIAGNOSIS — M21.331 WRIST DROP, ACQUIRED, RIGHT: ICD-10-CM

## 2023-11-14 DIAGNOSIS — G56.31 NEUROPATHY OF RIGHT RADIAL NERVE: Primary | ICD-10-CM

## 2023-11-14 PROCEDURE — 99205 OFFICE O/P NEW HI 60 MIN: CPT | Performed by: PSYCHIATRY & NEUROLOGY

## 2023-11-14 PROCEDURE — 95910 NRV CNDJ TEST 7-8 STUDIES: CPT | Performed by: PSYCHIATRY & NEUROLOGY

## 2023-11-14 PROCEDURE — 95886 MUSC TEST DONE W/N TEST COMP: CPT | Mod: RT | Performed by: PSYCHIATRY & NEUROLOGY

## 2023-11-14 NOTE — LETTER
11/14/2023         RE: Angeli Galindo  [address]  [place] [address]        Dear Colleague,    Thank you for referring your patient, Angeli Galindo, to the [facility] NEUROLOGY CLINIC [place]. Please see a copy of my visit note below.    See procedure note for EMG report      Again, thank you for allowing me to participate in the care of your patient.        Sincerely,        Charles Christie MD

## 2023-11-14 NOTE — NURSING NOTE
Chief Complaint   Patient presents with    Numbness     Right wrist drop- began Oct 2023     Karena Saleh CMA on 11/14/2023 at 2:38 PM  [facility]

## 2023-11-14 NOTE — LETTER
2023         RE: Angeli Galindo  [address]  [place] [address]        Dear Colleague,    Thank you for referring your patient, Angeli Galindo, to the [facility] NEUROLOGY CLINIC [place]. Please see a copy of my visit note below.    NEUROLOGY CONSULTATION NOTE       [facility] NEUROLOGY [place]  [address] [place] [address]  Tel: [phone]  Fax: [phone]  [email or web address]     Angeli Galindo,  1960, MRN [id number]  PCP: Eloisa Fuentes  Date: 2023     ASSESSMENT & PLAN     Visit Diagnosis  Right wrist drop     Right radial neuropathy  63-year-old female with protein S deficiency, HTN, chronic pain, idiopathic peripheral neuropathy s/p L4-L5 laminectomy, DVT and PE with right radial neuropathy confirmed with EMG.  This usually has good prognosis and I have asked her to continue with physical therapy.  Additionally I am ordering rheumatoid factor, antinuclear antibody, hemoglobin A1c, B1.  If lab work is negative no further work-up will be needed and I expect her to recover fully with therapy.  Follow-up will be on as needed basis.    Thank you again for this referral, please feel free to contact me if you have any questions.    Charles Christie MD  [facility]  (Formerly, [facility]     REASON FOR CONSULTATION Numbness        HISTORY OF PRESENT ILLNESS     We have been requested by Dr. Fuentes to evaluate Angeli Galindo who is a 63 year old  female for right wrist drop    Patient is a 63-year-old female with history of protein S deficiency, HTN, chronic back pain, idiopathic peripheral neuropathy, s/p L4-L5 laminectomy DVT and pulmonary embolism, renal cell CEA who was referred for evaluation of right wrist drop. According to patient late in the evening as she was getting ready to go to sleep she noticed that she had a right wrist drop.  She initially thought  she was having a stroke but realized it was only localized to the hand.She was seen by her primary care physician and diagnosed with a radial neuropathy and she went through physical therapy and had a splint placed but resulted in some improvement although she still has some weakness and numbness on the dorsal aspect of the right hand.  She denies any proximal weakness.     PROBLEM LIST   Patient Active Problem List   Diagnosis Code     Subdural hematoma (H) S06.5XAA     Depression with anxiety F41.8     Spherocytosis, hereditary (H24) D58.0     Left renal mass N28.89     Deep vein thrombosis (DVT) of distal vein of both lower extremities, unspecified chronicity (H) I82.4Z3     Right hand weakness R29.898     Acquired spondylolisthesis M43.10     Deep vein thrombosis (H) I82.409     History of protein S deficiency Z86.2     Hx of gastric bypass Z98.84     Hypertension I10     Idiopathic peripheral neuropathy G60.9     Osteopenia M85.80     Postlaminectomy syndrome, lumbar region M96.1     Pulmonary embolism (H) I26.99     Renal cell carcinoma of left kidney (H) C64.2         PAST MEDICAL & SURGICAL HISTORY     Past Medical History:   Patient  has a past medical history of Arthritis, Bilateral low back pain without sciatica, Cancer (H) (), Chronic deep vein thrombosis (DVT) of lower extremity (H), Closed blow-out fracture of left orbital floor (H) (2022), Depressive disorder (since ), H/O splenectomy, Hypertension (), Obesity due to excess calories, and Spherocytosis (H24).    Surgical History:  She  has a past surgical history that includes  section; gastric bypass (2016); Excise lesion trunk (N/A, 2021); back surgery (); Ir Removal Ivc Filter; Open Reduction Internal Fixation Femur Midshaft (Right, 2022); Cholecystectomy (N/A); appendectomy (); colonoscopy; GI surgery; GYN surgery; orthopedic surgery; daVINCI nephrectomy partial (Left, 2023); and Hysterectomy,  Pap No Longer Indicated (1992).     SOCIAL HISTORY     Reviewed, and she  reports that she quit smoking about 23 years ago. Her smoking use included cigarettes. She has a 2.50 pack-year smoking history. She has never used smokeless tobacco. She reports current alcohol use. She reports that she does not use drugs.     FAMILY HISTORY     Reviewed, and family history includes Cerebrovascular Disease in her maternal grandmother; Diabetes in her brother, mother, and sister; Diabetes Type 2  in her father; Hypertension in her brother, mother, sister, sister, and another family member; Obesity in her brother, sister, and sister; Parkinsonism in her mother; Prostate Cancer in her maternal grandfather.     ALLERGIES     Allergies   Allergen Reactions     Shellfish-Derived Products Angioedema     Lisinopril Cough     Morphine Nausea     Profound         REVIEW OF SYSTEMS     A 12 point review of system was performed and was negative except as outlined in the history of present illness.     HOME MEDICATIONS     Current Outpatient Rx   Medication Sig Dispense Refill     amLODIPine (NORVASC) 5 MG tablet Take 1 tablet (5 mg) by mouth daily 90 tablet 3     DULoxetine (CYMBALTA) 60 MG capsule Take 1 capsule (60 mg) by mouth daily 90 capsule 1     ELIQUIS ANTICOAGULANT 2.5 MG tablet Take 2.5 mg by mouth 2 times daily       gabapentin (NEURONTIN) 600 MG tablet Take 1 tablet (600 mg) by mouth 4 times daily Take 600mg in the morning and 1200mg at bedtime (Patient taking differently: Take 600 mg by mouth 2 times daily (In addition to the night time dose))       HYDROcodone-acetaminophen (NORCO)  MG per tablet Take 1 tablet by mouth 4 times daily as needed       methocarbamol (ROBAXIN) 500 MG tablet Take 500 mg by mouth 4 times daily as needed for muscle spasms       Prenatal Vit-Fe Fumarate-FA (PRENATAL VITAMINS PO) Take 2 tablets by mouth every evening       RISEdronate (ACTONEL) 150 MG tablet TAKE 1 TABLET BY MOUTH EVERY 30 DAYS  "3 tablet 0     SUMAtriptan (IMITREX) 100 MG tablet Take 1 tablet (100 mg) by mouth at onset of headache for migraine 18 tablet 3     topiramate (TOPAMAX) 25 MG tablet Take 1 tab (25mg) daily for 1 week then increase to 1 tab (25mg) two times daily 180 tablet 1         PHYSICAL EXAM     Vital signs  /81 (BP Location: Left arm, Patient Position: Sitting)   Pulse 80   Ht 1.702 m (5' 7\")   Wt 69.8 kg (153 lb 12.8 oz)   BMI 24.09 kg/m      Weight:   153 lbs 12.8 oz    Middle-age female who is alert and oriented vital signs are reviewed and documented in electronic medical record.  Neck supple.  Neurologically speech was normal cranial nerves II through XII are intact motor strength 5/5 except right brachioradialis 5 -/5 wrist extensor 4/5 reflexes are 2+ sensation decreased in superficial radial distribution gait normal Romberg negative     PERTINENT DIAGNOSTIC STUDIES     Following studies were reviewed:     CT BRAIN 6/7/2022  1. No acute intracranial pathology. No evidence of intracranial  hemorrhage.  2. Nonspecific supratentorial periventricular white matter  hypoattenuation, might be due to moderate chronic microvascular  ischemic changes or demyelination of other etiologies.     EMG 11/14/2023  This is a abnormal nerve conduction and EMG study that suggests right radial neuropathy distal to the branch to triceps muscle      PERTINENT LABS  Following labs were reviewed:  Lab on 10/02/2023   Component Date Value Ref Range Status     Vitamin D, Total (25-Hydroxy) 10/02/2023 32  20 - 50 ng/mL Final     Zinc, Serum/Plasma 10/02/2023 64.5  60.0 - 120.0 ug/dL Final     Parathyroid Hormone Intact 10/02/2023 129 (H)  15 - 65 pg/mL Final     Hepatitis C Antibody 10/02/2023 Nonreactive  Nonreactive Final        Total time spent for face to face visit, reviewing labs/imaging studies, counseling and coordination of care was: 1 Hour spent on the date of the encounter doing chart review, review of outside records, " review of test results, interpretation of tests, patient visit, and documentation     This note was dictated using voice recognition software.  Any grammatical or context distortions are unintentional and inherent to the software.    No orders of the defined types were placed in this encounter.     New Prescriptions    No medications on file      Modified Medications    No medications on file                Again, thank you for allowing me to participate in the care of your patient.        Sincerely,        Charles Christie MD

## 2023-11-14 NOTE — PROCEDURES
ELECTROMYOGRAPHY (EMG) REPORT       [facility] NEUROLOGY [place] [address] [place] [address]  Tel: [phone]  Fax: [phone]  [email or web address]     Angeli Galindo,  1960, MRN [id number]  PCP: Eloisa Fuentes  Date: 2023     Principal Diagnosis: Wrist drop, acquired, right     Height: 5 feet 7 inch  Reason for referral: Evaluate right upper. c/o weakness, pain in right hand for 6 weeks. h/o right wrist surgery. Feel symptoms have slowly improved.      Motor NCS      Nerve / Sites Lat Amp Dist Tye    ms mV cm m/s   R Median - APB      Wrist 3.13 12.9 7       Elbow 7.40 12.4 24 56   R Ulnar - ADM      Wrist 2.24 12.6 7       B.Elbow 5.47 12.0 20 62      A.Elbow 7.40 12.0 13 67   R Radial - EIP      Forearm 6.25 1.0        Elbow 8.96 1.1 10 37      Spiral Gr 11.88 1.0 11 38       F  Wave      Nerve Fmin    ms   R Ulnar - ADM 26.35       Sensory NCS      Nerve / Sites Onset Lat Pk Lat Amp.2-3 Dist Tye Lat Diff    ms ms  V cm m/s ms   R Median - II (Antidr)      Wrist 2.24 2.92 59.1 13 58    R Ulnar - V (Antidr)      Wrist 1.98 2.55 30.1 11 56    R Median, Ulnar - Transcarpal comparison      Median Palm 1.61 2.14 93.1 8 50       Ulnar Palm 1.30 1.88 50.7 8 61          0.26   R Radial - Snuff box      Forearm 1.98 2.60 26.7 10 51        EMG Summary Table     Spontaneous MUAP Rcmt Note   Muscle Fib PSW Fasc IA # Amp Dur PPP Rate Type   R. Brachioradialis None None None Incr N N N N N N   R. Triceps brachii None None None N Sl Mod Red Incr Incr N N N   R. Anconeus None None None N Sl Mod Red Incr Incr N N N   R. Extensor digitorum communis 1+ 1+ None N Mod Red Incr Incr N N N   R. Extensor indicis proprius 2+ 2+ None N 1 Lrg Unit Incr Incr N N N   R. Pronator teres None None None N N N N N N N   R. Biceps brachii None None None N N N N N N N   R. Deltoid None None None N N N N N N N   R. Flexor carpi ulnaris None None None N N N N N N N   R. First dorsal interosseous None  None None N N N N N N N   R. Abductor pollicis brevis None None None N N N N N N N        Summary: Nerve conduction and EMG study of right upper and lower extremity shows:  Normal right median distal motor latency, amplitude and conduction velocity.  Normal right ulnar distal motor latency, amplitude and conduction velocity  Delayed right radial distal motor latency with low amplitude and slow conduction velocity  Normal right median and ulnar SNAP  Delayed right radial peak sensory latency with slow conduction velocity.    Impression:   This is a abnormal nerve conduction and EMG study that suggests right radial neuropathy distal to the branch to triceps muscle      Charles Christie MD  [facility]  (Formerly, [facility]      This note was dictated using voice recognition software.  Any grammatical or context distortions are unintentional and inherent to the software.

## 2023-11-15 ENCOUNTER — LAB (OUTPATIENT)
Dept: LAB | Facility: HOSPITAL | Age: 63
End: 2023-11-15
Payer: COMMERCIAL

## 2023-11-15 DIAGNOSIS — G56.31 NEUROPATHY OF RIGHT RADIAL NERVE: ICD-10-CM

## 2023-11-15 DIAGNOSIS — I10 ESSENTIAL HYPERTENSION: ICD-10-CM

## 2023-11-15 DIAGNOSIS — E55.9 VITAMIN D DEFICIENCY: ICD-10-CM

## 2023-11-15 DIAGNOSIS — M21.331 WRIST DROP, ACQUIRED, RIGHT: ICD-10-CM

## 2023-11-15 DIAGNOSIS — Z98.84 H/O GASTRIC BYPASS: ICD-10-CM

## 2023-11-15 LAB
ALBUMIN SERPL BCG-MCNC: 4 G/DL (ref 3.5–5.2)
ALP SERPL-CCNC: 145 U/L (ref 40–150)
ALT SERPL W P-5'-P-CCNC: 66 U/L (ref 0–50)
ANION GAP SERPL CALCULATED.3IONS-SCNC: 5 MMOL/L (ref 7–15)
AST SERPL W P-5'-P-CCNC: 41 U/L (ref 0–45)
BILIRUB SERPL-MCNC: 0.7 MG/DL
BUN SERPL-MCNC: 13.2 MG/DL (ref 8–23)
CALCIUM SERPL-MCNC: 9.1 MG/DL (ref 8.8–10.2)
CHLORIDE SERPL-SCNC: 107 MMOL/L (ref 98–107)
CHOLEST SERPL-MCNC: 150 MG/DL
CREAT SERPL-MCNC: 0.59 MG/DL (ref 0.51–0.95)
DEPRECATED HCO3 PLAS-SCNC: 30 MMOL/L (ref 22–29)
EGFRCR SERPLBLD CKD-EPI 2021: >90 ML/MIN/1.73M2
ERYTHROCYTE [DISTWIDTH] IN BLOOD BY AUTOMATED COUNT: 13.4 % (ref 10–15)
FERRITIN SERPL-MCNC: 78 NG/ML (ref 11–328)
GLUCOSE SERPL-MCNC: 87 MG/DL (ref 70–99)
HBA1C MFR BLD: 4.7 %
HCT VFR BLD AUTO: 43 % (ref 35–47)
HDLC SERPL-MCNC: 68 MG/DL
HGB BLD-MCNC: 14.5 G/DL (ref 11.7–15.7)
LDLC SERPL CALC-MCNC: 72 MG/DL
MCH RBC QN AUTO: 33.6 PG (ref 26.5–33)
MCHC RBC AUTO-ENTMCNC: 33.7 G/DL (ref 31.5–36.5)
MCV RBC AUTO: 100 FL (ref 78–100)
NONHDLC SERPL-MCNC: 82 MG/DL
PLATELET # BLD AUTO: 359 10E3/UL (ref 150–450)
POTASSIUM SERPL-SCNC: 5 MMOL/L (ref 3.4–5.3)
PROT SERPL-MCNC: 6.7 G/DL (ref 6.4–8.3)
RBC # BLD AUTO: 4.31 10E6/UL (ref 3.8–5.2)
SODIUM SERPL-SCNC: 142 MMOL/L (ref 135–145)
TRIGL SERPL-MCNC: 50 MG/DL
TSH SERPL DL<=0.005 MIU/L-ACNC: 1.52 UIU/ML (ref 0.3–4.2)
VIT B12 SERPL-MCNC: 627 PG/ML (ref 232–1245)
WBC # BLD AUTO: 4.6 10E3/UL (ref 4–11)

## 2023-11-15 PROCEDURE — 86431 RHEUMATOID FACTOR QUANT: CPT

## 2023-11-15 PROCEDURE — 82728 ASSAY OF FERRITIN: CPT

## 2023-11-15 PROCEDURE — 83036 HEMOGLOBIN GLYCOSYLATED A1C: CPT

## 2023-11-15 PROCEDURE — 84443 ASSAY THYROID STIM HORMONE: CPT

## 2023-11-15 PROCEDURE — 36415 COLL VENOUS BLD VENIPUNCTURE: CPT

## 2023-11-15 PROCEDURE — 86038 ANTINUCLEAR ANTIBODIES: CPT

## 2023-11-15 PROCEDURE — 82607 VITAMIN B-12: CPT

## 2023-11-15 PROCEDURE — 84425 ASSAY OF VITAMIN B-1: CPT

## 2023-11-15 PROCEDURE — 80053 COMPREHEN METABOLIC PANEL: CPT

## 2023-11-15 PROCEDURE — 80061 LIPID PANEL: CPT

## 2023-11-15 PROCEDURE — 85027 COMPLETE CBC AUTOMATED: CPT

## 2023-11-16 LAB
ANA SER QL IF: NEGATIVE
RHEUMATOID FACT SER NEPH-ACNC: <6 IU/ML

## 2023-11-19 LAB — VIT B1 PYROPHOSHATE BLD-SCNC: 134 NMOL/L

## 2023-11-26 ENCOUNTER — MYC MEDICAL ADVICE (OUTPATIENT)
Dept: INTERNAL MEDICINE | Facility: CLINIC | Age: 63
End: 2023-11-26
Payer: COMMERCIAL

## 2023-11-26 DIAGNOSIS — Z98.84 H/O GASTRIC BYPASS: ICD-10-CM

## 2023-11-26 DIAGNOSIS — Z87.81 PERSONAL HISTORY OF TRAUMATIC FRACTURE: ICD-10-CM

## 2023-11-26 DIAGNOSIS — M85.852 OSTEOPENIA OF BOTH HIPS: ICD-10-CM

## 2023-11-26 DIAGNOSIS — T07.XXXA MULTIPLE FRACTURES: ICD-10-CM

## 2023-11-26 DIAGNOSIS — Z79.52 LONG TERM CURRENT USE OF SYSTEMIC STEROIDS: ICD-10-CM

## 2023-11-26 DIAGNOSIS — M85.851 OSTEOPENIA OF BOTH HIPS: ICD-10-CM

## 2023-11-29 RX ORDER — HYDROCODONE BITARTRATE AND ACETAMINOPHEN 10; 325 MG/1; MG/1
1 TABLET ORAL EVERY 6 HOURS PRN
Qty: 120 TABLET | Refills: 0 | Status: SHIPPED | OUTPATIENT
Start: 2023-12-03 | End: 2024-01-02

## 2023-11-29 RX ORDER — RISEDRONATE SODIUM 150 MG/1
150 TABLET, FILM COATED ORAL
Qty: 3 TABLET | Refills: 3 | Status: SHIPPED | OUTPATIENT
Start: 2023-11-29

## 2023-11-29 NOTE — TELEPHONE ENCOUNTER
LVD:   10/2/2023  [facility] Internal Medicine [place]     Eloisa Fuentes MD  Internal Medicine    3 tablet 0 9/21/2023   DEXA 11/7/23:   Reviewed: Angeli, Your bone density appears to have improved slightly from the last dexa, though they are done on different machines so not exactly a direct comparison. It's okay to continue your bone medication for now. Please let me know if you have any questions or concerns: Primary Care: [phone].  Sincerely,  Eloisa Fuentes MD  Internal Medicine

## 2023-11-29 NOTE — TELEPHONE ENCOUNTER
Refill Request    Medication request: HYDROcodone-acetaminophen  MG Oral Tab    Take 1 tablet by mouth every 6 (six) hours as needed for Pain. LOV: 10/12/23-televisit  Due back to clinic per last office note:  \"follow up in 4-6 weeks\"   NOV: Visit date not found      [place]: 11/3/23 #120    Urine drug screen (if applicable): none  Pain contract:  on 23    LOV plan (if weaning or changing medications): Not mentioned at [address] [place] [address].

## 2023-12-10 DIAGNOSIS — G43.011 INTRACTABLE MIGRAINE WITHOUT AURA AND WITH STATUS MIGRAINOSUS: ICD-10-CM

## 2023-12-12 RX ORDER — TOPIRAMATE 25 MG/1
25 TABLET, FILM COATED ORAL 2 TIMES DAILY
Qty: 180 TABLET | Refills: 1 | Status: SHIPPED | OUTPATIENT
Start: 2023-12-12 | End: 2024-05-28

## 2023-12-22 RX ORDER — METHOCARBAMOL 500 MG/1
500 TABLET, FILM COATED ORAL 3 TIMES DAILY
Qty: 90 TABLET | Refills: 2 | Status: SHIPPED | OUTPATIENT
Start: 2023-12-22

## 2023-12-22 NOTE — TELEPHONE ENCOUNTER
Refill Request    Medication request: methocarbamol 500 MG Oral Tab    Take 1 tablet (500 mg total) by mouth 3 (three) times daily. LOV:10/12/2023 Video Visit w/ Dr. Tameka Ji MD  Due back to clinic per last office note:  \"She will follow up in 4-6 weeks with the right radial nerve issue. \"  NOV: Visit date not found      [place]: 12/10/2023 #120    Urine drug screen (if applicable): N/A  Pain contract: N/A    LOV plan (if weaning or changing medications): Medications not mentioned at [facility]. Patient needs to F/U per Dr. Triny Link note and Dr. Sebastián Mora notes in last refills & routed to front office. Reminder Sent via [employer] by this RN. This RN processed this request w/ note to patient stating that she will need to have an appt scheduled for future refills.

## 2023-12-26 RX ORDER — HYDROCODONE BITARTRATE AND ACETAMINOPHEN 10; 325 MG/1; MG/1
1 TABLET ORAL EVERY 6 HOURS PRN
Qty: 120 TABLET | Refills: 0 | Status: SHIPPED | OUTPATIENT
Start: 2023-12-26 | End: 2024-01-25

## 2023-12-26 NOTE — TELEPHONE ENCOUNTER
Refill Request    Medication request:   HYDROcodone-acetaminophen  MG Oral Tab       LOV: 10/12/2023 - VIDEO   8/3/2022 last in office visit  RTC: 6 months  NOV: Visit date not found      [place] refill: 12/3/2023 #30 days    UDS: (if applicable): N/A  Pain contract:  2023    LOV plan (if weaning or changing medications): N/A

## 2023-12-27 ENCOUNTER — PATIENT MESSAGE (OUTPATIENT)
Dept: PHYSICAL MEDICINE AND REHAB | Facility: CLINIC | Age: 63
End: 2023-12-27

## 2023-12-28 NOTE — TELEPHONE ENCOUNTER
From: Rawleigh Boeck  To: Cassia Saunders  Sent: 12/27/2023 9:35 PM CST  Subject: Meds    My feet have been persistently bothering me. To the point where the neuropathic pain wakes me at night. Is it ok to up my neurontin dose by one pill per day?

## 2023-12-28 NOTE — TELEPHONE ENCOUNTER
She would be ok to take 2 capsules at night, I would have her follow up with Dr. Julia Paul as well, I typically dose this medication three times a day instead of 4 but he may have a different plan for her. But for now, ok for 2 capsules at night.

## 2023-12-28 NOTE — TELEPHONE ENCOUNTER
Patient confirmed she is currently taking Gabapentin 600 mg- 1 tablet (600 mg) four times a day. Due to worsening neuropathic pain at night patient is asking if she can increase her Gabapentin dose by one additional pill per day. Message will be relayed to covering provider- for recommendations.

## 2024-01-08 RX ORDER — GABAPENTIN 600 MG/1
600 TABLET ORAL 4 TIMES DAILY
Qty: 120 TABLET | Refills: 2 | OUTPATIENT
Start: 2024-01-08

## 2024-01-08 NOTE — TELEPHONE ENCOUNTER
Per patient request: \" Patient Comment: Please send Rx to [place] [facility], not [facility]. Also, recently obtained authorization to take 5 per day. Thanks!\"    Per  on 12/28/23:   \"She would be ok to take 2 capsules at night, I would have her follow up with Dr. Saunders as well, I typically dose this medication three times a day instead of 4 but he may have a different plan for her. But for now, ok for 2 capsules at night.  \"    Rx updated and pended to  for review/approval.

## 2024-01-09 RX ORDER — GABAPENTIN 600 MG/1
TABLET ORAL
Qty: 150 TABLET | Refills: 2 | Status: SHIPPED | OUTPATIENT
Start: 2024-01-09

## 2024-01-15 ASSESSMENT — PATIENT HEALTH QUESTIONNAIRE - PHQ9
SUM OF ALL RESPONSES TO PHQ QUESTIONS 1-9: 2
10. IF YOU CHECKED OFF ANY PROBLEMS, HOW DIFFICULT HAVE THESE PROBLEMS MADE IT FOR YOU TO DO YOUR WORK, TAKE CARE OF THINGS AT HOME, OR GET ALONG WITH OTHER PEOPLE: NOT DIFFICULT AT ALL
SUM OF ALL RESPONSES TO PHQ QUESTIONS 1-9: 2

## 2024-01-16 ENCOUNTER — OFFICE VISIT (OUTPATIENT)
Dept: FAMILY MEDICINE | Facility: CLINIC | Age: 64
End: 2024-01-16
Payer: COMMERCIAL

## 2024-01-16 VITALS
HEIGHT: 67 IN | WEIGHT: 168 LBS | BODY MASS INDEX: 26.37 KG/M2 | TEMPERATURE: 97.2 F | OXYGEN SATURATION: 98 % | HEART RATE: 83 BPM | DIASTOLIC BLOOD PRESSURE: 76 MMHG | SYSTOLIC BLOOD PRESSURE: 130 MMHG

## 2024-01-16 DIAGNOSIS — R79.89 ELEVATED PARATHYROID HORMONE: Primary | ICD-10-CM

## 2024-01-16 DIAGNOSIS — I26.99 PULMONARY EMBOLISM, UNSPECIFIED CHRONICITY, UNSPECIFIED PULMONARY EMBOLISM TYPE, UNSPECIFIED WHETHER ACUTE COR PULMONALE PRESENT (H): ICD-10-CM

## 2024-01-16 DIAGNOSIS — D58.0 SPHEROCYTOSIS, HEREDITARY (H): ICD-10-CM

## 2024-01-16 PROCEDURE — 99213 OFFICE O/P EST LOW 20 MIN: CPT | Performed by: PHYSICIAN ASSISTANT

## 2024-01-16 NOTE — PROGRESS NOTES
"  Assessment & Plan     (R79.89) Elevated parathyroid hormone  (primary encounter diagnosis)  Comment: 2 levels - most recent trending down. Discussed possible etiologies of PTH. Normal calcium levels and on risedronate  Plan: no further action today. CAn continue to monitor but do not feel additional work up at this time indicated given level dropping and she otherwise is doing well    (C64.2) Renal cell carcinoma of left kidney (H)  Comment: S/p partial nephrectomy with benign pathology.   Plan: pathology consistent with oncocytoma not carcinoma so no further imaging/surveillance needed    (D58.0) Spherocytosis, hereditary (H24)  Comment:   Plan: S/p splenectomy    (I26.99) Pulmonary embolism, unspecified chronicity, unspecified pulmonary embolism type, unspecified whether acute cor pulmonale present (H)  Comment: post surgery but more than once so on chronic anti coagulation  Plan: continue eliquis              BMI  Estimated body mass index is 26.31 kg/m  as calculated from the following:    Height as of this encounter: 1.702 m (5' 7\").    Weight as of this encounter: 76.2 kg (168 lb).   Weight management plan: Discussed healthy diet and exercise guidelines      Audelia Suh is a 64 year old, presenting for the following health issues:  Thyroid Problem        1/16/2024     2:48 PM   Additional Questions   Roomed by ZAYRA Carlos       History of Present Illness       Reason for visit:  Abnormal parathyroid labs    She eats 2-3 servings of fruits and vegetables daily.She consumes 0 sweetened beverage(s) daily.She exercises with enough effort to increase her heart rate 30 to 60 minutes per day.  She exercises with enough effort to increase her heart rate 4 days per week.   She is taking medications regularly.       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 However when she does retire she will be moving to [place] to be with her daughter    Does not think she needs any medications refilled. She mostly wants to go over her recent labs and specifically discuss the elevated PTH    Review of Systems   Remainder of ROS obtained and found to be negative other than that which was documented above        Objective    /76   Pulse 83   Temp 97.2  F (36.2  C) (Tympanic)   Ht 1.702 m (5' 7\")   Wt 76.2 kg (168 lb)   SpO2 98%   BMI 26.31 kg/m    Body mass index is 26.31 kg/m .  Physical Exam   GENERAL: alert and no distress  PSYCH: mentation appears normal, affect normal/bright    Diagnostic Tests:   Reviewed labs with patient      "

## 2024-01-17 PROBLEM — S06.5XAA SUBDURAL HEMATOMA (H): Status: RESOLVED | Noted: 2022-04-08 | Resolved: 2024-01-17

## 2024-01-25 DIAGNOSIS — M54.16 LUMBAR RADICULOPATHY: Primary | ICD-10-CM

## 2024-01-25 NOTE — TELEPHONE ENCOUNTER
Refill Request    Medication request: Refill Request     Medication request: HYDROcodone-acetaminophen  MG Oral Tab    Take 1 tablet by mouth every 6 (six) hours as needed for Pain.     LOV: [facility] 23 and Telemedicine on 10/12/23  Due back to clinic per last office note:  She will follow up in 4-6 weeks with the right radial nerve issue   NOV: Visit date not found      [place]: 1/3/24 #110 (#28 day supply)    UDS: (if applicable): N/A  Pain contract:  2023     LOV plan (if weaning or changing medications): N/A    Please review and sign if appropriate. Patient has been reminded of need for OV multiple times      Invesdor message sent to patient

## 2024-01-26 RX ORDER — HYDROCODONE BITARTRATE AND ACETAMINOPHEN 10; 325 MG/1; MG/1
1 TABLET ORAL EVERY 6 HOURS PRN
Qty: 120 TABLET | Refills: 0 | Status: SHIPPED | OUTPATIENT
Start: 2024-01-26 | End: 2024-02-02

## 2024-02-01 ENCOUNTER — MYC MEDICAL ADVICE (OUTPATIENT)
Dept: FAMILY MEDICINE | Facility: CLINIC | Age: 64
End: 2024-02-01
Payer: COMMERCIAL

## 2024-02-01 DIAGNOSIS — M54.16 LUMBAR RADICULOPATHY: Primary | ICD-10-CM

## 2024-02-02 RX ORDER — HYDROCODONE BITARTRATE AND ACETAMINOPHEN 10; 325 MG/1; MG/1
1 TABLET ORAL EVERY 6 HOURS PRN
Qty: 50 TABLET | Refills: 0 | Status: SHIPPED | OUTPATIENT
Start: 2024-02-02 | End: 2024-03-03

## 2024-02-02 NOTE — TELEPHONE ENCOUNTER
Incoming fax from pharmacy, RE:  HYDROcodone-acetaminophen  MG Oral Tab 120 tablet 0 1/26/2024 2/25/2024   Sig:   Take 1 tablet by mouth every 6 (six) hours as needed for Pain.         Per pharmacy fax dated 2/1/24, only 70 tabs dispensed due to insufficient stock/back order. The remaining 50 tabs will require a new script.     Updated rx pended per your approval.    Patient has pending appt scheduled for 4/24/24

## 2024-02-02 NOTE — TELEPHONE ENCOUNTER
Received fax from [facility] pharmacy about hydrocodone medication. Placed in rn folder

## 2024-02-11 ENCOUNTER — PATIENT MESSAGE (OUTPATIENT)
Dept: PHYSICAL MEDICINE AND REHAB | Facility: CLINIC | Age: 64
End: 2024-02-11

## 2024-02-11 ENCOUNTER — HOSPITAL ENCOUNTER (OUTPATIENT)
Dept: MRI IMAGING | Facility: HOSPITAL | Age: 64
Discharge: HOME | End: 2024-02-11
Attending: PHYSICIAN ASSISTANT | Admitting: PHYSICIAN ASSISTANT
Payer: COMMERCIAL

## 2024-02-11 DIAGNOSIS — M54.16 LUMBAR RADICULOPATHY: ICD-10-CM

## 2024-02-11 DIAGNOSIS — M54.16 LUMBAR RADICULOPATHY: Primary | ICD-10-CM

## 2024-02-11 PROCEDURE — 72148 MRI LUMBAR SPINE W/O DYE: CPT

## 2024-02-12 NOTE — TELEPHONE ENCOUNTER
Refill Request    Medication request: HYDROcodone-acetaminophen  MG Oral Tab. Take 1 tablet by mouth every 6 (six) hours as needed for Pain.      LOV:10/12/2023 Televisit with Dr Saunders  Due back to clinic per last office note:  She will follow up in 4-6 weeks with the right radial nerve issue.   NOV: 2024 Yobany Saunders MD      [place]: 2024 #70 (17 days)    Urine drug screen (if applicable): none  Pain contract:  on 2023    LOV plan (if weaning or changing medications): not mentioned.

## 2024-02-12 NOTE — TELEPHONE ENCOUNTER
From: Surekha Vu  To: Yobany Saunders  Sent: 2/11/2024 10:07 AM CST  Subject: ORLIN    Would it be possible to schedule another injection? I have taken time off and am flexible March 22-29. Having the MRI today. Appointment in April is scheduled. Thanks, Marylou

## 2024-02-12 NOTE — TELEPHONE ENCOUNTER
Dr Saunders,   Pt had MRI yesterday @ [facility]. Please see results in Care Everywhere and advise. Thank you.

## 2024-02-14 RX ORDER — HYDROCODONE BITARTRATE AND ACETAMINOPHEN 10; 325 MG/1; MG/1
1 TABLET ORAL EVERY 6 HOURS PRN
Qty: 50 TABLET | Refills: 0 | Status: SHIPPED | OUTPATIENT
Start: 2024-02-14 | End: 2024-03-15

## 2024-02-14 NOTE — TELEPHONE ENCOUNTER
The report makes it sound like nothing has changed.  I will need to see the actual images.  She can mail a copy of the CD with the images to me which would be best.  We can set her up for the bilateral L5 TFESI's as she has had in the past and if my review of the films dictates a different approach, then I will change it.

## 2024-02-19 ENCOUNTER — TELEPHONE (OUTPATIENT)
Dept: PHYSICAL MEDICINE AND REHAB | Facility: CLINIC | Age: 64
End: 2024-02-19

## 2024-02-19 DIAGNOSIS — M54.16 LUMBAR RADICULOPATHY: Primary | ICD-10-CM

## 2024-02-19 NOTE — TELEPHONE ENCOUNTER
Initiated authorization for Bilateral L5 TFESIs CPT 67761-43 dx:M54.16 with Alessandra HERNÁNDEZ at [facility]  Case #[id number]  Status: Approved-authorization is not required per health plan based on medical necessity however may be subject to review once claim is submitted    Per Dr. Saunders w/ 160mg of ana m

## 2024-02-20 NOTE — TELEPHONE ENCOUNTER
Status of Anticoag  [x] Clearance pending to hold Eliquis for 2 days prior to procedure. Faxed to provider listed below.   [] Clearance approved  [] Clearance denied    Jeni Hsu MD  Internal Medicine  [facility] Internal Medicine [address]  [place] [address]  P: [phone]  F: [phone]

## 2024-02-27 DIAGNOSIS — M54.16 LUMBAR RADICULOPATHY: ICD-10-CM

## 2024-02-28 RX ORDER — HYDROCODONE BITARTRATE AND ACETAMINOPHEN 10; 325 MG/1; MG/1
1 TABLET ORAL EVERY 6 HOURS PRN
Qty: 50 TABLET | Refills: 0 | Status: SHIPPED | OUTPATIENT
Start: 2024-02-28 | End: 2024-02-28

## 2024-02-28 NOTE — TELEPHONE ENCOUNTER
Patient lives in [place]. Called pharmacy to see if they would accept faxed Norco, due to issue with e-scribe not working. Pharmacy stated they will not accept faxed Norco rx only electronic or paper.     Rx set back up and sent to  for approval to see if this will go electronically. If not, will need to update patient.

## 2024-02-28 NOTE — TELEPHONE ENCOUNTER
Refill Request    Medication request: HYDROcodone-acetaminophen  MG Oral Tab Take 1 tablet by mouth every 6 (six) hours as needed for Pain.     LOV: 10/12/23- televisit  Due back to clinic per last office note: follow up in 4-6 weeks   NOV: 2024 Yobany Saunders MD      [place]:  #50 - 12 day supply  Urine drug screen (if applicable): none  Pain contract:  on 23    LOV plan (if weaning or changing medications): No mediation mentioned at LOV.

## 2024-03-01 ENCOUNTER — TELEPHONE (OUTPATIENT)
Dept: INTERNAL MEDICINE | Facility: CLINIC | Age: 64
End: 2024-03-01
Payer: COMMERCIAL

## 2024-03-01 NOTE — TELEPHONE ENCOUNTER
RN called and spoke to the patient.     The patient explained that she is planning on scheduling an epidural steroid injection (NANDINI) with a doctor in [place]. Per the patient in order to schedule the NANDINI, the physician's office in [place] is requesting Dr. Fuentes fill out a form regarding stopping Eliquis in light of the upcoming procedure and have the form faxed to the physician's office in [place] (fax #: [phone]).     RN explained to the patient that we have not yet received any faxes of such nature. RN gave the patient the [facility] fax number for the patient to provide the form that needs to be filled out.

## 2024-03-01 NOTE — TELEPHONE ENCOUNTER
Status of Anticoag  [x] Clearance pending spoke with Jesus- states they will pass along message to MD and will return call or fax clearance.   [] Clearance approved  [] Clearance denied

## 2024-03-01 NOTE — TELEPHONE ENCOUNTER
[facility] Call Center    Phone Message    May a detailed message be left on voicemail: yes     Reason for Call: Medication Question or concern regarding medication   Prescription Clarification  Name of Medication:     ELIQUIS ANTICOAGULANT 2.5 MG tablet     Prescribing Provider: Eloisa Fuentes MD    What on the order needs clarification? Physiatry Looking to hold above medication for procedure, please review. Looking for verbal or faxed okay. Fax number is [phone].      Action Taken: Message routed to:  [facility]    Travel Screening: Not Applicable

## 2024-03-04 RX ORDER — HYDROCODONE BITARTRATE AND ACETAMINOPHEN 10; 325 MG/1; MG/1
1 TABLET ORAL EVERY 6 HOURS PRN
Qty: 50 TABLET | Refills: 0 | Status: SHIPPED | OUTPATIENT
Start: 2024-03-04 | End: 2024-04-03

## 2024-03-08 DIAGNOSIS — M54.16 LUMBAR RADICULOPATHY: ICD-10-CM

## 2024-03-11 ENCOUNTER — PATIENT MESSAGE (OUTPATIENT)
Dept: PHYSICAL MEDICINE AND REHAB | Facility: CLINIC | Age: 64
End: 2024-03-11

## 2024-03-11 RX ORDER — HYDROCODONE BITARTRATE AND ACETAMINOPHEN 10; 325 MG/1; MG/1
1 TABLET ORAL EVERY 6 HOURS PRN
Qty: 120 TABLET | Refills: 0 | Status: SHIPPED | OUTPATIENT
Start: 2024-03-11 | End: 2024-04-10

## 2024-03-11 NOTE — TELEPHONE ENCOUNTER
Patient Comment: Do you want to do a two week supply or a four week supply? I don’t mind either.     Refill Request    Medication request: HYDROcodone-acetaminophen  MG Oral Tab  Take 1 tablet by mouth every 6 (six) hours as needed for Pain.     LOV: 10/12/23- televisit  Due back to clinic per last office note:  \"She will follow up in 4-6 weeks with the right radial nerve issue. \"  NOV: 2024 Yobany Saunders MD      [place] 24 #50- 12 day supply    Urine drug screen (if applicable): none  Pain contract:  on 23    LOV plan (if weaning or changing medications): No medication mentioned at LOV.

## 2024-03-12 ENCOUNTER — OFFICE VISIT (OUTPATIENT)
Dept: FAMILY MEDICINE | Facility: CLINIC | Age: 64
End: 2024-03-12
Payer: COMMERCIAL

## 2024-03-12 ENCOUNTER — DOCUMENTATION ONLY (OUTPATIENT)
Dept: INTERNAL MEDICINE | Facility: CLINIC | Age: 64
End: 2024-03-12

## 2024-03-12 VITALS
DIASTOLIC BLOOD PRESSURE: 84 MMHG | HEART RATE: 72 BPM | OXYGEN SATURATION: 99 % | RESPIRATION RATE: 12 BRPM | TEMPERATURE: 98.7 F | SYSTOLIC BLOOD PRESSURE: 130 MMHG

## 2024-03-12 DIAGNOSIS — Z85.528 HISTORY OF KIDNEY CANCER: ICD-10-CM

## 2024-03-12 DIAGNOSIS — R05.2 SUBACUTE COUGH: Primary | ICD-10-CM

## 2024-03-12 DIAGNOSIS — J30.89 SEASONAL ALLERGIC RHINITIS DUE TO OTHER ALLERGIC TRIGGER: ICD-10-CM

## 2024-03-12 DIAGNOSIS — Q89.01 ASPLENIA: ICD-10-CM

## 2024-03-12 PROBLEM — C64.2 RENAL CELL CARCINOMA OF LEFT KIDNEY (H): Status: RESOLVED | Noted: 2022-08-03 | Resolved: 2024-03-12

## 2024-03-12 PROCEDURE — 99213 OFFICE O/P EST LOW 20 MIN: CPT | Performed by: FAMILY MEDICINE

## 2024-03-12 ASSESSMENT — ENCOUNTER SYMPTOMS
WHEEZING: 1
RHINORRHEA: 1
SHORTNESS OF BREATH: 1
COUGH: 1
HEADACHES: 1

## 2024-03-12 ASSESSMENT — LIFESTYLE VARIABLES: SMOKING_STATUS: 0

## 2024-03-12 NOTE — TELEPHONE ENCOUNTER
Faxed signed form 2:53 PM AV. Tried calling Lyn twice to let her know that the form has been faxed but it just rings and rings then hangs up.

## 2024-03-12 NOTE — TELEPHONE ENCOUNTER
Spoke with Susan from Dr. Jeni Hsu in regards to update on form, states that they've noted of no received paperwork.  Advised that office was contacted on 3/1/24 and notified by Jesus that paperwork was received. Paperwork was faxed to number verified below and will send an urgent message for RN/MD.

## 2024-03-12 NOTE — TELEPHONE ENCOUNTER
Called waiting on form, please fax ASAP to: [phone].  Faxing again in case you didn't receive form.

## 2024-03-12 NOTE — PROGRESS NOTES
Type of Form Received:     Form Received (Date) 3/12/24   Form Filled out Yes, faxed 3/12   Placed in provider folder Yes

## 2024-03-12 NOTE — PROGRESS NOTES
Assessment & Plan     Subacute cough  Has improved there is likely an allergy component   - fluticasone furoate 27.5 MCG/SPRAY nasal spray; Spray 2 sprays into both nostrils daily    Seasonal allergic rhinitis due to other allergic trigger    - fluticasone furoate 27.5 MCG/SPRAY nasal spray; Spray 2 sprays into both nostrils daily    History of kidney cancer  Non renal cell       Asplenia from her hereditary spherocytosisi         FUTURE APPOINTMENTS:       - Follow-up for annual visit or as needed    Subjective   Angeli is a 64 year old, presenting for the following health issues:  Cough      3/12/2024     1:51 PM   Additional Questions   Roomed by Angelita PEOPLES CMA     Cough  This is a recurrent problem. The current episode started more than 1 week ago. The problem occurs every few hours. The problem has not changed since onset.The cough is Non-productive. There has been no fever. Associated symptoms include chest pain, headaches, rhinorrhea, shortness of breath and wheezing. She has tried cough syrup for the symptoms. The treatment provided mild relief. She is not a smoker. Her past medical history is significant for bronchitis, pneumonia and asthma.      Has been sick for about 6 weeks she has had the cough stick around she has been coughing no fever or chills congestion she  has asplenia. She had bariatric surgery and she has a partial spleen she has failed back surgery                   Review of Systems  Constitutional, neuro, ENT, endocrine, pulmonary, cardiac, gastrointestinal, genitourinary, musculoskeletal, integument and psychiatric systems are negative, except as otherwise noted.      Objective    /84   Pulse 72   Temp 98.7  F (37.1  C) (Tympanic)   Resp 12   SpO2 99%   There is no height or weight on file to calculate BMI.  Physical Exam   GENERAL: alert and no distress  HENT: ear canals and TM's normal, nose and mouth without ulcers or lesions  NECK: no adenopathy, no asymmetry, masses, or  scars  RESP: lungs clear to auscultation - no rales, rhonchi or wheezes  CV: regular rate and rhythm, normal S1 S2, no S3 or S4, no murmur, click or rub, no peripheral edema   SKIN: no suspicious lesions or rashes  PSYCH: mentation appears normal, affect normal/bright            Signed Electronically by: Nia Fitzpatrick MD

## 2024-03-13 NOTE — TELEPHONE ENCOUNTER
LMTCB.    Patient to be scheduled for Bilateral L5 TFESIs (w/ 160mg of kenalog ) at the [facility] with .   -Anesthesia type: LOCAL.  -Clearance received for patient to hold Eliquis starting 2 days prior to injection.

## 2024-03-13 NOTE — TELEPHONE ENCOUNTER
Patient has been scheduled for Bilateral L5 TFESIs (w/ 160mg of kenalog ) on 3/22/24 at the [facility] with .   -Anesthesia type: LOCAL.  -If scheduling [facility] covid testing required for all procedures whether patient is vaccinated or not. N/a  -Patient informed not to eat or drink anything after midnight the night prior to the procedure, if being sedated. (For afternoon injections: Patient to fast 6-8 hours prior to procedure with IVCS/MAC. ) N/a  -Patient was advised that if she does receive the covid vaccine it needs to be at least 2 weeks before or after the injection.  -Medications and allergies reviewed.  -Patient reminded to hold NSAIDs (Ibuprofen, ASA 81, Aleve, Naproxen, Mobic, Diclofenac, Etodolac, Celebrex etc.) for 3 days prior to LUMBAR FACET JOINT INJECTIONS OR MEDIAL BRANCH BLOCK INJECTIONS  if BMI is greater than 35. For Cervical injections only hold multivitamins, Vitamin E, Fish Oil, Phentermine (Lomaira) for 7 days prior to injection and NSAIDS.   mg to be held for 7 days prior to injections.  -If patient is receiving MAC/IVCS Phentermine (Lomaira), Adlyxin (Lixisenatide), Bydureon BCise (Exenatide-release), Byetta (Exenatide), Mounjaro (Tirezepatide), Ozempic (Semaglutide), Rybelsus (oral demaglutide), Saxenda (Liraglutide), Trulicity (Dulaglutide), Victoriza (Liraglutide), Wegovy (Semaglutide), Berberine (oral natures ozempic) will need to be held for 7 days prior to injection. N/a  -If patient's BMI is greater than 50. Patient is unable to get IVCS/MAC at [facility]. (Options: Patient can go to [facility] under MAC or ENDO under IVCS-reminder physician's slots at ENDO are limited. OR Patient can have the procedure done under local anesthesia at [facility] with Valium ( per physician's preference.) N/a    -If on blood thinner clearance has been received to hold this medication by provider. Clearance received.  -Patient informed she will need a  to and from procedure. EFFECTIVE 12/1/23- Per [facility]: \" Our  PAT team will notify the patient that their ride MUST remain onsite for the entirety of their visit if the ride is unable to do so the procedure will be canceled. \"    -[facility] is located in the [facility] 1st floor. Patient may park in the yellow or purple parking.    Follow up appointment has been scheduled for patient on: 4/24/24    Patient verbalized understanding and agrees with plan.  -----> Scheduled in Epic: Yes  -----> Scheduled in [facility]/Surgical Case Request: Yes

## 2024-03-13 NOTE — TELEPHONE ENCOUNTER
Received clearance to hold Eliquis for 2 days, placed clearance in Dr. Saunders's folder by nurses bin for review.

## 2024-03-13 NOTE — TELEPHONE ENCOUNTER
Antioag letter received from Dr. Jeni Hsu  Fax #: [phone]      Status of Anticoag  [] Clearance pending   [x] Clearance approved - Ok to hold Eliquis 2 days prior to injection.  [] Clearance denied    Clearance letter sent to scanning.

## 2024-03-13 NOTE — TELEPHONE ENCOUNTER
From: Surekha Vu  To: Yobany Saunders  Sent: 3/11/2024 8:44 PM CDT  Subject: ORLIN Clearance    I am waiting for clearance foe my ORLIN/Eliquis stopping. this has been pretty routine by now. I know that you need documentation from my primary MD but am at a loss as to why the paperwork has been sent to me. This should have been sent to her. I plan to stop the Eliquis 4 days prior to the procedure. id you need documentation from her, please send to Dr Jeni Hsu who is with [facility]. If you have any questions , call me. I plan to be in the [place] area the week of the 25th, call for questions. surekha

## 2024-03-14 NOTE — TELEPHONE ENCOUNTER
Due to financial reason's patient unable to get injection at [facility].     Patient asking if getting this done at the hospital is an option.     Message sent to referrals team to see if injection can be done at the hospital.

## 2024-04-05 DIAGNOSIS — M54.16 LUMBAR RADICULOPATHY: ICD-10-CM

## 2024-04-08 RX ORDER — HYDROCODONE BITARTRATE AND ACETAMINOPHEN 10; 325 MG/1; MG/1
1 TABLET ORAL EVERY 6 HOURS PRN
Qty: 120 TABLET | Refills: 0 | Status: SHIPPED | OUTPATIENT
Start: 2024-04-20 | End: 2024-05-20

## 2024-04-08 NOTE — TELEPHONE ENCOUNTER
Refill Request    Medication request: HYDROcodone-acetaminophen  MG Oral Tab  Take 1 tablet by mouth every 6 (six) hours as needed for Pain.     LOV: 10/12/23- televisist  Due back to clinic per last office note:  \"follow up in 4-6 weeks with the right radial nerve issue. \"  NOV: 2024 Yobany Saunders MD      [place]: 3/21/24 #120 - 30 day supply    Urine drug screen (if applicable): none  Pain contract:  on 23    LOV plan (if weaning or changing medications): No mention at LOV.

## 2024-04-20 ENCOUNTER — TELEPHONE (OUTPATIENT)
Dept: INTERNAL MEDICINE | Facility: CLINIC | Age: 64
End: 2024-04-20
Payer: COMMERCIAL

## 2024-04-22 RX ORDER — GABAPENTIN 600 MG/1
TABLET ORAL
Qty: 150 TABLET | Refills: 2 | OUTPATIENT
Start: 2024-04-22

## 2024-04-22 RX ORDER — METHOCARBAMOL 500 MG/1
500 TABLET, FILM COATED ORAL 3 TIMES DAILY
Qty: 180 TABLET | Refills: 0 | OUTPATIENT
Start: 2024-04-22

## 2024-04-22 NOTE — TELEPHONE ENCOUNTER
Refill Request    Medication request: gabapentin 600 MG Oral Tab  Take 600 mg in the morning, 600 mg in the afternoon, 600 mg evening and 1200 mg at night.     LOV: 10/12/23- televisit  Due back to clinic per last office note:  \"follow up in 4-6 weeks with the right radial nerve issue\"  NOV: 2024 Yobany Saunders MD      [facility]/Last refill: 3/18/24 #150 - 30 day supply    Urine drug screen (if applicable): none  Pain contract:  on 23     LOV plan (if weaning or changing medications): No medication mentioned at LOV.

## 2024-04-22 NOTE — TELEPHONE ENCOUNTER
Refill Request    Medication request: DULoxetine 60 MG Oral Cap DR Particles Take 1 capsule (60 mg total) by mouth once daily.     LOV: 10/12/23- televisit  Due back to clinic per last office note:  \"follow up in 4-6 weeks with the right radial nerve issue\"  NOV: 2024 Yobany Saunders MD      [facility]/Last refill: 24 #90 - 90 day supply    Urine drug screen (if applicable): none  Pain contract:  on 23     LOV plan (if weaning or changing medications): No medication mentioned at LOV.

## 2024-04-22 NOTE — TELEPHONE ENCOUNTER
Refill Request    Medication request: METHOCARBAMOL 500 MG Oral Tab  TAKE 1 TABLET BY MOUTH THREE TIMES A DAY     LOV: 10/12/23- televisit  Due back to clinic per last office note:  \"follow up in 4-6 weeks with the right radial nerve issue\"  NOV: 2024 Yobany Saunedrs MD      [facility] #90 - 30 day supply    Urine drug screen (if applicable): none  Pain contract:  on 23     LOV plan (if weaning or changing medications): No medication mentioned at LOV.

## 2024-04-23 RX ORDER — GABAPENTIN 600 MG/1
TABLET ORAL
Qty: 150 TABLET | Refills: 2 | Status: SHIPPED | OUTPATIENT
Start: 2024-04-23

## 2024-04-23 RX ORDER — DULOXETIN HYDROCHLORIDE 60 MG/1
60 CAPSULE, DELAYED RELEASE ORAL
Qty: 90 CAPSULE | Refills: 3 | Status: SHIPPED | OUTPATIENT
Start: 2024-04-23

## 2024-04-23 RX ORDER — METHOCARBAMOL 500 MG/1
500 TABLET, FILM COATED ORAL 3 TIMES DAILY
Qty: 90 TABLET | Refills: 2 | Status: SHIPPED | OUTPATIENT
Start: 2024-04-23

## 2024-04-24 ENCOUNTER — TELEMEDICINE (OUTPATIENT)
Dept: PHYSICAL MEDICINE AND REHAB | Facility: CLINIC | Age: 64
End: 2024-04-24
Payer: COMMERCIAL

## 2024-04-24 DIAGNOSIS — M51.9 LUMBAR DISC DISEASE: ICD-10-CM

## 2024-04-24 DIAGNOSIS — M43.10 ACQUIRED SPONDYLOLISTHESIS: ICD-10-CM

## 2024-04-24 DIAGNOSIS — M54.16 LUMBAR RADICULOPATHY: Primary | ICD-10-CM

## 2024-04-24 DIAGNOSIS — M51.26 LUMBAR HERNIATED DISC: ICD-10-CM

## 2024-04-24 DIAGNOSIS — G60.9 IDIOPATHIC PERIPHERAL NEUROPATHY: ICD-10-CM

## 2024-04-24 DIAGNOSIS — C64.2 RENAL CELL CARCINOMA OF LEFT KIDNEY (HCC): ICD-10-CM

## 2024-04-24 DIAGNOSIS — M48.061 SPINAL STENOSIS OF LUMBAR REGION WITHOUT NEUROGENIC CLAUDICATION: ICD-10-CM

## 2024-04-24 DIAGNOSIS — M48.061 LUMBAR FORAMINAL STENOSIS: ICD-10-CM

## 2024-04-24 DIAGNOSIS — M96.1 POSTLAMINECTOMY SYNDROME, LUMBAR REGION: ICD-10-CM

## 2024-04-24 PROCEDURE — 99214 OFFICE O/P EST MOD 30 MIN: CPT | Performed by: PHYSICAL MEDICINE & REHABILITATION

## 2024-04-24 NOTE — PROGRESS NOTES
[facility] [place] [facility]    Telemedicine Visit - Evaluation    Surekha Vu verbally consents to a Telemedicine Visit on 24. This visit is conducted using Telemedicine with live, interactive audio and video.    Patient understands and accepts financial responsibility for any deductible, co-insurance and/or co-pays associated with this service.    Chief Complaint:   Chief Complaint   Patient presents with    Low Back Pain       HISTORY OF PRESENT ILLNESS:   The patient is a 64 year old female who was last seen on 10/12/2023.  She states that she is doing ok now.  She will need to have the FMLA forms signed for the future.  She is taking the Norco 4 times per day.  Over the last couple of months, the neuropathy in her feet is worse.  She is still taking gabapentin 600 mg 5 times a day.  She found that CBD topical has helped the foot pain.  She has been having more back pain lately.  She had a recent MRI scan a couple of months ago.  She feels hat her low back pain is increasing over time.  She would like to get off of the Norco.  She last did the PT for the lumbar spine awhile ago.      PAST MEDICAL HISTORY:     Past Medical History:    Depression    PE (pulmonary embolism)         PAST SURGICAL HISTORY:     Past Surgical History:   Procedure Laterality Date    Anesth,lumbar spine,cord surgery            Kidney surgery  2023    Tumor removed    Laminectomy,lumbar      Removal spleen, total           CURRENT MEDICATIONS:     Current Outpatient Medications   Medication Sig Dispense Refill    gabapentin 600 MG Oral Tab Take 600 mg in the morning, 600 mg in the afternoon, 600 mg evening and 1200 mg at night. 150 tablet 2    methocarbamol 500 MG Oral Tab Take 1 tablet (500 mg total) by mouth 3 (three) times daily. 90 tablet 2    DULoxetine 60 MG Oral Cap DR Particles Take 1 capsule (60 mg total) by mouth once daily. 90 capsule 3    HYDROcodone-acetaminophen   MG Oral Tab Take 1 tablet by mouth every 6 (six) hours as needed for Pain. 120 tablet 0    Risedronate Sodium 5 MG Oral Tab Take 5 mg by mouth every morning before breakfast. MONTHLY      oxyCODONE ER 20 MG Oral Tablet Extended Release 12 hour Abuse-Deterrent Take 1 tablet (20 mg total) by mouth Q12H. 60 tablet 0    apixaban (ELIQUIS) 2.5 MG Oral Tab Take 1 tablet (2.5 mg total) by mouth 2 (two) times daily. 60 tablet 0    acetaminophen 500 MG Oral Tab Take 500 mg by mouth as needed.      vitamin B-12 50 MCG Oral Tab Take 500 mcg by mouth as needed.      Triamterene-HCTZ 37.5-25 MG Oral Tab Take 1 tablet by mouth as needed.      Naloxone HCl 4 MG/0.1ML Nasal Liquid 4 mg by Nasal route as needed. If patient remains unresponsive, repeat dose in other nostril 2-5 minutes after first dose. 1 kit 0    Calcium Carbonate-Vitamin D (CALCIUM + D OR) Take 1 tablet by mouth 2 (two) times daily. three times per week            ALLERGIES:     Allergies   Allergen Reactions    Shellfish-Derived Products ANGIOEDEMA    Lisinopril Coughing    Ace Inhibitors Coughing and OTHER (SEE COMMENTS)         FAMILY HISTORY:     Family History   Problem Relation Age of Onset    Cancer Father     Diabetes Mother     Other (Other) Mother         Parkinson          SOCIAL HISTORY:     Social History     Socioeconomic History    Marital status:    Tobacco Use    Smoking status: Former     Current packs/day: 0.00     Types: Cigarettes     Quit date: 2000     Years since quittin.4    Smokeless tobacco: Never   Vaping Use    Vaping status: Never Used   Substance and Sexual Activity    Alcohol use: Yes     Alcohol/week: 0.0 standard drinks of alcohol     Comment: socially    Drug use: No   Other Topics Concern    Caffeine Concern Yes     Comment: 2 cups coffee daily    Exercise Yes     Comment: walk 1-3 miles per day.   Social History Narrative    The patient does not use an assistive device.    The patient does live in a  home with stairs.     Social Determinants of Health     Financial Resource Strain: Low Risk  (1/15/2024)    Received from [facility]    Financial Resource Strain     Within the past 12 months, have you or your family members you live with been unable to get utilities (heat, electricity) when it was really needed?: No   Food Insecurity: Low Risk  (1/15/2024)    Received from [facility]    Food Insecurity     Within the past 12 months, did you worry that your food would run out before you got money to buy more?: No     Within the past 12 months, did the food you bought just not last and you didn’t have money to get more?: No   Transportation Needs: Low Risk  (1/15/2024)    Received from [facility]    Transportation Needs     Within the past 12 months, has lack of transportation kept you from medical appointments, getting your medicines, non-medical meetings or appointments, work, or from getting things that you need?: No   Physical Activity: Sufficiently Active (5/15/2019)    [facility], [facility]    Exercise Vital Sign     Days of Exercise per Week: 5 days     Minutes of Exercise per Session: 30 min   Housing Stability: Low Risk  (1/15/2024)    Received from [facility]    Housing Stability     Do you have housing? : Yes     Are you worried about losing your housing?: No          REVIEW OF SYSTEMS:   As per above HPI    PHYSICAL EXAM:   General: No immediate distress  Head: Normocephalic/ Atraumatic  Eyes: Extra-occular movements intact  Ears/Nose/Throat:  External appearance identifies normal appearance without obvious deformity  Cardiovascular: No cyanosis, clubbing or edema  Respiratory: Non-labored respirations  Skin: No lesions noted   Neurological: alert & oriented x 3, attentive, able to follow commands, comprehention intact, spontaneous speech intact  Psychiatric: Mood and affect appropriate    Musculoskeletal Exam:  Cervical Spine:     Posture: mild-moderate chin forward superiorly rotated protracted shoulder posture.   Shoulders: Level   Head: In neutral     LABS:   No results found for: \"EAG\", \"A1C\"  No results found for: \"WBC\", \"RBC\", \"HGB\", \"HCT\", \"MCV\", \"MCH\", \"MCHC\", \"RDW\", \"PLT\", \"MPV\"  No results found for: \"GLU\", \"BUN\", \"BUNCREA\", \"CREATSERUM\", \"ANIONGAP\", \"GFR\", \"GFRNAA\", \"GFRAA\", \"CA\", \"OSMOCALC\", \"ALKPHO\", \"AST\", \"ALT\", \"ALKPHOS\", \"BILT\", \"TP\", \"ALB\", \"GLOBULIN\", \"AGRATIO\", \"NA\", \"K\", \"CL\", \"CO2\"  No results found for: \"PTP\", \"PT\", \"INR\"  No results found for: \"VITD\", \"QVITD\", \"[id number]\"      ASSESSMENT:     1. Bilateral L5 radiculopathies     2. Idiopathic peripheral neuropathy    3. L3-4 mod central stenosis    4. L5-S1 left mild-mod, L4-5 bilateral mild, L3-4 left mild, L2-3 left mod foraminal stenosis    5. L5-S1 left mod foraminal, L4-5 mod diffuse, L3-4 mod diffuse, L2-3 left mod foraminal & mild-mod diffuse bulging discs    6. L5-S1 mild central herniated disc    7. L4-5 grade 1-2 unstable spondylolisthesis    8. s/p left L4-5 laminectomy    9. Renal cell carcinoma of left kidney (HCC)      PLAN:   She will give me her FMLA forms to fill out.    She will need bilateral L5 TFESI's in the future with 160 mg of the Kenalog.    She will continue with the Norco now for the pain, but I spoke to her about weaning down on this in the future.  She would drop by an half of a tablet every month.      She will use the CBD cream for the pain and see if this will allow her to decrease the gabapentin dose.    She will get back into the PT for the lumbar spine.    She will get me the copy of her most recent MRI scan.    Follow-up:  3 months to 6 months.    We discussed that a telemedicine visit is in place of an office visit; however, this limits the ability to perform a thorough physical examination which may affect objective findings related to a specific condition and can affect treatment.       The patient verbalized understanding with  this plan and was in agreement.  There are no barriers to learning.  All questions were answered.      Yobany Saunders M.D.  Physical Medicine and Rehabilitation   4/24/2024    Telehealth outside of [facility]  Telehealth Verbal Consent   I conducted a telehealth visit with Surekha Vu today, 04/24/24, which was completed using two-way, real-time interactive audio and video communication. This has been done in good chloe to provide continuity of care in the best interest of the provider-patient relationship, due to the COVID -19 public health crisis/national emergency where restrictions of face-to-face office visits are ongoing. Every conscious effort was taken to allow for sufficient and adequate time to complete the visit.  The patient was made aware of the limitations of the telehealth visit, including treatment limitations as no physical exam could be performed.  The patient was advised to call 911 or to go to the ER in case there was an emergency.  The patient was also advised of the potential privacy & security concerns related to the telehealth platform.   The patient was made aware of where to find [facility]'s notice of privacy practices, telehealth consent form and other related consent forms and documents.  which are located on the [facility] website. The patient verbally agreed to telehealth consent form, related consents and the risks discussed.    Lastly, the patient confirmed that they were in [place].   Included in this visit, time may have been spent reviewing labs, medications, radiology tests and decision making. Appropriate medical decision-making and tests are ordered as detailed in the plan of care above.  Coding/billing information is submitted for this visit based on complexity of care and/or time spent for the visit.

## 2024-04-25 NOTE — TELEPHONE ENCOUNTER
REFERRAL INFORMATION:  Referring Provider:  Self-Referred  Referring Clinic: N/A  Reason for Visit/Diagnosis: BMI 26, 2016 [facility] in [place]       FUTURE VISIT INFORMATION:  Appointment Date: 7/3/2024  Appointment Time: 8 AM     NOTES RECORD STATUS  DETAILS   OFFICE NOTE from Referring Provider N/A    OFFICE NOTE from [facility] Everywhere / [facility]:  5/23/23, 4/25/22 - PCC OV with Dr. Fuentes    [facility]:  7/13/16 - GEN SURG OV with Dr. Fortune   [facility] DISCHARGE SUMMARY/ ED VISITS  N/A    OPERATIVE REPORT Received [facility]:  11/20/17 - OP Note for REVISION OF SLEEVE GASTRECTOMY TO LAPAROSCOPIC TWME-EH-Y-GASTRIC BYPASS, LYSIS OF ADHESIONS with Dr. Mcgill  8/13/13 - OP Note for LAPAROSCOPIC SLEEVE GASTRECTOMY, HIATAL HERNIA REPAIR with Dr. Mcgill   PERTINENT LABS Internal    IMAGING (CT, MRI, US, XR)  [facility]:  4/11/23, 4/30/22 - CT Abd/Pelvis     Records Requested    Facility  [facility]  Fax: [phone]  Rigo Funes  Fax: [phone]    Outcome * 4/25/24 11:47 AM Faxed urgent request to [facility] for OP Note to be faxed to the clinic. - Becca    * 4/26/24 11:59 AM Received message from [facility] that they don't have the OP Note for Sleeve. - Becca    * 5/20/24 2:14 PM Faxed urgent request to [facility] for OP Note to be faxed to the clinic. - Becca    * 5/21/24 8:24 AM Records received from [facility] and sent to HIM to be scanned into the chart. - Becca

## 2024-05-08 NOTE — TELEPHONE ENCOUNTER
2024         RE: Damon Rock  [address] [address]        Dear Colleague,    Thank you for referring your patient, Damon Rock, to the [facility]. Please see a copy of my visit note below.    Department of Neurology  Movement Disorders Division     Patient: Damon Rock   MRN: [id number]   : 1956   Date of Visit: 2024    Impression:  Damon Rock is a 67 year old female with Essential Tremor. The patient's main concern today is tremor, however, she shares that her tremors are well controlled on current dose of primidone. She would like to continue on her current dose of primidone. We discussed several different factors that exacerbate tremor including uncontrolled emotional situations, poorly controlled mood issues (stress, anxiety, depression), poor sleep, metabolic/electrolyte abnormalities, medication side effects, poorly controlled pain, injury, acute infection, etc. Meditation resources provided.     Essential Tremor: No improvement with gabapentin 100 mg 1 tab three times daily. Started primidone 2024 and reported tremor control and has not reported AEs to this medication. We have discussed the Abram kIQ, FUS, gamma knife and Deep Brain Stimulation in the past. Anxiety and stress are triggers.    Plan:   - No changes to primidone 50 mg 1 tab daily   - Discussed neurotoxin injections if head tremors become bothersome. She does not think she is to this point.   - Consider trying guided meditation. Here are some apps I recommend:.     - HeadsThinkspeed anhum (www.headspace.com): 14 day free trial and then there is a monthly or annual rate  - Calm nahum (www.calm.com): 7 day free trial and then there is an annual rate  - InsightTimer nahum (www.insighttimer.com): FREE   - Knowing how to relax is not something our society teaches. In fact, it teaches us to do the opposite. The relaxation response was developed  Patient left message on office voice mail asking what next step is. Televisit 9/30/20. "by the [place]  and physician Dr. Jalen Kincaid. You can google both \"Jalen Kincaid\" and \"Relaxation Response\" to find a multitude of information. Breath work is the center of this process and can reduce anxiety and stress markedly.     Dr. Kincaid talks about the need for four things:     1. A quiet environment   2. A comfortable position    3. A passive mental attitude   4. A mental device    The passive mental attitude means that when distracting thoughts come into your mind while deep breathing, you do not  them. Instead of saying to yourself, \"Darn, I just can't calm my mind,\" say, \"Oh, that was interesting,\" and then refocus on the breath. Our brains are a complex network of connections that were meant to fire. It takes practice, lots of practice to focus and breathe. Try not to  your thoughts as good or bad, they just are.     The mental device can be just about anything. It can be meditation, prayer, saying one sound out loud, whatever you want it to be. The key is to breathe from your belly. Place your hand on your belly. As you inhale to the count of 3 through your nose, feel your hand rising. This helps to remind you to get the breath all the way down into the belly. Exhale to the count of 6 (or double whatever the count of the inhale) through pursed lips. By keeping your lips pursed, the exhale lasts longer and decreases the loss of carbon dioxide. When we breathe fast from our chest, we lose too much carbon dioxide which changes the chemistry of the body. This is what causes many of the physical symptoms of anxiety (tremor, lightheadedness, fast heart rate, ringing in the ear, feeling of unreality).     So, inhale through your nose, think 'All is well,' Exhale through pursed lips, thinking 'This and every day'. Do this twice a day, first thing in the morning and before dinner in the evening. Do it for a minimum of 10 minutes, a maximum of 20 minutes (Dr. Jalen Kincaid's Relaxation " "Response). These two phrases are just suggestions. Change it to whatever resonates with you.     Remember, practice this daily, so that you will have this skill when you need it most. Please watch this short 2 minute video: https://www.youPerficient.com/watch?v=[email or web address]     Patient to return in 6-8 months, for in-person visit, 30 minutes.     Ofelia Batista, DO  Movement Disorders Specialist  [facility]     Note dictated using voice recognition software.   30 minutes spent on date of encounter doing chart reviews and exam and documentation and further activities as noted above.        ------------------------------------------------------------------------------------------------------------      History of Present Illness  Ms. Rock is a pleasant 67 year old female that presents to Neurology Movement clinic for follow up regarding Essential Tremor.   The patient was initially seen in neurologic consultation on 1/18/23 and most recently 1/26/24 for management of tremor. Please see the comprehensive neurologic consultation notes from those dates in the Epic records for details.      History obtained from patient. Patient is accompanied by ANAHY Abdullahi.  Main complaint: tremor  Patient reports \"this is the best she has been\" in regard to her tremor. Head and hands still shake but this is not bothersome. No issues with eating; \"It makes it in there.\" She is able to cut her food now. She is able to do laundry and use a drill.   Primidone benefits: Improved   Side effects of primidone: none   She drinks decaffeinated coffee.   Triggers for tremors: stress  Anxiety: Treated with Buspirone 10 mg and fluoxetine 20 mg daily. She is currently very stressed with personal issues regarding her son and family. Kaylen is a great support system.   Movement Disorder-related Medications                   Indication At bedtime        Primidone 50 mg ET 1                                      Review of " Systems:  Other than that mentioned above, the remainder of 12 systems reviewed were negative.    PMH: unchanged  PSH: unchanged  FH: unchanged  SH: unchanged    Medications:  Current Outpatient Medications   Medication Sig Dispense Refill     ALPRAZolam (XANAX) 1 MG tablet [ALPRAZOLAM (XANAX) 1 MG TABLET] As needed       atorvastatin (LIPITOR) 20 MG tablet [ATORVASTATIN (LIPITOR) 20 MG TABLET] Take 20 mg by mouth daily.        busPIRone (BUSPAR) 10 MG tablet [BUSPIRONE (BUSPAR) 10 MG TABLET] TAKE 1 TABLET BY MOUTH TWICE DAILY FOR ANXIETY       FLUoxetine (PROZAC) 20 MG capsule Take 20 mg by mouth daily       primidone (MYSOLINE) 50 MG tablet Start primidone 50 mg by taking 0.5 tab x 2 week, then increase to a full tab. 30 tablet 11     vitamin D3 (CHOLECALCIFEROL) 50 mcg (2000 units) tablet Take 1 tablet by mouth daily             Allergies   Allergen Reactions     Shellfish Derived [Shellfish-Derived Products] Anaphylaxis     Phenytoin Sodium Extended [Phenytoin] Other (See Comments)     Swelling and skin peeling in hands and feet          Physical Exam:  The patient's  blood pressure is 104/67 and her pulse is 77.        5/8/2024    12:00 PM   Tremor Motor Scale   Medication On   DBS - Right Brain Off   DBS - Left Brain Off   Head 1.5   Face & Jaw 0   Voice 0   Outstretched - RIGHT 1   Outstretched - LEFT 1   Wingbeating - RIGHT 1.5   Wingbeating - LEFT 1   Kinetic - RIGHT 1.5   Kinetic - LEFT 1   Lower Limb - RIGHT 0   Lower Limb - LEFT 1   Lower Limb (Max) 1   Trunk (Standing) 0   Axial 1.5       Data Reviewed: I have personally reviewed the tests/studies below.       Lab Results   Component Value Date    A1C 5.4 01/18/2023     TSH   Date Value Ref Range Status   06/30/2023 1.17 0.30 - 4.20 uIU/mL Final   03/10/2021 1.24 0.30 - 5.00 uIU/mL Final     CBC RESULTS:   Recent Labs   Lab Test 09/26/23  0902   WBC 4.9   RBC 4.46   HGB 14.1   HCT 44.6      MCH 31.6   MCHC 31.6   RDW 12.6        Last  Comprehensive Metabolic Panel:  Sodium   Date Value Ref Range Status   09/26/2023 139 136 - 145 mmol/L Final     Potassium   Date Value Ref Range Status   09/26/2023 3.8 3.4 - 5.3 mmol/L Final   01/18/2023 4.3 3.5 - 5.0 mmol/L Final     Chloride   Date Value Ref Range Status   09/26/2023 103 98 - 107 mmol/L Final   01/18/2023 103 98 - 107 mmol/L Final     Carbon Dioxide (CO2)   Date Value Ref Range Status   09/26/2023 28 22 - 29 mmol/L Final   01/18/2023 27 22 - 31 mmol/L Final     Anion Gap   Date Value Ref Range Status   09/26/2023 8 7 - 15 mmol/L Final   01/18/2023 9 5 - 18 mmol/L Final     Glucose   Date Value Ref Range Status   09/26/2023 104 (H) 70 - 99 mg/dL Final   01/18/2023 88 70 - 125 mg/dL Final     Urea Nitrogen   Date Value Ref Range Status   09/26/2023 11.2 8.0 - 23.0 mg/dL Final   01/18/2023 14 8 - 22 mg/dL Final     Creatinine   Date Value Ref Range Status   09/26/2023 0.89 0.51 - 0.95 mg/dL Final     GFR Estimate   Date Value Ref Range Status   09/26/2023 71 >60 mL/min/1.73m2 Final   04/01/2021 >60 >60 mL/min/1.73m2 Final     Calcium   Date Value Ref Range Status   09/26/2023 9.1 8.8 - 10.2 mg/dL Final     Bilirubin Total   Date Value Ref Range Status   09/13/2023 0.3 <=1.2 mg/dL Final     Alkaline Phosphatase   Date Value Ref Range Status   09/13/2023 116 (H) 35 - 104 U/L Final     ALT   Date Value Ref Range Status   09/13/2023 50 0 - 50 U/L Final     Comment:     Reference intervals for this test were updated on 6/12/2023 to more accurately reflect our healthy population. There may be differences in the flagging of prior results with similar values performed with this method. Interpretation of those prior results can be made in the context of the updated reference intervals.       AST   Date Value Ref Range Status   09/13/2023 37 0 - 45 U/L Final     Comment:     Reference intervals for this test were updated on 6/12/2023 to more accurately reflect our healthy population. There may be differences  in the flagging of prior results with similar values performed with this method. Interpretation of those prior results can be made in the context of the updated reference intervals.                        Again, thank you for allowing me to participate in the care of your patient.        Sincerely,        Ofelia Batista DO

## 2024-05-16 DIAGNOSIS — M54.16 LUMBAR RADICULOPATHY: ICD-10-CM

## 2024-05-16 NOTE — TELEPHONE ENCOUNTER
Refill Request    Medication request: HYDROcodone-acetaminophen  MG Oral Tab.Take 1 tablet by mouth every 6 (six) hours as needed for Pain.      LOV:Televisit on 24 with Mayur  Due back to clinic per last office note:  Follow-up:  3 months to 6 months.   NOV: Visit date not found      [place]120 (30 days)    Urine drug screen (if applicable): none  Pain contract:  on 2023    LOV plan (if weaning or changing medications): She will continue with the Norco now for the pain, but I spoke to her about weaning down on this in the future. She would drop by an half of a tablet every month.

## 2024-05-20 DIAGNOSIS — M54.16 LUMBAR RADICULOPATHY: ICD-10-CM

## 2024-05-20 RX ORDER — HYDROCODONE BITARTRATE AND ACETAMINOPHEN 10; 325 MG/1; MG/1
1 TABLET ORAL EVERY 6 HOURS PRN
Qty: 120 TABLET | Refills: 0 | Status: SHIPPED | OUTPATIENT
Start: 2024-05-20 | End: 2024-06-19

## 2024-05-20 NOTE — TELEPHONE ENCOUNTER
Refill Request    Medication request: HYDROcodone-acetaminophen  MG Oral Tab. Take 1 tablet by mouth every 6 (six) hours as needed for Pain      LOV:04/24/24 Televisit with Dr Whiteside  Due back to clinic per last office note:  Follow-up:  3 months to 6 months.   NOV: Visit date not found      [place] 4/20/2024 #120 (30 days)    Urine drug screen (if applicable): none  Pain contract: 08/17/2023    LOV plan (if weaning or changing medications): She will continue with the Norco now for the pain, but I spoke to her about weaning down on this in the future. She would drop by an half of a tablet every month.

## 2024-05-24 ENCOUNTER — VIRTUAL VISIT (OUTPATIENT)
Dept: ENDOCRINOLOGY | Facility: CLINIC | Age: 64
End: 2024-05-24
Payer: COMMERCIAL

## 2024-05-24 ENCOUNTER — TELEPHONE (OUTPATIENT)
Dept: ENDOCRINOLOGY | Facility: CLINIC | Age: 64
End: 2024-05-24

## 2024-05-24 VITALS — WEIGHT: 180 LBS | HEIGHT: 67 IN | BODY MASS INDEX: 28.25 KG/M2

## 2024-05-24 DIAGNOSIS — Z98.84 HX OF GASTRIC BYPASS: ICD-10-CM

## 2024-05-24 DIAGNOSIS — Z86.39 HX OF OBESITY: ICD-10-CM

## 2024-05-24 DIAGNOSIS — I10 PRIMARY HYPERTENSION: ICD-10-CM

## 2024-05-24 DIAGNOSIS — E66.3 OVERWEIGHT (BMI 25.0-29.9): ICD-10-CM

## 2024-05-24 PROBLEM — R29.898 RIGHT HAND WEAKNESS: Status: RESOLVED | Noted: 2023-10-04 | Resolved: 2024-05-24

## 2024-05-24 PROBLEM — N28.89 LEFT RENAL MASS: Status: RESOLVED | Noted: 2023-04-07 | Resolved: 2024-05-24

## 2024-05-24 PROCEDURE — 99204 OFFICE O/P NEW MOD 45 MIN: CPT | Mod: 95

## 2024-05-24 RX ORDER — AMLODIPINE BESYLATE 5 MG/1
5 TABLET ORAL DAILY
COMMUNITY

## 2024-05-24 RX ORDER — SEMAGLUTIDE 0.25 MG/.5ML
0.25 INJECTION, SOLUTION SUBCUTANEOUS WEEKLY
Qty: 2 ML | Refills: 0 | Status: SHIPPED | OUTPATIENT
Start: 2024-05-24 | End: 2024-08-06 | Stop reason: DRUGHIGH

## 2024-05-24 RX ORDER — SEMAGLUTIDE 0.5 MG/.5ML
0.5 INJECTION, SOLUTION SUBCUTANEOUS WEEKLY
Qty: 2 ML | Refills: 2 | Status: SHIPPED | OUTPATIENT
Start: 2024-06-23 | End: 2024-08-06 | Stop reason: DRUGHIGH

## 2024-05-24 ASSESSMENT — PAIN SCALES - GENERAL: PAINLEVEL: NO PAIN (0)

## 2024-05-24 NOTE — PROGRESS NOTES
"Virtual Visit Details    Type of service:  Video Visit     Originating Location (pt. Location): {video visit patient location:577162::\"Home\"}  {PROVIDER LOCATION On-site should be selected for visits conducted from your clinic location or adjoining [facility] hospital, academic office, or other nearby [facility] building. Off-site should be selected for all other provider locations, including home:[id number]}  Distant Location (provider location):  {virtual location provider:586185}  Platform used for Video Visit: {Virtual Visit Platforms:342307::\"Optireno\"}    "

## 2024-05-24 NOTE — NURSING NOTE
Is the patient currently in the state of [place]? YES    Visit mode:VIDEO    If the visit is dropped, the patient can be reconnected by: VIDEO VISIT: Text to cell phone:   Telephone Information:   Mobile [phone]       Will anyone else be joining the visit? NO  (If patient encounters technical issues they should call [phone])    How would you like to obtain your AVS? MyChart    Are changes needed to the allergy or medication list? No    Are refills needed on medications prescribed by this physician? NO    Reason for visit: Consult    Latrell ADEN

## 2024-05-24 NOTE — TELEPHONE ENCOUNTER
PA Initiation Key: [id number]    Medication: WEGOVY 0.25 MG/0.5ML SC SOAJ  Insurance Company: [employer] - Phone [phone] Fax [phone]  Pharmacy Filling the Rx: [place], [place] - [address]  Filling Pharmacy Phone: [phone]  Filling Pharmacy Fax: [phone]  Start Date: 5/24/2024

## 2024-05-24 NOTE — LETTER
2024       RE: Angeli Galindo  [address]  [place] [address]     Dear Colleague,    Thank you for referring your patient, Angeli Galindo, to the [facility]. Please see a copy of my visit note below.    Virtual Visit Details    Type of service:  Video Visit     Originating Location (pt. Location): Home    Distant Location (provider location):  Off-site  Platform used for Video Visit: [facility] Bariatric Surgery Note    RE: Angeli Galindo  MR#: [id number]  : 1960  VISIT DATE: May 24, 2024      Dear Eloisa Fuentes,    I had the pleasure of seeing your patient, Angeli Galindo, in my post-bariatric surgery assessment clinic.    Assessment & Plan  Problem List Items Addressed This Visit       Hx of gastric bypass    Hypertension    Relevant Medications    Semaglutide-Weight Management (WEGOVY) 0.25 MG/0.5ML pen    Semaglutide-Weight Management (WEGOVY) 0.5 MG/0.5ML pen (Start on 2024)    Overweight (BMI 25.0-29.9)     S/p gastric sleeve in  in [place]   Highest weight - 339lbs   Flo weight after sleeve - around 280llb  S/p conversion to RYBP in 2016 due to uncontrollable GERD  Flo weight after surgery - 153lb     Maintained 90-year weight for around 7 years.  Weight regain in the past 6 months due to increased stress.  Has regained around 30 pounds.  Does not feel comfortable at this weight.  Wants to get back to low weight in order to improve blood pressure and feel better overall.    Discussed AOM's to help with weight loss:  - Phentermine was previously trialed prior to gastric sleeve.  Side effects of palpitations off and on.  Will not retrial due to side effects, history of hypertension, and age.  - Topiramate currently taking for migraines.  No effect on weight.  - Naltrexone contraindicated due to currently taking opioids for chronic pain.  -  Wegovy to be started today.No contraindications. Discussed side effects, risks, and benefits.  No history of pancreatitis.  No personal or family history of MTC or MENII.  No history of pre or type 2 diabetes.         Relevant Medications    Semaglutide-Weight Management (WEGOVY) 0.25 MG/0.5ML pen    Semaglutide-Weight Management (WEGOVY) 0.5 MG/0.5ML pen (Start on 6/23/2024)    Other Relevant Orders    Med Therapy Management Referral     Other Visit Diagnoses       Hx of obesity        Relevant Medications    Semaglutide-Weight Management (WEGOVY) 0.25 MG/0.5ML pen    Semaglutide-Weight Management (WEGOVY) 0.5 MG/0.5ML pen (Start on 6/23/2024)           Start Wegovy 0.25mg once weekly for 4 weeks, then increase to 0.5mg once weekly. Consider Zepbound and Saxenda as needed.  MTM pharmacist in 6 weeks   Kaur Mccormick or Alaina in 3 months   Eloisa Monaco in 6 months     55 minutes spent by me on the date of the encounter doing chart review, history and exam, documentation and further activities per the note    CHIEF COMPLAINT: Post-bariatric surgery follow-up.     HISTORY OF PRESENT ILLNESS:      5/21/2024     6:12 AM   Questions Regarding Prior Weight Loss Surgery Reviewed With Patient   I had the following weight loss procedure Rose Mary-en-y Gastric Bypass   What year was your surgery? 2016   How has your weight changed since your last visit? I have gained weight   Do you currently have any of the following Heartburn, acid reflux, or GERD (acid reflux disease)?    Hypertension (high blood pressure)?   Do you have any concerns today? Yes, weight gain, increased BP, anxiety     S/p gastric sleeve in 2014 in [place]   Highest weight - 339lbs   Christos weight after sleeve - around 280llb  S/p conversion to RYBP in 2016 due to uncontrollable GERD  Christos weight after surgery - 153lb     Overweight onset in childhood.     Was able to maintain christos weight for around 7years. Started weight regain in the past 6 months- has gained  around 30lbs related to increase stress. Has now had to buy new clothes and blood pressure is getting higher again. Wants to lose weight to improve blood pressure and to feel better overall.     Anti-obesity medications:   Phentermine - was on prior to gastric sleeve. Was helpful wieght weight loss. Side effects of palpitations off and on.   Topiramate - currently on for migraines. Has been on it for around 4 years ago. No effect on weight. Side effects of drowsiness.     Recent diet changes: Eating 4 meals a day. Portion sizes around 1-1.5cups. Started factor meals recently. Some increase hunger at times. Eats out of boredom at times - especially at night. Some emotional eating. Some thoughts of food. Can get full. Can stay full if has a protein heavy meal, otherwise will get hungry sooner.     -Water? 60oz daily. Also drinking tea, coffee + cream, glass of wine (2-3xweek).     Recent exercise/activity changes: Walks daily. Walks dog.     Recent stressors: Increased stress over the past couple of months with work and family. Moved to help with mother. Is in the process of getting doctorate.     Recent sleep changes: takes melatonin, no concerns     Vitamins/Labs: prenatal - 2 tabs, B12 PRN, Vitamin D, Calcium     No nausea, vomiting, abdominal pain, constipation, diarrhea, or dysphagia.   No GERD or reaactive hypoglycemia     Dumping syndrome - if has sugar     Weight History:      5/21/2024     6:12 AM   --   What is your highest lifetime weight? 335   What is your lowest weight since surgery? (In pounds) 153                          5/21/2024     6:12 AM   Questions Regarding Co-Morbidities and Health Concerns Reviewed With Patient   Pre-diabetes Never   Diabetes II Never   High Blood Pressure Stayed the same   High cholesterol Never   Heartburn/Reflux Improved   Sleep apnea Gone away   PCOS Never   Back pain Stayed the same   Joint pain Stayed the same   Lower leg swelling Gone away     Chronic back pain - due  back  injury. On opioids Followed by pain clinic.     History of DVT and PE - protein S. On blood thinner. Followed by hematology and PCP     hx of renal cell cardinoma - partial nephrectomy     Depression and anxiety - mood is stable. Previously seen by therapist     HTN - self restarted norvasc. Followed by PCP         5/21/2024     6:12 AM   Eating Habits   How many meals do you eat per day? 3   Do you snack between meals? Yes   How much food are you eating at each meal? Greater than 1 cup   Are you able to separate your meals and liquids by at least 30 minutes? Sometimes   Are you able to avoid liquid calories? Sometimes           5/21/2024     6:12 AM   Exercise Questions Reviewed With Patient   How often do you exercise? 3 to 4 times per week   What is the duration of your exercise (in minutes)? 20 Minutes   What types of exercise do you do? walking   What keeps you from being more active? Pain    Lack of Time    Too tired       Social History:      5/21/2024     6:12 AM   --   Are you smoking? No   Are you drinking alcohol? Yes   How much alcohol? 3-4 glasses of wine/week     Nicotine - quit in 2000.     No drugs or THC     Medications:  Current Outpatient Medications   Medication Sig Dispense Refill    amLODIPine (NORVASC) 5 MG tablet Take 5 mg by mouth daily      DULoxetine (CYMBALTA) 60 MG capsule Take 1 capsule (60 mg) by mouth daily 90 capsule 1    gabapentin (NEURONTIN) 600 MG tablet Take 1 tablet (600 mg) by mouth 4 times daily Take 600mg in the morning and 1200mg at bedtime (Patient taking differently: Take 600 mg by mouth 2 times daily (In addition to the night time dose))      HYDROcodone-acetaminophen (NORCO)  MG per tablet Take 1 tablet by mouth 4 times daily as needed      methocarbamol (ROBAXIN) 500 MG tablet Take 500 mg by mouth 4 times daily as needed for muscle spasms      naloxone (NARCAN) 4 MG/0.1ML nasal spray Spray 1 spray (4 mg) into one nostril alternating nostrils as needed for  opioid reversal every 2-3 minutes until assistance arrives  0    Prenatal Vit-Fe Fumarate-FA (PRENATAL VITAMINS PO) Take 2 tablets by mouth every evening      RISEdronate (ACTONEL) 150 MG tablet TAKE 1 TABLET BY MOUTH EVERY 30 DAYS. 3 tablet 3    Semaglutide-Weight Management (WEGOVY) 0.25 MG/0.5ML pen Inject 0.25 mg Subcutaneous once a week For 4 weeks 2 mL 0    [START ON 6/23/2024] Semaglutide-Weight Management (WEGOVY) 0.5 MG/0.5ML pen Inject 0.5 mg Subcutaneous once a week After completing 4 weeks of 0.25mg dose 2 mL 2    SUMAtriptan (IMITREX) 100 MG tablet Take 1 tablet (100 mg) by mouth at onset of headache for migraine 18 tablet 3    ELIQUIS ANTICOAGULANT 2.5 MG tablet Take 1 tablet (2.5 mg) by mouth 2 times daily 180 tablet 2    fluticasone furoate 27.5 MCG/SPRAY nasal spray Spray 2 sprays into both nostrils daily 27.3 mL 2    topiramate (TOPAMAX) 25 MG tablet Take 1 tablet (25 mg) by mouth 2 times daily 180 tablet 1     No current facility-administered medications for this visit.         5/21/2024     6:12 AM   --   Do you avoid NSAIDs such as (Ibuprofen, Aleve, Naproxen, Advil)? Yes       ROS:  GI:       5/21/2024     6:12 AM   --   Vomiting No   Diarrhea No   Constipation No   Swallowing trouble No   Abdominal pain No   Heartburn Yes     Skin:       5/21/2024     6:12 AM   BAR RBS ROS - SKIN   Rash in skin folds Yes     Psych:       5/21/2024     6:12 AM   --   Depression Yes   Anxiety Yes     Female Only:       5/21/2024     6:12 AM   BAR RBS ROS -    Female only Post-menopausal   Stress urinary incontinence No         Anti-obesity medication ROS:    HEENT  Hx of glaucoma: No    Cardiovascular  CAD:No  HTN:Yes    Gastrointestinal  GERD:No  Constipation:No  Liver Dz:No  H/O Pancreatitis:No    Psychiatric  Bipolar: No  Anxiety:Yes  Depression:Yes  History of alcohol/drug abuse: No  Hx of eating disorder:No    Endocrine  Personal or family hx of MTC or MEN2:No  Diabetes/prediabetes: No    Neurologic:  Hx  "of seizures: No  Hx of migraines: Yes  Memory Impairment: No      History of kidney stones: No  Kidney disease:  partial nephrectomy  Current birth control:  hysterectomy in 1992    Taking Opioid/Narcotic: Yes        Objective   Ht 1.702 m (5' 7\")   Wt 81.6 kg (180 lb)   BMI 28.19 kg/m           Vitals:  No vitals were obtained today due to virtual visit.    Physical Exam   GENERAL: alert and no distress  EYES: Eyes grossly normal to inspection.  No discharge or erythema, or obvious scleral/conjunctival abnormalities.  RESP: No audible wheeze, cough, or visible cyanosis.    SKIN: Visible skin clear. No significant rash, abnormal pigmentation or lesions.  NEURO: Cranial nerves grossly intact.  Mentation and speech appropriate for age.  PSYCH: Appropriate affect, tone, and pace of words        Sincerely,    Eloisa Murry PA-C    "

## 2024-05-24 NOTE — PROGRESS NOTES
Virtual Visit Details    Type of service:  Video Visit     Originating Location (pt. Location): Home    Distant Location (provider location):  Off-site  Platform used for Video Visit: [facility] Bariatric Surgery Note    RE: Angeli Galindo  MR#: [id number]  : 1960  VISIT DATE: May 24, 2024      Dear Eloisa Fuentes,    I had the pleasure of seeing your patient, Angeli Galindo, in my post-bariatric surgery assessment clinic.    Assessment & Plan   Problem List Items Addressed This Visit       Hx of gastric bypass    Hypertension    Relevant Medications    Semaglutide-Weight Management (WEGOVY) 0.25 MG/0.5ML pen    Semaglutide-Weight Management (WEGOVY) 0.5 MG/0.5ML pen (Start on 2024)    Overweight (BMI 25.0-29.9)     S/p gastric sleeve in  in [place]   Highest weight - 339lbs   Flo weight after sleeve - around 280llb  S/p conversion to RYBP in  due to uncontrollable GERD  Flo weight after surgery - 153lb     Maintained 90-year weight for around 7 years.  Weight regain in the past 6 months due to increased stress.  Has regained around 30 pounds.  Does not feel comfortable at this weight.  Wants to get back to low weight in order to improve blood pressure and feel better overall.    Discussed AOM's to help with weight loss:  - Phentermine was previously trialed prior to gastric sleeve.  Side effects of palpitations off and on.  Will not retrial due to side effects, history of hypertension, and age.  - Topiramate currently taking for migraines.  No effect on weight.  - Naltrexone contraindicated due to currently taking opioids for chronic pain.  - Wegovy to be started today.No contraindications. Discussed side effects, risks, and benefits.  No history of pancreatitis.  No personal or family history of MTC or MENII.  No history of pre or type 2 diabetes.         Relevant Medications    Semaglutide-Weight Management (WEGOVY) 0.25 MG/0.5ML pen    Semaglutide-Weight Management (WEGOVY) 0.5  MG/0.5ML pen (Start on 6/23/2024)    Other Relevant Orders    Med Therapy Management Referral     Other Visit Diagnoses       Hx of obesity        Relevant Medications    Semaglutide-Weight Management (WEGOVY) 0.25 MG/0.5ML pen    Semaglutide-Weight Management (WEGOVY) 0.5 MG/0.5ML pen (Start on 6/23/2024)           Start Wegovy 0.25mg once weekly for 4 weeks, then increase to 0.5mg once weekly. Consider Zepbound and Saxenda as needed.  MTM pharmacist in 6 weeks   Kaur Mccormick or Alaina in 3 months   Eloisa Monaco in 6 months     55 minutes spent by me on the date of the encounter doing chart review, history and exam, documentation and further activities per the note    CHIEF COMPLAINT: Post-bariatric surgery follow-up.     HISTORY OF PRESENT ILLNESS:      5/21/2024     6:12 AM   Questions Regarding Prior Weight Loss Surgery Reviewed With Patient   I had the following weight loss procedure Rose Mary-en-y Gastric Bypass   What year was your surgery? 2016   How has your weight changed since your last visit? I have gained weight   Do you currently have any of the following Heartburn, acid reflux, or GERD (acid reflux disease)?    Hypertension (high blood pressure)?   Do you have any concerns today? Yes, weight gain, increased BP, anxiety     S/p gastric sleeve in 2014 in [place]   Highest weight - 339lbs   Christos weight after sleeve - around 280llb  S/p conversion to RYBP in 2016 due to uncontrollable GERD  Christos weight after surgery - 153lb     Overweight onset in childhood.     Was able to maintain christos weight for around 7years. Started weight regain in the past 6 months- has gained around 30lbs related to increase stress. Has now had to buy new clothes and blood pressure is getting higher again. Wants to lose weight to improve blood pressure and to feel better overall.     Anti-obesity medications:   Phentermine - was on prior to gastric sleeve. Was helpful wieght weight loss. Side effects of palpitations off and on.    Topiramate - currently on for migraines. Has been on it for around 4 years ago. No effect on weight. Side effects of drowsiness.     Recent diet changes: Eating 4 meals a day. Portion sizes around 1-1.5cups. Started factor meals recently. Some increase hunger at times. Eats out of boredom at times - especially at night. Some emotional eating. Some thoughts of food. Can get full. Can stay full if has a protein heavy meal, otherwise will get hungry sooner.     -Water? 60oz daily. Also drinking tea, coffee + cream, glass of wine (2-3xweek).     Recent exercise/activity changes: Walks daily. Walks dog.     Recent stressors: Increased stress over the past couple of months with work and family. Moved to help with mother. Is in the process of getting doctorate.     Recent sleep changes: takes melatonin, no concerns     Vitamins/Labs: prenatal - 2 tabs, B12 PRN, Vitamin D, Calcium     No nausea, vomiting, abdominal pain, constipation, diarrhea, or dysphagia.   No GERD or reaactive hypoglycemia     Dumping syndrome - if has sugar     Weight History:      5/21/2024     6:12 AM   --   What is your highest lifetime weight? 335   What is your lowest weight since surgery? (In pounds) 153                          5/21/2024     6:12 AM   Questions Regarding Co-Morbidities and Health Concerns Reviewed With Patient   Pre-diabetes Never   Diabetes II Never   High Blood Pressure Stayed the same   High cholesterol Never   Heartburn/Reflux Improved   Sleep apnea Gone away   PCOS Never   Back pain Stayed the same   Joint pain Stayed the same   Lower leg swelling Gone away     Chronic back pain - due back  injury. On opioids Followed by pain clinic.     History of DVT and PE - protein S. On blood thinner. Followed by hematology and PCP     hx of renal cell cardinoma - partial nephrectomy     Depression and anxiety - mood is stable. Previously seen by therapist     HTN - self restarted norvasc. Followed by PCP         5/21/2024     6:12  AM   Eating Habits   How many meals do you eat per day? 3   Do you snack between meals? Yes   How much food are you eating at each meal? Greater than 1 cup   Are you able to separate your meals and liquids by at least 30 minutes? Sometimes   Are you able to avoid liquid calories? Sometimes           5/21/2024     6:12 AM   Exercise Questions Reviewed With Patient   How often do you exercise? 3 to 4 times per week   What is the duration of your exercise (in minutes)? 20 Minutes   What types of exercise do you do? walking   What keeps you from being more active? Pain    Lack of Time    Too tired       Social History:      5/21/2024     6:12 AM   --   Are you smoking? No   Are you drinking alcohol? Yes   How much alcohol? 3-4 glasses of wine/week     Nicotine - quit in 2000.     No drugs or THC     Medications:  Current Outpatient Medications   Medication Sig Dispense Refill    amLODIPine (NORVASC) 5 MG tablet Take 5 mg by mouth daily      DULoxetine (CYMBALTA) 60 MG capsule Take 1 capsule (60 mg) by mouth daily 90 capsule 1    gabapentin (NEURONTIN) 600 MG tablet Take 1 tablet (600 mg) by mouth 4 times daily Take 600mg in the morning and 1200mg at bedtime (Patient taking differently: Take 600 mg by mouth 2 times daily (In addition to the night time dose))      HYDROcodone-acetaminophen (NORCO)  MG per tablet Take 1 tablet by mouth 4 times daily as needed      methocarbamol (ROBAXIN) 500 MG tablet Take 500 mg by mouth 4 times daily as needed for muscle spasms      naloxone (NARCAN) 4 MG/0.1ML nasal spray Spray 1 spray (4 mg) into one nostril alternating nostrils as needed for opioid reversal every 2-3 minutes until assistance arrives  0    Prenatal Vit-Fe Fumarate-FA (PRENATAL VITAMINS PO) Take 2 tablets by mouth every evening      RISEdronate (ACTONEL) 150 MG tablet TAKE 1 TABLET BY MOUTH EVERY 30 DAYS. 3 tablet 3    Semaglutide-Weight Management (WEGOVY) 0.25 MG/0.5ML pen Inject 0.25 mg Subcutaneous once a week  "For 4 weeks 2 mL 0    [START ON 6/23/2024] Semaglutide-Weight Management (WEGOVY) 0.5 MG/0.5ML pen Inject 0.5 mg Subcutaneous once a week After completing 4 weeks of 0.25mg dose 2 mL 2    SUMAtriptan (IMITREX) 100 MG tablet Take 1 tablet (100 mg) by mouth at onset of headache for migraine 18 tablet 3    ELIQUIS ANTICOAGULANT 2.5 MG tablet Take 1 tablet (2.5 mg) by mouth 2 times daily 180 tablet 2    fluticasone furoate 27.5 MCG/SPRAY nasal spray Spray 2 sprays into both nostrils daily 27.3 mL 2    topiramate (TOPAMAX) 25 MG tablet Take 1 tablet (25 mg) by mouth 2 times daily 180 tablet 1     No current facility-administered medications for this visit.         5/21/2024     6:12 AM   --   Do you avoid NSAIDs such as (Ibuprofen, Aleve, Naproxen, Advil)? Yes       ROS:  GI:       5/21/2024     6:12 AM   --   Vomiting No   Diarrhea No   Constipation No   Swallowing trouble No   Abdominal pain No   Heartburn Yes     Skin:       5/21/2024     6:12 AM   BAR RBS ROS - SKIN   Rash in skin folds Yes     Psych:       5/21/2024     6:12 AM   --   Depression Yes   Anxiety Yes     Female Only:       5/21/2024     6:12 AM   BAR RBS ROS -    Female only Post-menopausal   Stress urinary incontinence No         Anti-obesity medication ROS:    HEENT  Hx of glaucoma: No    Cardiovascular  CAD:No  HTN:Yes    Gastrointestinal  GERD:No  Constipation:No  Liver Dz:No  H/O Pancreatitis:No    Psychiatric  Bipolar: No  Anxiety:Yes  Depression:Yes  History of alcohol/drug abuse: No  Hx of eating disorder:No    Endocrine  Personal or family hx of MTC or MEN2:No  Diabetes/prediabetes: No    Neurologic:  Hx of seizures: No  Hx of migraines: Yes  Memory Impairment: No      History of kidney stones: No  Kidney disease:  partial nephrectomy  Current birth control:  hysterectomy in 1992    Taking Opioid/Narcotic: Yes        Objective    Ht 1.702 m (5' 7\")   Wt 81.6 kg (180 lb)   BMI 28.19 kg/m           Vitals:  No vitals were obtained today due " to virtual visit.    Physical Exam   GENERAL: alert and no distress  EYES: Eyes grossly normal to inspection.  No discharge or erythema, or obvious scleral/conjunctival abnormalities.  RESP: No audible wheeze, cough, or visible cyanosis.    SKIN: Visible skin clear. No significant rash, abnormal pigmentation or lesions.  NEURO: Cranial nerves grossly intact.  Mentation and speech appropriate for age.  PSYCH: Appropriate affect, tone, and pace of words        Sincerely,    Eloisa Murry PA-C

## 2024-05-28 ENCOUNTER — MYC REFILL (OUTPATIENT)
Dept: INTERNAL MEDICINE | Facility: CLINIC | Age: 64
End: 2024-05-28
Payer: COMMERCIAL

## 2024-05-28 ENCOUNTER — MYC REFILL (OUTPATIENT)
Dept: FAMILY MEDICINE | Facility: CLINIC | Age: 64
End: 2024-05-28
Payer: COMMERCIAL

## 2024-05-28 DIAGNOSIS — Z86.718 HISTORY OF DEEP VENOUS THROMBOSIS: Primary | ICD-10-CM

## 2024-05-28 DIAGNOSIS — G43.011 INTRACTABLE MIGRAINE WITHOUT AURA AND WITH STATUS MIGRAINOSUS: ICD-10-CM

## 2024-05-28 NOTE — TELEPHONE ENCOUNTER
Prior Authorization Approval    Medication: WEGOVY 0.25 MG/0.5ML SC SOAJ  Authorization Effective Date: 5/27/2024  Authorization Expiration Date: 12/16/2024  Approved Dose/Quantity:    Reference #: Key: [id number]   Insurance Company: [email or web address] [phone] Fax [phone]  Expected CoPay: $    CoPay Card Available: No    Financial Assistance Needed:    Which Pharmacy is filling the prescription: [facility] [place], [place] - [address]  Pharmacy Notified: Y  Patient Notified: Y

## 2024-05-30 ENCOUNTER — TELEPHONE (OUTPATIENT)
Dept: ENDOCRINOLOGY | Facility: CLINIC | Age: 64
End: 2024-05-30
Payer: COMMERCIAL

## 2024-05-30 RX ORDER — TOPIRAMATE 25 MG/1
25 TABLET, FILM COATED ORAL 2 TIMES DAILY
Qty: 180 TABLET | Refills: 1 | Status: SHIPPED | OUTPATIENT
Start: 2024-05-30

## 2024-05-30 NOTE — TELEPHONE ENCOUNTER
Left Voicemail (1st Attempt) and Sent Mychart (1st Attempt) for the patient to call back and schedule the following:    Appointment type: RET MW  Provider: Leilani Gallardo  Return date: around 8/24/24  Specialty phone number: [phone]    Additional appointment(s) needed:     Appointment type: [facility]  Provider: Eloisa Murry  Return date: around 11/24/24  Specialty phone number: [phone]

## 2024-05-30 NOTE — TELEPHONE ENCOUNTER
ELIQUIS ANTICOAGULANT 2.5 MG tablet -- -- 11/3/2022 -- Yes   Sig - Route: Take 2.5 mg by mouth 2 times daily - Oral   Class: Historical   Order: [id number]     Last Office Visit : 10/2/23  Future Office visit:  0    Routing refill request to provider for review/approval because:  Medication is reported/historical

## 2024-06-03 RX ORDER — APIXABAN 2.5 MG/1
2.5 TABLET, FILM COATED ORAL 2 TIMES DAILY
Qty: 180 TABLET | Refills: 2 | Status: SHIPPED | OUTPATIENT
Start: 2024-06-03

## 2024-06-04 PROBLEM — E66.3 OVERWEIGHT (BMI 25.0-29.9): Status: ACTIVE | Noted: 2024-06-04

## 2024-06-04 NOTE — PATIENT INSTRUCTIONS
"Sae Suh, it was nice to meet you today!  Thank you for allowing us the privilege of caring for you. We hope we provided you with the excellent service you deserve.   Please let us know if there is anything else we can do for you so that we can be sure you are completely satisfied with your care experience.    To ensure the quality of our services you may be receiving a patient satisfaction survey from an independent patient satisfaction monitoring company.    The greatest compliment you can give is a \"Likely to Recommend\"    Your visit was with Eloisa Murry PA-C today.    Instructions per today's visit:     Start Wegovy 0.25mg once weekly for 4 weeks, then increase to 0.5mg once weekly. Consider Zepbound and Saxenda as needed.  MTM pharmacist in 6 weeks   Kaur Mccormick or Alaina in 3 months   Eloisa Monaco in 6 months     ___________________________________________________________________________  Important contact and scheduling information:  Please call our contact center at [phone] to schedule your next appointments.  For any nursing questions or concerns call Sydnee House LPN at [phone] or Becca Broderick RN at [phone]  Please call during clinic hours Monday through Friday 8:00a - 4:00p if you have questions or you can contact us via [employer] at anytime and we will reply during clinic hours.    Lab results will be communicated through My Chart or letter (if My Chart not used). Please call the clinic if you have not received communication after 1 week or if you have any questions.?  Clinic Fax: [phone]  __________________________________________________________________________    If labs were ordered today:    Please make an appointment to have them drawn at your convenience.     To schedule the Lab Appointment using [employer]:  Select \"Schedule an Appointment\"  Select \"Lab Only\"  For \"A couple of questions\", select \"Other\"  For \"Which locations work for you?, select the location and set up the " appointment    To schedule by phone call [phone] to schedule a lab only appointment at any [facility] lab.  ___________________________________________________________________________  Work with [facility] !  Virtual Sessions are Available through [facility] Management Clinics    To learn more, call to schedule a free, Health  Q&A appointment: [phone]     What is Health Coaching?  Do you know what you are supposed to do, but you just aren't doing it?  Then, HEALTH COACHING may help you!   Get unstuck and move forward with the support of a professionally trained NBC-HWC (National Board-Certified Health and ) who uses evidence-based approaches to help you move forward with healthy lifestyle changes in the areas of weight loss, stress management and overall well-being.    Health Coaches help you identify goals that will work best for you. Health Coaches provide support and encouragement with overcoming barriers and help you to find inspiration and motivation to lead a healthy lifestyle.    Option one:  Health Coaching 3-Pack; Three, 30-minute Health Coaching Visits, for $99  Visits are done virtually (phone or video)  This is a self pay service; we do not accept insurance for rosana coaching.    Option two:   The 24 week Plan; 11 Health Coaching Visits, and a 7 months subscription to [employer]-- on-demand fitness, nutrition and mindfulness classes, for $499 (employee discounts may be available). Participants will also meet regularly with a weight management Medical Provider and a Registered/Licensed Dietician.  This is a self-pay service; we do not accept insurance for health coaching.    To Schedule a free Health  Q&A appointment to learn more,  call [phone].  ____________________________________________________________________  [facility]  Healthy Lifestyle Group    Healthy Lifestyle Group  This is a 60 minute  "virtual coaching group for those who want to lead a healthier lifestyle. Come together to set goals and overcome barriers in a supportive group environment. We will address the four pillars of health--nutrition, exercise, sleep and emotional well-being.  This group is highly recommended for those who are participating in the 24 week Healthy Lifestyle Plan and our Health Coaching sessions.    WHEN: This group meets the first Friday of the month, 12:30 PM - 1:30 PM online, via a zoom meeting.      FACILITATOR: Led by National Board Certified Health and , Porsha Khan, [facility].    TO REGISTER: Please call the Call Center at [phone] to register. You will get an appointment to attend in [employer]. Fifteen minutes prior to the meeting, complete the e-check in and you will get the link to join the meeting.  There is no charge to attend this group and space is limited.      2023 and 2024 Meeting Topics and Dates:    November 3: Introduction to Mindfulness (Learn simple and effective mindfulness practices and how it can benefit you)    December 8: Let's Talk (guided discussion on our wins and challenges)    January 5: New Years Vision: Manifest your Best 2024! (Guided imagery,  journaling and discussion)    February 2: Let's Talk    March 1: 10 Percent Happier by Jace Guevara (Book Bites; a guided discussion on the nuggets of wisdom from favorite wellness books; no need to read the book but highly encouraged)    April 5: Let's Talk    May 3: \"Essentialism; The Disciplined Pursuit of Less by Yvan Villela (book bites discussion)    June 7: Let's Talk    July 5: NO MEETING, off for the 4th of July Holiday    August 2: The Blue Zones, Secrets for Living a Longer Life by Jace Ibarra (book bites discussion)      If you would like bariatric surgery specific support group info please let your care team know.         Thank you,   [facility] Comprehensive Weight Management Team        WEGOVY (semaglutide)    What " is Wegovy?    Wegovy (semaglutide) injection 2.4 mg is an injectable prescription medicine used for adults with obesity (BMI ?30) or overweight (excess weight) (BMI ?27) who also have weight-related medical problems to help them lose weight and keep the weight off.    1.  Start Wegovy (semaglutide) 0.25 mg once weekly for 4 weeks, then if tolerating increase to 0.5 mg weekly for 4 weeks, then if tolerating increase to 1 mg weekly for 4 weeks, then if tolerating increase to 1.7 mg weekly for 4 weeks, then if tolerating increase to 2.4 mg weekly thereafter.    -Each Wegovy pen is a once weekly single-dose prefilled pen with a pen injector already built within the pen. Discard the Wegovy pen after use in sharps container.     2. Storage: make sure that when you get the prescription that you store the prescription in the refrigerator until it is time to use the Wegovy pen.  Once it is time to use the Wegovy pen, you can keep the pen at room temperature and it is good for up to 28 days at room temperature.     3.  Potential common side effects: nausea, headache, diarrhea, stomach upset.  If these become unmanageable or concerning symptoms, please make sure to call or mychart.    4. Wegovy should be discontinued for ?2 months prior to becoming pregnant.     Prior Authorization Process for Weight Management Medications: You are being prescribed a medication that will likely need to undergo a prior authorization.  A prior authorization is when the clinic needs to fill out a form that is sent to your insurance company to obtain coverage for that medication. The prescription will NOT automatically go to your pharmacy today if it needs a Prior Authorization. If the medication prior authorization is approved, the care team will contact you and the prescription will be released to your pharmacy. If denied, we will work to try and appeal the prior authorization if possible. The initial prior authorization process takes up to 5-10  business days and appeals can take up to 30 days. If you do not hear from us at the end of that time, you may contact the clinic.    Go to site: Wegovy video to learn more and watch instruction videos.    For any questions or concerns please send a Playhem message to our team or call our weight management call center at [phone] during regular business hours. For questions during evenings or weekends your messages will be addressed during the next business day.  For emergencies please call 911 or seek immediate medical care.

## 2024-06-04 NOTE — ASSESSMENT & PLAN NOTE
S/p gastric sleeve in 2014 in [place]   Highest weight - 339lbs   Flo weight after sleeve - around 280llb  S/p conversion to RYBP in 2016 due to uncontrollable GERD  Flo weight after surgery - 153lb     Maintained 90-year weight for around 7 years.  Weight regain in the past 6 months due to increased stress.  Has regained around 30 pounds.  Does not feel comfortable at this weight.  Wants to get back to low weight in order to improve blood pressure and feel better overall.    Discussed AOM's to help with weight loss:  - Phentermine was previously trialed prior to gastric sleeve.  Side effects of palpitations off and on.  Will not retrial due to side effects, history of hypertension, and age.  - Topiramate currently taking for migraines.  No effect on weight.  - Naltrexone contraindicated due to currently taking opioids for chronic pain.  - Wegovy to be started today.No contraindications. Discussed side effects, risks, and benefits.  No history of pancreatitis.  No personal or family history of MTC or MENII.  No history of pre or type 2 diabetes.

## 2024-06-17 DIAGNOSIS — M54.16 LUMBAR RADICULOPATHY: ICD-10-CM

## 2024-06-18 NOTE — TELEPHONE ENCOUNTER
Refill Request    Medication request: HYDROcodone-acetaminophen  MG Oral Tab.   Take 1 tablet by mouth every 6 (six) hours as needed for Pain.      LOV:2024 televisit with Dr Yobany Saunders  Due back to clinic per last office note:  Follow-up:  3 months to 6 months.   NOV: 2024 Yobany Saunders MD      [facility]/Last refill: 2024 #120 tablet (30 days)    Urine drug screen (if applicable): none  Pain contract:  on 2023    LOV plan (if weaning or changing medications): She will continue with the Norco now for the pain, but I spoke to her about weaning down on this in the future. She would drop by an half of a tablet every month.

## 2024-06-21 ENCOUNTER — PATIENT MESSAGE (OUTPATIENT)
Dept: PHYSICAL MEDICINE AND REHAB | Facility: CLINIC | Age: 64
End: 2024-06-21

## 2024-06-21 NOTE — TELEPHONE ENCOUNTER
From: Surekha Vu  To: Yobany Saunders  Sent: 6/21/2024 5:45 AM CDT  Subject: Refill    Just checking on the refill request. Am out. Pharmacy is one floor up now. Can pick it up before I go home. I have an appt scheduled with you (in person) as promised. Not ready to taper just yet. Will talk soon. Hope you and the family are well. Marylou

## 2024-06-22 RX ORDER — HYDROCODONE BITARTRATE AND ACETAMINOPHEN 10; 325 MG/1; MG/1
1 TABLET ORAL EVERY 6 HOURS PRN
Qty: 120 TABLET | Refills: 0 | Status: SHIPPED | OUTPATIENT
Start: 2024-06-22 | End: 2024-07-22

## 2024-07-01 NOTE — PROGRESS NOTES
"Video-Visit Details    Type of service:  Video Visit    Video Start Time: 7:51 AM   Video End Time: 8:26 AM    Originating Location (pt. Location): Home    Distant Location (provider location):  Offsite (providers home) [facility] [place]     Platform used for Video Visit: [employer]    Nutrition Assessment  Reason For Visit:  Angeli Galindo is a 64 year old female presents today for new re-establish nutrition visit. Pt with history of gastric sleeve in 2014.  Patient referred by Eloisa Murry PA-C on May 24, 2024.    Patient with Co-morbidities of obesity including:  Type II DM no  Renal Failure no  Sleep apnea no  Hypertension yes   Dyslipidemia no  Joint pain no  Back pain no  GERD yes    Anthropometrics:  Pre-op Weight: 280 lbs  Lowest weight after surgery: 153 lbs    Current Weight:   Estimated body mass index is 26.06 kg/m  as calculated from the following:    Height as of this encounter: 1.702 m (5' 7.01\").    Weight as of this encounter: 75.5 kg (166 lb 6.4 oz).    Current weight: 166 lbs    Pt's goal: wants to get back to 153 or into 140s    Weight Loss Medications: Wegovy - increased dose, now taking 0.5 mg. Had lots of nausea however felt that it was more related to having a salad/chicken that felt like it got stuck. Has not had other episodes of nausea since then.    Current Vitamins/Minerals:     Women's multivitamin (previously was taking prenatal vitamin), 1 or 2 tablets- (1 tablet contains 80 mcg vitamin B12, 400 international unit(s) vitamin D, 27 mg iron)  Calcium citrate with Vitamin D (600 mg calcium, 800 international unit(s) Vitamin D3) twice/day (total 1200 mg calcium)  B12 hasn't been taking- encouraged to start taking    Nutrition History:       No data to display                     No data to display              Wants to reduce weight to be more flexible/have increased mobility.    Hasn't been a big meat eater- will eat ground beef vs cuts of meat. Had " "finely cut steak tacos the other day which she tolerated okay.    Hates cooking, so signed up for factor meals and doing well with them. High in protein/fiber and lower in carbohydrates. Not a big breakfast eater. With wegovy when she's done eating she's done- has less emotional eating with this. Nighttime eating is completely absent now, not eating chips anymore, doesn't have cravings for them    Recent food recall:  Breakfast: Protein drink ([employer]) or cheese sticks (something quick) or greek yogurt, coffee  AM snack: eggs with cheese maybe or [employer] protein drinks  Lunch: Salads (paula/iceberg lettuce) with protein (chickpeas/meat) and cheese and vegetables OR 1/2 sandwich  Dinner: Factor meals- chicken/pork with vegetables (broccoli, asparagus), may do salads or soups, OR mexican food (lettuce as taco shell or burrito bowl)  Snacks: Strawberries/blueberries  Beverages: Coffee, Wine a couple of times/week, iced tea (sips on throughout the day) and water (2 trenta size water cups from [employer]- total 62 oz daily), milk occasionally or chobani yogurt drinks occasionally    Dining out: 1x/week (burrito bowls)    Physical Activity:  Walking \"not as much as she should\", would like to start tracking walking with apple watch, needs to find the .    Going to [place] in October and doing a 10k walk/run with her daughter. Wants to work up to being able to do this    Nutrition Prescription:  Grams Protein: 50-60 (minimum)  Amount of Fluid: 48-64 oz    Nutrition Diagnosis  Overweight related to history of positive energy balance as evidenced by BMI >25.    Intervention  Materials/Education provided on maintenance dietary guidelines after bariatric surgery, portion sizes, eating pace and chewing well, snacking, separation of beverages from meals, vitamin and mineral supplements after weight loss surgery, and protein needs. Discussed importance of physical activity.  Patient demonstrates understanding. Provided " pt with list of goals and RD contact information.    Expected Engagement: good    Goals:  1. Aim to reduce wine intake to once every other week.  2. Aim to start tracking walking habits- can set more formal goal after you start tracking  3. Aim take your dog to the dog park 3x/week    -Take the following after a Rose Mary-en-y Gastric Bypass:    Multivitamin/minerals: adult dose 2 times daily    Iron: 45-60 mg elemental daily (18-36 mg daily if low risk) - may partly or fully be covered in multivitamin     Calcium Citrate containing vitamin D: 500 mg 3 times daily or 600 mg 2 times daily    Vitamin B12: sublingual form of at least 500 mcg daily or injection of 1000 mcg monthly     Post Weight Loss Surgery Resources    Pureed Recipes  [email or web address]    Why Take Supplements for Life after Weight Loss Surgery  [email or web address]     Supplements after Sleeve Gastrectomy, Gastric Bypass or Single Anastomosis Duodenal Switch  [email or web address]    Keeping Up Your Diet after Weight Loss Surgery  [email or web address]    Preventing Low Blood Sugar after Weight Loss Surgery  [email or web address]     Preventing Dumping Syndrome after Weight Loss Surgery  [email or web address]     Follow-Up:  Tuesday, July 30th at 2:30 pm    Time spent with patient: 35 minutes.  Alaina Barton, MPS, RD, LD  Clinic #: [phone]

## 2024-07-03 ENCOUNTER — PRE VISIT (OUTPATIENT)
Dept: ENDOCRINOLOGY | Facility: CLINIC | Age: 64
End: 2024-07-03

## 2024-07-03 ENCOUNTER — VIRTUAL VISIT (OUTPATIENT)
Dept: ENDOCRINOLOGY | Facility: CLINIC | Age: 64
End: 2024-07-03
Payer: COMMERCIAL

## 2024-07-03 VITALS — BODY MASS INDEX: 26.12 KG/M2 | WEIGHT: 166.4 LBS | HEIGHT: 67 IN

## 2024-07-03 DIAGNOSIS — E66.3 OVERWEIGHT (BMI 25.0-29.9): ICD-10-CM

## 2024-07-03 DIAGNOSIS — Z71.3 NUTRITIONAL COUNSELING: Primary | ICD-10-CM

## 2024-07-03 DIAGNOSIS — Z98.84 HX OF GASTRIC BYPASS: ICD-10-CM

## 2024-07-03 PROCEDURE — 97802 MEDICAL NUTRITION INDIV IN: CPT | Mod: 95

## 2024-07-03 PROCEDURE — 99207 PR NO CHARGE LOS: CPT | Mod: 95

## 2024-07-03 NOTE — LETTER
"7/3/2024       RE: Angeli Galindo  [address]  [place] [address]     Dear Colleague,    Thank you for referring your patient, Angeli Galindo, to the [facility] [place] at [facility]. Please see a copy of my visit note below.    Video-Visit Details    Type of service:  Video Visit    Video Start Time: 7:51 AM   Video End Time: 8:26 AM    Originating Location (pt. Location): Home    Distant Location (provider location):  Offsite (providers home) [facility] [place]     Platform used for Video Visit: [employer]    Nutrition Assessment  Reason For Visit:  Angeli Galindo is a 64 year old female presents today for new re-[facility] nutrition visit. Pt with history of gastric sleeve in 2014.  Patient referred by Eloisa Murry PA-C on May 24, 2024.    Patient with Co-morbidities of obesity including:  Type II DM no  Renal Failure no  Sleep apnea no  Hypertension yes   Dyslipidemia no  Joint pain no  Back pain no  GERD yes    Anthropometrics:  Pre-op Weight: 280 lbs  Lowest weight after surgery: 153 lbs    Current Weight:   Estimated body mass index is 26.06 kg/m  as calculated from the following:    Height as of this encounter: 1.702 m (5' 7.01\").    Weight as of this encounter: 75.5 kg (166 lb 6.4 oz).    Current weight: 166 lbs    Pt's goal: wants to get back to 153 or into 140s    Weight Loss Medications: Wegovy - increased dose, now taking 0.5 mg. Had lots of nausea however felt that it was more related to having a salad/chicken that felt like it got stuck. Has not had other episodes of nausea since then.    Current Vitamins/Minerals:     Women's multivitamin (previously was taking prenatal vitamin), 1 or 2 tablets- (1 tablet contains 80 mcg vitamin B12, 400 international unit(s) vitamin D, 27 mg iron)  Calcium citrate with Vitamin D (600 mg calcium, 800 international " "unit(s) Vitamin D3) twice/day (total 1200 mg calcium)  B12 hasn't been taking- encouraged to start taking    Nutrition History:       No data to display                     No data to display              Wants to reduce weight to be more flexible/have increased mobility.    Hasn't been a big meat eater- will eat ground beef vs cuts of meat. Had finely cut steak tacos the other day which she tolerated okay.    Hates cooking, so signed up for factor meals and doing well with them. High in protein/fiber and lower in carbohydrates. Not a big breakfast eater. With wegovy when she's done eating she's done- has less emotional eating with this. Nighttime eating is completely absent now, not eating chips anymore, doesn't have cravings for them    Recent food recall:  Breakfast: Protein drink ([employer]life) or cheese sticks (something quick) or greek yogurt, coffee  AM snack: eggs with cheese maybe or [employer] protein drinks  Lunch: Salads (paula/iceberg lettuce) with protein (chickpeas/meat) and cheese and vegetables OR 1/2 sandwich  Dinner: Factor meals- chicken/pork with vegetables (broccoli, asparagus), may do salads or soups, OR mexican food (lettuce as taco shell or burrito bowl)  Snacks: Strawberries/blueberries  Beverages: Coffee, Wine a couple of times/week, iced tea (sips on throughout the day) and water (2 trenta size water cups from [employer]- total 62 oz daily), milk occasionally or chobani yogurt drinks occasionally    Dining out: 1x/week (burrito bowls)    Physical Activity:  Walking \"not as much as she should\", would like to start tracking walking with apple watch, needs to find the .    Going to [place] in October and doing a 10k walk/run with her daughter. Wants to work up to being able to do this    Nutrition Prescription:  Grams Protein: 50-60 (minimum)  Amount of Fluid: 48-64 oz    Nutrition Diagnosis  Overweight related to history of positive energy balance as evidenced by BMI " >25.    Intervention  Materials/Education provided on maintenance dietary guidelines after bariatric surgery, portion sizes, eating pace and chewing well, snacking, separation of beverages from meals, vitamin and mineral supplements after weight loss surgery, and protein needs. Discussed importance of physical activity.  Patient demonstrates understanding. Provided pt with list of goals and RD contact information.    Expected Engagement: good    Goals:  1. Aim to reduce wine intake to once every other week.  2. Aim to start tracking walking habits- can set more formal goal after you start tracking  3. Aim take your dog to the dog park 3x/week    -Take the following after a Rose Mary-en-y Gastric Bypass:    Multivitamin/minerals: adult dose 2 times daily    Iron: 45-60 mg elemental daily (18-36 mg daily if low risk) - may partly or fully be covered in multivitamin     Calcium Citrate containing vitamin D: 500 mg 3 times daily or 600 mg 2 times daily    Vitamin B12: sublingual form of at least 500 mcg daily or injection of 1000 mcg monthly     Post Weight Loss Surgery Resources    Pureed Recipes  [email or web address]    Why Take Supplements for Life after Weight Loss Surgery  [email or web address]     Supplements after Sleeve Gastrectomy, Gastric Bypass or Single Anastomosis Duodenal Switch  [email or web address]    Keeping Up Your Diet after Weight Loss Surgery  [email or web address]    Preventing Low Blood Sugar after Weight Loss Surgery  [email or web address]     Preventing Dumping Syndrome after Weight Loss Surgery  [email or web address]     Follow-Up:  Tuesday, July 30th at 2:30 pm    Time spent with patient: 35 minutes.  DHIRAJ Turner, RD, LD  Clinic #: [phone]

## 2024-07-03 NOTE — NURSING NOTE
Current patient location: [address]   [place] [address]    Is the patient currently in the state of [place]? YES    Visit mode:VIDEO    If the visit is dropped, the patient can be reconnected by: VIDEO VISIT: Text to cell phone:   Telephone Information:   Mobile [phone]       Will anyone else be joining the visit? NO  (If patient encounters technical issues they should call [phone])    How would you like to obtain your AVS? MyChart    Are changes needed to the allergy or medication list? Pt stated no changes to allergies and Pt stated no med changes    Are refills needed on medications prescribed by this physician? NO    Reason for visit: Consult    Siena ADEN

## 2024-07-03 NOTE — PATIENT INSTRUCTIONS
Sae Suh,    Please follow-up with dietitian on Tuesday, July 30th at 2:30 pm.    Thank you,    Alaina Barton, MPS, RD, LD  If you need to schedule or reschedule, please call [phone].    Nutrition Goals:  1. Aim to reduce wine intake to once every other week.  2. Aim to start tracking walking habits- can set more formal goal after you start tracking  3. Aim take your dog to the dog park 3x/week    -Take the following after a Rose Mary-en-y Gastric Bypass:    Multivitamin/minerals: adult dose 2 times daily    Iron: 45-60 mg elemental daily (18-36 mg daily if low risk) - may partly or fully be covered in multivitamin     Calcium Citrate containing vitamin D: 500 mg 3 times daily or 600 mg 2 times daily    Vitamin B12: sublingual form of at least 500 mcg daily or injection of 1000 mcg monthly     Post Weight Loss Surgery Resources    Pureed Recipes  [email or web address]    Why Take Supplements for Life after Weight Loss Surgery  [email or web address]     Supplements after Sleeve Gastrectomy, Gastric Bypass or Single Anastomosis Duodenal Switch  [email or web address]    Keeping Up Your Diet after Weight Loss Surgery  [email or web address]    Preventing Low Blood Sugar after Weight Loss Surgery  [email or web address]     Preventing Dumping Syndrome after Weight Loss Surgery  [email or web address]

## 2024-07-13 DIAGNOSIS — M54.16 LUMBAR RADICULOPATHY: ICD-10-CM

## 2024-07-14 ENCOUNTER — HEALTH MAINTENANCE LETTER (OUTPATIENT)
Age: 64
End: 2024-07-14

## 2024-07-15 NOTE — TELEPHONE ENCOUNTER
Refill Request    Medication request:   HYDROcodone-acetaminophen  MG Oral Tab         LOV: 2024 Yobany Saunders MD   RTC: 6 months  NOV: 2024 Yobany Saunders MD      [place]: 2024 #25 days    UDS: (if applicable): N/A  Pain contract:  2023    LOV plan (if weaning or changing medications): She will continue with the Norco now for the pain

## 2024-07-16 ENCOUNTER — PATIENT MESSAGE (OUTPATIENT)
Dept: PHYSICAL MEDICINE AND REHAB | Facility: CLINIC | Age: 64
End: 2024-07-16

## 2024-07-17 NOTE — TELEPHONE ENCOUNTER
Patient needing rx refilled asap. Patient did not get the full 30 day supply for last refill due to supply issues with pharmacy.    Rx forwarded high priority to .

## 2024-07-17 NOTE — TELEPHONE ENCOUNTER
From: Surekha Vu  To: Yobany Saunders  Sent: 7/16/2024 6:57 PM CDT  Subject: Rx    I’m hoping that my [place] could be refilled today. The last time I got it refilled it was only a partial fill and I’m going to be taking my last one on the 17th in the morning. 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 And I will be able to pick it up at the pharmacy if it gets prescribed today. Hopefully that works out well for everyone. Thank you in advance! Cj

## 2024-07-18 RX ORDER — HYDROCODONE BITARTRATE AND ACETAMINOPHEN 10; 325 MG/1; MG/1
1 TABLET ORAL EVERY 6 HOURS PRN
Qty: 120 TABLET | Refills: 0 | Status: SHIPPED | OUTPATIENT
Start: 2024-07-18 | End: 2024-08-17

## 2024-07-25 NOTE — PROGRESS NOTES
"Phone-Visit Details    Type of service:  Phone Visit    Phone call duration: 26 minutes    Distant Location (provider location):  Offsite (providers home) [facility] [place]       Nutrition Assessment  Reason For Visit:  Angeli Galindo is a 64 year old female presents today for return re-establish nutrition visit. Pt with history of gastric sleeve in 2014.  Patient referred by Eloisa Murry PA-C on May 24, 2024.    Patient with Co-morbidities of obesity including:  Type II DM no  Renal Failure no  Sleep apnea no  Hypertension yes   Dyslipidemia no  Joint pain no  Back pain no  GERD yes    Anthropometrics:  Pre-op Weight: 280 lbs  Lowest weight after surgery: 153 lbs    Current Weight:   Estimated body mass index is 25.28 kg/m  as calculated from the following:    Height as of this encounter: 1.702 m (5' 7\").    Weight as of this encounter: 73.2 kg (161 lb 6.4 oz).    Current weight: 161 lbs (lost 5 lbs since last RD visit)    Pt's goal: wants to get back to 153 or into 140s    Weight Loss Medications:   Wegovy - increased dose, now taking 0.5 mg. No side effects since last RD visit.    Current Vitamins/Minerals:     Women's multivitamin (previously was taking prenatal vitamin), 1 or 2 tablets- (1 tablet contains 80 mcg vitamin B12, 400 international unit(s) vitamin D, 27 mg iron)  Calcium citrate with Vitamin D (600 mg calcium, 800 international unit(s) Vitamin D3) twice/day (total 1200 mg calcium)  Vitmain B12- started taking 5000 mcg dose, discussed that pt doesn't have to take this every day. Reports she will look for a 500 mcg supplement instead soon.    Nutrition History:       No data to display                     No data to display              Wants to reduce weight to be more flexible/have increased mobility.    Hasn't been a big meat eater- will eat ground beef vs cuts of meat. Had finely cut steak tacos the other day which she tolerated okay.    Hates cooking, so " "signed up for factor meals and doing well with them. High in protein/fiber and lower in carbohydrates. Not a big breakfast eater. With wegovy when she's done eating she's done- has less emotional eating with this. Nighttime eating is completely absent now, not eating chips anymore, doesn't have cravings for them    Recent food recall:  Breakfast: Protein drink ([employer]) or cheese sticks (something quick) or greek yogurt, coffee  AM snack: eggs with cheese maybe or [employer] protein drinks  Lunch: Salads (paula/iceberg lettuce) with protein (chickpeas/meat) and cheese and vegetables OR 1/2 sandwich  Dinner: Factor meals- chicken/pork with vegetables (broccoli, asparagus), may do salads or soups, OR mexican food (lettuce as taco shell or burrito bowl)  Snacks: Strawberries/blueberries    Beverages: Coffee, Wine a couple of times/week, iced tea (sips on throughout the day) and water (2 trenta size water cups from [employer]- total 62 oz daily), milk occasionally or chobani yogurt drinks occasionally    Dining out: 1x/week (burrito bowls)    Physical Activity:  Walking \"not as much as she should\", would like to start tracking walking with apple watch, needs to find the .    Going to [place] in October and doing a 10k walk/run with her daughter. Wants to work up to being able to do this    July 30 2024:  Getting 80-90 g protein per day. Not tracking carbohydrates but trying to keep it lower. Carbohydrate cravings have decreased, and if she is having carbohydrates tends to be more fruits (banana, berries) and vegetables with hummus versus chips/dip. Sometimes has to remind herself to eat due to being distracted by work. Quality of food is better per patient. Feeling better physically and able to move more.    Hydration: Going well, gets venti iced water from [employer]. Getting 64+ oz/day.  Alcohol: Intake has decreased to ~3 glasses/week.    PA: Got a pedometer and started tracking walking habits. Usually " 5000-10,000 steps per day. Goes through ups and downs with back pain. Will have an injection at the end of September.    Previous goals:  1. Aim to reduce wine intake to once every other week. - Not met, modified below  2. Aim to start tracking walking habits- can set more formal goal after you start tracking - Met  3. Aim take your dog to the dog park 3x/week - In progress, twice/week. Hasn't as much lately with weather/heat. Would go when less humid.    Nutrition Prescription:  Grams Protein: 50-60 (minimum)  Amount of Fluid: 48-64 oz    Nutrition Diagnosis  Overweight related to history of positive energy balance as evidenced by BMI >25.    Intervention  Materials/Education provided on maintenance dietary guidelines after bariatric surgery, portion sizes, eating pace and chewing well, snacking, separation of beverages from meals, vitamin and mineral supplements after weight loss surgery, and protein needs. Discussed importance of physical activity.  Patient demonstrates understanding. Provided pt with list of goals and RD contact information.    Expected Engagement: good    Goals:  1. Aim to continue with wine intake at ~3 glasses/week.  2. Aim for 10,000 steps per day on average  3. Aim take your dog to the dog park 2x/week    -Take the following after a Rose Mary-en-y Gastric Bypass:    Multivitamin/minerals: adult dose 2 times daily    Iron: 45-60 mg elemental daily (18-36 mg daily if low risk) - may partly or fully be covered in multivitamin     Calcium Citrate containing vitamin D: 500 mg 3 times daily or 600 mg 2 times daily    Vitamin B12: sublingual form of at least 500 mcg daily or injection of 1000 mcg monthly     Carbohydrate ideas:              - Brown/Wild rice (could consider microwavable pouches)              - Whole wheat pasta              - Protein pasta (barilla protein + as an example)              - Whole wheat bread              - Whole wheat tortilla               - Oatmeal               -  Fruit              - Starchy vegetables (corn, peas, beans/lentils, potatoes)              - Whole grain crackers               - Whole wheat waffle               - [place] protein waffles/pancakes              - Couscous               - Quinoa               - Bulgar               - Barley      Post Weight Loss Surgery Resources    Pureed Recipes  [email or web address]    Why Take Supplements for Life after Weight Loss Surgery  [email or web address]     Supplements after Sleeve Gastrectomy, Gastric Bypass or Single Anastomosis Duodenal Switch  [email or web address]    Keeping Up Your Diet after Weight Loss Surgery  [email or web address]    Preventing Low Blood Sugar after Weight Loss Surgery  [email or web address]     Preventing Dumping Syndrome after Weight Loss Surgery  [email or web address]     Follow-Up:  Tuesday, August 27th at 1:30 pm    Time spent with patient: 26 minutes.  DHIRAJ Turner, RD, LD  Clinic #: [phone]

## 2024-07-30 ENCOUNTER — VIRTUAL VISIT (OUTPATIENT)
Dept: ENDOCRINOLOGY | Facility: CLINIC | Age: 64
End: 2024-07-30
Payer: COMMERCIAL

## 2024-07-30 VITALS — BODY MASS INDEX: 25.33 KG/M2 | WEIGHT: 161.4 LBS | HEIGHT: 67 IN

## 2024-07-30 DIAGNOSIS — E66.3 OVERWEIGHT (BMI 25.0-29.9): ICD-10-CM

## 2024-07-30 DIAGNOSIS — Z98.84 HX OF GASTRIC BYPASS: ICD-10-CM

## 2024-07-30 DIAGNOSIS — Z71.3 NUTRITIONAL COUNSELING: Primary | ICD-10-CM

## 2024-07-30 PROCEDURE — 98968 PH1 ASSMT&MGMT NQHP 21-30: CPT | Mod: 93

## 2024-07-30 PROCEDURE — 99207 PR NO CHARGE LOS: CPT | Mod: 93

## 2024-07-30 ASSESSMENT — PAIN SCALES - GENERAL: PAINLEVEL: SEVERE PAIN (6)

## 2024-07-30 NOTE — PATIENT INSTRUCTIONS
Sae Suh,    Please follow-up with dietitian on Tuesday, August 27th at 1:30 pm.    Thank you,    Alaina Barton, MPS, RD, LD  If you need to schedule or reschedule, please call [phone].    Nutrition Goals:  1. Aim to continue with wine intake at ~3 glasses/week.  2. Aim for 10,000 steps per day on average  3. Aim take your dog to the dog park 2x/week    -Take the following after a Rose Mary-en-y Gastric Bypass:    Multivitamin/minerals: adult dose 2 times daily    Iron: 45-60 mg elemental daily (18-36 mg daily if low risk) - may partly or fully be covered in multivitamin     Calcium Citrate containing vitamin D: 500 mg 3 times daily or 600 mg 2 times daily    Vitamin B12: sublingual form of at least 500 mcg daily or injection of 1000 mcg monthly     Carbohydrate ideas:              - Brown/Wild rice (could consider microwavable pouches)              - Whole wheat pasta              - Protein pasta (barilla protein + as an example)              - Whole wheat bread              - Whole wheat tortilla               - Oatmeal               - Fruit              - Starchy vegetables (corn, peas, beans/lentils, potatoes)              - Whole grain crackers               - Whole wheat waffle               - [place] protein waffles/pancakes              - Couscous               - Quinoa               - Bulgar               - Barley      Post Weight Loss Surgery Resources    Pureed Recipes  [email or web address]    Why Take Supplements for Life after Weight Loss Surgery  [email or web address]     Supplements after Sleeve Gastrectomy, Gastric Bypass or Single Anastomosis Duodenal Switch  [email or web address]    Keeping Up Your Diet after Weight Loss Surgery  [email or web address]    Preventing Low Blood Sugar after Weight Loss Surgery  [email or web address]     Preventing Dumping Syndrome after Weight Loss Surgery  [email or web address]

## 2024-07-30 NOTE — LETTER
"7/30/2024       RE: Angeli Galindo  [address]   [address]  [place] [address]     Dear Colleague,    Thank you for referring your patient, Angeli Galindo, to the [facility] [place] at [facility]. Please see a copy of my visit note below.    Phone-Visit Details    Type of service:  Phone Visit    Phone call duration: 26 minutes    Distant Location (provider location):  Offsite (providers home) [facility] [place]       Nutrition Assessment  Reason For Visit:  Angeli Galindo is a 64 year old female presents today for return re-establish nutrition visit. Pt with history of gastric sleeve in 2014.  Patient referred by Eloisa Murry PA-C on May 24, 2024.    Patient with Co-morbidities of obesity including:  Type II DM no  Renal Failure no  Sleep apnea no  Hypertension yes   Dyslipidemia no  Joint pain no  Back pain no  GERD yes    Anthropometrics:  Pre-op Weight: 280 lbs  Lowest weight after surgery: 153 lbs    Current Weight:   Estimated body mass index is 25.28 kg/m  as calculated from the following:    Height as of this encounter: 1.702 m (5' 7\").    Weight as of this encounter: 73.2 kg (161 lb 6.4 oz).    Current weight: 161 lbs (lost 5 lbs since last RD visit)    Pt's goal: wants to get back to 153 or into 140s    Weight Loss Medications:   Wegovy - increased dose, now taking 0.5 mg. No side effects since last RD visit.    Current Vitamins/Minerals:     Women's multivitamin (previously was taking prenatal vitamin), 1 or 2 tablets- (1 tablet contains 80 mcg vitamin B12, 400 international unit(s) vitamin D, 27 mg iron)  Calcium citrate with Vitamin D (600 mg calcium, 800 international unit(s) Vitamin D3) twice/day (total 1200 mg calcium)  Vitmain B12- started taking 5000 mcg dose, discussed that pt doesn't have to take this every day. Reports she will look for a 500 mcg supplement " "instead soon.    Nutrition History:       No data to display                     No data to display              Wants to reduce weight to be more flexible/have increased mobility.    Hasn't been a big meat eater- will eat ground beef vs cuts of meat. Had finely cut steak tacos the other day which she tolerated okay.    Hates cooking, so signed up for factor meals and doing well with them. High in protein/fiber and lower in carbohydrates. Not a big breakfast eater. With wegovy when she's done eating she's done- has less emotional eating with this. Nighttime eating is completely absent now, not eating chips anymore, doesn't have cravings for them    Recent food recall:  Breakfast: Protein drink ([employer]) or cheese sticks (something quick) or greek yogurt, coffee  AM snack: eggs with cheese maybe or [employer] protein drinks  Lunch: Salads (paula/iceberg lettuce) with protein (chickpeas/meat) and cheese and vegetables OR 1/2 sandwich  Dinner: [employer] meals- chicken/pork with vegetables (broccoli, asparagus), may do salads or soups, OR mexican food (lettuce as taco shell or burrito bowl)  Snacks: Strawberries/blueberries    Beverages: Coffee, Wine a couple of times/week, iced tea (sips on throughout the day) and water (2 trenta size water cups from [employer]- total 62 oz daily), milk occasionally or chobani yogurt drinks occasionally    Dining out: 1x/week (burrito bowls)    Physical Activity:  Walking \"not as much as she should\", would like to start tracking walking with apple watch, needs to find the .    Going to [place] in October and doing a 10k walk/run with her daughter. Wants to work up to being able to do this    July 30 2024:  Getting 80-90 g protein per day. Not tracking carbohydrates but trying to keep it lower. Carbohydrate cravings have decreased, and if she is having carbohydrates tends to be more fruits (banana, berries) and vegetables with hummus versus chips/dip. Sometimes has to remind " herself to eat due to being distracted by work. Quality of food is better per patient. Feeling better physically and able to move more.    Hydration: Going well, gets venti iced water from [employer]. Getting 64+ oz/day.  Alcohol: Intake has decreased to ~3 glasses/week.    PA: Got a pedometer and started tracking walking habits. Usually 5000-10,000 steps per day. Goes through ups and downs with back pain. Will have an injection at the end of September.    Previous goals:  1. Aim to reduce wine intake to once every other week. - Not met, modified below  2. Aim to start tracking walking habits- can set more formal goal after you start tracking - Met  3. Aim take your dog to the dog park 3x/week - In progress, twice/week. Hasn't as much lately with weather/heat. Would go when less humid.    Nutrition Prescription:  Grams Protein: 50-60 (minimum)  Amount of Fluid: 48-64 oz    Nutrition Diagnosis  Overweight related to history of positive energy balance as evidenced by BMI >25.    Intervention  Materials/Education provided on maintenance dietary guidelines after bariatric surgery, portion sizes, eating pace and chewing well, snacking, separation of beverages from meals, vitamin and mineral supplements after weight loss surgery, and protein needs. Discussed importance of physical activity.  Patient demonstrates understanding. Provided pt with list of goals and RD contact information.    Expected Engagement: good    Goals:  1. Aim to continue with wine intake at ~3 glasses/week.  2. Aim for 10,000 steps per day on average  3. Aim take your dog to the dog park 2x/week    -Take the following after a Rose Mary-en-y Gastric Bypass:    Multivitamin/minerals: adult dose 2 times daily    Iron: 45-60 mg elemental daily (18-36 mg daily if low risk) - may partly or fully be covered in multivitamin     Calcium Citrate containing vitamin D: 500 mg 3 times daily or 600 mg 2 times daily    Vitamin B12: sublingual form of at least 500 mcg  daily or injection of 1000 mcg monthly     Carbohydrate ideas:              - Brown/Wild rice (could consider microwavable pouches)              - Whole wheat pasta              - Protein pasta (barilla protein + as an example)              - Whole wheat bread              - Whole wheat tortilla               - Oatmeal               - Fruit              - Starchy vegetables (corn, peas, beans/lentils, potatoes)              - Whole grain crackers               - Whole wheat waffle               - [place] protein waffles/pancakes              - Couscous               - Quinoa               - Bulgar               - Barley      Post Weight Loss Surgery Resources    Pureed Recipes  [email or web address]    Why Take Supplements for Life after Weight Loss Surgery  [email or web address]     Supplements after Sleeve Gastrectomy, Gastric Bypass or Single Anastomosis Duodenal Switch  [email or web address]    Keeping Up Your Diet after Weight Loss Surgery  [email or web address]    Preventing Low Blood Sugar after Weight Loss Surgery  [email or web address]     Preventing Dumping Syndrome after Weight Loss Surgery  [email or web address]     Follow-Up:  Tuesday, August 27th at 1:30 pm    Time spent with patient: 26 minutes.  DHIRAJ uTrner, RD, LD  Clinic #: [phone]

## 2024-07-30 NOTE — NURSING NOTE
Current patient location: home     Is the patient currently in the state of [place]? YES    Visit mode:TELEPHONE    If the visit is dropped, the patient can be reconnected by: VIDEO VISIT: Text to cell phone:   Telephone Information:   Mobile [phone]       Will anyone else be joining the visit? NO  (If patient encounters technical issues they should call [phone]    How would you like to obtain your AVS? MyChart    Are changes needed to the allergy or medication list? N/A    Are refills needed on medications prescribed by this physician? NO    Reason for visit: RECHECK    Wt other than 24 hrs:    Pain more than one location:  no  Tiffaine ADEN

## 2024-08-06 ENCOUNTER — VIRTUAL VISIT (OUTPATIENT)
Dept: CARDIOLOGY | Facility: CLINIC | Age: 64
End: 2024-08-06
Payer: COMMERCIAL

## 2024-08-06 VITALS — BODY MASS INDEX: 25.06 KG/M2 | WEIGHT: 160 LBS

## 2024-08-06 DIAGNOSIS — E66.3 OVERWEIGHT (BMI 25.0-29.9): Primary | ICD-10-CM

## 2024-08-06 DIAGNOSIS — Z86.2 HISTORY OF PROTEIN S DEFICIENCY: ICD-10-CM

## 2024-08-06 DIAGNOSIS — F41.8 DEPRESSION WITH ANXIETY: ICD-10-CM

## 2024-08-06 DIAGNOSIS — I26.99 ACUTE PULMONARY EMBOLISM, UNSPECIFIED PULMONARY EMBOLISM TYPE, UNSPECIFIED WHETHER ACUTE COR PULMONALE PRESENT (H): ICD-10-CM

## 2024-08-06 DIAGNOSIS — I10 BENIGN ESSENTIAL HYPERTENSION: ICD-10-CM

## 2024-08-06 DIAGNOSIS — Z98.84 HX OF GASTRIC BYPASS: ICD-10-CM

## 2024-08-06 DIAGNOSIS — M96.1 POSTLAMINECTOMY SYNDROME, LUMBAR REGION: ICD-10-CM

## 2024-08-06 DIAGNOSIS — J30.2 SEASONAL ALLERGIES: ICD-10-CM

## 2024-08-06 DIAGNOSIS — M85.80 OSTEOPENIA, UNSPECIFIED LOCATION: ICD-10-CM

## 2024-08-06 RX ORDER — VITAMIN B COMPLEX
1 TABLET ORAL DAILY
COMMUNITY

## 2024-08-06 ASSESSMENT — PAIN SCALES - GENERAL: PAINLEVEL: NO PAIN (0)

## 2024-08-06 NOTE — NURSING NOTE
Is the patient currently in the state of [place]? YES    Visit mode:TELEPHONE    If the visit is dropped, the patient can be reconnected by: TELEPHONE VISIT: Phone number:   Telephone Information:   Mobile [phone]       Will anyone else be joining the visit? NO  (If patient encounters technical issues they should call [phone]    How would you like to obtain your AVS? MyChart    Are changes needed to the allergy or medication list? No    Are refills needed on medications prescribed by this physician? NO    Reason for visit: Medication Therapy Management    Zunilda ADEN

## 2024-08-06 NOTE — LETTER
8/6/2024      RE: Angeli Galindo  [address]   [address]  [place] [address]       Dear Colleague,    Thank you for the opportunity to participate in the care of your patient, Angeli Galindo, at the [facility] [place] [facility]. Please see a copy of my visit note below.    Medication Therapy Management (MTM) Encounter    ASSESSMENT:                            Medication Adherence/Access: No issues identified    Weight management/s/p RYGB : patient congratulated on successful lifestyle mods and significant portion of visit spent discussing risk/benefit of Wegovy increase. Ultimately, patient decided Wegovy 0.5 mg effects waning and will trial increase of Wegovy at this time to support further reduction in food noise to help move toward goal of comfortable activity. Will report to team any side effects or anorexia. Discussed reducing topiramate given occasional forgetting meals - patient declined given significant benefit to migraine prophylaxis.     Patient would benefit from continuion pharmacotherapy for weight management. Given obesity and successful weight management , recommend continuing GLP1/GIP therapy as data to support most significant weight loss and patient has no contraindications. Patient also likely to benefit from continued reduction in food noise and increased satiety. Discussed dietary and behavioral modifications.       Hypertension: last blood pressure not at goal <130/80 mmHg; expect continued blood pressure lowering secondary to weight management and lifestyle mods - will continue to monitor for now, no change in treatment recommended.    Migraine:stable     DVT/PE History : stable     Depression/Anxiety:stable     Pain:defer to pain provider.    Allergies:stable     Osteopenia: stable - may consider bisphosphonate holiday after 5 years (patient has only been on for 3 years at this time); no changes recommended.  Did not have time to discuss calcium and Vitamin D goals - provided via [employer].    PLAN:                            Increase Wegovy to 1mg weekly. Notify care team if this dose is too high and leads to side effects (nausea, constipation) or difficulty consuming enough food for nourishing your body.    To help with tolerability and effectiveness of Wegovy :  Eat small meals/snacks throughout the day (about every 2 hours)  Focus on getting protein in first with each meal and snack. 60 g protein daily is first goal of protein intake.  Drink plenty of water - goal 64 oz throughout the day  You may try Metamucil, Benefiber, or Citrucel to help feel more full (less nausea) and have softer, more consistent bowel movements.  To optimize weight management - work on incorporating resistance training/weight lifting to build muscle and improve overall metabolism of adipose tissue.    2. Your goal is to get 1200 mg calcium daily for bone health. Your body can only absorb about 500 mg at one time. So a good guideline is to take a calcium supplement 500 mg twice daily and then get one serving of dairy or green leafy vegetables daily (each serving is about 300 mg calcium). Alternatively, 3-4 servings of dairy or green leafy vegetables daily meets this goal.    3. Your goal is to get 1000 international units vitamin D daily.        Follow-up: 11/12 with Eloisa Murry PA-C. Given our recent visit, it is OK to cancel visit with Kaur Duke CNP  on 8/13/24.  8/27/24 with Alaina Barton RD.  As needed with Annie Kelley RPH     SUBJECTIVE/OBJECTIVE:                          Angeli Galindo is a 64 year old female seen for an initial visit. She was referred to me from Eloisa Murry PA-C .      Reason for visit: Medication Therapy Management - GLP1/GIP Management .    Allergies/ADRs: Reviewed in chart  Past Medical History: Reviewed in chart  Tobacco: She reports that she quit smoking about 24 years ago. Her smoking use included  "cigarettes. She started smoking about 29 years ago. She has a 2.5 pack-year smoking history. She has never used smokeless tobacco.  Alcohol: 1-3 beverages / week (wine - working to reduce this)    Medication Adherence/Access: no issues reported    Weight Management/s/p RYGB 2016:  Wegovy 0.5 mg once weekly   Topiramate 25 mg - 2 tabs once daily    Last visit with Eloisa Murry PA-C 5/24/24 for Return Medical Weight Management Visit  Last visit with Alaina Barton, [facility] 7/30/24    Patient reports the following medication side effects: occasional constipation - resolves with senna. Very pleased with the food noise reduction and satisfied with food intake. Goal to get to 145-150 lbs as this was weight she was at previously with good mobility and goal is to continue mobility as ages and help with goal of 10K in [place] with daughter coming up.    Nutrition/Eating Habits: rarely has some difficulty remembering to eat due to significant reduction in food noise. FOcusing on fueling body with lean protein, fruits, vegetables.     Exercise/Activity: walking more - increased step count recently, dog walking.     Medications Tried/Failed:  GLP1/GIP  : per visit note with Eloisa Murry 5/24/24 Patient denies personal or family history of MEN Type2, MTC, Pancreatitis.      Initial Consult Weight: 339 lb (before surgery); 180 lb at GLP1/GIP start     Current weight today: 160 lbs 0 oz  Cumulative Weight Loss: -20 lb, -11% from baseline    Wt Readings from Last 4 Encounters:   08/06/24 72.6 kg (160 lb)   07/30/24 73.2 kg (161 lb 6.4 oz)   07/03/24 75.5 kg (166 lb 6.4 oz)   05/24/24 81.6 kg (180 lb)     Estimated body mass index is 25.06 kg/m  as calculated from the following:    Height as of 7/30/24: 1.702 m (5' 7\").    Weight as of this encounter: 72.6 kg (160 lb).    Hypertension:   Amlodipine 5mg once daily     Patient reports no current medication side effects.  Patient goal to stop antihypertensives with further weight " loss (able to do this in the past)    Migraine:  Topiramate 25 mg - 2 tabs once daily  SUmatriptan 100 mg as needed for headache onset    Topiramate significantly helping reduce headache frequency. Declines changes in this med.     DVT/PE History :   Eliquis 2.5 mg twice daily     Works well. Denies side effects.     Depression/Anxiety:  Duloxetine 60 mg - once daily     Works well. Denies side effects. Mental health better managed with physical health improvements patient notes    Pain:  Gabapentin 600mg tab - 600 mg every morning and 1200 mg every night at bedtime   Norco 10/325 mg 1 tab occasionally  Methocarbamol 500 mg tab 1 tab as needed   Narcan as needed .    Periodic back pain managed with pain meds. No issues reported.     Allergies:  Fluticasone furoate 27.5 mcg spray - 2 spays both nostrils    No issues reported.     Osteopenia:   risedronate (Actonel) 35mg weekly    Patient is not experiencing side effects.  DEXA 2023 - osteopenia improving  No reported issues at this time.     Today's Vitals: Wt 72.6 kg (160 lb)   BMI 25.06 kg/m      BP Readings from Last 3 Encounters:   03/12/24 130/84   01/16/24 130/76   11/14/23 125/81       ----------------      I spent 30 minutes with this patient today. All changes were made via collaborative practice agreement with Eloisa Murry. A copy of the visit note was provided to the patient's provider(s).    A summary of these recommendations was sent via [employer].    Annie Kelley, Pharm D., MPH    Medication Therapy Management Pharmacist   [facility]      Telemedicine Visit Details  Type of service:  Telephone visit  Start Time:  1:33 PM  End Time:  2:03 PM     Medication Therapy Recommendations  Osteopenia    Current Medication: RISEdronate (ACTONEL) 150 MG tablet   Rationale: Synergistic therapy - Needs additional medication therapy - Indication   Recommendation: Start Medication   Status: Patient Agreed -  Adherence/Education         Overweight (BMI 25.0-29.9)    Current Medication: Semaglutide-Weight Management (WEGOVY) 0.5 MG/0.5ML pen (Discontinued)   Rationale: Dose too low - Dosage too low - Effectiveness   Recommendation: Increase Dose   Status: Accepted per CPA                Please do not hesitate to contact me if you have any questions/concerns.     Sincerely,     Annie Kelley, [facility]

## 2024-08-06 NOTE — PATIENT INSTRUCTIONS
Recommendations from MTM Pharmacist visit:                                                    MTM (medication therapy management) is a service provided by a clinical pharmacist designed to help you get the most of out of your medicines.  You may be sent a phone or email survey evaluating today's visit.  Please provide feedback you have for the service he received today if you are able.      Increase Wegovy to 1mg weekly. Notify care team if this dose is too high and leads to side effects (nausea, constipation) or difficulty consuming enough food for nourishing your body.    To help with tolerability and effectiveness of Wegovy :  Eat small meals/snacks throughout the day (about every 2 hours)  Focus on getting protein in first with each meal and snack. 60 g protein daily is first goal of protein intake.  Drink plenty of water - goal 64 oz throughout the day  You may try Metamucil, Benefiber, or Citrucel to help feel more full (less nausea) and have softer, more consistent bowel movements.  To optimize weight management - work on incorporating resistance training/weight lifting to build muscle and improve overall metabolism of adipose tissue.    2. Your goal is to get 1200 mg calcium daily for bone health. Your body can only absorb about 500 mg at one time. So a good guideline is to take a calcium supplement 500 mg twice daily and then get one serving of dairy or green leafy vegetables daily (each serving is about 300 mg calcium). Alternatively, 3-4 servings of dairy or green leafy vegetables daily meets this goal.    3. Your goal is to get 1000 international units vitamin D daily.        Follow-up: 11/12 with Eloisa Murry PA-C. Given our recent visit, it is OK to cancel visit with Kaur Duke CNP  on 8/13/24.  8/27/24 with Alaina Barton RD.  As needed with Annie Kelley [facility]         It was great speaking with you today.  I value your experience and would be very thankful for your time in providing feedback in  "our clinic survey. In the next few days, you may receive an email or text message from [facility] [employer] with a link to a survey related to your  clinical pharmacist.\"     To schedule another MTM appointment, please call the clinic directly ([facility] Phone Number: [phone] (schedules for [facility] and [facility] - providers, dietitians, health coaches) or you may call the MTM scheduling line at [phone] or toll-free at [phone].     My Clinical Pharmacist's contact information:                                                      Please feel free to contact me with any questions or concerns you have.      Annie Kelley, Pharm D., MPH    Medication Therapy Management Pharmacist   [facility]          Meal Replacement Shake Options:   *Protein Shake Criteria: no more than 210 Calories, at least 20 grams of protein, and less than 10 grams of sugar   Premier Protein (160 Calories, 30 g protein)  Slim Fast Advanced Nutrition (180 Calories, 20 g protein)  Muscle Milk, lactose-free, 17 oz bottle (210 Calories, 30 g protein)  Integrated Supplements, no artificial sugars (110 Calories, 20 g protein)  Boost/Ensure Max (160 calories, 30 gm protein)   Fairlife Protein Shakes (160-230 calories, 26-42 gm protein)  Aldi's Elevation Protein Powder (180 calories, 30 gm protein)   Orgain Protein Shakes (130-160 calories, 20-26 gm protein)     Meal Replacement Bar Options:  Quest Protein Bars (190 Calories, 20 g protein)  Built Bar (170 Calories, 15-20 g protein)  One Protein Bar (210 calories, 20 g protein)  Ingram Signature Protein Bar ([employer]) (190 Calories, 21 g protein)  Pure Protein Bars (180 Calories, 21 g protein)    Low Calorie Frozen Meal:  Healthy Choice Power Bowls  Lean Cuisine  Smart Ones  Catrachito Massey      ---------------------------------------------------------------  Tips to Increase the Protein in Your " Diet  You may need more protein in your diet to help you heal from an illness, surgery or wound. Extra protein can also help you gain weight. Here are some ideas for adding high-protein foods to your meals.  Meat and fish  Add chopped cooked meat to vegetables, salads, casseroles, soups, sauces and biscuit dough.  Use in omelets, soufflés, quiches, sandwich fillings and chicken or turkey stuffing.  Wrap in pie crust or biscuit dough to make a turnover.  Add to stuffed baked potatoes.  Make a dip with diced meat or flaked fish mixed with sour cream and spices.  Chopped, hard-cooked eggs  Add to salads.  Use for snacks and sandwich filling.  High-protein milk  To make high-protein milk, mix 1 quart whole milk with 1 cup powdered milk.  Add to cream soups, mashed potatoes, scrambled eggs, cereals and dried eggnog mix.  Use as an ingredient in puddings, custards, hot chocolate, milk shakes and pancakes.  Powdered milk  If you don't have any high-protein milk on hand, you can use powdered milk. Add 3 tablespoons to:  gravies, sauces, cream soups, mayonnaise  casseroles, meat patties, meatloaf, tuna salad, deviled ham  scalloped or mashed potatoes, creamed spinach  scrambled eggs, egg salad  cereals  yogurt, milk drinks, ice cream, frozen desserts, puddings, custards.  Add 4 to 6 tablespoons powdered milk to make:  cream sauces  breads, muffins, pancakes, waffles, cookies, cakes  cream pies, frostings, cake fillings  fruit cobblers, bread or rice pudding, gelatin desserts.  For high-protein eggnog, add 3 to 6 tablespoons powdered milk to prepared eggnog.  Hard or soft cheese  Melt on sandwiches, breads, tortillas, hamburgers, hot dogs, other meats, vegetables, eggs and pies.  Grate into soups, chili, sauces, casseroles, vegetables, potatoes, rice, noodles or meatloaf.  Eat with toast or crackers, or melt for roslyn dip.  Cottage cheese or ricotta cheese  Mix with or scoop on top of fruits and vegetables.  Add to  casseroles, lasagna, spaghetti, noodles and egg dishes (omelets, scrambled eggs, soufflés).  Use in gelatin, pudding-type desserts, cheesecake and pancake batter.  Use to stuff crepes, pasta shells or manicotti.  Fruit yogurt  Blend with fruits for a fruit smoothie.  Use as a dip for fruits and vegetables.  Scoop on top of pancakes or waffles.  Tofu  Blend silken tofu with fruits and juices for a smoothie.  Add chunks of firm tofu to soups and stews, or crumble into meatloaf.  Blend dried onion soup mix into soft or silken tofu for dip.  Use pureed silken tofu for part of the mayonnaise, sour cream, cream cheese or ricotta cheese called for in recipes.  Beans  Use cooked beans or peas in soups, casseroles, pasta, tacos and burritos.  Nuts and seeds  Note: For children under 3, discuss with the child's care team.  Use in casseroles, breads, muffins, pancakes, cookies and waffles.  Sprinkle on fruits, cereals, ice cream, yogurt, vegetables and salads.  Mix with raisins, dried fruits and chocolate chips for a snack.  Nut butters  Note: For children under 3, discuss with the child's care team.  Spread on sandwiches, toast, muffins, crackers, waffles, pancakes and fruit slices.  Use as a dip for raw vegetables.  Blend with milk drinks, or swirl through ice cream, yogurt or hot cereal.  Nutrition supplements (nutrition bars, drinks and powders)  Add powders to milk drinks and desserts.  Mix with ice cream, milk and fruit for a high-protein milk shake.    For informational purposes only. Not to replace the advice of your health care provider. Clinically reviewed by Diana Dillon RD, CEHTAN, and the Clinical Nutrition Service Line. Copyright   2005 [employer] [facility]. All rights reserved. freee [id number] - REV 04/24.      -----------------------------------------------------------------------------------------------------------------  Learning About High-Protein Foods  What foods are high in protein?     The  "foods you eat contain nutrients, such as vitamins and minerals. Protein is a nutrient. Your body needs the right amount to stay healthy and work as it should. You can use the list below to help you make choices about which foods to eat.  Here are some examples of foods that are high in protein.  Dairy and dairy alternatives  Cheese  Milk  Soy milk  Yogurt (especially Greek)  Meat  Beef  Chicken  Ham  Lamb  Lunch meat  Pork  Sausage  Turkey  Other protein foods  Beans (black, garbanzo, kidney, lima)  Eggs  Hummus  Lentils  Nuts  Peanut butter and other nut butters  Peas  Soybeans  Tofu  Veggie or soy adriana (Check the nutrition label for the amount of protein in each serving.)  Seafood  Anchovies  Cod  Crab  Halibut  [place]  Sardines  Shrimp  [place]ia  [place]  Tuna  Protein supplements  Bars (Check the nutrition label for the amount of protein in each serving.)  Drinks  Powders  Work with your doctor to find out how much of this nutrient you need. Depending on your health, you may need more or less of it in your diet.  Where can you learn more?  Go to https://www.[employer].net/patiented  Enter P335 in the search box to learn more about \"Learning About High-Protein Foods.\"  Current as of: September 20, 2023               Content Version: 14.0    9688-4538 [employer].   Care instructions adapted under license by your healthcare professional. If you have questions about a medical condition or this instruction, always ask your healthcare professional. Healthwise, [employer] disclaims any warranty or liability for your use of this information.         "

## 2024-08-06 NOTE — Clinical Note
YUAN patient - we increased Wegovy today given patient close to goal and having some returning food noise. Seeing Alaina on 8/27.  Kaur- she had a visit scheduled with you for 8/13, but given our visit today, I told her it would be redundant to have a visit with you in a week an not needed. So she will be cancelling that and seeing YUAN in Nov instead. Medication Therapy Management as needed.  Let me know if not OK, but figured you didn't know her well yet either and she is stable so ok! Annie

## 2024-08-06 NOTE — PROGRESS NOTES
Medication Therapy Management (MTM) Encounter    ASSESSMENT:                            Medication Adherence/Access: No issues identified    Weight management/s/p RYGB : patient congratulated on successful lifestyle mods and significant portion of visit spent discussing risk/benefit of Wegovy increase. Ultimately, patient decided Wegovy 0.5 mg effects waning and will trial increase of Wegovy at this time to support further reduction in food noise to help move toward goal of comfortable activity. Will report to team any side effects or anorexia. Discussed reducing topiramate given occasional forgetting meals - patient declined given significant benefit to migraine prophylaxis.     Patient would benefit from continuion pharmacotherapy for weight management. Given obesity and successful weight management , recommend continuing GLP1/GIP therapy as data to support most significant weight loss and patient has no contraindications. Patient also likely to benefit from continued reduction in food noise and increased satiety. Discussed dietary and behavioral modifications.       Hypertension: last blood pressure not at goal <130/80 mmHg; expect continued blood pressure lowering secondary to weight management and lifestyle mods - will continue to monitor for now, no change in treatment recommended.    Migraine:stable     DVT/PE History : stable     Depression/Anxiety:stable     Pain:defer to pain provider.    Allergies:stable     Osteopenia: stable - may consider bisphosphonate holiday after 5 years (patient has only been on for 3 years at this time); no changes recommended. Did not have time to discuss calcium and Vitamin D goals - provided via [employer].    PLAN:                            Increase Wegovy to 1mg weekly. Notify care team if this dose is too high and leads to side effects (nausea, constipation) or difficulty consuming enough food for nourishing your body.    To help with tolerability and effectiveness of Wegovy  :  Eat small meals/snacks throughout the day (about every 2 hours)  Focus on getting protein in first with each meal and snack. 60 g protein daily is first goal of protein intake.  Drink plenty of water - goal 64 oz throughout the day  You may try Metamucil, Benefiber, or Citrucel to help feel more full (less nausea) and have softer, more consistent bowel movements.  To optimize weight management - work on incorporating resistance training/weight lifting to build muscle and improve overall metabolism of adipose tissue.    2. Your goal is to get 1200 mg calcium daily for bone health. Your body can only absorb about 500 mg at one time. So a good guideline is to take a calcium supplement 500 mg twice daily and then get one serving of dairy or green leafy vegetables daily (each serving is about 300 mg calcium). Alternatively, 3-4 servings of dairy or green leafy vegetables daily meets this goal.    3. Your goal is to get 1000 international units vitamin D daily.        Follow-up: 11/12 with Eloisa Murry PA-C. Given our recent visit, it is OK to cancel visit with Kaur Duke CNP  on 8/13/24.  8/27/24 with Alaina Barton RD.  As needed with Annie Kelley RPH     SUBJECTIVE/OBJECTIVE:                          Angeli Galindo is a 64 year old female seen for an initial visit. She was referred to me from Eloisa Murry PA-C .      Reason for visit: Medication Therapy Management - GLP1/GIP Management .    Allergies/ADRs: Reviewed in chart  Past Medical History: Reviewed in chart  Tobacco: She reports that she quit smoking about 24 years ago. Her smoking use included cigarettes. She started smoking about 29 years ago. She has a 2.5 pack-year smoking history. She has never used smokeless tobacco.  Alcohol: 1-3 beverages / week (wine - working to reduce this)    Medication Adherence/Access: no issues reported    Weight Management /s/p RYGB 2016:  Wegovy 0.5 mg once weekly   Topiramate 25 mg - 2 tabs once daily    Last  "visit with Eloisa Murry PA-C 5/24/24 for Return Medical Weight Management Visit  Last visit with Alaina Barton, RD 7/30/24    Patient reports the following medication side effects: occasional constipation - resolves with senna. Very pleased with the food noise reduction and satisfied with food intake. Goal to get to 145-150 lbs as this was weight she was at previously with good mobility and goal is to continue mobility as ages and help with goal of 10K in [place] with daughter coming up.    Nutrition/Eating Habits: rarely has some difficulty remembering to eat due to significant reduction in food noise. FOcusing on fueling body with lean protein, fruits, vegetables.     Exercise/Activity: walking more - increased step count recently, dog walking.     Medications Tried/Failed:  GLP1/GIP  : per visit note with Eloisa Murry 5/24/24 Patient denies personal or family history of MEN Type2, MTC, Pancreatitis.      Initial Consult Weight: 339 lb (before surgery); 180 lb at GLP1/GIP start     Current weight today: 160 lbs 0 oz  Cumulative Weight Loss: -20 lb, -11% from baseline    Wt Readings from Last 4 Encounters:   08/06/24 72.6 kg (160 lb)   07/30/24 73.2 kg (161 lb 6.4 oz)   07/03/24 75.5 kg (166 lb 6.4 oz)   05/24/24 81.6 kg (180 lb)     Estimated body mass index is 25.06 kg/m  as calculated from the following:    Height as of 7/30/24: 1.702 m (5' 7\").    Weight as of this encounter: 72.6 kg (160 lb).    Hypertension:   Amlodipine 5mg once daily     Patient reports no current medication side effects.  Patient goal to stop antihypertensives with further weight loss (able to do this in the past)    Migraine:  Topiramate 25 mg - 2 tabs once daily  SUmatriptan 100 mg as needed for headache onset    Topiramate significantly helping reduce headache frequency. Declines changes in this med.     DVT/PE History :   Eliquis 2.5 mg twice daily     Works well. Denies side effects.     Depression/Anxiety:  Duloxetine 60 mg - " once daily     Works well. Denies side effects. Mental health better managed with physical health improvements patient notes    Pain:  Gabapentin 600mg tab - 600 mg every morning and 1200 mg every night at bedtime   Norco 10/325 mg 1 tab occasionally  Methocarbamol 500 mg tab 1 tab as needed   Narcan as needed .    Periodic back pain managed with pain meds. No issues reported.     Allergies:  Fluticasone furoate 27.5 mcg spray - 2 spays both nostrils    No issues reported.     Osteopenia:   risedronate (Actonel) 35mg weekly    Patient is not experiencing side effects.  DEXA 2023 - osteopenia improving  No reported issues at this time.     Today's Vitals: Wt 72.6 kg (160 lb)   BMI 25.06 kg/m      BP Readings from Last 3 Encounters:   03/12/24 130/84   01/16/24 130/76   11/14/23 125/81       ----------------      I spent 30 minutes with this patient today. All changes were made via collaborative practice agreement with Eloisa Murry. A copy of the visit note was provided to the patient's provider(s).    A summary of these recommendations was sent via [employer].    Annie Kelley, Pharm D., MPH    Medication Therapy Management Pharmacist   [facility]      Telemedicine Visit Details  Type of service:  Telephone visit  Start Time:  1:33 PM  End Time:  2:03 PM     Medication Therapy Recommendations  Osteopenia    Current Medication: RISEdronate (ACTONEL) 150 MG tablet   Rationale: Synergistic therapy - Needs additional medication therapy - Indication   Recommendation: Start Medication   Status: Patient Agreed - Adherence/Education         Overweight (BMI 25.0-29.9)    Current Medication: Semaglutide-Weight Management (WEGOVY) 0.5 MG/0.5ML pen (Discontinued)   Rationale: Dose too low - Dosage too low - Effectiveness   Recommendation: Increase Dose   Status: Accepted per CPA

## 2024-08-06 NOTE — PROGRESS NOTES
"Virtual Visit Details    Type of service:  Telephone Visit   Phone call duration: *** minutes   Originating Location (pt. Location): {patient location:233510::\"Home\"}  {PROVIDER LOCATION On-site should be selected for visits conducted from your clinic location or adjoining [facility] hospital, academic office, or other nearby [facility] building. Off-site should be selected for all other provider locations, including home:[id number]}  Distant Location (provider location):  {virtual location provider:287662}  "

## 2024-08-13 DIAGNOSIS — M54.16 LUMBAR RADICULOPATHY: ICD-10-CM

## 2024-08-13 RX ORDER — GABAPENTIN 600 MG/1
TABLET ORAL
Qty: 150 TABLET | Refills: 2 | Status: SHIPPED | OUTPATIENT
Start: 2024-08-13

## 2024-08-13 RX ORDER — METHOCARBAMOL 500 MG/1
500 TABLET, FILM COATED ORAL 3 TIMES DAILY
Qty: 90 TABLET | Refills: 2 | Status: SHIPPED | OUTPATIENT
Start: 2024-08-13

## 2024-08-13 RX ORDER — GABAPENTIN 600 MG/1
TABLET ORAL
Qty: 150 TABLET | Refills: 2 | OUTPATIENT
Start: 2024-08-13

## 2024-08-13 RX ORDER — HYDROCODONE BITARTRATE AND ACETAMINOPHEN 10; 325 MG/1; MG/1
1 TABLET ORAL EVERY 6 HOURS PRN
Qty: 120 TABLET | Refills: 0 | Status: SHIPPED | OUTPATIENT
Start: 2024-08-13 | End: 2024-09-12

## 2024-08-13 NOTE — TELEPHONE ENCOUNTER
Refill Request    Medication request:   gabapentin 600 MG Oral Tab         LOV: 2024 Yobany Saunders MD   RTC: 6 months  NOV: 2024 Yobany Saunders MD      [place]: 2024 #30 days    UDS: (if applicable): N/A  Pain contract:  2023    LOV plan (if weaning or changing medications): She will use the CBD cream for the pain and see if this will allow her to decrease the gabapentin dose.

## 2024-08-13 NOTE — TELEPHONE ENCOUNTER
Refill Request    Medication request:   HYDROcodone-acetaminophen  MG Oral Tab       LOV: 2024 Yobany Saunders MD   RTC: 6 months  NOV: 2024 Yobany Saunders MD      [place]: 2024 #30 days    UDS: (if applicable): N/A  Pain contract:  2023    LOV plan (if weaning or changing medications): She will continue with the Norco now for the pain, but I spoke to her about weaning down on this in the future.  She would drop by an half of a tablet every month.

## 2024-08-13 NOTE — TELEPHONE ENCOUNTER
Refill Request    Medication request:   methocarbamol 500 MG Oral Tab       LOV: 2024 Yobany Saunders MD   RTC: 6 months  NOV: 2024 Yobany Saunders MD      [place]: 2024 #30 days    UDS: (if applicable): N/A  Pain contract:  2023    LOV plan (if weaning or changing medications): N/A

## 2024-08-14 RX ORDER — METHOCARBAMOL 500 MG/1
500 TABLET, FILM COATED ORAL 3 TIMES DAILY
Qty: 90 TABLET | Refills: 1 | OUTPATIENT
Start: 2024-08-14

## 2024-08-14 RX ORDER — GABAPENTIN 600 MG/1
TABLET ORAL
Qty: 150 TABLET | Refills: 1 | OUTPATIENT
Start: 2024-08-14

## 2024-08-14 NOTE — TELEPHONE ENCOUNTER
Refill Request    Medication request: methocarbamol 500 MG Oral Tab.  Take 1 tablet (500 mg total) by mouth 3 (three) times daily. b     LOV:4/24/2024 Yobany Saunders MD   Due back to clinic per last office note:  Follow-up:  3 months to 6 months.   NOV: 9/25/2024 Yobany Saunders MD      [place]: 6/19/24 #90 (30 days)    Urine drug screen (if applicable): N/A  Pain contract: N/A    LOV plan (if weaning or changing medications): not mentioned    Duplicate request of methocarbamol. See other refill from yesterday 8/13/24         Refill Request    Medication request: gabapentin 600 MG Oral Tab. Take 600 mg in the morning, 600 mg in the afternoon, 600 mg evening and 1200 mg at night.       [place]: 6/19/24 #150 (30 days)    LOV plan (if weaning or changing medications): She will use the CBD cream for the pain and see if this will allow her to decrease the gabapentin dose.     Duplicate request

## 2024-09-12 DIAGNOSIS — M54.16 LUMBAR RADICULOPATHY: ICD-10-CM

## 2024-09-12 NOTE — TELEPHONE ENCOUNTER
Refill Request    Medication request: HYDROcodone-acetaminophen  MG Oral Tab. Take 1 tablet by mouth every 6 (six) hours as needed for Pain.      LOV:2024 Yobany Saunders MD   Due back to clinic per last office note:  Follow-up:  3 months to 6 months.   NOV: 2024 Yobany Saunders MD      [place]: 2024 #120 (30 days)    Urine drug screen (if applicable): none  Pain contract:  on 2023    LOV plan (if weaning or changing medications): She will continue with the Norco now for the pain, but I spoke to her about weaning down on this in the future. She would drop by an half of a tablet every month.

## 2024-09-13 RX ORDER — HYDROCODONE BITARTRATE AND ACETAMINOPHEN 10; 325 MG/1; MG/1
1 TABLET ORAL EVERY 6 HOURS PRN
Qty: 120 TABLET | Refills: 0 | Status: SHIPPED | OUTPATIENT
Start: 2024-09-13 | End: 2024-10-13

## 2024-09-24 ENCOUNTER — TELEPHONE (OUTPATIENT)
Dept: PHYSICAL MEDICINE AND REHAB | Facility: CLINIC | Age: 64
End: 2024-09-24

## 2024-09-24 DIAGNOSIS — M51.9 LUMBAR DISC DISEASE: Primary | ICD-10-CM

## 2024-09-24 DIAGNOSIS — M51.26 LUMBAR HERNIATED DISC: ICD-10-CM

## 2024-09-24 NOTE — TELEPHONE ENCOUNTER
Symptoms all same as in past- patient needs to reschedule her appointment -she had family emergency and can't come in on 9/25/2024 -needs to reschedule for December.    TRANSFORAMINAL LUMBAR EPIDURAL STEROID INJECTION BILATERAL / Bilateral L5 Transforaminal       All patient's injections have been same as above.    Patient is asking if Dr. Saunders is able to order injection prior to her appointment so that she is able to schedule the injections the same week as her appt.     Patient will be coming in from out of town that week.    Order pended for Bilateral L5 TFESI under Local and routed to Dr. Saunders for review

## 2024-09-25 ENCOUNTER — TELEPHONE (OUTPATIENT)
Dept: PHYSICAL MEDICINE AND REHAB | Facility: CLINIC | Age: 64
End: 2024-09-25

## 2024-09-25 ENCOUNTER — DOCUMENTATION ONLY (OUTPATIENT)
Dept: INTERNAL MEDICINE | Facility: CLINIC | Age: 64
End: 2024-09-25
Payer: COMMERCIAL

## 2024-09-25 NOTE — TELEPHONE ENCOUNTER
FYI:  The patient will possibly need to be seen prior to approval for the procedure per [facility]  OV notes are greater than 90 days old and there has not been recent imaging. Last imaging was 2019  I have submitted the information to [facility] in hopes of approval.     Initiated authorization for Bilateral L5 TFSE. CPT/HCPCS 09901-24 dx:M54.9, M51.26 to be done at [facility] with [facility]andra     Status: pending  Reference/Authorization # [id number]  Anticipated Determination Date:10/04/2024

## 2024-09-25 NOTE — TELEPHONE ENCOUNTER
Status of Anticoag  [x] Clearance pending to hold Eliquis for 2 days prior to bilateral L5 transforaminal epidural steroid injection faxed to Dr. Jeni Hsu F: [phone]  [] Clearance approved  [] Clearance denied

## 2024-09-25 NOTE — PROGRESS NOTES
Type of Form Received: [employer] Hold Request    Form Received (Date) 9/25/24   Form Filled out Yes, faxed 9/30/24   Placed in provider folder Yes

## 2024-09-26 NOTE — TELEPHONE ENCOUNTER
FYI:  The patient will possibly need to be seen prior to approval for the procedure per [facility]  OV notes are greater than 90 days old and there has not been recent imaging. Last imaging was 2019  I have submitted the information to [facility] in hopes of approval.    Initiated authorization for Bilateral L5 TFSE. CPT/HCPCS 50605-87 dx:M54.9, M51.26 to be done at [facility] with Zandra     Status: pending  Reference/Authorization # [id number]  Anticipated Determination Date:10/04/2024

## 2024-09-30 NOTE — TELEPHONE ENCOUNTER
Status of Anticoag  [] Clearance pending  [x] Clearance approved to hold Eliquis 2 dayts prior to procedure.  Placed into scanning.   [] Clearance denied

## 2024-09-30 NOTE — TELEPHONE ENCOUNTER
Rcvd fax of clearance to hold Eliquis for 9 2 0 days prior to Bilateral L% transforaminal epidural steroid injection    Placed in medical clearance folder

## 2024-10-02 NOTE — TELEPHONE ENCOUNTER
This was approved yesterday, but the patient is looking to schedule in December (outside of her approval dates)  Please see the 9/25/24 tele enc for complete details.  Thanks.

## 2024-10-10 ENCOUNTER — TELEPHONE (OUTPATIENT)
Dept: PHYSICAL MEDICINE AND REHAB | Facility: CLINIC | Age: 64
End: 2024-10-10

## 2024-10-11 DIAGNOSIS — M54.16 LUMBAR RADICULOPATHY: ICD-10-CM

## 2024-10-14 NOTE — TELEPHONE ENCOUNTER
Refill Request    Medication request: HYDROcodone-acetaminophen  MG Oral Tab     LOV: 2024 Yobany Saunders MD   RTC: 6 months  NOV: Visit date not found      [place]: 2024 #30 days    UDS: (if applicable): N/A  Pain contract:  2023    LOV plan (if weaning or changing medications): She will continue with the Norco now for the pain, but I spoke to her about weaning down on this in the future. She would drop by an half of a tablet every month.

## 2024-10-15 RX ORDER — HYDROCODONE BITARTRATE AND ACETAMINOPHEN 10; 325 MG/1; MG/1
1 TABLET ORAL EVERY 6 HOURS PRN
Qty: 120 TABLET | Refills: 0 | Status: SHIPPED | OUTPATIENT
Start: 2024-10-15 | End: 2024-11-14

## 2024-11-10 DIAGNOSIS — M54.16 LUMBAR RADICULOPATHY: ICD-10-CM

## 2024-11-11 RX ORDER — HYDROCODONE BITARTRATE AND ACETAMINOPHEN 10; 325 MG/1; MG/1
1 TABLET ORAL EVERY 6 HOURS PRN
Qty: 120 TABLET | Refills: 0 | Status: SHIPPED | OUTPATIENT
Start: 2024-11-11 | End: 2024-12-11

## 2024-11-11 NOTE — TELEPHONE ENCOUNTER
Refill Request    Medication request:   HYDROcodone-acetaminophen  MG Oral Tab         LOV: 2024 Yobany Saunders MD   RTC: 6 months  NOV: 2025 Yobany Saunders MD      [place]: 10/16/2024 #30 days    UDS: (if applicable): N/A  Pain contract:  2023    LOV plan (if weaning or changing medications): She will continue with the Norco now for the pain, but I spoke to her about weaning down on this in the future. She would drop by an half of a tablet every month.

## 2024-11-12 ENCOUNTER — VIRTUAL VISIT (OUTPATIENT)
Dept: ENDOCRINOLOGY | Facility: CLINIC | Age: 64
End: 2024-11-12
Payer: COMMERCIAL

## 2024-11-12 VITALS — HEIGHT: 67 IN | WEIGHT: 153 LBS | BODY MASS INDEX: 24.01 KG/M2

## 2024-11-12 DIAGNOSIS — E66.3 OVERWEIGHT (BMI 25.0-29.9): ICD-10-CM

## 2024-11-12 PROCEDURE — G2211 COMPLEX E/M VISIT ADD ON: HCPCS | Mod: 95

## 2024-11-12 PROCEDURE — 99213 OFFICE O/P EST LOW 20 MIN: CPT | Mod: 95

## 2024-11-12 RX ORDER — SEMAGLUTIDE 1.7 MG/.75ML
1.7 INJECTION, SOLUTION SUBCUTANEOUS WEEKLY
Qty: 9 ML | Refills: 1 | Status: SHIPPED | OUTPATIENT
Start: 2024-11-12

## 2024-11-12 ASSESSMENT — PAIN SCALES - GENERAL: PAINLEVEL_OUTOF10: SEVERE PAIN (6)

## 2024-11-12 NOTE — PATIENT INSTRUCTIONS
"Sae Suh,   Thank you for allowing us the privilege of caring for you. We hope we provided you with the excellent service you deserve.   Please let us know if there is anything else we can do for you so that we can be sure you are completely satisfied with your care experience.    To ensure the quality of our services you may be receiving a patient satisfaction survey from an independent patient satisfaction monitoring company.    The greatest compliment you can give is a \"Likely to Recommend\"    Your visit was with Eloisa Monaco ZHANNA Murry today.    Instructions per today's visit:     Increase Wegovy 1.7mg once weekly. Can also try every other week to help with weight maintenance. Dose decreases back to  Wegovy 1.0mg if too much. Refills sent   Goal to transition to compounded semaglutide once insurance is no longer covered.   Eloisa Monaco in 3 months    ___________________________________________________________________________  Important contact and scheduling information:  Please call our contact center at [phone] to schedule your next appointments.  For any nursing questions or concerns call Sydnee House LPN at [phone] or Becca Broderick RN at [phone]  Please call during clinic hours Monday through Friday 8:00a - 4:00p if you have questions or you can contact us via [employer] at anytime and we will reply during clinic hours.    Lab results will be communicated through My Chart or letter (if My Chart not used). Please call the clinic if you have not received communication after 1 week or if you have any questions.?  Clinic Fax: [phone]  __________________________________________________________________________    If labs were ordered today:    Please make an appointment to have them drawn at your convenience.     To schedule the Lab Appointment using [employer]:  Select \"Schedule an Appointment\"  Select \"Lab Only\"  For \"A couple of questions\", select \"Other\"  For \"Which locations work for you?, select " the location and set up the appointment    To schedule by phone call [phone] to schedule a lab only appointment at any [facility] lab.  ___________________________________________________________________________  Work with A [facility] !  Virtual Sessions are Available through [facility]    To learn more, call to schedule a free, Health  Q&A appointment: [phone]     What is Health Coaching?  Do you know what you are supposed to do, but you just aren't doing it?  Then, HEALTH COACHING may help you!   Get unstuck and move forward with the support of a professionally trained NBC-HWC (National Board-Certified Health and ) who uses evidence-based approaches to help you move forward with healthy lifestyle changes in the areas of weight loss, stress management and overall well-being.    Health Coaches help you identify goals that will work best for you. Health Coaches provide support and encouragement with overcoming barriers and help you to find inspiration and motivation to lead a healthy lifestyle.    Option one:  Health Coaching 3-Pack; Three, 30-minute Health Coaching Visits, for $99  Visits are done virtually (phone or video)  This is a self pay service; we do not accept insurance for rosana coaching.    Option two:   The 24 week Plan; 11 Health Coaching Visits, and a 7 months subscription to [employer]-- on-demand fitness, nutrition and mindfulness classes, for $499 (employee discounts may be available). Participants will also meet regularly with a weight management Medical Provider and a Registered/Licensed Dietician.  This is a self-pay service; we do not accept insurance for health coaching.    To Schedule a free Health  Q&A appointment to learn more,  call [phone].  ____________________________________________________________________  [facility]  Healthy Lifestyle Group    Healthy Lifestyle  "Group  This is a 60 minute virtual coaching group for those who want to lead a healthier lifestyle. Come together to set goals and overcome barriers in a supportive group environment. We will address the four pillars of health--nutrition, exercise, sleep and emotional well-being.  This group is highly recommended for those who are participating in the 24 week Healthy Lifestyle Plan and our Health Coaching sessions.    WHEN: This group meets the first Friday of the month, 12:30 PM - 1:30 PM online, via a zoom meeting.      FACILITATOR: Led by National Board Certified Health and , Porsha Khan, [facility], [facility].    TO REGISTER: Please call the Call Center at [phone] to register. You will get an appointment to attend in [place]. Fifteen minutes prior to the meeting, complete the e-check in and you will get the link to join the meeting.  There is no charge to attend this group and space is limited.      2023 and 2024 Meeting Topics and Dates:    November 3: Introduction to Mindfulness (Learn simple and effective mindfulness practices and how it can benefit you)    December 8: Let's Talk (guided discussion on our wins and challenges)    January 5: New Years Vision: Manifest your Best 2024! (Guided imagery,  journaling and discussion)    February 2: Let's Talk    March 1: 10 Percent Happier by Jace Guevara (Book Bites; a guided discussion on the nuggets of wisdom from favorite wellness books; no need to read the book but highly encouraged)    April 5: Let's Talk    May 3: \"Essentialism; The Disciplined Pursuit of Less by Yvan Villela (book bites discussion)    June 7: Let's Talk    July 5: NO MEETING, off for the 4th of July Holiday    August 2: The Blue Zones, Secrets for Living a Longer Life by Jace Ibarra (book bites discussion)      If you would like bariatric surgery specific support group info please let your care team know.         Thank you,   [facility] " Team

## 2024-11-12 NOTE — PROGRESS NOTES
Virtual Visit Details    Type of service:  Video Visit     Originating Location (pt. Location): Home    Distant Location (provider location):  Off-site  Platform used for Video Visit: [facility] Medical Weight Management Note     Angeli Galindo  MRN:  [id number]  :  1960  KATELIN:  2024    Dear Eloisa Fuentes MD,    I had the pleasure of seeing your patient Angeli Galindo. She is a 64 year old female who I am continuing to see for treatment of obesity related to:         No data to display                Assessment & Plan   Problem List Items Addressed This Visit       Overweight (BMI 25.0-29.9)    Relevant Medications    Semaglutide-Weight Management (WEGOVY) 1 MG/0.5ML pen    Semaglutide-Weight Management (WEGOVY) 1.7 MG/0.75ML pen      Increase Wegovy 1.7mg once weekly. Can also try every other week to help with weight maintenance. Dose decreases back to  Wegovy 1.0mg if too much. Refills sent   Goal to transition to compounded semaglutide once insurance is no longer covered.   Mariah in 3 months    INTERVAL HISTORY:  S/p gastric sleeve in  in [place]   Highest weight - 339lbs   Flo weight after sleeve - around 280llb  S/p conversion to RYBP in 2016 due to uncontrollable GERD  Flo weight after surgery - 153lb   Started Wegovy       Is really happy at this weight. Would like to lose around 10lbs.     Anti-obesity medication history    Current:   Wegovy 1.0mg - has been on this dose for around 4 months. At first had nausea and constipation - but has improved with continued use. Has constipation at baseline     Insurance will not be covering GLP-1 next year. No hx of pre or type II diabetes. No hx of CVD.     Recent diet changes: Increase in water. No longer drinking any pop. Decrease in wine. Increase in protein.     Recent exercise/activity changes: has been walking more.     Recent stressors: work is going well.         CURRENT WEIGHT:   153 lbs 0 oz    Initial Weight (lbs): 180  lbs  Last Visits Weight: 73.2 kg (161 lb 6.4 oz)  Cumulative weight loss (lbs): 27  Weight Loss Percentage: 15%    Wt Readings from Last 5 Encounters:   11/12/24 69.4 kg (153 lb)   08/06/24 72.6 kg (160 lb)   07/30/24 73.2 kg (161 lb 6.4 oz)   07/03/24 75.5 kg (166 lb 6.4 oz)   05/24/24 81.6 kg (180 lb)             11/10/2024    11:44 AM   Changes and Difficulties   I have made the following changes to my diet since my last visit: Increased my daily protein and fluid intake   With regards to my diet, I am still struggling with: -   I have made the following changes to my activity/exercise since my last visit: I walk a lot   With regards to my activity/exercise, I am still struggling with: -         MEDICATIONS:   Current Outpatient Medications   Medication Sig Dispense Refill    Semaglutide-Weight Management (WEGOVY) 1 MG/0.5ML pen Inject 1 mg subcutaneously once a week. 6 mL 1    Semaglutide-Weight Management (WEGOVY) 1.7 MG/0.75ML pen Inject 1.7 mg subcutaneously once a week. 9 mL 1    amLODIPine (NORVASC) 5 MG tablet Take 5 mg by mouth daily      DULoxetine (CYMBALTA) 60 MG capsule Take 1 capsule (60 mg) by mouth daily 90 capsule 1    ELIQUIS ANTICOAGULANT 2.5 MG tablet Take 1 tablet (2.5 mg) by mouth 2 times daily 180 tablet 2    fluticasone furoate 27.5 MCG/SPRAY nasal spray Spray 2 sprays into both nostrils daily 27.3 mL 2    gabapentin (NEURONTIN) 600 MG tablet Take 1 tablet (600 mg) by mouth 4 times daily Take 600mg in the morning and 1200mg at bedtime (Patient taking differently: Take 600 mg by mouth 2 times daily (In addition to the night time dose))      HYDROcodone-acetaminophen (NORCO)  MG per tablet Take 1 tablet by mouth 4 times daily as needed      methocarbamol (ROBAXIN) 500 MG tablet Take 500 mg by mouth 4 times daily as needed for muscle spasms      naloxone (NARCAN) 4 MG/0.1ML nasal spray Spray 1 spray (4 mg) into one nostril alternating nostrils as needed for opioid reversal every 2-3  "minutes until assistance arrives  0    Prenatal Vit-Fe Fumarate-FA (PRENATAL VITAMINS PO) Take 2 tablets by mouth every evening      RISEdronate (ACTONEL) 150 MG tablet TAKE 1 TABLET BY MOUTH EVERY 30 DAYS. 3 tablet 3    SUMAtriptan (IMITREX) 100 MG tablet Take 1 tablet (100 mg) by mouth at onset of headache for migraine. 18 tablet 3    topiramate (TOPAMAX) 25 MG tablet Take 1 tablet (25 mg) by mouth 2 times daily 180 tablet 1    Vitamin D3 (VITAMIN D, CHOLECALCIFEROL,) 25 mcg (1000 units) tablet Take 1 tablet by mouth daily             11/10/2024    11:44 AM   Weight Loss Medication History Reviewed With Patient   Which weight loss medications are you currently taking on a regular basis? Wegovy   Are you having any side effects from the weight loss medication that we have prescribed you? No               5/21/2024     6:07 AM   IRASEMA Score (Last Two)   IRASEMA Raw Score 34   Activation Score 68.9   IRASEMA Level 3         PHYSICAL EXAM:  Objective    Ht 1.702 m (5' 7.01\")   Wt 69.4 kg (153 lb)   BMI 23.96 kg/m             Vitals:  No vitals were obtained today due to virtual visit.    GENERAL: alert and no distress  EYES: Eyes grossly normal to inspection.  No discharge or erythema, or obvious scleral/conjunctival abnormalities.  RESP: No audible wheeze, cough, or visible cyanosis.    SKIN: Visible skin clear. No significant rash, abnormal pigmentation or lesions.  NEURO: Cranial nerves grossly intact.  Mentation and speech appropriate for age.  PSYCH: Appropriate affect, tone, and pace of words        Sincerely,    Eloisa Murry PA-C      28 minutes spent by me on the date of the encounter doing chart review, history and exam, documentation and further activities per the note    The longitudinal plan of care for the diagnosis(es)/condition(s) as documented were addressed during this visit. Due to the added complexity in care, I will continue to support Angeli in the subsequent management and with ongoing continuity of " care.

## 2024-11-12 NOTE — PROGRESS NOTES
"Virtual Visit Details    Type of service:  Video Visit     Originating Location (pt. Location): {video visit patient location:490246::\"Home\"}  {PROVIDER LOCATION On-site should be selected for visits conducted from your clinic location or adjoining [facility] hospital, academic office, or other nearby [facility] building. Off-site should be selected for all other provider locations, including home:[id number]}  Distant Location (provider location):  {virtual location provider:120045}  Platform used for Video Visit: {Virtual Visit Platforms:736980::\"[employer]"}        "

## 2024-11-12 NOTE — LETTER
2024       RE: Angeli Galindo  [address]   [address]  [facility] [address]     Dear Colleague,    Thank you for referring your patient, Angeli Galindo, to the [facility] [place] [facility]. Please see a copy of my visit note below.    Virtual Visit Details    Type of service:  Video Visit     Originating Location (pt. Location): Home    Distant Location (provider location):  Off-site  Platform used for Video Visit: [facility] Medical Weight Management Note     Angeli Galindo  MRN:  [id number]  :  1960  KATELIN:  2024    Dear Eloisa Fuentes MD,    I had the pleasure of seeing your patient Angeli Galindo. She is a 64 year old female who I am continuing to see for treatment of obesity related to:         No data to display                Assessment & Plan  Problem List Items Addressed This Visit       Overweight (BMI 25.0-29.9)    Relevant Medications    Semaglutide-Weight Management (WEGOVY) 1 MG/0.5ML pen    Semaglutide-Weight Management (WEGOVY) 1.7 MG/0.75ML pen      Increase Wegovy 1.7mg once weekly. Can also try every other week to help with weight maintenance. Dose decreases back to  Wegovy 1.0mg if too much. Refills sent   Goal to transition to compounded semaglutide once insurance is no longer covered.   Eloisa Monaco in 3 months    INTERVAL HISTORY:  S/p gastric sleeve in  in [place]   Highest weight - 339lbs   Flo weight after sleeve - around 280llb  S/p conversion to RYBP in 2016 due to uncontrollable GERD  Flo weight after surgery - 153lb   Started Wegovy       Is really happy at this weight. Would like to lose around 10lbs.     Anti-obesity medication history    Current:   Wegovy 1.0mg - has been on this dose for around 4 months. At first had nausea and constipation - but has improved with continued use. Has constipation at baseline     Insurance will not be covering GLP-1 next  year. No hx of pre or type II diabetes. No hx of CVD.     Recent diet changes: Increase in water. No longer drinking any pop. Decrease in wine. Increase in protein.     Recent exercise/activity changes: has been walking more.     Recent stressors: work is going well.         CURRENT WEIGHT:   153 lbs 0 oz    Initial Weight (lbs): 180 lbs  Last Visits Weight: 73.2 kg (161 lb 6.4 oz)  Cumulative weight loss (lbs): 27  Weight Loss Percentage: 15%    Wt Readings from Last 5 Encounters:   11/12/24 69.4 kg (153 lb)   08/06/24 72.6 kg (160 lb)   07/30/24 73.2 kg (161 lb 6.4 oz)   07/03/24 75.5 kg (166 lb 6.4 oz)   05/24/24 81.6 kg (180 lb)             11/10/2024    11:44 AM   Changes and Difficulties   I have made the following changes to my diet since my last visit: Increased my daily protein and fluid intake   With regards to my diet, I am still struggling with: -   I have made the following changes to my activity/exercise since my last visit: I walk a lot   With regards to my activity/exercise, I am still struggling with: -         MEDICATIONS:   Current Outpatient Medications   Medication Sig Dispense Refill     Semaglutide-Weight Management (WEGOVY) 1 MG/0.5ML pen Inject 1 mg subcutaneously once a week. 6 mL 1     Semaglutide-Weight Management (WEGOVY) 1.7 MG/0.75ML pen Inject 1.7 mg subcutaneously once a week. 9 mL 1     amLODIPine (NORVASC) 5 MG tablet Take 5 mg by mouth daily       DULoxetine (CYMBALTA) 60 MG capsule Take 1 capsule (60 mg) by mouth daily 90 capsule 1     ELIQUIS ANTICOAGULANT 2.5 MG tablet Take 1 tablet (2.5 mg) by mouth 2 times daily 180 tablet 2     fluticasone furoate 27.5 MCG/SPRAY nasal spray Spray 2 sprays into both nostrils daily 27.3 mL 2     gabapentin (NEURONTIN) 600 MG tablet Take 1 tablet (600 mg) by mouth 4 times daily Take 600mg in the morning and 1200mg at bedtime (Patient taking differently: Take 600 mg by mouth 2 times daily (In addition to the night time dose))        "HYDROcodone-acetaminophen (NORCO)  MG per tablet Take 1 tablet by mouth 4 times daily as needed       methocarbamol (ROBAXIN) 500 MG tablet Take 500 mg by mouth 4 times daily as needed for muscle spasms       naloxone (NARCAN) 4 MG/0.1ML nasal spray Spray 1 spray (4 mg) into one nostril alternating nostrils as needed for opioid reversal every 2-3 minutes until assistance arrives  0     Prenatal Vit-Fe Fumarate-FA (PRENATAL VITAMINS PO) Take 2 tablets by mouth every evening       RISEdronate (ACTONEL) 150 MG tablet TAKE 1 TABLET BY MOUTH EVERY 30 DAYS. 3 tablet 3     SUMAtriptan (IMITREX) 100 MG tablet Take 1 tablet (100 mg) by mouth at onset of headache for migraine. 18 tablet 3     topiramate (TOPAMAX) 25 MG tablet Take 1 tablet (25 mg) by mouth 2 times daily 180 tablet 1     Vitamin D3 (VITAMIN D, CHOLECALCIFEROL,) 25 mcg (1000 units) tablet Take 1 tablet by mouth daily             11/10/2024    11:44 AM   Weight Loss Medication History Reviewed With Patient   Which weight loss medications are you currently taking on a regular basis? Wegovy   Are you having any side effects from the weight loss medication that we have prescribed you? No               5/21/2024     6:07 AM   IRASEMA Score (Last Two)   IRASEMA Raw Score 34   Activation Score 68.9   IRASEMA Level 3         PHYSICAL EXAM:  Objective   Ht 1.702 m (5' 7.01\")   Wt 69.4 kg (153 lb)   BMI 23.96 kg/m             Vitals:  No vitals were obtained today due to virtual visit.    GENERAL: alert and no distress  EYES: Eyes grossly normal to inspection.  No discharge or erythema, or obvious scleral/conjunctival abnormalities.  RESP: No audible wheeze, cough, or visible cyanosis.    SKIN: Visible skin clear. No significant rash, abnormal pigmentation or lesions.  NEURO: Cranial nerves grossly intact.  Mentation and speech appropriate for age.  PSYCH: Appropriate affect, tone, and pace of words        Sincerely,    Eloisa Murry PA-C      28 minutes spent by me on " the date of the encounter doing chart review, history and exam, documentation and further activities per the note    The longitudinal plan of care for the diagnosis(es)/condition(s) as documented were addressed during this visit. Due to the added complexity in care, I will continue to support Angeli in the subsequent management and with ongoing continuity of care.      Again, thank you for allowing me to participate in the care of your patient.      Sincerely,    Eloisa Murry PA-C

## 2024-11-12 NOTE — NURSING NOTE
Current patient location: [address] [place] [address] [address]     [facility] [address]    Is the patient currently in the state of [place]? YES    Visit mode:VIDEO    If the visit is dropped, the patient can be reconnected by: VIDEO VISIT: Text to cell phone:   Telephone Information:   Mobile [phone]       Will anyone else be joining the visit? NO  (If patient encounters technical issues they should call [phone])    Are changes needed to the allergy or medication list? Pt stated no changes to allergies and Pt stated no med changes    Are refills needed on medications prescribed by this physician? NO    Reason for visit: RECHECK    Siena ADEN

## 2024-11-13 ENCOUNTER — MYC REFILL (OUTPATIENT)
Dept: FAMILY MEDICINE | Facility: CLINIC | Age: 64
End: 2024-11-13
Payer: COMMERCIAL

## 2024-11-13 DIAGNOSIS — G43.011 INTRACTABLE MIGRAINE WITHOUT AURA AND WITH STATUS MIGRAINOSUS: ICD-10-CM

## 2024-11-15 RX ORDER — SUMATRIPTAN SUCCINATE 100 MG/1
100 TABLET ORAL
Qty: 18 TABLET | Refills: 3 | Status: SHIPPED | OUTPATIENT
Start: 2024-11-15

## 2024-11-15 NOTE — TELEPHONE ENCOUNTER
Pending Prescriptions:                       Disp   Refills    SUMAtriptan (IMITREX) 100 MG tablet        18 tab*3        Sig: Take 1 tablet (100 mg) by mouth at onset of headache           for migraine.    Routing refill request to provider for review/approval because:  Requested Prescriptions   Pending Prescriptions Disp Refills    SUMAtriptan (IMITREX) 100 MG tablet 18 tablet 3     Sig: Take 1 tablet (100 mg) by mouth at onset of headache for migraine.       Serotonin Agonists Failed - 11/15/2024  9:14 AM        Failed - Serotonin Agonist request needs review.     If the patient has used less than or equal to nine (9) tablets or injections for migraine a month the RN may authorize the refill request.          Passed - Most recent blood pressure under 140/90 in past 12 months     BP Readings from Last 3 Encounters:   03/12/24 130/84   01/16/24 130/76   11/14/23 125/81       No data recorded            Passed - Medication is active on med list        Passed - Recent (12 mo) or future (90 days) visit within the authorizing provider's specialty     The patient must have completed an in-person or virtual visit within the past 12 months or has a future visit scheduled within the next 90 days with the authorizing provider s specialty.  Urgent care and e-visits do not quality as an office visit for this protocol.          Passed - Medication indicated for associated diagnosis     Medication is associated with one or more of the following diagnoses:     Cluster headache   Headache   Migraine          Passed - Patient is age 18 or older        Passed - No active pregnancy on record        Passed - No positive pregnancy test in past 12 months           Malena Espino RN  [facility]

## 2024-11-18 DIAGNOSIS — G43.011 INTRACTABLE MIGRAINE WITHOUT AURA AND WITH STATUS MIGRAINOSUS: ICD-10-CM

## 2024-11-19 RX ORDER — SUMATRIPTAN SUCCINATE 100 MG/1
100 TABLET ORAL
Qty: 18 TABLET | Refills: 3 | Status: SHIPPED | OUTPATIENT
Start: 2024-11-19

## 2024-12-09 DIAGNOSIS — M54.16 LUMBAR RADICULOPATHY: ICD-10-CM

## 2024-12-09 RX ORDER — HYDROCODONE BITARTRATE AND ACETAMINOPHEN 10; 325 MG/1; MG/1
1 TABLET ORAL EVERY 6 HOURS PRN
Qty: 120 TABLET | Refills: 0 | Status: SHIPPED | OUTPATIENT
Start: 2024-12-09 | End: 2025-01-08

## 2024-12-09 NOTE — TELEPHONE ENCOUNTER
Refill Request    Medication request: HYDROcodone-acetaminophen  MG Oral Tab.  Take 1 tablet by mouth every 6 (six) hours as needed for Pain.      LOV:2024 Yobany Saunders MD   Due back to clinic per last office note:  Follow-up:  3 months to 6 months.     NOV: 2025 Yobany Saunders [place]      [place]: 11/15/2024 #120 (30 days)    Urine drug screen (if applicable): none  Pain contract:  on 2023    LOV plan (if weaning or changing medications): She will continue with the Norco now for the pain, but I spoke to her about weaning down on this in the future. She would drop by an half of a tablet every month.

## 2024-12-16 RX ORDER — GABAPENTIN 600 MG/1
TABLET ORAL
Qty: 150 TABLET | Refills: 2 | Status: SHIPPED | OUTPATIENT
Start: 2024-12-16

## 2024-12-16 NOTE — TELEPHONE ENCOUNTER
Refill Request    Medication request: gabapentin 600 MG Oral Tab. Take 600 mg in the morning, 600 mg in the afternoon, 600 mg evening and 1200 mg at night.     LOV:4/24/2024 (Televisit) Yobany Saunders MD   Due back to clinic per last office note: Per Dr. Saunders: \"Follow-up:  3 months to 6 months.\"  NOV: 1/30/2025 Yobany Saunders MD      [place]: 10/13/2024 #150    Urine drug screen (if applicable): n/a  Pain contract: n/a    LOV plan (if weaning or changing medications): Per Dr. Saunders: \"She is still taking gabapentin 600 mg 5 times a day.\"

## 2025-01-09 ENCOUNTER — PATIENT MESSAGE (OUTPATIENT)
Dept: PHYSICAL MEDICINE AND REHAB | Facility: CLINIC | Age: 65
End: 2025-01-09

## 2025-01-09 DIAGNOSIS — M54.16 LUMBAR RADICULOPATHY: ICD-10-CM

## 2025-01-09 NOTE — TELEPHONE ENCOUNTER
Refill Request    Medication request: HYDROcodone-acetaminophen  MG Oral Tab   Sig:   Take 1 tablet by mouth every 6 (six) hours as needed for pain.    LOV: 2024 Yobany Saunders MD (TELEMED)  RTC: 3-6 months  NOV: 2025 Yobany Saunders MD      [place] 120# 2024    UDS: (if applicable): NONE  Pain contract:  on 23     LOV plan (if weaning or changing medications): She will continue with the Norco now for the pain, but I spoke to her about weaning down on this in the future. She would drop by an half of a tablet every month.

## 2025-01-10 RX ORDER — HYDROCODONE BITARTRATE AND ACETAMINOPHEN 10; 325 MG/1; MG/1
1 TABLET ORAL EVERY 6 HOURS PRN
Qty: 120 TABLET | Refills: 0 | Status: SHIPPED | OUTPATIENT
Start: 2025-01-10 | End: 2025-02-09

## 2025-01-10 RX ORDER — METHOCARBAMOL 500 MG/1
500 TABLET, FILM COATED ORAL 3 TIMES DAILY
Qty: 90 TABLET | Refills: 1 | Status: SHIPPED | OUTPATIENT
Start: 2025-01-10

## 2025-01-10 NOTE — TELEPHONE ENCOUNTER
Refill Request    Medication request: methocarbamol 500 MG Oral Tab Take 1 tablet (500 mg total) by mouth 3 (three) times daily.     LOV:4/24/2024 (tele) Yobany Saunders MD   Due back to clinic per last office note:   3 months to 6 months.   NOV: 1/30/2025 Yobany Saunders MD      [place]: 11/1/2024 #90 (30 days supply)  RN spoke with pharmacist who verified last fill.     Urine drug screen (if applicable): n/a  Pain contract: n/a    LOV plan (if weaning or changing medications): not mentioned

## 2025-01-30 ENCOUNTER — LAB ENCOUNTER (OUTPATIENT)
Dept: LAB | Facility: HOSPITAL | Age: 65
End: 2025-01-30
Attending: PHYSICAL MEDICINE & REHABILITATION
Payer: COMMERCIAL

## 2025-01-30 DIAGNOSIS — Z51.81 ENCOUNTER FOR THERAPEUTIC DRUG MONITORING: ICD-10-CM

## 2025-01-30 PROBLEM — F07.81 POST CONCUSSION SYNDROME: Status: ACTIVE | Noted: 2025-01-30

## 2025-01-30 LAB
AMPHET UR QL SCN: NEGATIVE
BARBITURATES UR QL SCN: NEGATIVE
BENZODIAZ UR QL SCN: NEGATIVE
CANNABINOIDS UR QL SCN: NEGATIVE
COCAINE UR QL: NEGATIVE
CREAT UR-SCNC: 100.2 MG/DL
FENTANYL UR QL SCN: NEGATIVE
MDMA UR QL SCN: NEGATIVE
METHADONE UR QL SCN: NEGATIVE
OXYCODONE UR QL SCN: NEGATIVE
PCP UR QL SCN: NEGATIVE

## 2025-01-30 PROCEDURE — 80307 DRUG TEST PRSMV CHEM ANLYZR: CPT

## 2025-01-30 PROCEDURE — 80361 OPIATES 1 OR MORE: CPT

## 2025-02-03 ENCOUNTER — PATIENT MESSAGE (OUTPATIENT)
Dept: PHYSICAL MEDICINE AND REHAB | Facility: CLINIC | Age: 65
End: 2025-02-03

## 2025-02-03 DIAGNOSIS — M54.16 LUMBAR RADICULOPATHY: Primary | ICD-10-CM

## 2025-02-05 LAB
CODEINE UR: NEGATIVE
HYDROCODONE CONF UR: >3000 NG/ML
HYDROCODONE UR: POSITIVE
HYDROMORPH CONF UR: 444 NG/ML
HYDROMORPH UR: POSITIVE
MORPHINE UR: NEGATIVE
OPIATES CLASS UR: POSITIVE NG/ML

## 2025-02-05 RX ORDER — PREGABALIN 100 MG/1
100 CAPSULE ORAL 3 TIMES DAILY
Qty: 90 CAPSULE | Refills: 0 | Status: SHIPPED
Start: 2025-02-05

## 2025-02-08 DIAGNOSIS — M54.16 LUMBAR RADICULOPATHY: ICD-10-CM

## 2025-02-10 RX ORDER — HYDROCODONE BITARTRATE AND ACETAMINOPHEN 10; 325 MG/1; MG/1
1 TABLET ORAL EVERY 6 HOURS PRN
Qty: 120 TABLET | Refills: 0 | Status: SHIPPED | OUTPATIENT
Start: 2025-02-10 | End: 2025-03-12

## 2025-02-10 NOTE — TELEPHONE ENCOUNTER
Refill Request    Medication request: HYDROcodone-acetaminophen  MG Oral Tab. Take 1 tablet by mouth every 6 (six) hours as needed for Pain.      LOV:2025 Yobany Saunders MD   Due back to clinic per last office note:  She will follow up in 3 months or sooner if needed.      NOV: 2025 Yobany Saunders MD      [place] 25 #120 (30 days)    Urine drug screen (if applicable): 25  Pain contract:  on 2023    LOV plan (if weaning or changing medications): She will continue with her current medications

## 2025-02-15 ENCOUNTER — HEALTH MAINTENANCE LETTER (OUTPATIENT)
Age: 65
End: 2025-02-15

## 2025-03-06 DIAGNOSIS — M54.16 LUMBAR RADICULOPATHY: ICD-10-CM

## 2025-03-06 RX ORDER — METHOCARBAMOL 500 MG/1
500 TABLET, FILM COATED ORAL 3 TIMES DAILY
Qty: 90 TABLET | Refills: 2 | Status: SHIPPED | OUTPATIENT
Start: 2025-03-06

## 2025-03-06 NOTE — TELEPHONE ENCOUNTER
Refill Request    Medication request: METHOCARBAMOL 500 MG Oral Tab  TAKE ONE TABLET BY MOUTH THREE TIMES A DAY     LOV:1/30/2025 Yobany Saunders MD   Due back to clinic per last office note:  \"She will follow up in 3 months or sooner if needed. \"  NOV: 5/22/2025 Yobany Saunders MD      [place]: 1/11/25 #90 - 30 day supply    Urine drug screen (if applicable): 1/30/25  Pain contract: Valid until 2/11/26    LOV plan (if weaning or changing medications): Per  at last office visit: \"She will continue with her current medications, but I will change the gabapentin to Lyrica to 100 mg TID. \"

## 2025-03-06 NOTE — TELEPHONE ENCOUNTER
Refill Request    Medication request: HYDROcodone-acetaminophen  MG Oral Tab  Take 1 tablet by mouth every 6 (six) hours as needed for Pain.     LOV:1/30/2025 Yobany Saunders MD   Due back to clinic per last office note:  \"She will follow up in 3 months or sooner if needed. \"  NOV: 5/22/2025 Yobany Saunders MD      [place]: 2/11/25 #120 - 30 day supply    Urine drug screen (if applicable): 1/30/25  Pain contract: Valid until 2/11/26    LOV plan (if weaning or changing medications): Per  at last office visit: \"She will continue with her current medications, but I will change the gabapentin to Lyrica to 100 mg TID. \"

## 2025-03-07 RX ORDER — HYDROCODONE BITARTRATE AND ACETAMINOPHEN 10; 325 MG/1; MG/1
1 TABLET ORAL EVERY 6 HOURS PRN
Qty: 120 TABLET | Refills: 0 | Status: SHIPPED | OUTPATIENT
Start: 2025-03-13 | End: 2025-04-12

## 2025-03-11 ENCOUNTER — MYC MEDICAL ADVICE (OUTPATIENT)
Dept: ENDOCRINOLOGY | Facility: CLINIC | Age: 65
End: 2025-03-11
Payer: COMMERCIAL

## 2025-03-11 DIAGNOSIS — E66.3 OVERWEIGHT (BMI 25.0-29.9): Primary | ICD-10-CM

## 2025-03-18 DIAGNOSIS — G43.011 INTRACTABLE MIGRAINE WITHOUT AURA AND WITH STATUS MIGRAINOSUS: ICD-10-CM

## 2025-03-24 NOTE — TELEPHONE ENCOUNTER
Left Voicemail (1st Attempt) and Sent Mychart (1st Attempt) for the patient to call back and schedule the following:    Appointment type: Medicare Wellness  Provider: PCP  Return date: Next available   Specialty phone number: [phone]

## 2025-03-24 NOTE — TELEPHONE ENCOUNTER
Last Written Prescription:  topiramate (TOPAMAX) 25 MG tablet 180 tablet 1 5/30/2024     ----------------------  Last Visit Date: 10/2/23  Future Visit Date: 0----------------------      Refill decision: Medication unable to be refilled by RN due to  Overdue for follow-up    Looks like she takes for migraines   should have been out of medication 11/2024       Request from pharmacy:  Requested Prescriptions   Pending Prescriptions Disp Refills    topiramate (TOPAMAX) 25 MG tablet [Pharmacy Med Name: TOPIRAMATE 25MG TABS] 180 tablet 1     Sig: TAKE ONE TABLET BY MOUTH TWICE A DAY       Anti-Seizure Meds Protocol  Failed - 3/24/2025  1:00 PM        Failed - Review Authorizing provider's last note.      Refer to last progress notes: confirm request is for original authorizing provider (cannot be through other providers).          Failed - Has GFR on file in past 12 months and most recent value is normal        Failed - Recent (12 mo) or future (90 days) visit within the authorizing provider's specialty     The patient must have completed an in-person or virtual visit within the past 12 months or has a future visit scheduled within the next 90 days with the authorizing provider s specialty.  Urgent care and e-visits do not qualify as an office visit for this protocol.          Passed - Normal ALT or AST on file in past 26 months     Recent Labs   Lab Test 11/15/23  1101   ALT 66*     Recent Labs   Lab Test 11/15/23  1101   AST 41             Passed - Medication is active on med list and the sig matches. RN to manually verify dose and sig if red X/fail.     If the protocol passes (green check), you do not need to verify med dose and sig.    A prescription matches if they are the same clinical intention.    For Example: once daily and every morning are the same.    The protocol can not identify upper and lower case letters as matching and will fail.     For Example: Take 1 tablet (50 mg) by mouth daily     TAKE 1 TABLET  (50 MG) BY MOUTH DAILY    For all fails (red x), verify dose and sig.    If the refill does match what is on file, the RN can still proceed to approve the refill request.       If they do not match, route to the appropriate provider.             Passed - Medication indicated for associated diagnosis     Medication is associated with one or more of the following diagnoses:     Bipolar   Dementia   Depression   Epilepsy   Migraine   Seizure   Trigeminal Neuralgia   Cyclothymia          Passed - No active pregnancy on record        Passed - No positive pregnancy test in last 12 months

## 2025-03-24 NOTE — TELEPHONE ENCOUNTER
Requested Renewals     Name from pharmacy: TOPIRAMATE 25MG TABS         Will file in chart as: topiramate (TOPAMAX) 25 MG tablet    Sig: Take 1 tablet (25 mg) by mouth 2 times daily.    Original sig: TAKE ONE TABLET BY MOUTH TWICE A DAY    Disp: 180 tablet    Refills: 0 (Pharmacy requested: 1)    Start: 3/24/2025    Class: E-Prescribe    Non-formulary For: Intractable migraine without aura and with status migrainosus    Last ordered: 9 months ago (5/30/2024) by Eloisa Fuentes MD    Last refill: 8/26/2024    Rx #: [id number]    Anti-Seizure Meds Protocol  [id number]/24/2025 04:51 PM   Protocol Details Review Authorizing provider's last note.    Has GFR on file in past 12 months and most recent value is normal    Recent (12 mo) or future (90 days) visit within the authorizing provider's specialty    Normal ALT or AST on file in past 26 months    Medication is active on med list and the sig matches. RN to manually verify dose and sig if red X/fail.    Medication indicated for associated diagnosis    No active pregnancy on record    No positive pregnancy test in last 12 months      To be filled at: [place], [place] - [address]

## 2025-03-25 RX ORDER — TOPIRAMATE 25 MG/1
25 TABLET, FILM COATED ORAL 2 TIMES DAILY
Qty: 60 TABLET | Refills: 0 | Status: SHIPPED | OUTPATIENT
Start: 2025-03-25

## 2025-03-26 NOTE — TELEPHONE ENCOUNTER
Patient confirmed scheduled appointment:  Date: 5/5/25  Time: 10:00am  Visit type: EPP  Provider: PCP  [place] [facility] [place]imaging: -  Additional notes: LOV 10/2023 - overdue annual / labs / prescription refills

## 2025-04-09 ENCOUNTER — PATIENT MESSAGE (OUTPATIENT)
Dept: PHYSICAL MEDICINE AND REHAB | Facility: CLINIC | Age: 65
End: 2025-04-09

## 2025-04-09 DIAGNOSIS — M54.16 LUMBAR RADICULOPATHY: ICD-10-CM

## 2025-04-09 RX ORDER — LIDOCAINE 50 MG/G
1 PATCH TOPICAL EVERY 24 HOURS
Qty: 30 PATCH | Refills: 3 | Status: SHIPPED | OUTPATIENT
Start: 2025-04-09

## 2025-04-09 RX ORDER — HYDROCODONE BITARTRATE AND ACETAMINOPHEN 10; 325 MG/1; MG/1
1 TABLET ORAL EVERY 6 HOURS PRN
Qty: 120 TABLET | Refills: 0 | Status: SHIPPED | OUTPATIENT
Start: 2025-04-12 | End: 2025-05-12

## 2025-04-09 NOTE — TELEPHONE ENCOUNTER
Patient requesting new prescription for lidocaine patch .    Patient mychart update/rx request forwarded on to .

## 2025-04-09 NOTE — TELEPHONE ENCOUNTER
Refill Request    Medication request: HYDROcodone-acetaminophen  MG Oral Tab Take 1 tablet by mouth every 6 (six) hours as needed for Pain.     LOV:1/30/2025 Yobany Saunders MD   Due back to clinic per last office note:  \"She will follow up in 3 months or sooner if needed. \"  NOV: 5/22/2025 Yobany Saunders [place]      [place]: 3/13/25 #120 - 30 day supply    Urine drug screen (if applicable): 1/30/25  Pain contract: Valid until 2/11/26    LOV plan (if weaning or changing medications): Per  at last office visit: \"She will continue with her current medications, but I will change the gabapentin to Lyrica to 100 mg TID. \"

## 2025-04-12 ENCOUNTER — ANCILLARY PROCEDURE (OUTPATIENT)
Dept: MAMMOGRAPHY | Facility: CLINIC | Age: 65
End: 2025-04-12
Attending: INTERNAL MEDICINE
Payer: COMMERCIAL

## 2025-04-12 DIAGNOSIS — M54.16 LUMBAR RADICULOPATHY: ICD-10-CM

## 2025-04-12 DIAGNOSIS — Z12.31 VISIT FOR SCREENING MAMMOGRAM: ICD-10-CM

## 2025-04-12 PROCEDURE — 77067 SCR MAMMO BI INCL CAD: CPT

## 2025-04-12 PROCEDURE — 77063 BREAST TOMOSYNTHESIS BI: CPT

## 2025-04-14 NOTE — TELEPHONE ENCOUNTER
Refill Request    Medication request: pregabalin 100 MG Oral Cap Take 1 capsule (100 mg total) by mouth in the morning, at noon, and at bedtime.     LOV:1/30/2025 Yobany Saunders MD   Due back to clinic per last office note:  \"She will follow up in 3 months or sooner if needed. \"  NOV: 5/22/2025 Yobany Saunders MD      [place]: 2/6/25 #90 - 30 day supply    Urine drug screen (if applicable): 1/30/25  Pain contract: Valid until 2/11/26    LOV plan (if weaning or changing medications): Per  at last office visit: \"She will continue with her current medications, but I will change the gabapentin to Lyrica to 100 mg TID. \"

## 2025-04-15 ENCOUNTER — VIRTUAL VISIT (OUTPATIENT)
Dept: ENDOCRINOLOGY | Facility: CLINIC | Age: 65
End: 2025-04-15
Payer: COMMERCIAL

## 2025-04-15 VITALS — BODY MASS INDEX: 21.19 KG/M2 | HEIGHT: 67 IN | WEIGHT: 135 LBS

## 2025-04-15 DIAGNOSIS — E66.3 OVERWEIGHT (BMI 25.0-29.9): Primary | ICD-10-CM

## 2025-04-15 DIAGNOSIS — I10 PRIMARY HYPERTENSION: ICD-10-CM

## 2025-04-15 PROCEDURE — 98006 SYNCH AUDIO-VIDEO EST MOD 30: CPT

## 2025-04-15 PROCEDURE — 1126F AMNT PAIN NOTED NONE PRSNT: CPT | Mod: 95

## 2025-04-15 RX ORDER — SEMAGLUTIDE 1.7 MG/.75ML
1.7 INJECTION, SOLUTION SUBCUTANEOUS WEEKLY
Qty: 3 ML | Refills: 5 | Status: SHIPPED | OUTPATIENT
Start: 2025-04-15

## 2025-04-15 ASSESSMENT — PAIN SCALES - GENERAL: PAINLEVEL_OUTOF10: NO PAIN (0)

## 2025-04-15 NOTE — NURSING NOTE
Current patient location: home     Is the patient currently in the state of [place]? YES    Visit mode: VIDEO    If the visit is dropped, the patient can be reconnected by:VIDEO VISIT: Text to cell phone:   Telephone Information:   Mobile [phone]       Will anyone else be joining the visit? NO  (If patient encounters technical issues they should call [phone]    Are changes needed to the allergy or medication list? Pt stated no changes to allergies and Pt stated no med changes    Are refills needed on medications prescribed by this physician? Discuss with provider    Rooming Documentation:  Questionnaire(s) completed      Reason for visit: RECHECK    Wt other than 24 hrs:    Pain more than one location: no   Tiffanie ADEN

## 2025-04-15 NOTE — PROGRESS NOTES
Virtual Visit Details    Type of service:  Video Visit     Originating Location (pt. Location): Home    Distant Location (provider location):  Off-site  Platform used for Video Visit: [facility] Medical Weight Management Note     Angeli Galindo  MRN:  [id number]  :  1960  KATELIN:  4/15/2025    Dear Eloisa Fuentes MD,    I had the pleasure of seeing your patient Angeli Galindo. She is a 65 year old female who I am continuing to see for treatment of obesity related to:         No data to display                Assessment & Plan   Problem List Items Addressed This Visit       Hypertension    Relevant Medications    Semaglutide-Weight Management (WEGOVY) 1.7 MG/0.75ML pen    Overweight (BMI 25.0-29.9) - Primary    Relevant Medications    Semaglutide-Weight Management (WEGOVY) 1.7 MG/0.75ML pen      Transition Wegovy 1.7mg once weekly. Cash pay with coupon. Stop Zepbound 1 week prior to starting Wegovy   Eloisa Monaco in 6 months     INTERVAL HISTORY:  S/p gastric sleeve in  in [place]   Highest weight - 339lbs   Flo weight after sleeve - around 280llb  S/p conversion to RYBP in  due to uncontrollable GERD  Flo weight after surgery - 153lb   Started Wegovy       Weight stable around 133-138lb. Feels fantastic at this weight. Weight loss has really improved her mental and physical health.     Reviewed that goal is maintain at this weight. Continued weight loss could be unhealthy, going to a too low BMI.     Anti-obesity medication history    Current:     Wegovy 1.7mg - complete 3 months worth. No side effects. Was helpful with weight loss, and then maintenance.     Zepbound 5mg - taking 1xweek. This is her second box. She is happy on this dose.       Recent diet changes: Continues to prioritize proteins. Minimizes carbs, or does not feel great. Portion sizes are well controlled. Minimal pop. Drinking 64oz fluids daily - water and unsweetened iced tea. Eating 3 meals a day. Minimal snacking.  B-  protein shake   L - ground beef + lettuce    D - tacos, other protein options     Recent exercise/activity changes: walking during and after work.     Recent stressors: minimal     Recent sleep changes: No concerns     Vitamins/Labs: prenatal, B12, hair and nails, calcium     CURRENT WEIGHT:   135 lbs 0 oz    Initial Weight (lbs): 180 lbs     Cumulative weight loss (lbs): 45  Weight Loss Percentage: 25%    Wt Readings from Last 5 Encounters:   04/15/25 61.2 kg (135 lb)   11/12/24 69.4 kg (153 lb)   08/06/24 72.6 kg (160 lb)   07/30/24 73.2 kg (161 lb 6.4 oz)   07/03/24 75.5 kg (166 lb 6.4 oz)             4/10/2025     8:31 AM   Changes and Difficulties   I have made the following changes to my diet since my last visit: Maintain a good, steady protein intake. Eat that first.   With regards to my diet, I am still struggling with: Not much. Would like to eat more vegetables.   I have made the following changes to my activity/exercise since my last visit: I am still walking a ton and loving it. Especially now that the weather is better :)   With regards to my activity/exercise, I am still struggling with: Being consistent.         MEDICATIONS:   Current Outpatient Medications   Medication Sig Dispense Refill    Semaglutide-Weight Management (WEGOVY) 1.7 MG/0.75ML pen Inject 1.7 mg subcutaneously once a week. 3 mL 5    amLODIPine (NORVASC) 5 MG tablet Take 5 mg by mouth daily      DULoxetine (CYMBALTA) 60 MG capsule Take 1 capsule (60 mg) by mouth daily 90 capsule 1    ELIQUIS ANTICOAGULANT 2.5 MG tablet Take 1 tablet (2.5 mg) by mouth 2 times daily 180 tablet 2    fluticasone furoate 27.5 MCG/SPRAY nasal spray Spray 2 sprays into both nostrils daily 27.3 mL 2    gabapentin (NEURONTIN) 600 MG tablet Take 1 tablet (600 mg) by mouth 4 times daily Take 600mg in the morning and 1200mg at bedtime (Patient taking differently: Take 600 mg by mouth 2 times daily (In addition to the night time dose))       "HYDROcodone-acetaminophen (NORCO)  MG per tablet Take 1 tablet by mouth 4 times daily as needed      methocarbamol (ROBAXIN) 500 MG tablet Take 500 mg by mouth 4 times daily as needed for muscle spasms      naloxone (NARCAN) 4 MG/0.1ML nasal spray Spray 1 spray (4 mg) into one nostril alternating nostrils as needed for opioid reversal every 2-3 minutes until assistance arrives  0    Prenatal Vit-Fe Fumarate-FA (PRENATAL VITAMINS PO) Take 2 tablets by mouth every evening      RISEdronate (ACTONEL) 150 MG tablet TAKE 1 TABLET BY MOUTH EVERY 30 DAYS. 3 tablet 3    SUMAtriptan (IMITREX) 100 MG tablet Take 1 tablet (100 mg) by mouth at onset of headache for migraine. 18 tablet 3    tirzepatide-weight management (ZEPBOUND) 5 MG/0.5ML vial Inject 0.5 mLs (5 mg) subcutaneously once a week. 2 mL 0    topiramate (TOPAMAX) 25 MG tablet Take 1 tablet (25 mg) by mouth 2 times daily. Please call [phone] for appt for further refills. 60 tablet 0    Vitamin D3 (VITAMIN D, CHOLECALCIFEROL,) 25 mcg (1000 units) tablet Take 1 tablet by mouth daily             4/10/2025     8:31 AM   Weight Loss Medication History Reviewed With Patient   Which weight loss medications are you currently taking on a regular basis? Topamax (topiramate)    Wegovy   Are you having any side effects from the weight loss medication that we have prescribed you? No       Objective    Ht 1.702 m (5' 7\")   Wt 61.2 kg (135 lb)   BMI 21.14 kg/m    Vitals - Patient Reported  Pain Score: No Pain (0)      Vitals:  No vitals were obtained today due to virtual visit.      PHYSICAL EXAM:  GENERAL: alert and no distress  EYES: Eyes grossly normal to inspection.  No discharge or erythema, or obvious scleral/conjunctival abnormalities.  RESP: No audible wheeze, cough, or visible cyanosis.    SKIN: Visible skin clear. No significant rash, abnormal pigmentation or lesions.  NEURO: Cranial nerves grossly intact.  Mentation and speech appropriate for age.  PSYCH: " Appropriate affect, tone, and pace of words        Sincerely,    MADELYN MckinnonC      31 minutes spent by me on the date of the encounter doing chart review, history and exam, documentation and further activities per the note    The longitudinal plan of care for the diagnosis(es)/condition(s) as documented were addressed during this visit. Due to the added complexity in care, I will continue to support Angeli in the subsequent management and with ongoing continuity of care.

## 2025-04-15 NOTE — PATIENT INSTRUCTIONS
Visit Plan:     Transition Wegovy 1.7mg once weekly. Cash pay with coupon. Stop Zepbound 1 week prior to starting Wegovy   Eloisa Monaco in 6 months

## 2025-04-15 NOTE — LETTER
4/15/2025       RE: Angeli Galindo  [address]   [address]  [place] [facility] [address]     Dear Colleague,    Thank you for referring your patient, Angeli Galindo, to the [facility]NA[facility] [place] at [facility]. Please see a copy of my visit note below.    Virtual Visit Details    Type of service:  Video Visit     Originating Location (pt. Location): Home    Distant Location (provider location):  Off-site  Platform used for Video Visit: [facility] Medical Weight Management Note     Angeli Galindo  MRN:  [id number]  :  1960  KATELIN:  4/15/2025    Dear Eloisa Fuentes MD,    I had the pleasure of seeing your patient Angeli Galindo. She is a 65 year old female who I am continuing to see for treatment of obesity related to:         No data to display                Assessment & Plan  Problem List Items Addressed This Visit       Hypertension    Relevant Medications    Semaglutide-Weight Management (WEGOVY) 1.7 MG/0.75ML pen    Overweight (BMI 25.0-29.9) - Primary    Relevant Medications    Semaglutide-Weight Management (WEGOVY) 1.7 MG/0.75ML pen      Transition Wegovy 1.7mg once weekly. Cash pay with coupon. Stop Zepbound 1 week prior to starting Wegovy   Eloisa oMnaco in 6 months     INTERVAL HISTORY:  S/p gastric sleeve in  in [place]   Highest weight - 339lbs   Flo weight after sleeve - around 280llb  S/p conversion to RYBP in  due to uncontrollable GERD  Flo weight after surgery - 153lb   Started Wegovy       Weight stable around 133-138lb. Feels fantastic at this weight. Weight loss has really improved her mental and physical health.     Reviewed that goal is maintain at this weight. Continued weight loss could be unhealthy, going to a too low BMI.     Anti-obesity medication history    Current:     Wegovy 1.7mg - complete 3 months worth. No side effects. Was helpful with weight loss, and then maintenance.      Zepbound 5mg - taking 1xweek. This is her second box. She is happy on this dose.       Recent diet changes: Continues to prioritize proteins. Minimizes carbs, or does not feel great. Portion sizes are well controlled. Minimal pop. Drinking 64oz fluids daily - water and unsweetened iced tea. Eating 3 meals a day. Minimal snacking.  B- protein shake   L - ground beef + lettuce    D - tacos, other protein options     Recent exercise/activity changes: walking during and after work.     Recent stressors: minimal     Recent sleep changes: No concerns     Vitamins/Labs: prenatal, B12, hair and nails, calcium     CURRENT WEIGHT:   135 lbs 0 oz    Initial Weight (lbs): 180 lbs     Cumulative weight loss (lbs): 45  Weight Loss Percentage: 25%    Wt Readings from Last 5 Encounters:   04/15/25 61.2 kg (135 lb)   11/12/24 69.4 kg (153 lb)   08/06/24 72.6 kg (160 lb)   07/30/24 73.2 kg (161 lb 6.4 oz)   07/03/24 75.5 kg (166 lb 6.4 oz)             4/10/2025     8:31 AM   Changes and Difficulties   I have made the following changes to my diet since my last visit: Maintain a good, steady protein intake. Eat that first.   With regards to my diet, I am still struggling with: Not much. Would like to eat more vegetables.   I have made the following changes to my activity/exercise since my last visit: I am still walking a ton and loving it. Especially now that the weather is better :)   With regards to my activity/exercise, I am still struggling with: Being consistent.         MEDICATIONS:   Current Outpatient Medications   Medication Sig Dispense Refill     Semaglutide-Weight Management (WEGOVY) 1.7 MG/0.75ML pen Inject 1.7 mg subcutaneously once a week. 3 mL 5     amLODIPine (NORVASC) 5 MG tablet Take 5 mg by mouth daily       DULoxetine (CYMBALTA) 60 MG capsule Take 1 capsule (60 mg) by mouth daily 90 capsule 1     ELIQUIS ANTICOAGULANT 2.5 MG tablet Take 1 tablet (2.5 mg) by mouth 2 times daily 180 tablet 2     fluticasone  "furoate 27.5 MCG/SPRAY nasal spray Spray 2 sprays into both nostrils daily 27.3 mL 2     gabapentin (NEURONTIN) 600 MG tablet Take 1 tablet (600 mg) by mouth 4 times daily Take 600mg in the morning and 1200mg at bedtime (Patient taking differently: Take 600 mg by mouth 2 times daily (In addition to the night time dose))       HYDROcodone-acetaminophen (NORCO)  MG per tablet Take 1 tablet by mouth 4 times daily as needed       methocarbamol (ROBAXIN) 500 MG tablet Take 500 mg by mouth 4 times daily as needed for muscle spasms       naloxone (NARCAN) 4 MG/0.1ML nasal spray Spray 1 spray (4 mg) into one nostril alternating nostrils as needed for opioid reversal every 2-3 minutes until assistance arrives  0     Prenatal Vit-Fe Fumarate-FA (PRENATAL VITAMINS PO) Take 2 tablets by mouth every evening       RISEdronate (ACTONEL) 150 MG tablet TAKE 1 TABLET BY MOUTH EVERY 30 DAYS. 3 tablet 3     SUMAtriptan (IMITREX) 100 MG tablet Take 1 tablet (100 mg) by mouth at onset of headache for migraine. 18 tablet 3     tirzepatide-weight management (ZEPBOUND) 5 MG/0.5ML vial Inject 0.5 mLs (5 mg) subcutaneously once a week. 2 mL 0     topiramate (TOPAMAX) 25 MG tablet Take 1 tablet (25 mg) by mouth 2 times daily. Please call [phone] for appt for further refills. 60 tablet 0     Vitamin D3 (VITAMIN D, CHOLECALCIFEROL,) 25 mcg (1000 units) tablet Take 1 tablet by mouth daily             4/10/2025     8:31 AM   Weight Loss Medication History Reviewed With Patient   Which weight loss medications are you currently taking on a regular basis? Topamax (topiramate)    Wegovy   Are you having any side effects from the weight loss medication that we have prescribed you? No       Objective   Ht 1.702 m (5' 7\")   Wt 61.2 kg (135 lb)   BMI 21.14 kg/m    Vitals - Patient Reported  Pain Score: No Pain (0)      Vitals:  No vitals were obtained today due to virtual visit.      PHYSICAL EXAM:  GENERAL: alert and no distress  EYES: Eyes " grossly normal to inspection.  No discharge or erythema, or obvious scleral/conjunctival abnormalities.  RESP: No audible wheeze, cough, or visible cyanosis.    SKIN: Visible skin clear. No significant rash, abnormal pigmentation or lesions.  NEURO: Cranial nerves grossly intact.  Mentation and speech appropriate for age.  PSYCH: Appropriate affect, tone, and pace of words        Sincerely,    Eloisa Murry PA-C      31 minutes spent by me on the date of the encounter doing chart review, history and exam, documentation and further activities per the note    The longitudinal plan of care for the diagnosis(es)/condition(s) as documented were addressed during this visit. Due to the added complexity in care, I will continue to support Angeli in the subsequent management and with ongoing continuity of care.      Again, thank you for allowing me to participate in the care of your patient.      Sincerely,    Eloisa Murry PA-C

## 2025-04-17 RX ORDER — PREGABALIN 100 MG/1
CAPSULE ORAL
Qty: 90 CAPSULE | Refills: 1 | Status: SHIPPED | OUTPATIENT
Start: 2025-04-17

## 2025-04-21 DIAGNOSIS — G43.011 INTRACTABLE MIGRAINE WITHOUT AURA AND WITH STATUS MIGRAINOSUS: ICD-10-CM

## 2025-04-22 NOTE — TELEPHONE ENCOUNTER
Last Written Prescription:   Disp Refills Start End SUSY   Vitamin D3 (VITAMIN D, CHOLECALCIFEROL,) 25 mcg (1000 units) tablet -- --  -- No   Sig - Route: Take 1 tablet by mouth daily - Oral     ----------------------  Last Visit Date:   3/12/2024  [facility]      Future Visit Date: 5/5/25      Refill decision: Medication unable to be refilled by RN due to: Medication - Last script is Reported/Historical/Transitional        Request from pharmacy:  Requested Prescriptions   Pending Prescriptions Disp Refills    topiramate (TOPAMAX) 25 MG tablet [Pharmacy Med Name: TOPIRAMATE 25MG TABS] 60 tablet 0     Sig: TAKE ONE TABLET BY MOUTH TWICE A DAY. PLEASE CALL [phone] FOR APPOINTMENT FOR FURTHER REFILLS       Anti-Seizure Meds Protocol  Failed - 4/22/2025  1:50 PM        Failed - Review Authorizing provider's last note.      Refer to last progress notes: confirm request is for original authorizing provider (cannot be through other providers).          Failed - Medication is active on med list and the sig matches. RN to manually verify dose and sig if red X/fail.     If the protocol passes (green check), you do not need to verify med dose and sig.    A prescription matches if they are the same clinical intention.    For Example: once daily and every morning are the same.    The protocol can not identify upper and lower case letters as matching and will fail.     For Example: Take 1 tablet (50 mg) by mouth daily     TAKE 1 TABLET (50 MG) BY MOUTH DAILY    For all fails (red x), verify dose and sig.    If the refill does match what is on file, the RN can still proceed to approve the refill request.       If they do not match, route to the appropriate provider.             Failed - Has GFR on file in past 12 months and most recent value is normal        Passed - Normal ALT or AST on file in past 26 months     Recent Labs   Lab Test 11/15/23  1101   ALT 66*     Recent Labs   Lab Test 11/15/23  1101   AST  41             Passed - Recent (12 mo) or future (90 days) visit within the authorizing provider's specialty     The patient must have completed an in-person or virtual visit within the past 12 months or has a future visit scheduled within the next 90 days with the authorizing provider s specialty.  Urgent care and e-visits do not qualify as an office visit for this protocol.          Passed - Medication indicated for associated diagnosis     Medication is associated with one or more of the following diagnoses:     Bipolar   Dementia   Depression   Epilepsy   Migraine   Seizure   Trigeminal Neuralgia   Cyclothymia          Passed - No active pregnancy on record        Passed - No positive pregnancy test in last 12 months

## 2025-04-23 RX ORDER — TOPIRAMATE 25 MG/1
25 TABLET, FILM COATED ORAL 2 TIMES DAILY
Qty: 60 TABLET | Refills: 0 | Status: SHIPPED | OUTPATIENT
Start: 2025-04-23

## 2025-04-28 ENCOUNTER — TELEPHONE (OUTPATIENT)
Dept: ENDOCRINOLOGY | Facility: CLINIC | Age: 65
End: 2025-04-28
Payer: COMMERCIAL

## 2025-04-28 NOTE — TELEPHONE ENCOUNTER
Patient confirmed scheduled appointment:     Date: 10/21/25  Time: 1PM  Visit type: Return Weight Management  Visit mode: Virtual Visit  Provider:  Eloisa Murry PA-C  [place] [facility] [place]    Additional Notes:

## 2025-05-01 ENCOUNTER — TELEPHONE (OUTPATIENT)
Dept: FAMILY MEDICINE | Facility: CLINIC | Age: 65
End: 2025-05-01
Payer: COMMERCIAL

## 2025-05-01 NOTE — TELEPHONE ENCOUNTER
Patient Quality Outreach    Patient is due for the following:   Hypertension -  BP check  Physical Annual Wellness Visit      Topic Date Due    Meningitis A Vaccine (1 - Risk 2-dose series) Never done    Diptheria Tetanus Pertussis (DTAP/TDAP/TD) Vaccine (1 - Tdap) Never done    Zoster (Shingles) Vaccine (1 of 2) Never done    COVID-19 Vaccine (5 - 2024-25 season) 09/01/2024       Action(s) Taken:   Patient has upcoming appointment, these items will be addressed at that time.    Patient has up coming appointment in [facility], may have moved clinics.     Type of outreach:    Chart review performed, no outreach needed.    Questions for provider review:    None         Brittany Angulo, ASIM  Chart routed to None.

## 2025-05-04 SDOH — HEALTH STABILITY: PHYSICAL HEALTH: ON AVERAGE, HOW MANY DAYS PER WEEK DO YOU ENGAGE IN MODERATE TO STRENUOUS EXERCISE (LIKE A BRISK WALK)?: 4 DAYS

## 2025-05-04 SDOH — HEALTH STABILITY: PHYSICAL HEALTH: ON AVERAGE, HOW MANY MINUTES DO YOU ENGAGE IN EXERCISE AT THIS LEVEL?: 30 MIN

## 2025-05-05 ENCOUNTER — LAB (OUTPATIENT)
Dept: LAB | Facility: CLINIC | Age: 65
End: 2025-05-05
Payer: COMMERCIAL

## 2025-05-05 ENCOUNTER — OFFICE VISIT (OUTPATIENT)
Dept: INTERNAL MEDICINE | Facility: CLINIC | Age: 65
End: 2025-05-05
Payer: COMMERCIAL

## 2025-05-05 VITALS
HEIGHT: 67 IN | TEMPERATURE: 97.6 F | BODY MASS INDEX: 21.41 KG/M2 | OXYGEN SATURATION: 100 % | RESPIRATION RATE: 16 BRPM | WEIGHT: 136.4 LBS | HEART RATE: 64 BPM | SYSTOLIC BLOOD PRESSURE: 111 MMHG | DIASTOLIC BLOOD PRESSURE: 73 MMHG

## 2025-05-05 DIAGNOSIS — Z86.718 HISTORY OF DEEP VENOUS THROMBOSIS: ICD-10-CM

## 2025-05-05 DIAGNOSIS — G43.011 INTRACTABLE MIGRAINE WITHOUT AURA AND WITH STATUS MIGRAINOSUS: ICD-10-CM

## 2025-05-05 DIAGNOSIS — I10 HYPERTENSION, UNSPECIFIED TYPE: ICD-10-CM

## 2025-05-05 DIAGNOSIS — Z23 NEED FOR VACCINATION: ICD-10-CM

## 2025-05-05 DIAGNOSIS — M85.852 OSTEOPENIA OF BOTH HIPS: ICD-10-CM

## 2025-05-05 DIAGNOSIS — Z91.81 PERSONAL HISTORY OF FALL: ICD-10-CM

## 2025-05-05 DIAGNOSIS — M85.851 OSTEOPENIA OF BOTH HIPS: ICD-10-CM

## 2025-05-05 DIAGNOSIS — R26.89 BALANCE PROBLEMS: ICD-10-CM

## 2025-05-05 DIAGNOSIS — Z87.81 PERSONAL HISTORY OF TRAUMATIC FRACTURE: ICD-10-CM

## 2025-05-05 DIAGNOSIS — Z00.00 ROUTINE GENERAL MEDICAL EXAMINATION AT A HEALTH CARE FACILITY: Primary | ICD-10-CM

## 2025-05-05 DIAGNOSIS — M81.0 OSTEOPOROSIS WITHOUT CURRENT PATHOLOGICAL FRACTURE, UNSPECIFIED OSTEOPOROSIS TYPE: ICD-10-CM

## 2025-05-05 DIAGNOSIS — Z98.84 H/O GASTRIC BYPASS: ICD-10-CM

## 2025-05-05 DIAGNOSIS — Z79.52 LONG TERM CURRENT USE OF SYSTEMIC STEROIDS: ICD-10-CM

## 2025-05-05 DIAGNOSIS — T07.XXXA MULTIPLE FRACTURES: ICD-10-CM

## 2025-05-05 LAB
ALBUMIN SERPL BCG-MCNC: 4.3 G/DL (ref 3.5–5.2)
ALP SERPL-CCNC: 130 U/L (ref 40–150)
ALT SERPL W P-5'-P-CCNC: 20 U/L (ref 0–50)
ANION GAP SERPL CALCULATED.3IONS-SCNC: 6 MMOL/L (ref 7–15)
AST SERPL W P-5'-P-CCNC: 24 U/L (ref 0–45)
BILIRUB SERPL-MCNC: 0.9 MG/DL
BUN SERPL-MCNC: 12.1 MG/DL (ref 8–23)
CALCIUM SERPL-MCNC: 9.2 MG/DL (ref 8.8–10.4)
CHLORIDE SERPL-SCNC: 112 MMOL/L (ref 98–107)
CHOLEST SERPL-MCNC: 134 MG/DL
CREAT SERPL-MCNC: 0.53 MG/DL (ref 0.51–0.95)
EGFRCR SERPLBLD CKD-EPI 2021: >90 ML/MIN/1.73M2
ERYTHROCYTE [DISTWIDTH] IN BLOOD BY AUTOMATED COUNT: 11.9 % (ref 10–15)
EST. AVERAGE GLUCOSE BLD GHB EST-MCNC: 85 MG/DL
FASTING STATUS PATIENT QL REPORTED: YES
FASTING STATUS PATIENT QL REPORTED: YES
GLUCOSE SERPL-MCNC: 94 MG/DL (ref 70–99)
HBA1C MFR BLD: 4.6 %
HCO3 SERPL-SCNC: 26 MMOL/L (ref 22–29)
HCT VFR BLD AUTO: 42 % (ref 35–47)
HDLC SERPL-MCNC: 63 MG/DL
HGB BLD-MCNC: 14.1 G/DL (ref 11.7–15.7)
LDLC SERPL CALC-MCNC: 59 MG/DL
MCH RBC QN AUTO: 33.3 PG (ref 26.5–33)
MCHC RBC AUTO-ENTMCNC: 33.6 G/DL (ref 31.5–36.5)
MCV RBC AUTO: 99 FL (ref 78–100)
NONHDLC SERPL-MCNC: 71 MG/DL
PLATELET # BLD AUTO: 348 10E3/UL (ref 150–450)
POTASSIUM SERPL-SCNC: 5.2 MMOL/L (ref 3.4–5.3)
PROT SERPL-MCNC: 7.3 G/DL (ref 6.4–8.3)
RBC # BLD AUTO: 4.23 10E6/UL (ref 3.8–5.2)
SODIUM SERPL-SCNC: 144 MMOL/L (ref 135–145)
TRIGL SERPL-MCNC: 61 MG/DL
TSH SERPL DL<=0.005 MIU/L-ACNC: 2.51 UIU/ML (ref 0.3–4.2)
VIT B12 SERPL-MCNC: 1138 PG/ML (ref 232–1245)
VIT D+METAB SERPL-MCNC: 29 NG/ML (ref 20–50)
WBC # BLD AUTO: 2.7 10E3/UL (ref 4–11)

## 2025-05-05 PROCEDURE — 80053 COMPREHEN METABOLIC PANEL: CPT | Performed by: PATHOLOGY

## 2025-05-05 PROCEDURE — 83036 HEMOGLOBIN GLYCOSYLATED A1C: CPT | Performed by: INTERNAL MEDICINE

## 2025-05-05 PROCEDURE — 90471 IMMUNIZATION ADMIN: CPT | Performed by: INTERNAL MEDICINE

## 2025-05-05 PROCEDURE — 82607 VITAMIN B-12: CPT | Performed by: INTERNAL MEDICINE

## 2025-05-05 PROCEDURE — 36415 COLL VENOUS BLD VENIPUNCTURE: CPT | Performed by: PATHOLOGY

## 2025-05-05 PROCEDURE — 85027 COMPLETE CBC AUTOMATED: CPT | Performed by: PATHOLOGY

## 2025-05-05 PROCEDURE — 3078F DIAST BP <80 MM HG: CPT | Performed by: INTERNAL MEDICINE

## 2025-05-05 PROCEDURE — 80061 LIPID PANEL: CPT | Performed by: PATHOLOGY

## 2025-05-05 PROCEDURE — 82306 VITAMIN D 25 HYDROXY: CPT | Performed by: INTERNAL MEDICINE

## 2025-05-05 PROCEDURE — 99397 PER PM REEVAL EST PAT 65+ YR: CPT | Mod: 25 | Performed by: INTERNAL MEDICINE

## 2025-05-05 PROCEDURE — 90619 MENACWY-TT VACCINE IM: CPT | Performed by: INTERNAL MEDICINE

## 2025-05-05 PROCEDURE — 3074F SYST BP LT 130 MM HG: CPT | Performed by: INTERNAL MEDICINE

## 2025-05-05 PROCEDURE — 84443 ASSAY THYROID STIM HORMONE: CPT | Performed by: PATHOLOGY

## 2025-05-05 PROCEDURE — 99000 SPECIMEN HANDLING OFFICE-LAB: CPT | Performed by: PATHOLOGY

## 2025-05-05 RX ORDER — TOPIRAMATE 25 MG/1
25 TABLET, FILM COATED ORAL 2 TIMES DAILY
Qty: 180 TABLET | Refills: 4 | Status: SHIPPED | OUTPATIENT
Start: 2025-05-05

## 2025-05-05 RX ORDER — LIDOCAINE 50 MG/G
1 PATCH TOPICAL DAILY
COMMUNITY
Start: 2025-04-09

## 2025-05-05 RX ORDER — TOPIRAMATE 25 MG/1
25 TABLET, FILM COATED ORAL 2 TIMES DAILY
Qty: 60 TABLET | Refills: 0 | Status: SHIPPED | OUTPATIENT
Start: 2025-05-05 | End: 2025-05-05

## 2025-05-05 RX ORDER — PREGABALIN 100 MG/1
CAPSULE ORAL
COMMUNITY
Start: 2025-04-17

## 2025-05-05 RX ORDER — RISEDRONATE SODIUM 150 MG/1
150 TABLET, FILM COATED ORAL
Qty: 3 TABLET | Refills: 1 | Status: SHIPPED | OUTPATIENT
Start: 2025-05-05

## 2025-05-05 RX ORDER — APIXABAN 2.5 MG/1
2.5 TABLET, FILM COATED ORAL 2 TIMES DAILY
Qty: 180 TABLET | Refills: 4 | Status: SHIPPED | OUTPATIENT
Start: 2025-05-05
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
## 2025-05-12 DIAGNOSIS — M54.16 LUMBAR RADICULOPATHY: ICD-10-CM

## 2025-05-13 NOTE — TELEPHONE ENCOUNTER
Refill Request    Medication request: HYDROcodone-acetaminophen  MG Oral Tab     LOV: 1/30/2025 Yobany Saunders MD   RTC: 3 months  NOV: 5/22/2025 Yobany Saunders MD      [place]: 4/12/2025 #30 days    UDS: (if applicable): 1/30/2025  Pain contract: Valid through 2/11/2026    LOV plan (if weaning or changing medications): She will continue with her current medications

## 2025-05-16 ENCOUNTER — PATIENT MESSAGE (OUTPATIENT)
Dept: PHYSICAL MEDICINE AND REHAB | Facility: CLINIC | Age: 65
End: 2025-05-16

## 2025-05-16 RX ORDER — HYDROCODONE BITARTRATE AND ACETAMINOPHEN 10; 325 MG/1; MG/1
1 TABLET ORAL EVERY 6 HOURS PRN
Qty: 120 TABLET | Refills: 0 | Status: SHIPPED | OUTPATIENT
Start: 2025-05-16 | End: 2025-06-15

## 2025-05-16 NOTE — TELEPHONE ENCOUNTER
Outpatient Medication Detail     Disp Refills Start End    HYDROcodone-acetaminophen  MG Oral Tab 120 tablet 0 5/16/2025 6/15/2025    Sig - Route: Take 1 tablet by mouth every 6 (six) hours as needed for Pain. - Oral    Sent to pharmacy as: HYDROcodone-Acetaminophen  MG Oral Tablet (Norco)    Earliest Fill Date: 5/16/2025    E-Prescribing Status: Receipt confirmed by pharmacy (5/16/2025 11:19 AM CDT)        Pharmacy    [place], [place] - [address] [phone], [phone]

## 2025-05-22 ENCOUNTER — TELEMEDICINE (OUTPATIENT)
Dept: PHYSICAL MEDICINE AND REHAB | Facility: CLINIC | Age: 65
End: 2025-05-22
Payer: COMMERCIAL

## 2025-05-22 DIAGNOSIS — M96.1 POSTLAMINECTOMY SYNDROME, LUMBAR REGION: ICD-10-CM

## 2025-05-22 DIAGNOSIS — M48.061 LUMBAR FORAMINAL STENOSIS: ICD-10-CM

## 2025-05-22 DIAGNOSIS — M54.16 LUMBAR RADICULOPATHY: ICD-10-CM

## 2025-05-22 DIAGNOSIS — G60.9 IDIOPATHIC PERIPHERAL NEUROPATHY: ICD-10-CM

## 2025-05-22 DIAGNOSIS — M47.816 LUMBAR FACET ARTHROPATHY: ICD-10-CM

## 2025-05-22 DIAGNOSIS — C64.2 RENAL CELL CARCINOMA OF LEFT KIDNEY (HCC): ICD-10-CM

## 2025-05-22 DIAGNOSIS — M51.26 LUMBAR HERNIATED DISC: Primary | ICD-10-CM

## 2025-05-22 DIAGNOSIS — M51.9 LUMBAR DISC DISEASE: ICD-10-CM

## 2025-05-22 DIAGNOSIS — M48.061 SPINAL STENOSIS OF LUMBAR REGION WITHOUT NEUROGENIC CLAUDICATION: ICD-10-CM

## 2025-05-22 DIAGNOSIS — S72.001D CLOSED FRACTURE OF RIGHT HIP WITH ROUTINE HEALING: ICD-10-CM

## 2025-05-22 DIAGNOSIS — M43.10 ACQUIRED SPONDYLOLISTHESIS: ICD-10-CM

## 2025-05-22 PROCEDURE — 99214 OFFICE O/P EST MOD 30 MIN: CPT | Performed by: PHYSICAL MEDICINE & REHABILITATION

## 2025-05-22 NOTE — PROGRESS NOTES
[facility]    Telemedicine Visit - Evaluation    Surekha Vu verbally consents to a Telemedicine Visit on 05/22/25. This visit is conducted using Telemedicine with live, interactive audio and video.    Patient understands and accepts financial responsibility for any deductible, co-insurance and/or co-pays associated with this service.    Chief Complaint:   Chief Complaint   Patient presents with    Low Back Pain       HISTORY OF PRESENT ILLNESS:   The patient is a 65 year old female who was last seen on 1/30/2025.  She has been using the Lidocaine patches which are helping her significantly.  She is using the Norco 10/325 for the pain 4 times a day.  She is saving the Norco for the severe pain.  The patches help the localized pain.  She has returned from [place] and she did well with the walking for the most part. Her weight is stable in the 130's.  Her AIC is 4.6 now.      She is still having dizziness and balance issues and was told that she had an old stroke in the past.  She will be starting PT for this again soon.    She is taking Lyrica 100 mg TID which is helping along with the Cymbalta 60 mg.  She is also taking the Robaxin 500 mg TID.    Description of the Pain  The pain is located in the bilateral low back.    The pain radiates to the bilateral lateral hip..  The pain at its best is 3/10. The pain at its worst is 9/10. The pain is currently  4/10.  The pain is described as a(n) aching sensation.    The pain is worse sitting still.    The pain is better the more active she is.  The numbness and tingling is unchanged.  There is not weakness in both legs.       PAST MEDICAL HISTORY:   Past Medical History[1]      PAST SURGICAL HISTORY:   Past Surgical History[2]      CURRENT MEDICATIONS:   Current Medications[3]      ALLERGIES:   Allergies[4]      FAMILY HISTORY:   Family History[5]       SOCIAL HISTORY:   Short Social Hx on File[6]       REVIEW OF SYSTEMS:    As per above HPI    PHYSICAL EXAM:   General: No immediate distress  Head: Normocephalic/ Atraumatic  Eyes: Extra-occular movements intact  Ears/Nose/Throat:  External appearance identifies normal appearance without obvious deformity  Cardiovascular: No cyanosis, clubbing or edema  Respiratory: Non-labored respirations  Skin: No lesions noted   Neurological: alert & oriented x 3, attentive, able to follow commands, comprehention intact, spontaneous speech intact  Psychiatric: Mood and affect appropriate    Musculoskeletal Exam:  Gait:    Gait: Normal gait   Sit to Stand: no difficulty      LABS:   No results found for: \"EAG\", \"A1C\"  No results found for: \"WBC\", \"RBC\", \"HGB\", \"HCT\", \"MCV\", \"MCH\", \"MCHC\", \"RDW\", \"PLT\", \"MPV\"  No results found for: \"GLU\", \"BUN\", \"BUNCREA\", \"CREATSERUM\", \"ANIONGAP\", \"GFR\", \"GFRNAA\", \"GFRAA\", \"CA\", \"OSMOCALC\", \"ALKPHO\", \"AST\", \"ALT\", \"ALKPHOS\", \"BILT\", \"TP\", \"ALB\", \"GLOBULIN\", \"AGRATIO\", \"NA\", \"K\", \"CL\", \"CO2\"  No results found for: \"PTP\", \"PT\", \"INR\"  No results found for: \"VITD\", \"QVITD\", \"[id number]\"    ASSESSMENT:     1. L5-S1 mild central herniated disc    2. L5-S1 left mod foraminal, L4-5 mod diffuse, L3-4 mod diffuse, L2-3 left mod foraminal & mild-mod diffuse bulging discs    3. L5-S1 left mild-mod, L4-5 bilateral mild, L3-4 left mild, L2-3 left mod foraminal stenosis    4. L3-4 mod central stenosis    5. Idiopathic peripheral neuropathy    6. Bilateral L5 radiculopathies     7. Lumbar facet arthropathy    8. Renal cell carcinoma of left kidney (HCC)    9. s/p left L4-5 laminectomy    10. L4-5 grade 1-2 unstable spondylolisthesis    11. Closed fracture of right hip with routine healing and s/p pinning of the hip          PLAN:   The patient will continue with her home exercise program.    The patient will continue with their current pain medications.    The patient does not need any injections at this time.    She will maintain her stable weight.    Follow-up:  6 months    We  discussed that a telemedicine visit is in place of an office visit; however, this limits the ability to perform a thorough physical examination which may affect objective findings related to a specific condition and can affect treatment.       The patient verbalized understanding with this plan and was in agreement.  There are no barriers to learning.  All questions were answered.    Yobany Saunders M.D.  Physical Medicine and Rehabilitation   5/22/2025    [facility]   I conducted a telehealth visit with Surekha Vu today, 05/22/25, which was completed using two-way, real-time interactive audio and video communication. This has been done in good chloe to provide continuity of care in the best interest of the provider-patient relationship, due to the COVID -19 public health crisis/national emergency where restrictions of face-to-face office visits are ongoing. Every conscious effort was taken to allow for sufficient and adequate time to complete the visit.  The patient was made aware of the limitations of the telehealth visit, including treatment limitations as no physical exam could be performed.  The patient was advised to call 911 or to go to the ER in case there was an emergency.  The patient was also advised of the potential privacy & security concerns related to the telehealth platform.   The patient was made aware of where to find [facility]'s notice of privacy practices, telehealth consent form and other related consent forms and documents.  which are located on the [facility] website. The patient verbally agreed to telehealth consent form, related consents and the risks discussed.    Lastly, the patient confirmed that they were in [place].   Included in this visit, time may have been spent reviewing labs, medications, radiology tests and decision making. Appropriate medical decision-making and tests are ordered as detailed in the plan of care above.  Coding/billing  information is submitted for this visit based on complexity of care and/or time spent for the visit.           [1]   Past Medical History:   Depression    PE (pulmonary embolism)   [2]   Past Surgical History:  Procedure Laterality Date    Anesth,lumbar spine,cord surgery            Kidney surgery  2023    Tumor removed    Laminectomy,lumbar  2009    Removal spleen, total     [3]   Current Outpatient Medications   Medication Sig Dispense Refill    HYDROcodone-acetaminophen  MG Oral Tab Take 1 tablet by mouth every 6 (six) hours as needed for Pain. 120 tablet 0    PREGABALIN 100 MG Oral Cap TAKE ONE CAPSULE BY MOUTH THREE TIMES A DAY, IN THE MORNING, AT NOON, AND AT BEDTIME 90 capsule 1    lidocaine 5 % External Patch Place 1 patch onto the skin daily. 30 patch 3    methocarbamol 500 MG Oral Tab Take 1 tablet (500 mg total) by mouth 3 (three) times daily. 90 tablet 2    gabapentin 600 MG Oral Tab TAKE ONE TABLET BY MOUTH EVERY MORNING, TAKE ONE TABLET EVERY AFTERNOON, TAKE ONE TABLET EVERY EVENING, AND TAKE TWO TABLETS EVERY NIGHT 150 tablet 2    DULoxetine 60 MG Oral Cap DR Particles Take 1 capsule (60 mg total) by mouth once daily. 90 capsule 3    Risedronate Sodium 5 MG Oral Tab Take 5 mg by mouth every morning before breakfast. MONTHLY      oxyCODONE ER 20 MG Oral Tablet Extended Release 12 hour Abuse-Deterrent Take 1 tablet (20 mg total) by mouth Q12H. 60 tablet 0    apixaban (ELIQUIS) 2.5 MG Oral Tab Take 1 tablet (2.5 mg total) by mouth 2 (two) times daily. 60 tablet 0    acetaminophen 500 MG Oral Tab Take 500 mg by mouth as needed.      vitamin B-12 50 MCG Oral Tab Take 500 mcg by mouth as needed.      Triamterene-HCTZ 37.5-25 MG Oral Tab Take 1 tablet by mouth as needed.      Naloxone HCl 4 MG/0.1ML Nasal Liquid 4 mg by Nasal route as needed. If patient remains unresponsive, repeat dose in other nostril 2-5 minutes after first dose. 1 kit 0    Calcium Carbonate-Vitamin D (CALCIUM + D  OR) Take 1 tablet by mouth 2 (two) times daily. three times per week      [4]   Allergies  Allergen Reactions    Shellfish-Derived Products ANGIOEDEMA    Lisinopril Coughing    Ace Inhibitors Coughing and OTHER (SEE COMMENTS)   [5]   Family History  Problem Relation Age of Onset    Cancer Father     Diabetes Mother     Other (Other) Mother         Parkinson   [6]   Social History  Socioeconomic History    Marital status:    Tobacco Use    Smoking status: Former     Current packs/day: 0.00     Types: Cigarettes     Quit date: 2000     Years since quittin.5    Smokeless tobacco: Never   Vaping Use    Vaping status: Never Used   Substance and Sexual Activity    Alcohol use: Yes     Alcohol/week: 0.0 standard drinks of alcohol     Comment: socially    Drug use: No   Other Topics Concern    Caffeine Concern Yes     Comment: 2 cups coffee daily    Exercise Yes     Comment: walk 1-3 miles per day.   Social History Narrative    The patient does not use an assistive device.    The patient does live in a home with stairs.     Social Drivers of Health     Food Insecurity: Low Risk  (2025)    Received from [employer]    Food Insecurity     Within the past 12 months, did you worry that your food would run out before you got money to buy more?: No     Within the past 12 months, did the food you bought just not last and you didn’t have money to get more?: No   Transportation Needs: Low Risk  (2025)    Received from [employer]    Transportation Needs     Within the past 12 months, has lack of transportation kept you from medical appointments, getting your medicines, non-medical meetings or appointments, work, or from getting things that you need?: No   Stress: No Stress Concern Present (2025)    Received from [employer]    [place] [place] [employer] - Occupational Stress Questionnaire     Feeling of Stress : Not at all   Housing Stability: Low Risk  (2025)    Received from [employer]     Housing Stability     Do you have housing? (Housing is defined as stable permanent housing and does not include staying outside in a car, in a tent, in an abandoned building, in an overnight shelter, or couch-surfing.): Yes     Are you worried about losing your housing?: No

## 2025-05-28 RX ORDER — METHOCARBAMOL 500 MG/1
500 TABLET, FILM COATED ORAL 3 TIMES DAILY
Qty: 90 TABLET | Refills: 2 | Status: SHIPPED | OUTPATIENT
Start: 2025-05-28

## 2025-05-28 NOTE — TELEPHONE ENCOUNTER
Refill Request    Medication request: methocarbamol 500 MG Oral Tab Take 1 tablet (500 mg total) by mouth 3 (three) times daily.     LOV:5/22/2025 Yobany Saunders MD   Due back to clinic per last office note:  \"Follow-up:  6 months \"  NOV: Visit date not found      [place] 5/6/25 #90 - 30 day supply    Urine drug screen (if applicable): n/a  Pain contract: n/a    LOV plan (if weaning or changing medications): Per  at last office visit: \"She is taking Lyrica 100 mg TID which is helping along with the Cymbalta 60 mg. She is also taking the Robaxin 500 mg TID. \"    \"The patient will continue with their current pain medications. \"

## 2025-06-10 DIAGNOSIS — M54.16 LUMBAR RADICULOPATHY: ICD-10-CM

## 2025-06-11 RX ORDER — HYDROCODONE BITARTRATE AND ACETAMINOPHEN 10; 325 MG/1; MG/1
1 TABLET ORAL EVERY 6 HOURS PRN
Qty: 120 TABLET | Refills: 0 | Status: SHIPPED | OUTPATIENT
Start: 2025-06-15 | End: 2025-07-15

## 2025-06-11 NOTE — TELEPHONE ENCOUNTER
Refill Request    Medication request: HYDROcodone-acetaminophen  MG Oral Tab  Take 1 tablet by mouth every 6 (six) hours as needed for Pain.     LOV:5/22/2025 - televisit Yobany Saunders MD   Due back to clinic per last office note:  \"Follow-up:  6 months \"  NOV: Visit date not found      [place]: 5/16/25 #120- 30 day supply    Urine drug screen (if applicable): 1/30/25  Pain contract: Valid until 2/11/26    LOV plan (if weaning or changing medications): Per  at last office visit: \"The patient will continue with their current pain medications.\"

## 2025-06-27 NOTE — TELEPHONE ENCOUNTER
Refill Request    Medication request:   Requested Prescriptions     Pending Prescriptions Disp Refills    DULoxetine 60 MG Oral Cap DR Particles 90 capsule 3     Sig: Take 1 capsule (60 mg total) by mouth in the morning.       LOV:5/22/2025 Yobany Saunders MD   Due back to clinic per last office note:  She will follow up in 3 months or sooner if needed.     NOV: 10/30/2025 Yobany Saunders MD      [place]: 02/08/2025 #90 - 90 day supply    Urine drug screen (if applicable): 01/30/2026  Pain contract: Expires 02/11/2026    LOV plan (if weaning or changing medications): She will continue with her current medications, but I will change the gabapentin to Lyrica to 100 mg TID.

## 2025-06-28 RX ORDER — DULOXETIN HYDROCHLORIDE 60 MG/1
60 CAPSULE, DELAYED RELEASE ORAL
Qty: 90 CAPSULE | Refills: 3 | Status: SHIPPED | OUTPATIENT
Start: 2025-06-28

## 2025-07-06 DIAGNOSIS — M54.16 LUMBAR RADICULOPATHY: ICD-10-CM

## 2025-07-07 RX ORDER — PREGABALIN 100 MG/1
CAPSULE ORAL
Qty: 90 CAPSULE | Refills: 1 | Status: SHIPPED | OUTPATIENT
Start: 2025-07-07

## 2025-07-07 NOTE — TELEPHONE ENCOUNTER
Refill Request    Medication request: PREGABALIN 100 MG Oral Cap  TAKE ONE CAPSULE BY MOUTH THREE TIMES A DAY, IN THE MORNING, AT NOON, AND AT BEDTIME     LOV:5/22/2025 Yobany Saunders MD   Due back to clinic per last office note:  \"Follow-up:  6 months \"  NOV: 10/30/2025 Yobany Saunders MD      [place]: 5/15/25 #90 - 30 day supply    Urine drug screen (if applicable): n/a  Pain contract: n/a    LOV plan (if weaning or changing medications): Per  at last office visit: \"The patient will continue with their current pain medications. \"

## 2025-07-09 DIAGNOSIS — M54.16 LUMBAR RADICULOPATHY: ICD-10-CM

## 2025-07-09 NOTE — TELEPHONE ENCOUNTER
Refill Request    Medication request: HYDROcodone-acetaminophen  MG Oral Tab.  Take 1 tablet by mouth every 6 (six) hours as needed for Pain.      LOV:5/22/2025 Yobany Saunders MD   Due back to clinic per last office note: Follow-up:  6 months   NOV: 10/30/2025 Yobany Saunders MD      [place]: 6/15/2025 #120 (30 days)    Urine drug screen (if applicable): 1/30/2025  Pain contract: 1/30/2025    LOV plan (if weaning or changing medications): The patient will continue with their current pain medications.

## 2025-07-10 RX ORDER — HYDROCODONE BITARTRATE AND ACETAMINOPHEN 10; 325 MG/1; MG/1
1 TABLET ORAL EVERY 6 HOURS PRN
Qty: 120 TABLET | Refills: 0 | Status: SHIPPED | OUTPATIENT
Start: 2025-07-10 | End: 2025-08-09

## 2025-07-15 ENCOUNTER — PATIENT MESSAGE (OUTPATIENT)
Dept: PHYSICAL MEDICINE AND REHAB | Facility: CLINIC | Age: 65
End: 2025-07-15

## 2025-07-15 DIAGNOSIS — M54.16 LUMBAR RADICULOPATHY: Primary | ICD-10-CM

## 2025-07-23 RX ORDER — GABAPENTIN 600 MG/1
600 TABLET ORAL 4 TIMES DAILY
Qty: 120 TABLET | Refills: 3 | Status: SHIPPED | OUTPATIENT
Start: 2025-07-23

## 2025-07-23 NOTE — TELEPHONE ENCOUNTER
Per patient request: \"I'd like to go back to Gabapentin and off the Lyrica. I think that helped more. I know it makes me more tired, but I will deal with it. \"    Patient is currently on pregabalin 100 mg three  times per day.  Patient was previously on Gabapentin 600 four times per day.    Patient message sent to  to advise on medication adjustment.

## 2025-08-01 DIAGNOSIS — M54.16 LUMBAR RADICULOPATHY: ICD-10-CM

## 2025-08-01 RX ORDER — HYDROCODONE BITARTRATE AND ACETAMINOPHEN 10; 325 MG/1; MG/1
1 TABLET ORAL EVERY 6 HOURS PRN
Qty: 120 TABLET | Refills: 0 | Status: SHIPPED | OUTPATIENT
Start: 2025-08-10 | End: 2025-09-09

## (undated) DIAGNOSIS — M54.16 LUMBAR RADICULOPATHY: Primary | ICD-10-CM

## (undated) DIAGNOSIS — M96.1 POSTLAMINECTOMY SYNDROME, LUMBAR REGION: ICD-10-CM

## (undated) DEVICE — GLOVE BIOGEL PI ULTRATOUCH G SZ 7.5 42175

## (undated) DEVICE — GLOVE UNDER INDICATOR PI SZ 6.5 LF 41665

## (undated) DEVICE — DAVINCI XI DRAPE ARM 470015

## (undated) DEVICE — SU VICRYL 0 CT-1 27" UND J260H

## (undated) DEVICE — SOL NACL 0.9% IRRIG 1000ML BOTTLE 2F7124

## (undated) DEVICE — SPONGE RAY-TEC 4X8" 7318

## (undated) DEVICE — GLOVE BIOGEL PI SZ 6.5 40865

## (undated) DEVICE — GLOVE PROTEXIS BLUE W/NEU-THERA 8.0  2D73EB80

## (undated) DEVICE — BLADE KNIFE SURG 10 371110

## (undated) DEVICE — DRSG GAUZE 2X2" TRAY 1806

## (undated) DEVICE — LINEN TOWEL PACK X6 WHITE 5487

## (undated) DEVICE — DRSG ABDOMINAL 07 1/2X8" 7197D

## (undated) DEVICE — SYSTEM LAPAROVUE VISIBILITY LAPVUE10

## (undated) DEVICE — DAVINCI XI SEAL UNIVERSAL 5-8MM 470361

## (undated) DEVICE — PREP CHLORAPREP 26ML TINTED HI-LITE ORANGE 930815

## (undated) DEVICE — Device

## (undated) DEVICE — SU VICRYL 2-0 CT-1 27" UND J259H

## (undated) DEVICE — DAVINCI HOT SHEARS TIP COVER  400180

## (undated) DEVICE — PACK DAVINCI UROLOGY SBA15UDFSG

## (undated) DEVICE — DRAPE STERI TOWEL LG 1010

## (undated) DEVICE — TUBING CONMED AIRSEAL SMOKE EVAC INSUFFLATION ASM-EVAC

## (undated) DEVICE — LAPAROSCOPIC TRANSDUCER COVER

## (undated) DEVICE — SOL WATER IRRIG 1000ML BOTTLE 2F7114

## (undated) DEVICE — DRAPE IOBAN INCISE 23X17" 6650EZ

## (undated) DEVICE — POUCH TISSUE RETRIEVAL LAP 10MM 2.21" INTRO TRS100SB2

## (undated) DEVICE — NDL INSUFFLATION 13GA 120MM C2201

## (undated) DEVICE — DRAPE CONVERTORS U-DRAPE 60X72" 8476

## (undated) DEVICE — DRSG PRIMAPORE 03 1/8X6" 66000318

## (undated) DEVICE — DAVINCI XI GRASPER PROGRASP 8MM EXT 471093

## (undated) DEVICE — ADH SKIN CLOSURE PREMIERPRO EXOFIN 1.0ML 3470

## (undated) DEVICE — SU ETHILON 2-0 FS 18" 664G

## (undated) DEVICE — SU VICRYL 0 UR-6 27" J603H

## (undated) DEVICE — DAVINCI XI DRAPE COLUMN 470341

## (undated) DEVICE — GLOVE BIOGEL PI MICRO INDICATOR UNDERGLOVE SZ 7.5 48975

## (undated) DEVICE — DAVINCI XI FCP BIPOLAR FENESTRATED 470205

## (undated) DEVICE — CUSTOM PACK MINOR SBA5BMNHEA

## (undated) DEVICE — DAVINCI XI NDL DRIVER LARGE 470006

## (undated) DEVICE — DRSG ADAPTIC 3X16"  6114

## (undated) DEVICE — ESU PENCIL SMOKE EVAC W/ROCKER SWITCH 0703-047-000

## (undated) DEVICE — DRAPE C-ARM W/STRAPS 42X72" 07-CA104

## (undated) DEVICE — TROCAR AIRSEAL BLADELESS 12X120MM IAS12-120

## (undated) DEVICE — GOWN IMPERVIOUS BREATHABLE SMART LG 89015

## (undated) DEVICE — LINEN TOWEL PACK X5 5464

## (undated) DEVICE — GLOVE PROTEXIS W/NEU-THERA 7.5  2D73TE75

## (undated) DEVICE — CATH TRAY FOLEY SURESTEP 16FR WDRAIN BAG STLK LATEX A300316A

## (undated) DEVICE — ESU GROUND PAD UNIVERSAL W/O CORD

## (undated) DEVICE — TUBING SUCTION MEDI-VAC SOFT 3/16"X20' N520A

## (undated) DEVICE — SUCTION MANIFOLD NEPTUNE 2 SYS 4 PORT 0702-020-000

## (undated) DEVICE — STPL SKIN SUBCUTICULAR INSORB  2030

## (undated) DEVICE — DRAPE MAYO STAND 23X54 8337

## (undated) DEVICE — SURGICEL HEMOSTAT 2X14" 1951

## (undated) DEVICE — SUCTION IRR STRYKERFLOW II W/TIP 250-070-520

## (undated) DEVICE — SU MONOCRYL 4-0 PS-2 18" UND Y496G

## (undated) DEVICE — RX SURGIFLO HEMOSTATIC MATRIX W/THROMBIN 8ML 2994

## (undated) DEVICE — TAPE DURAPORE 3" SILK 1538-3

## (undated) DEVICE — SUCTION MANIFOLD NEPTUNE 2 SYS 1 PORT 702-025-000

## (undated) DEVICE — DRAPE OR LAPAROTOMY KC 89228*

## (undated) DEVICE — GLOVE UNDER INDICATOR PI SZ 7.0 LF 41670

## (undated) DEVICE — DRAPE U SPLIT 74X120" 29440

## (undated) DEVICE — DRSG GAUZE 4X4" TRAY 6939

## (undated) DEVICE — SU MONOCRYL 3-0 PS-1 27" Y936H

## (undated) DEVICE — DRAPE SHEET REV FOLD 3/4 9349

## (undated) DEVICE — DECANTER VIAL 2006S

## (undated) DEVICE — SUTURE VICRYL+ 4-0 UNDYED PS-2 VCP496H

## (undated) DEVICE — DAVINCI XI MONOPOLAR SCISSORS HOT SHEARS 8MM 470179

## (undated) DEVICE — TUBING IV EXTENSION SET ANESTHESIA 34" MLL 2C6227

## (undated) DEVICE — GLOVE BIOGEL PI MICRO SZ 7.5 48575

## (undated) DEVICE — DRAPE STERI U 1015

## (undated) DEVICE — ESU GROUND PAD ADULT W/CORD E7507

## (undated) DEVICE — LINEN TOWEL PACK X30 5481

## (undated) DEVICE — CLIP ENDO HEMO-LOC PURPLE LG 544240

## (undated) DEVICE — KIT PATIENT CARE HANA PROFX W/BOOT LINER SUPINE 6851

## (undated) DEVICE — STPL ENDO ARTICULATING 45MM EC45A

## (undated) DEVICE — DRAPE SHEET MED 44X70" 9355

## (undated) DEVICE — DRAIN JACKSON PRATT RESERVOIR 100ML SU130-1305

## (undated) DEVICE — DRAIN JACKSON PRATT CHANNEL 19FR ROUND HUBLESS SIL JP-2230

## (undated) DEVICE — SYR BULB IRRIG 50ML LATEX FREE 0035280

## (undated) DEVICE — SU VICRYL 0 CT-1 36" J346H

## (undated) DEVICE — PLATE GROUNDING ADULT W/CORD 9165L

## (undated) DEVICE — DRAPE IOBAN ISOLATION VERTICAL 320X21CM 6617

## (undated) DEVICE — SOL NACL 0.9% INJ 1000ML BAG 2B1324X

## (undated) DEVICE — DRAPE POUCH IRR 1016

## (undated) RX ORDER — LIDOCAINE HYDROCHLORIDE 20 MG/ML
JELLY TOPICAL
Status: DISPENSED
Start: 2022-11-23

## (undated) RX ORDER — ACETAMINOPHEN 325 MG/1
TABLET ORAL
Status: DISPENSED
Start: 2023-04-14

## (undated) RX ORDER — FENTANYL CITRATE 50 UG/ML
INJECTION, SOLUTION INTRAMUSCULAR; INTRAVENOUS
Status: DISPENSED
Start: 2022-04-09

## (undated) RX ORDER — CEFAZOLIN SODIUM/WATER 2 G/20 ML
SYRINGE (ML) INTRAVENOUS
Status: DISPENSED
Start: 2023-04-14

## (undated) RX ORDER — VECURONIUM BROMIDE 1 MG/ML
INJECTION, POWDER, LYOPHILIZED, FOR SOLUTION INTRAVENOUS
Status: DISPENSED
Start: 2023-04-14

## (undated) RX ORDER — FENTANYL CITRATE 0.05 MG/ML
INJECTION, SOLUTION INTRAMUSCULAR; INTRAVENOUS
Status: DISPENSED
Start: 2023-04-14

## (undated) RX ORDER — HYDROMORPHONE HCL IN WATER/PF 6 MG/30 ML
PATIENT CONTROLLED ANALGESIA SYRINGE INTRAVENOUS
Status: DISPENSED
Start: 2023-04-14

## (undated) RX ORDER — HYDROMORPHONE HYDROCHLORIDE 1 MG/ML
INJECTION, SOLUTION INTRAMUSCULAR; INTRAVENOUS; SUBCUTANEOUS
Status: DISPENSED
Start: 2022-04-09

## (undated) RX ORDER — FENTANYL CITRATE 50 UG/ML
INJECTION, SOLUTION INTRAMUSCULAR; INTRAVENOUS
Status: DISPENSED
Start: 2023-04-14

## (undated) RX ORDER — BUPIVACAINE HYDROCHLORIDE 2.5 MG/ML
INJECTION, SOLUTION EPIDURAL; INFILTRATION; INTRACAUDAL
Status: DISPENSED
Start: 2023-04-14

## (undated) RX ORDER — PROPOFOL 10 MG/ML
INJECTION, EMULSION INTRAVENOUS
Status: DISPENSED
Start: 2023-04-14

## (undated) RX ORDER — GABAPENTIN 300 MG/1
CAPSULE ORAL
Status: DISPENSED
Start: 2023-04-14

## (undated) RX ORDER — ONDANSETRON 2 MG/ML
INJECTION INTRAMUSCULAR; INTRAVENOUS
Status: DISPENSED
Start: 2023-04-14

## (undated) RX ORDER — DEXAMETHASONE SODIUM PHOSPHATE 4 MG/ML
INJECTION, SOLUTION INTRA-ARTICULAR; INTRALESIONAL; INTRAMUSCULAR; INTRAVENOUS; SOFT TISSUE
Status: DISPENSED
Start: 2023-04-14

## (undated) RX ORDER — HYDROMORPHONE HYDROCHLORIDE 1 MG/ML
INJECTION, SOLUTION INTRAMUSCULAR; INTRAVENOUS; SUBCUTANEOUS
Status: DISPENSED
Start: 2023-04-14

## (undated) RX ORDER — CEFAZOLIN SODIUM/WATER 2 G/20 ML
SYRINGE (ML) INTRAVENOUS
Status: DISPENSED
Start: 2022-04-09

## (undated) RX ORDER — BUPIVACAINE HYDROCHLORIDE 2.5 MG/ML
INJECTION, SOLUTION EPIDURAL; INFILTRATION; INTRACAUDAL
Status: DISPENSED
Start: 2022-04-09

## (undated) RX ORDER — CIPROFLOXACIN 500 MG/1
TABLET, FILM COATED ORAL
Status: DISPENSED
Start: 2022-11-23

## (undated) NOTE — Clinical Note
6/2/2017      Priya Anderson MD  Physical Medicine and Rehabilitation  [address] [address]  Dept: [phone]  Dept Fax: [phone]        RE: Consultation for Farideh Perez        Dear Shakira Cordon,    Thank you v

## (undated) NOTE — LETTER
4/21/2020      Titi Ramon MD  Physical Medicine and Rehabilitation  [address] [address]  Dept: [phone]  Dept Fax: [phone]        RE: Consultation for Tristin Bowling        Dear Mesfin De La Cruz MD,    Thank

## (undated) NOTE — LETTER
2/1/2021      Roberto Hernandez MD  Physical Medicine and Rehabilitation  2010 [facility], [address] [place] [address]  Dept: [phone]  Dept Fax: [phone]        RE: Consultation for Danyel Almazan        Dear Gabriel Murphy MD,    Thank y

## (undated) NOTE — Clinical Note
1/27/2017      Kit Hollingsworth MD  Physical Medicine and Rehabilitation  [address] [address]  Dept: [phone]  Dept Fax: [phone]        RE: Consultation for Walter Moore        Dear Bhumi Hastings,    Thank you

## (undated) NOTE — LETTER
22        To Whom It May Concern:      Jared Katya  :  1960    []  Patient has been cleared to hold Eliquis for 2 days prior to bilateral L5 transforaminal epidural steroid injection. Holding the medication(s) may put the patient at an increased risk for stroke, heart attack, or neurologic or cardiac events. []  Patient has not been cleared to hold Eliquis for 2 days prior to bilateral L5 transforaminal epidural steroid injection. If this office may be of further assistance, please do not hesitate to contact us. Sincerely,    Alberto Bobby.  Geraldine Morales, [address] [place] [address]. [phone]  Fax. [phone]

## (undated) NOTE — LETTER
6/12/2020      Katie Rapp MD  Physical Medicine and Rehabilitation  2010 [facility], [address] [place] [address]  Dept: [phone]  Dept Fax: [phone]        RE: Consultation for Alicia Franco        Dear Abdirahman Lorenzo MD,    Thank

## (undated) NOTE — LETTER
4/16/2021      Denys Partida MD  Physical Medicine and Rehabilitation  [address] [address]  Dept: [phone]  Dept Fax: [phone]        RE: Consultation for Rohit Fuentes        Dear Morelia English MD,    Thank

## (undated) NOTE — LETTER
9/14/2017      Mimi Payne MD  Physical Medicine and Rehabilitation  [address] [address]  Dept: [phone]  Dept Fax: [phone]        RE: Consultation for Branden Ritter        Dear Ayaan Santiago,    Thank you

## (undated) NOTE — LETTER
9/30/2020      Emma Sherman MD  Physical Medicine and Rehabilitation  [address] [address]  Dept: [phone]  Dept Fax: [phone]        RE: Consultation for Mirtha Moulton        Dear Randall Whitley MD,    Thank

## (undated) NOTE — Clinical Note
[facility] SURGERY SCHEDULING FORM   [address] [place] [address]   [phone] (scheduling phone) [phone] (scheduling fax)     PATIENT INFORMATION   Patient Name:    Jaylin Lemon   :    1960   G Completed by:    Reji Duarte      Date:    5/18/2017    [facility] will follow its Pre-Admission Assessment and Screening Policy for pre-admission testing.   Physicians that require   additional testing must order this directly and have results faxed to [phone] at

## (undated) NOTE — LETTER
5/22/2025      Yobany Saunders MD  Physical Medicine and Rehabilitation  [address] [place] [address], [address]  [facility] [address]  Dept: [phone]  Dept Fax: [phone]        RE: Consultation for Surekha Vu        Dear Tess Davis MD,    Thank you very much for the opportunity to see your patient.  Attached please find a summary from your patient's recent visit.     I appreciate the chance to take care of your patient with you.  Please feel free to call me with any questions or concerns.    Sincerely,        Yobany Saunders MD  Electronically Signed on 5/22/2025

## (undated) NOTE — LETTER
OPIOID TREATMENT AGREEMENT    For Pain Management      Please read each statement, initial at the bottom of each page, and sign the last page to indicate your agreement with this form.  If you have any questions about any information in this form or the symptoms can include yawning, sweating, watery eyes, runny nose, anxiety, tremors, aching muscles, hot and cold flashes, “goose bumps”, abdominal cramps, and diarrhea. These symptoms can occur 24-48 hours after the last dose and can last up to 3 weeks. the treatment plan, referral to the Medication Assisted Therapy Program, an may result in termination of the physician/patient relationship. 8. I will not use any illicit substances, such as cocaine, marijuana, etc. while taking these medications.   I un refills of any medications during the evening or on weekends.     f. I must bring back all opioid medications and adjunctive medications prescribed by my physician in the original containers/bottles at every visit.    g. Prescriptions will not be written in induces changes that result in a lessening of one or more of the drug’s effects over time. The dose of the opioid may have to be adjusted up or down to a dose that produces maximum function and a realistic decrease of the patient’s pain.     12. If it appe 16. I agree to work closely with my physician/health care provider to assure the agreed plan of care is followed. 18. I acknowledge that I have received a copy of this agreement for my records.           I __________________________________________ have

## (undated) NOTE — LETTER
2/24/2020      Tyshawn Goetz MD  Physical Medicine and Rehabilitation  [address] [address]  Dept: [phone]  Dept Fax: [phone]        RE: Consultation for Donnell Guerrero        Dear Concepcion Galvan MD,    Thank

## (undated) NOTE — LETTER
3/13/2019      Titi Ramon MD  Physical Medicine and Rehabilitation  [address] [address]  Dept: [phone]  Dept Fax: [phone]        RE: Consultation for Tristin Bowling        Dear Mesfin De La Cruz MD,    Thank

## (undated) NOTE — LETTER
[address], [address], [address] [facility]  2010 [facility], [place] [address]  [phone]            February 26, 2018    The purpose of this Agreement is to prevent misunderstandings about certain medicati character and intensity of my pain, the effect of the pain on my daily life, and how well medicine is helping to relieve pain.    _______ I will not use any illegal controlled substances, including marijuana, cocaine, etc., nor will I misuse or self-prescr Date                 Time                 Signature of Witness

## (undated) NOTE — LETTER
10/12/2023      Cassia Alberto MD  Physical Medicine and Rehabilitation  2010 [facility], [address] [place] [address]  Dept: [phone]  Dept Fax: [phone]        RE: Consultation for Rawleigh Boeck        Dear Milvia Dalal MD,    Thank you very much for the opportunity to see your patient. Attached please find a summary from your patient's recent visit. I appreciate the chance to take care of your patient with you. Please feel free to call me with any questions or concerns. Sincerely,        Bam Wheeler.  Tori Alberto MD  Electronically Signed on 10/12/2023

## (undated) NOTE — LETTER
8/3/2022      Kasey Cerna MD  Physical Medicine and Rehabilitation  2010 [facility], [address] [place] [address]  Dept: [phone]  Dept Fax: [phone]        RE: Consultation for Yoshi Tatum        Dear Evelyn Sauceda MD,    Thank you very much for the opportunity to see your patient. Attached please find a summary from your patient's recent visit. I appreciate the chance to take care of your patient with you. Please feel free to call me with any questions or concerns. Sincerely,        Malik Davila.  Medardo Cerna MD  Electronically Signed on 8/3/2022

## (undated) NOTE — LETTER
[facility] SURGERY SCHEDULING FORM   [address] [facility]   [phone] (scheduling phone) [phone] (scheduling fax)     PATIENT INFORMATION   Last Name:      Mary Dominguez      First Name:    Vicky Johnson Allergies: Shellfish-Derived Products; Lisinopril; Ace Inhibitors  *PT LIVES OUT OF TOWN - CAN DO COVID TEST AFTERNOON PRIOR PER LAST INJECTION*       Completed by:     Beulah MILTON      Date:    7/21/2020

## (undated) NOTE — LETTER
4/28/2021      Angela Quarles MD  Physical Medicine and Rehabilitation  2010 [facility], [address] [place] [address]  Dept: [phone]  Dept Fax: [phone]        RE: Consultation for Suzzanna Severin        Dear Darryle Bowers, MD,    Thank

## (undated) NOTE — LETTER
4/24/2024      Yobany Sanuders MD  Physical Medicine and Rehabilitation  [address] [place] [address], [address]  [facility] [address]  Dept: [phone]  Dept Fax: [phone]        RE: Consultation for Surekha Vu        Dear Tess Davis MD,    Thank you very much for the opportunity to see your patient.  Attached please find a summary from your patient's recent visit.     I appreciate the chance to take care of your patient with you.  Please feel free to call me with any questions or concerns.    Sincerely,        Yobany Saunders MD  Electronically Signed on 4/24/2024

## (undated) NOTE — LETTER
24    To Whom It May Concern:      Surekha Vu  :  1960    []  Patient has been cleared to hold Eliquis for 2 days prior to bilateral L5 transforaminal epidural steroid injection.  Holding the medication(s) may put the patient at an increased risk for stroke, heart attack, or neurologic or cardiac events.    []  Patient has NOT been cleared to hold Eliquis for procedure.        X_________________________________________  (Provider signature)                                     (Date)      Please make a selection, sign and fax this letter back to our office    If this office may be of further assistance, please do not hesitate to contact us.      Sincerely,    Yobany Saunders MD    Phone #: [phone]  Fax #: [phone]

## (undated) NOTE — LETTER
24        To Whom It May Concern:      Surekha Vu  :  1960    []  Patient has been cleared to hold Eliquis for 2 days prior to bilateral L5 transforaminal epidural steroid injection.  Holding the medication(s) may put the patient at an increased risk for stroke, heart attack, or neurologic or cardiac events.    []  Patient has NOT been cleared to hold Eliquis for procedure.        X_________________________________________  (Provider signature)                                     (Date)      Please make a selection, sign and fax this letter back to our office    If this office may be of further assistance, please do not hesitate to contact us.      Sincerely,    Yobany Saunders MD    Phone #: [phone]  Fax #: [phone]

## (undated) NOTE — LETTER
[facility] SURGERY SCHEDULING FORM   [address] [facility]   [phone] (scheduling phone) [phone] (scheduling fax)     PATIENT INFORMATION   Patient Name:    Carlos Alberto Zhao   :    1960   G [facility] will follow its Pre-Admission Assessment and Screening Policy for pre-admission testing. Physicians that require   additional testing must order this directly and have results faxed to [facility] at [phone].

## (undated) NOTE — LETTER
[facility] SURGERY SCHEDULING FORM   [address] [facility]   [phone] (scheduling phone) [phone] (scheduling fax)     PATIENT INFORMATION   Last Name:      Mary Dominguez      First Name:    Vicky Johnson []  No or using our own   Allergies: Shellfish-Derived Products, Lisinopril, and Ace Inhibitors         Completed by:    Saintclair Rye      Date:    10/1/2020

## (undated) NOTE — LETTER
8/26/2019      Missael Corbin MD  Physical Medicine and Rehabilitation  [address] [address]  Dept: [phone]  Dept Fax: [phone]        RE: Consultation for Susanna Nava        Dear Joesph Hurtado MD,    Thank

## (undated) NOTE — LETTER
10/20/2017      Sreekanth Oliveira MD  Physical Medicine and Rehabilitation  2010 [facility], [address] [place] [address]  Dept: [phone]  Dept Fax: [phone]        RE: Consultation for Ponce Bello        Dear Celia Stallworth,    Thank you

## (undated) NOTE — LETTER
[facility] SURGERY SCHEDULING FORM   [address] [place] [address]   [phone] (scheduling phone) [phone] (scheduling fax)     PATIENT INFORMATION   Last Name:      Kyle Bond      First Name:    Rocio Lopez Allergies: Shellfish-Derived Products; Lisinopril;  Ace Inhibitors    Request last appt of the day as she comes in from [place], [place]  Patient refuses allergy prep and she states it is not needed     Completed by:    Evangelina Alcantar      Date:    3/12/2020

## (undated) NOTE — LETTER
23        To Whom It May Concern:      Sahra Zabala  :  1960    Dr. Alex Jauregui is recommending Bilateral L5 transforaminal epidural steroid injections for this patient. However, she will need clearance to hold her blood thinner. Patient informed our office she discontinued taking Eliquis on her own and will be making an appointment to see Dr. Tameka Medeiros. []  Patient has been cleared to hold Eliquis for 2 days prior to procedure. Holding the medication(s) may put the patient at an increased risk for stroke, heart attack, or neurologic or cardiac events. []  Patient has NOT been cleared to hold Eliquis for 2 days prior to procedure. X_________________________________________  (Provider signature)                                     (Date)      Please make a selection, sign and fax this letter back to our office    If this office may be of further assistance, please do not hesitate to contact us. Sincerely,    Stephania Montana.  Alex Jauregui MD    Phone #: [phone]  Fax #: [phone]

## (undated) NOTE — LETTER
2/26/2018      Angela Quarles MD  Physical Medicine and Rehabilitation  [address] [address]  Dept: [phone]  Dept Fax: [phone]        RE: Consultation for Suzzanna Severin        Dear Darryle Bowers,    Thank you

## (undated) NOTE — LETTER
5/20/2021      Priya Anderson MD  Physical Medicine and Rehabilitation  [address] [address]  Dept: [phone]  Dept Fax: [phone]        RE: Consultation for Farideh Perez        Dear Shakira Cordon MD,    Thank

## (undated) NOTE — LETTER
9/7/2018      Titi Ramon MD  Physical Medicine and Rehabilitation  2010 [facility], [address] [place] [address]  Dept: [phone]  Dept Fax: [phone]        RE: Consultation for Tristin Bowling        Dear Mesfin De La Cruz MD,    Thank y

## (undated) NOTE — LETTER
3/25/2022      Jj Mooney MD  Physical Medicine and Rehabilitation  2010 [facility], [address] [place] [address]  Dept: [phone]  Dept Fax: [phone]        RE: Consultation for Coco Bernard        Dear Edward Messina MD,    Thank you very much for the opportunity to see your patient. Attached please find a summary from your patient's recent visit. I appreciate the chance to take care of your patient with you. Please feel free to call me with any questions or concerns. Sincerely,        Lula Alvarado.  Marvin Mooney MD  Electronically Signed on 3/25/2022

## (undated) NOTE — LETTER
8/9/2018      Chandler Santana MD  Physical Medicine and Rehabilitation  [address] [place] [address] [address]  Dept: [phone]  Dept Fax: [phone]        RE: Consultation for Imani Gonzales        Dear Maria Esther Quesada,    Thank you v

## (undated) NOTE — LETTER
4/19/2022      Patrice Martinez MD  Physical Medicine and Rehabilitation  2010 [facility], [address] [place] [address]  Dept: [phone]  Dept Fax: [phone]        RE: Consultation for Roe Jacobo        Dear Sree Hartley MD,    Thank you very much for the opportunity to see your patient. Attached please find a summary from your patient's recent visit. I appreciate the chance to take care of your patient with you. Please feel free to call me with any questions or concerns. Sincerely,        Renetta Og.  Judy Martinez MD  Electronically Signed on 4/19/2022

## (undated) NOTE — LETTER
[facility] SURGERY SCHEDULING FORM   [address] [facility]   [phone] (scheduling phone) [phone] (scheduling fax)     PATIENT INFORMATION   Last Name:      Mary Dominguez      First Name:    Vicky Johnson Allergies: Shellfish-Derived Products;  Lisinopril    Request IV Benadryl at [facility] and first appt     Completed by:    Abbie Valdez      Date:    8/10/2018

## (undated) NOTE — LETTER
REQUEST LAST APPT OF THE DAY-FLYING IN FROM [facility] SURGERY SCHEDULING FORM   [address]   [phone] (scheduling phone) [phone] (scheduling fax)     PATIENT INFORMATION   Last Na []  No or using our own   Allergies: Shellfish-Derived Products;  Lisinopril    Request last appt as she is flying in from [place]-Please give IV Benadryl for allergy prep     Completed by:    Carina Quezada      Date:    11/14/2019

## (undated) NOTE — LETTER
11/19/2021      Fritz Champagne MD  Physical Medicine and Rehabilitation  [address] [place] [address] [address]  Dept: [phone]  Dept Fax: [phone]        RE: Consultation for Aurora Degroot        Dear Ella Mckeon MD,    Thank

## (undated) NOTE — LETTER
4/24/2018      Liliana Amin MD  Physical Medicine and Rehabilitation  [address] [place] [address] [address]  Dept: [phone]  Dept Fax: [phone]        RE: Consultation for Anisa Spore        Dear Aria Dasilva,    Thank you

## (undated) NOTE — MR AVS SNAPSHOT
[facility]  [address], [address]  [address] [facility] [phone]               Thank you for choosing us for your health care visit with Daniel Goetz MD.  We are glad to serve you and happy to provide you with this summary of you Commonly known as:  NEXIUM           gabapentin 400 MG Caps   Take 1 capsule (400 mg total) by mouth 3 (three) times daily.    Commonly known as:  NEURONTIN           HYDROcodone-acetaminophen 5-325 MG Tabs   Take 1 tablet by mouth every 4 (four) hours as n Tips for increasing your physical activity – Adults who are physically active are less likely to develop some chronic diseases than adults who are inactive.      HOW TO GET STARTED: HOW TO STAY MOTIVATED:   Start activities slowly and build up over time D

## (undated) NOTE — LETTER
9/7/2018      Jigna Quinones Nurse, MD  Physical Medicine and Rehabilitation  2010 [facility], [address] [place] [address]  Dept: [phone]  Dept Fax: [phone]        RE: Consultation for Awilda Merida        Dear Jaydon Balderrama MD,    Thank y

## (undated) NOTE — LETTER
21        To Whom It May Concern:      Kasey Last  :  1960    []  Patient has been cleared to hold Eliquis for 2 days prior to Bilateral L5 TFESI  Holding the medication(s) may put the patient at an increased risk for stroke, heart attack

## (undated) NOTE — Clinical Note
3/23/2017      Angela Quarles MD  Physical Medicine and Rehabilitation  [address] [place] [address] [address]  Dept: [phone]  Dept Fax: [phone]        RE: Consultation for Suzzanna Severin        Dear Darryle Bowers,    Thank you

## (undated) NOTE — LETTER
[facility] SURGERY SCHEDULING FORM   [address] [place] [address]   [phone] (scheduling phone) [phone] (scheduling fax)     PATIENT INFORMATION   Last Name:      Mortimer Cornet      First Name:    Yuri Amin Completed by:    Ernesto Chowdhury      Date:    4/13/2018

## (undated) NOTE — LETTER
11/15/2019      Mimi Payne MD  Physical Medicine and Rehabilitation  [address] [place] [place] [address]  Dept: [phone]  Dept Fax: [phone]        RE: Consultation for Branden Ritter        Dear Ayaan Santiago MD,    Thank

## (undated) NOTE — LETTER
[facility] SURGERY SCHEDULING FORM   [address]  [address] [facility]   [phone] (scheduling phone) [phone] (scheduling fax)     PATIENT INFORMATION   Last Name:      Britt Hollins      First Name:    Elizabeth Barillas Allergies: Shellfish-Derived Products; Lisinopril;  Ace Inhibitors         Completed by:    Andrae Mena      Date:    7/30/2020

## (undated) NOTE — LETTER
AUTHORIZATION FOR SURGICAL OPERATION OR OTHER PROCEDURE    1.  I hereby authorize Dr. Mikayla Austin and the [facility] staff assigned to my case to perform the following operation and/or procedure at the [facility]:    Right Achilles tendon platelet rich plas Patient signature:  ___________________________________________________             Relationship to Patient:           []  Parent    Responsible person                          []  Spouse  In case of minor or                    [] Other  _____________   In

## (undated) NOTE — LETTER
[facility] SURGERY SCHEDULING FORM   [address] [facility]   [phone] (scheduling phone) [phone] (scheduling fax)     PATIENT INFORMATION   Last Name:      Ruddy Polk      First Name:    Mulu Chao Allergies: Shellfish-Derived Products; Lisinopril; Ace Inhibitors         Completed by:     Beulah MILTON      Date:    6/3/2020

## (undated) NOTE — LETTER
3/3/2022      Javier Jauregui MD  Physical Medicine and Rehabilitation  [facility], [address] [place] [address]  Dept: [phone]  Dept Fax: [phone]        RE: Consultation for Sahra Zabala        Dear Jennifer Baldwin MD,    Thank you very much for the opportunity to see your patient. Attached please find a summary from your patient's recent visit. I appreciate the chance to take care of your patient with you. Please feel free to call me with any questions or concerns. Sincerely,        Stephania Montana.  Alex Jauregui MD  Electronically Signed on 3/3/2022